# Patient Record
Sex: FEMALE | Race: BLACK OR AFRICAN AMERICAN | Employment: UNEMPLOYED | URBAN - METROPOLITAN AREA
[De-identification: names, ages, dates, MRNs, and addresses within clinical notes are randomized per-mention and may not be internally consistent; named-entity substitution may affect disease eponyms.]

---

## 2017-08-08 PROBLEM — I51.7 CARDIOMEGALY: Status: ACTIVE | Noted: 2017-08-08

## 2017-08-08 PROBLEM — R09.02 HYPOXEMIA: Status: ACTIVE | Noted: 2017-08-08

## 2017-08-09 PROBLEM — R91.1 NODULE OF RIGHT LUNG: Status: ACTIVE | Noted: 2017-08-09

## 2018-04-08 ENCOUNTER — APPOINTMENT (OUTPATIENT)
Dept: GENERAL RADIOLOGY | Age: 63
DRG: 720 | End: 2018-04-08
Payer: COMMERCIAL

## 2018-04-08 ENCOUNTER — APPOINTMENT (OUTPATIENT)
Dept: CT IMAGING | Age: 63
DRG: 720 | End: 2018-04-08
Payer: COMMERCIAL

## 2018-04-08 ENCOUNTER — HOSPITAL ENCOUNTER (INPATIENT)
Age: 63
LOS: 5 days | Discharge: HOME HEALTH | DRG: 720 | End: 2018-04-14
Attending: EMERGENCY MEDICINE | Admitting: HOSPITALIST
Payer: COMMERCIAL

## 2018-04-08 DIAGNOSIS — A41.9 SEVERE SEPSIS (HCC): ICD-10-CM

## 2018-04-08 DIAGNOSIS — N17.0 ACUTE RENAL FAILURE WITH TUBULAR NECROSIS (HCC): ICD-10-CM

## 2018-04-08 DIAGNOSIS — N19 UREMIA: ICD-10-CM

## 2018-04-08 DIAGNOSIS — F14.10 COCAINE ABUSE (HCC): ICD-10-CM

## 2018-04-08 DIAGNOSIS — R65.20 SEVERE SEPSIS (HCC): ICD-10-CM

## 2018-04-08 DIAGNOSIS — E87.29 HIGH ANION GAP METABOLIC ACIDOSIS: ICD-10-CM

## 2018-04-08 DIAGNOSIS — G93.49 UREMIC ENCEPHALOPATHY: Primary | ICD-10-CM

## 2018-04-08 DIAGNOSIS — R74.8 ELEVATED LIPASE: ICD-10-CM

## 2018-04-08 DIAGNOSIS — N19 UREMIC ENCEPHALOPATHY: Primary | ICD-10-CM

## 2018-04-08 DIAGNOSIS — N17.9 ACUTE RENAL FAILURE, UNSPECIFIED ACUTE RENAL FAILURE TYPE (HCC): ICD-10-CM

## 2018-04-08 DIAGNOSIS — K92.2 GASTROINTESTINAL HEMORRHAGE, UNSPECIFIED GASTROINTESTINAL HEMORRHAGE TYPE: ICD-10-CM

## 2018-04-08 DIAGNOSIS — L08.9 LOCAL INFECTION OF SKIN AND SUBCUTANEOUS TISSUE: ICD-10-CM

## 2018-04-08 LAB
ACETAMINOPHEN LEVEL: <15 MCG/ML (ref 10–30)
ALBUMIN SERPL-MCNC: 3.6 G/DL (ref 3.5–5.2)
ALP BLD-CCNC: 129 U/L (ref 35–104)
ALT SERPL-CCNC: 12 U/L (ref 0–32)
AMMONIA: 27 UMOL/L (ref 11–51)
AMPHETAMINE SCREEN, URINE: NOT DETECTED
ANION GAP SERPL CALCULATED.3IONS-SCNC: 39 MMOL/L (ref 7–16)
AST SERPL-CCNC: 16 U/L (ref 0–31)
B.E.: -12.7 MMOL/L (ref -3–3)
BACTERIA: ABNORMAL /HPF
BARBITURATE SCREEN URINE: NOT DETECTED
BASOPHILS ABSOLUTE: 0.05 E9/L (ref 0–0.2)
BASOPHILS RELATIVE PERCENT: 0.2 % (ref 0–2)
BENZODIAZEPINE SCREEN, URINE: NOT DETECTED
BILIRUB SERPL-MCNC: 0.5 MG/DL (ref 0–1.2)
BILIRUBIN URINE: ABNORMAL
BLOOD, URINE: ABNORMAL
BUN BLDV-MCNC: 274 MG/DL (ref 8–23)
CALCIUM SERPL-MCNC: 9.7 MG/DL (ref 8.6–10.2)
CANNABINOID SCREEN URINE: NOT DETECTED
CARDIOPULMONARY BYPASS: NO
CHLORIDE BLD-SCNC: 91 MMOL/L (ref 98–107)
CLARITY: ABNORMAL
CO2: 8 MMOL/L (ref 22–29)
COCAINE METABOLITE SCREEN URINE: POSITIVE
COLOR: YELLOW
CREAT SERPL-MCNC: 15.3 MG/DL (ref 0.5–1)
DELIVERY SYSTEMS: ABNORMAL
DEVICE: ABNORMAL
EKG ATRIAL RATE: 102 BPM
EKG P AXIS: 61 DEGREES
EKG P-R INTERVAL: 160 MS
EKG Q-T INTERVAL: 342 MS
EKG QRS DURATION: 108 MS
EKG QTC CALCULATION (BAZETT): 445 MS
EKG T AXIS: 91 DEGREES
EKG VENTRICULAR RATE: 102 BPM
EOSINOPHILS ABSOLUTE: 0 E9/L (ref 0.05–0.5)
EOSINOPHILS RELATIVE PERCENT: 0 % (ref 0–6)
EPITHELIAL CELLS, UA: ABNORMAL /HPF
ETHANOL: <10 MG/DL (ref 0–0.08)
FIO2 ARTERIAL: 2
GFR AFRICAN AMERICAN: 3
GFR NON-AFRICAN AMERICAN: 3 ML/MIN/1.73
GLUCOSE BLD-MCNC: 157 MG/DL (ref 74–109)
GLUCOSE URINE: NEGATIVE MG/DL
HCO3 ARTERIAL: 12.9 MMOL/L (ref 22–26)
HCT VFR BLD CALC: 35 % (ref 34–48)
HEMOGLOBIN: 12.2 G/DL (ref 11.5–15.5)
IMMATURE GRANULOCYTES #: 0.13 E9/L
IMMATURE GRANULOCYTES %: 0.6 % (ref 0–5)
INR BLD: ABNORMAL
KETONES, URINE: ABNORMAL MG/DL
LACTIC ACID: 0.7 MMOL/L (ref 0.5–2.2)
LACTIC ACID: 1.5 MMOL/L (ref 0.5–2.2)
LEUKOCYTE ESTERASE, URINE: ABNORMAL
LIPASE: 211 U/L (ref 13–60)
LYMPHOCYTES ABSOLUTE: 1.14 E9/L (ref 1.5–4)
LYMPHOCYTES RELATIVE PERCENT: 5.4 % (ref 20–42)
MCH RBC QN AUTO: 29 PG (ref 26–35)
MCHC RBC AUTO-ENTMCNC: 34.9 % (ref 32–34.5)
MCV RBC AUTO: 83.3 FL (ref 80–99.9)
METHADONE SCREEN, URINE: NOT DETECTED
MONOCYTES ABSOLUTE: 0.96 E9/L (ref 0.1–0.95)
MONOCYTES RELATIVE PERCENT: 4.5 % (ref 2–12)
NEUTROPHILS ABSOLUTE: 18.92 E9/L (ref 1.8–7.3)
NEUTROPHILS RELATIVE PERCENT: 89.3 % (ref 43–80)
NITRITE, URINE: NEGATIVE
O2 SATURATION: 96.3 % (ref 92–98.5)
OPERATOR ID: 2124
OPIATE SCREEN URINE: NOT DETECTED
PCO2 ARTERIAL: 28.3 MMHG (ref 35–45)
PDW BLD-RTO: 14.7 FL (ref 11.5–15)
PH BLOOD GAS: 7.27 (ref 7.35–7.45)
PH UA: 5 (ref 5–9)
PHENCYCLIDINE SCREEN URINE: NOT DETECTED
PLATELET # BLD: 303 E9/L (ref 130–450)
PMV BLD AUTO: 12.2 FL (ref 7–12)
PO2 ARTERIAL: 94.3 MMHG (ref 60–80)
POTASSIUM SERPL-SCNC: 3.9 MMOL/L (ref 3.5–5)
PROPOXYPHENE SCREEN: NOT DETECTED
PROTEIN UA: ABNORMAL MG/DL
PROTHROMBIN TIME: ABNORMAL SEC (ref 9.3–12.4)
RBC # BLD: 4.2 E12/L (ref 3.5–5.5)
RBC UA: ABNORMAL /HPF (ref 0–2)
SALICYLATE, SERUM: <0.3 MG/DL (ref 0–30)
SODIUM BLD-SCNC: 138 MMOL/L (ref 132–146)
SOURCE, BLOOD GAS: ABNORMAL
SPECIFIC GRAVITY UA: >=1.03 (ref 1–1.03)
TOTAL CK: 292 U/L (ref 20–180)
TOTAL PROTEIN: 8.3 G/DL (ref 6.4–8.3)
TRICYCLIC ANTIDEPRESSANTS SCREEN SERUM: NEGATIVE NG/ML
TROPONIN: 0.01 NG/ML (ref 0–0.03)
UROBILINOGEN, URINE: 0.2 E.U./DL
WBC # BLD: 21.2 E9/L (ref 4.5–11.5)
WBC UA: ABNORMAL /HPF (ref 0–5)

## 2018-04-08 PROCEDURE — 70450 CT HEAD/BRAIN W/O DYE: CPT

## 2018-04-08 PROCEDURE — G0480 DRUG TEST DEF 1-7 CLASSES: HCPCS

## 2018-04-08 PROCEDURE — 2500000003 HC RX 250 WO HCPCS: Performed by: EMERGENCY MEDICINE

## 2018-04-08 PROCEDURE — 71045 X-RAY EXAM CHEST 1 VIEW: CPT

## 2018-04-08 PROCEDURE — 2000000000 HC ICU R&B

## 2018-04-08 PROCEDURE — 83605 ASSAY OF LACTIC ACID: CPT

## 2018-04-08 PROCEDURE — 36600 WITHDRAWAL OF ARTERIAL BLOOD: CPT

## 2018-04-08 PROCEDURE — 80307 DRUG TEST PRSMV CHEM ANLYZR: CPT

## 2018-04-08 PROCEDURE — 87186 SC STD MICRODIL/AGAR DIL: CPT

## 2018-04-08 PROCEDURE — 87070 CULTURE OTHR SPECIMN AEROBIC: CPT

## 2018-04-08 PROCEDURE — 36415 COLL VENOUS BLD VENIPUNCTURE: CPT

## 2018-04-08 PROCEDURE — 99285 EMERGENCY DEPT VISIT HI MDM: CPT

## 2018-04-08 PROCEDURE — 85025 COMPLETE CBC W/AUTO DIFF WBC: CPT

## 2018-04-08 PROCEDURE — 82803 BLOOD GASES ANY COMBINATION: CPT

## 2018-04-08 PROCEDURE — 6360000002 HC RX W HCPCS: Performed by: HOSPITALIST

## 2018-04-08 PROCEDURE — 85610 PROTHROMBIN TIME: CPT

## 2018-04-08 PROCEDURE — 82550 ASSAY OF CK (CPK): CPT

## 2018-04-08 PROCEDURE — 87077 CULTURE AEROBIC IDENTIFY: CPT

## 2018-04-08 PROCEDURE — 93005 ELECTROCARDIOGRAM TRACING: CPT | Performed by: EMERGENCY MEDICINE

## 2018-04-08 PROCEDURE — 6360000002 HC RX W HCPCS: Performed by: EMERGENCY MEDICINE

## 2018-04-08 PROCEDURE — 84484 ASSAY OF TROPONIN QUANT: CPT

## 2018-04-08 PROCEDURE — 82140 ASSAY OF AMMONIA: CPT

## 2018-04-08 PROCEDURE — 81001 URINALYSIS AUTO W/SCOPE: CPT

## 2018-04-08 PROCEDURE — C9113 INJ PANTOPRAZOLE SODIUM, VIA: HCPCS | Performed by: EMERGENCY MEDICINE

## 2018-04-08 PROCEDURE — 83690 ASSAY OF LIPASE: CPT

## 2018-04-08 PROCEDURE — 2580000003 HC RX 258: Performed by: HOSPITALIST

## 2018-04-08 PROCEDURE — 2580000003 HC RX 258: Performed by: EMERGENCY MEDICINE

## 2018-04-08 PROCEDURE — 87040 BLOOD CULTURE FOR BACTERIA: CPT

## 2018-04-08 PROCEDURE — 6360000002 HC RX W HCPCS: Performed by: PHYSICIAN ASSISTANT

## 2018-04-08 PROCEDURE — 02H633Z INSERTION OF INFUSION DEVICE INTO RIGHT ATRIUM, PERCUTANEOUS APPROACH: ICD-10-PCS | Performed by: HOSPITALIST

## 2018-04-08 PROCEDURE — 80053 COMPREHEN METABOLIC PANEL: CPT

## 2018-04-08 RX ORDER — 0.9 % SODIUM CHLORIDE 0.9 %
1000 INTRAVENOUS SOLUTION INTRAVENOUS ONCE
Status: COMPLETED | OUTPATIENT
Start: 2018-04-08 | End: 2018-04-08

## 2018-04-08 RX ORDER — DESMOPRESSIN ACETATE 4 UG/ML
1 INJECTION, SOLUTION INTRAVENOUS; SUBCUTANEOUS ONCE
Status: DISCONTINUED | OUTPATIENT
Start: 2018-04-08 | End: 2018-04-08

## 2018-04-08 RX ORDER — LORAZEPAM 2 MG/ML
1 INJECTION INTRAMUSCULAR ONCE
Status: COMPLETED | OUTPATIENT
Start: 2018-04-08 | End: 2018-04-08

## 2018-04-08 RX ORDER — 0.9 % SODIUM CHLORIDE 0.9 %
500 INTRAVENOUS SOLUTION INTRAVENOUS ONCE
Status: COMPLETED | OUTPATIENT
Start: 2018-04-08 | End: 2018-04-08

## 2018-04-08 RX ORDER — PANTOPRAZOLE SODIUM 40 MG/10ML
40 INJECTION, POWDER, LYOPHILIZED, FOR SOLUTION INTRAVENOUS ONCE
Status: COMPLETED | OUTPATIENT
Start: 2018-04-08 | End: 2018-04-08

## 2018-04-08 RX ORDER — FLUCONAZOLE, SODIUM CHLORIDE 2 MG/ML
100 INJECTION INTRAVENOUS ONCE
Status: COMPLETED | OUTPATIENT
Start: 2018-04-08 | End: 2018-04-09

## 2018-04-08 RX ADMIN — LORAZEPAM 1 MG: 2 INJECTION INTRAMUSCULAR; INTRAVENOUS at 20:52

## 2018-04-08 RX ADMIN — PANTOPRAZOLE SODIUM 40 MG: 40 INJECTION, POWDER, FOR SOLUTION INTRAVENOUS at 19:51

## 2018-04-08 RX ADMIN — PIPERACILLIN SODIUM AND TAZOBACTAM SODIUM 3.38 G: 3; .375 INJECTION, POWDER, LYOPHILIZED, FOR SOLUTION INTRAVENOUS at 19:51

## 2018-04-08 RX ADMIN — SODIUM CHLORIDE 500 ML: 9 INJECTION, SOLUTION INTRAVENOUS at 17:44

## 2018-04-08 RX ADMIN — SODIUM CHLORIDE 1000 ML: 9 INJECTION, SOLUTION INTRAVENOUS at 19:57

## 2018-04-08 RX ADMIN — FLUCONAZOLE 100 MG: 2 INJECTION INTRAVENOUS at 23:59

## 2018-04-08 RX ADMIN — DAPTOMYCIN 385 MG: 500 INJECTION, POWDER, LYOPHILIZED, FOR SOLUTION INTRAVENOUS at 21:59

## 2018-04-08 RX ADMIN — SODIUM CHLORIDE 1000 ML: 9 INJECTION, SOLUTION INTRAVENOUS at 17:44

## 2018-04-09 ENCOUNTER — APPOINTMENT (OUTPATIENT)
Dept: INTERVENTIONAL RADIOLOGY/VASCULAR | Age: 63
DRG: 720 | End: 2018-04-09
Payer: COMMERCIAL

## 2018-04-09 ENCOUNTER — APPOINTMENT (OUTPATIENT)
Dept: ULTRASOUND IMAGING | Age: 63
DRG: 720 | End: 2018-04-09
Payer: COMMERCIAL

## 2018-04-09 PROBLEM — A41.9 SEPSIS AFFECTING SKIN: Status: ACTIVE | Noted: 2018-04-09

## 2018-04-09 PROBLEM — N17.0 ACUTE RENAL FAILURE WITH TUBULAR NECROSIS (HCC): Status: ACTIVE | Noted: 2018-04-08

## 2018-04-09 PROBLEM — E87.20 METABOLIC ACIDOSIS: Status: ACTIVE | Noted: 2018-04-09

## 2018-04-09 PROBLEM — K62.5 RECTAL BLEEDING: Status: ACTIVE | Noted: 2018-04-09

## 2018-04-09 PROBLEM — F14.10 COCAINE ABUSE (HCC): Status: ACTIVE | Noted: 2018-04-09

## 2018-04-09 LAB
ALBUMIN SERPL-MCNC: 2.7 G/DL (ref 3.5–5.2)
ALP BLD-CCNC: 100 U/L (ref 35–104)
ALT SERPL-CCNC: 10 U/L (ref 0–32)
AMORPHOUS: ABNORMAL
ANION GAP SERPL CALCULATED.3IONS-SCNC: 31 MMOL/L (ref 7–16)
APTT: 24.9 SEC (ref 24.5–35.1)
APTT: 27.9 SEC (ref 24.5–35.1)
AST SERPL-CCNC: 12 U/L (ref 0–31)
BACTERIA: ABNORMAL /HPF
BILIRUB SERPL-MCNC: 0.4 MG/DL (ref 0–1.2)
BILIRUBIN URINE: NEGATIVE
BLOOD, URINE: ABNORMAL
BUN BLDV-MCNC: 254 MG/DL (ref 8–23)
C3 COMPLEMENT: 176 MG/DL (ref 90–180)
C4 COMPLEMENT: 62 MG/DL (ref 10–40)
CALCIUM SERPL-MCNC: 8.9 MG/DL (ref 8.6–10.2)
CHLORIDE BLD-SCNC: 98 MMOL/L (ref 98–107)
CHLORIDE URINE RANDOM: <20 MMOL/L
CLARITY: ABNORMAL
CO2: 14 MMOL/L (ref 22–29)
COLOR: ABNORMAL
CREAT SERPL-MCNC: 12.7 MG/DL (ref 0.5–1)
CREATININE URINE: 169 MG/DL (ref 29–226)
CRYSTALS, UA: ABNORMAL
EOSINOPHIL, URINE: 0 % (ref 0–1)
EPITHELIAL CELLS, UA: ABNORMAL /HPF
GFR AFRICAN AMERICAN: 4
GFR NON-AFRICAN AMERICAN: 4 ML/MIN/1.73
GLUCOSE BLD-MCNC: 181 MG/DL (ref 74–109)
GLUCOSE URINE: NEGATIVE MG/DL
HCT VFR BLD CALC: 26.9 % (ref 34–48)
HEMOGLOBIN: 9.8 G/DL (ref 11.5–15.5)
INR BLD: 1.1
INR BLD: 1.1
KETONES, URINE: NEGATIVE MG/DL
LACTIC ACID: 0.5 MMOL/L (ref 0.5–2.2)
LEUKOCYTE ESTERASE, URINE: ABNORMAL
MAGNESIUM: 1.8 MG/DL (ref 1.6–2.6)
MCH RBC QN AUTO: 29.8 PG (ref 26–35)
MCHC RBC AUTO-ENTMCNC: 36.4 % (ref 32–34.5)
MCV RBC AUTO: 81.8 FL (ref 80–99.9)
NITRITE, URINE: NEGATIVE
OSMOLALITY URINE: 411 MOSM/KG (ref 300–900)
OSMOLALITY: 389 MOSM/KG (ref 285–310)
PDW BLD-RTO: 14.5 FL (ref 11.5–15)
PH UA: 5 (ref 5–9)
PHOSPHORUS: 11.2 MG/DL (ref 2.5–4.5)
PLATELET # BLD: 255 E9/L (ref 130–450)
PMV BLD AUTO: 12.3 FL (ref 7–12)
POTASSIUM SERPL-SCNC: 3.4 MMOL/L (ref 3.5–5)
POTASSIUM, UR: 18.6 MMOL/L
PROTEIN PROTEIN: 52 MG/DL (ref 0–12)
PROTEIN UA: 30 MG/DL
PROTEIN/CREAT RATIO: 0.3
PROTEIN/CREAT RATIO: 0.3 (ref 0–0.2)
PROTHROMBIN TIME: 12.5 SEC (ref 9.3–12.4)
PROTHROMBIN TIME: 12.6 SEC (ref 9.3–12.4)
RBC # BLD: 3.29 E12/L (ref 3.5–5.5)
RBC UA: ABNORMAL /HPF (ref 0–2)
REPORT: NORMAL
RHEUMATOID FACTOR: 12 IU/ML (ref 0–13)
SODIUM BLD-SCNC: 143 MMOL/L (ref 132–146)
SODIUM URINE: <20 MMOL/L
SPECIFIC GRAVITY UA: 1.02 (ref 1–1.03)
TOTAL CK: 207 U/L (ref 20–180)
TOTAL PROTEIN: 7.1 G/DL (ref 6.4–8.3)
UREA NITROGEN, UR: 775 MG/DL (ref 800–1666)
UROBILINOGEN, URINE: 0.2 E.U./DL
WBC # BLD: 14.7 E9/L (ref 4.5–11.5)
WBC UA: ABNORMAL /HPF (ref 0–5)

## 2018-04-09 PROCEDURE — 2580000003 HC RX 258: Performed by: PHYSICIAN ASSISTANT

## 2018-04-09 PROCEDURE — 83930 ASSAY OF BLOOD OSMOLALITY: CPT

## 2018-04-09 PROCEDURE — 80053 COMPREHEN METABOLIC PANEL: CPT

## 2018-04-09 PROCEDURE — 87340 HEPATITIS B SURFACE AG IA: CPT

## 2018-04-09 PROCEDURE — 2000000000 HC ICU R&B

## 2018-04-09 PROCEDURE — 86038 ANTINUCLEAR ANTIBODIES: CPT

## 2018-04-09 PROCEDURE — 36415 COLL VENOUS BLD VENIPUNCTURE: CPT

## 2018-04-09 PROCEDURE — 2500000003 HC RX 250 WO HCPCS: Performed by: EMERGENCY MEDICINE

## 2018-04-09 PROCEDURE — 85730 THROMBOPLASTIN TIME PARTIAL: CPT

## 2018-04-09 PROCEDURE — 02HV33Z INSERTION OF INFUSION DEVICE INTO SUPERIOR VENA CAVA, PERCUTANEOUS APPROACH: ICD-10-PCS | Performed by: HOSPITALIST

## 2018-04-09 PROCEDURE — 94640 AIRWAY INHALATION TREATMENT: CPT

## 2018-04-09 PROCEDURE — 84100 ASSAY OF PHOSPHORUS: CPT

## 2018-04-09 PROCEDURE — 84540 ASSAY OF URINE/UREA-N: CPT

## 2018-04-09 PROCEDURE — 86235 NUCLEAR ANTIGEN ANTIBODY: CPT

## 2018-04-09 PROCEDURE — 83935 ASSAY OF URINE OSMOLALITY: CPT

## 2018-04-09 PROCEDURE — 76937 US GUIDE VASCULAR ACCESS: CPT | Performed by: RADIOLOGY

## 2018-04-09 PROCEDURE — 36556 INSERT NON-TUNNEL CV CATH: CPT | Performed by: RADIOLOGY

## 2018-04-09 PROCEDURE — 82550 ASSAY OF CK (CPK): CPT

## 2018-04-09 PROCEDURE — 86803 HEPATITIS C AB TEST: CPT

## 2018-04-09 PROCEDURE — 87081 CULTURE SCREEN ONLY: CPT

## 2018-04-09 PROCEDURE — 86431 RHEUMATOID FACTOR QUANT: CPT

## 2018-04-09 PROCEDURE — 84166 PROTEIN E-PHORESIS/URINE/CSF: CPT

## 2018-04-09 PROCEDURE — 86160 COMPLEMENT ANTIGEN: CPT

## 2018-04-09 PROCEDURE — 83605 ASSAY OF LACTIC ACID: CPT

## 2018-04-09 PROCEDURE — 83516 IMMUNOASSAY NONANTIBODY: CPT

## 2018-04-09 PROCEDURE — 2700000000 HC OXYGEN THERAPY PER DAY

## 2018-04-09 PROCEDURE — 5A1D70Z PERFORMANCE OF URINARY FILTRATION, INTERMITTENT, LESS THAN 6 HOURS PER DAY: ICD-10-PCS

## 2018-04-09 PROCEDURE — 84165 PROTEIN E-PHORESIS SERUM: CPT

## 2018-04-09 PROCEDURE — 2580000003 HC RX 258: Performed by: EMERGENCY MEDICINE

## 2018-04-09 PROCEDURE — 86703 HIV-1/HIV-2 1 RESULT ANTBDY: CPT

## 2018-04-09 PROCEDURE — C1751 CATH, INF, PER/CENT/MIDLINE: HCPCS

## 2018-04-09 PROCEDURE — 84133 ASSAY OF URINE POTASSIUM: CPT

## 2018-04-09 PROCEDURE — 86255 FLUORESCENT ANTIBODY SCREEN: CPT

## 2018-04-09 PROCEDURE — 82693 ASSAY OF ETHYLENE GLYCOL: CPT

## 2018-04-09 PROCEDURE — 85610 PROTHROMBIN TIME: CPT

## 2018-04-09 PROCEDURE — 84300 ASSAY OF URINE SODIUM: CPT

## 2018-04-09 PROCEDURE — 77001 FLUOROGUIDE FOR VEIN DEVICE: CPT | Performed by: RADIOLOGY

## 2018-04-09 PROCEDURE — 76775 US EXAM ABDO BACK WALL LIM: CPT

## 2018-04-09 PROCEDURE — 81001 URINALYSIS AUTO W/SCOPE: CPT

## 2018-04-09 PROCEDURE — 84156 ASSAY OF PROTEIN URINE: CPT

## 2018-04-09 PROCEDURE — 6360000002 HC RX W HCPCS: Performed by: PHYSICIAN ASSISTANT

## 2018-04-09 PROCEDURE — 82570 ASSAY OF URINE CREATININE: CPT

## 2018-04-09 PROCEDURE — 87186 SC STD MICRODIL/AGAR DIL: CPT

## 2018-04-09 PROCEDURE — 76536 US EXAM OF HEAD AND NECK: CPT

## 2018-04-09 PROCEDURE — 90935 HEMODIALYSIS ONE EVALUATION: CPT

## 2018-04-09 PROCEDURE — 80074 ACUTE HEPATITIS PANEL: CPT

## 2018-04-09 PROCEDURE — 83735 ASSAY OF MAGNESIUM: CPT

## 2018-04-09 PROCEDURE — 6370000000 HC RX 637 (ALT 250 FOR IP): Performed by: PHYSICIAN ASSISTANT

## 2018-04-09 PROCEDURE — 85027 COMPLETE CBC AUTOMATED: CPT

## 2018-04-09 PROCEDURE — 82436 ASSAY OF URINE CHLORIDE: CPT

## 2018-04-09 PROCEDURE — 87070 CULTURE OTHR SPECIMN AEROBIC: CPT

## 2018-04-09 PROCEDURE — 87205 SMEAR GRAM STAIN: CPT

## 2018-04-09 PROCEDURE — 2500000003 HC RX 250 WO HCPCS: Performed by: NURSE PRACTITIONER

## 2018-04-09 RX ORDER — ASPIRIN 81 MG/1
81 TABLET, CHEWABLE ORAL DAILY
Status: DISCONTINUED | OUTPATIENT
Start: 2018-04-09 | End: 2018-04-14 | Stop reason: HOSPADM

## 2018-04-09 RX ORDER — ONDANSETRON 2 MG/ML
4 INJECTION INTRAMUSCULAR; INTRAVENOUS EVERY 6 HOURS PRN
Status: DISCONTINUED | OUTPATIENT
Start: 2018-04-09 | End: 2018-04-14 | Stop reason: HOSPADM

## 2018-04-09 RX ORDER — ATORVASTATIN CALCIUM 10 MG/1
10 TABLET, FILM COATED ORAL DAILY
Status: DISCONTINUED | OUTPATIENT
Start: 2018-04-09 | End: 2018-04-14 | Stop reason: HOSPADM

## 2018-04-09 RX ORDER — SODIUM CHLORIDE 9 MG/ML
INJECTION, SOLUTION INTRAVENOUS CONTINUOUS
Status: DISCONTINUED | OUTPATIENT
Start: 2018-04-09 | End: 2018-04-09

## 2018-04-09 RX ORDER — METOPROLOL TARTRATE 5 MG/5ML
5 INJECTION INTRAVENOUS ONCE
Status: COMPLETED | OUTPATIENT
Start: 2018-04-09 | End: 2018-04-09

## 2018-04-09 RX ORDER — HEPARIN SODIUM 5000 [USP'U]/ML
5000 INJECTION, SOLUTION INTRAVENOUS; SUBCUTANEOUS EVERY 8 HOURS SCHEDULED
Status: DISCONTINUED | OUTPATIENT
Start: 2018-04-09 | End: 2018-04-09

## 2018-04-09 RX ORDER — METOPROLOL TARTRATE 50 MG/1
50 TABLET, FILM COATED ORAL 2 TIMES DAILY
Status: DISCONTINUED | OUTPATIENT
Start: 2018-04-09 | End: 2018-04-09

## 2018-04-09 RX ORDER — ACETAMINOPHEN 500 MG
500 TABLET ORAL EVERY 4 HOURS PRN
Status: DISCONTINUED | OUTPATIENT
Start: 2018-04-09 | End: 2018-04-14 | Stop reason: HOSPADM

## 2018-04-09 RX ORDER — SEVELAMER CARBONATE 800 MG/1
800 TABLET, FILM COATED ORAL
Status: DISCONTINUED | OUTPATIENT
Start: 2018-04-09 | End: 2018-04-14

## 2018-04-09 RX ORDER — SODIUM CHLORIDE 0.9 % (FLUSH) 0.9 %
10 SYRINGE (ML) INJECTION PRN
Status: DISCONTINUED | OUTPATIENT
Start: 2018-04-09 | End: 2018-04-14 | Stop reason: HOSPADM

## 2018-04-09 RX ORDER — DOCUSATE SODIUM 100 MG/1
100 CAPSULE, LIQUID FILLED ORAL 2 TIMES DAILY
Status: DISCONTINUED | OUTPATIENT
Start: 2018-04-09 | End: 2018-04-14 | Stop reason: HOSPADM

## 2018-04-09 RX ORDER — SODIUM CHLORIDE 0.9 % (FLUSH) 0.9 %
10 SYRINGE (ML) INJECTION EVERY 12 HOURS SCHEDULED
Status: DISCONTINUED | OUTPATIENT
Start: 2018-04-09 | End: 2018-04-14 | Stop reason: HOSPADM

## 2018-04-09 RX ADMIN — METOROPROLOL TARTRATE 5 MG: 5 INJECTION, SOLUTION INTRAVENOUS at 21:15

## 2018-04-09 RX ADMIN — Medication 10 ML: at 09:00

## 2018-04-09 RX ADMIN — DESMOPRESSIN ACETATE 29 MCG: 4 SOLUTION INTRAVENOUS at 02:26

## 2018-04-09 RX ADMIN — Medication: at 10:11

## 2018-04-09 RX ADMIN — Medication: at 18:26

## 2018-04-09 RX ADMIN — PIPERACILLIN SODIUM AND TAZOBACTAM SODIUM 3.38 G: 3; .375 INJECTION, POWDER, LYOPHILIZED, FOR SOLUTION INTRAVENOUS at 21:20

## 2018-04-09 RX ADMIN — Medication 10 ML: at 21:15

## 2018-04-09 RX ADMIN — PIPERACILLIN SODIUM AND TAZOBACTAM SODIUM 3.38 G: 3; .375 INJECTION, POWDER, LYOPHILIZED, FOR SOLUTION INTRAVENOUS at 08:33

## 2018-04-09 ASSESSMENT — PAIN SCALES - GENERAL
PAINLEVEL_OUTOF10: 0

## 2018-04-09 ASSESSMENT — ENCOUNTER SYMPTOMS
SHORTNESS OF BREATH: 0
ABDOMINAL PAIN: 0
NAUSEA: 0
VOMITING: 0

## 2018-04-10 ENCOUNTER — APPOINTMENT (OUTPATIENT)
Dept: GENERAL RADIOLOGY | Age: 63
DRG: 720 | End: 2018-04-10
Payer: COMMERCIAL

## 2018-04-10 LAB
ANCA IFA: NORMAL
ANION GAP SERPL CALCULATED.3IONS-SCNC: 19 MMOL/L (ref 7–16)
B.E.: 5 MMOL/L (ref -3–3)
BASOPHILS ABSOLUTE: 0.03 E9/L (ref 0–0.2)
BASOPHILS RELATIVE PERCENT: 0.3 % (ref 0–2)
BUN BLDV-MCNC: 146 MG/DL (ref 8–23)
CALCIUM SERPL-MCNC: 8.5 MG/DL (ref 8.6–10.2)
CHLORIDE BLD-SCNC: 98 MMOL/L (ref 98–107)
CO2: 30 MMOL/L (ref 22–29)
COHB: 1.8 % (ref 0–1.5)
CREAT SERPL-MCNC: 6.9 MG/DL (ref 0.5–1)
CRITICAL: ABNORMAL
DATE ANALYZED: ABNORMAL
DATE OF COLLECTION: ABNORMAL
EOSINOPHILS ABSOLUTE: 0.01 E9/L (ref 0.05–0.5)
EOSINOPHILS RELATIVE PERCENT: 0.1 % (ref 0–6)
GFR AFRICAN AMERICAN: 7
GFR NON-AFRICAN AMERICAN: 7 ML/MIN/1.73
GLUCOSE BLD-MCNC: 159 MG/DL (ref 74–109)
HAV IGM SER IA-ACNC: NORMAL
HCO3: 28.1 MMOL/L (ref 22–26)
HCT VFR BLD CALC: 23.9 % (ref 34–48)
HEMOGLOBIN: 8.5 G/DL (ref 11.5–15.5)
HEPATITIS B CORE IGM ANTIBODY: NORMAL
HEPATITIS B SURFACE ANTIGEN INTERPRETATION: NORMAL
HEPATITIS C ANTIBODY INTERPRETATION: NORMAL
HEPATITIS C ANTIBODY INTERPRETATION: NORMAL
HHB: 3.4 % (ref 0–5)
HIV-1 AND HIV-2 ANTIBODIES: NORMAL
IMMATURE GRANULOCYTES #: 0.07 E9/L
IMMATURE GRANULOCYTES %: 0.8 % (ref 0–5)
INFLUENZA A BY PCR: NOT DETECTED
INFLUENZA B BY PCR: NOT DETECTED
LAB: ABNORMAL
LYMPHOCYTES ABSOLUTE: 0.73 E9/L (ref 1.5–4)
LYMPHOCYTES RELATIVE PERCENT: 8.1 % (ref 20–42)
Lab: 925
MAGNESIUM: 1.5 MG/DL (ref 1.6–2.6)
MCH RBC QN AUTO: 29.3 PG (ref 26–35)
MCHC RBC AUTO-ENTMCNC: 35.6 % (ref 32–34.5)
MCV RBC AUTO: 82.4 FL (ref 80–99.9)
METHB: 0.1 % (ref 0–1.5)
MODE: ABNORMAL
MONOCYTES ABSOLUTE: 0.68 E9/L (ref 0.1–0.95)
MONOCYTES RELATIVE PERCENT: 7.5 % (ref 2–12)
MRSA CULTURE ONLY: NORMAL
NEUTROPHILS ABSOLUTE: 7.53 E9/L (ref 1.8–7.3)
NEUTROPHILS RELATIVE PERCENT: 83.2 % (ref 43–80)
O2 CONTENT: 11.9 ML/DL
O2 SATURATION: 96.5 % (ref 92–98.5)
O2HB: 94.7 % (ref 94–97)
OPERATOR ID: 557
PATIENT TEMP: 37 C
PCO2: 35.1 MMHG (ref 35–45)
PDW BLD-RTO: 13.7 FL (ref 11.5–15)
PH BLOOD GAS: 7.52 (ref 7.35–7.45)
PHOSPHORUS: 5.5 MG/DL (ref 2.5–4.5)
PLATELET # BLD: 225 E9/L (ref 130–450)
PMV BLD AUTO: 11.9 FL (ref 7–12)
PO2: 87.6 MMHG (ref 60–100)
POTASSIUM SERPL-SCNC: 3 MMOL/L (ref 3.5–5)
PRO-BNP: 1705 PG/ML (ref 0–125)
RBC # BLD: 2.9 E12/L (ref 3.5–5.5)
SODIUM BLD-SCNC: 147 MMOL/L (ref 132–146)
SOURCE, BLOOD GAS: ABNORMAL
THB: 8.8 G/DL (ref 11.5–16.5)
TIME ANALYZED: 925
WBC # BLD: 9.1 E9/L (ref 4.5–11.5)

## 2018-04-10 PROCEDURE — 94762 N-INVAS EAR/PLS OXIMTRY CONT: CPT

## 2018-04-10 PROCEDURE — 2580000003 HC RX 258: Performed by: SPECIALIST

## 2018-04-10 PROCEDURE — 36415 COLL VENOUS BLD VENIPUNCTURE: CPT

## 2018-04-10 PROCEDURE — 84100 ASSAY OF PHOSPHORUS: CPT

## 2018-04-10 PROCEDURE — 83880 ASSAY OF NATRIURETIC PEPTIDE: CPT

## 2018-04-10 PROCEDURE — 2500000003 HC RX 250 WO HCPCS: Performed by: NURSE PRACTITIONER

## 2018-04-10 PROCEDURE — 83735 ASSAY OF MAGNESIUM: CPT

## 2018-04-10 PROCEDURE — 2580000003 HC RX 258: Performed by: PHYSICIAN ASSISTANT

## 2018-04-10 PROCEDURE — 73100 X-RAY EXAM OF WRIST: CPT

## 2018-04-10 PROCEDURE — C9113 INJ PANTOPRAZOLE SODIUM, VIA: HCPCS | Performed by: HOSPITALIST

## 2018-04-10 PROCEDURE — 90935 HEMODIALYSIS ONE EVALUATION: CPT

## 2018-04-10 PROCEDURE — 80048 BASIC METABOLIC PNL TOTAL CA: CPT

## 2018-04-10 PROCEDURE — 36600 WITHDRAWAL OF ARTERIAL BLOOD: CPT

## 2018-04-10 PROCEDURE — 87502 INFLUENZA DNA AMP PROBE: CPT

## 2018-04-10 PROCEDURE — 2580000003 HC RX 258: Performed by: EMERGENCY MEDICINE

## 2018-04-10 PROCEDURE — 82805 BLOOD GASES W/O2 SATURATION: CPT

## 2018-04-10 PROCEDURE — 6360000002 HC RX W HCPCS: Performed by: PHYSICIAN ASSISTANT

## 2018-04-10 PROCEDURE — 2000000000 HC ICU R&B

## 2018-04-10 PROCEDURE — 2500000003 HC RX 250 WO HCPCS: Performed by: EMERGENCY MEDICINE

## 2018-04-10 PROCEDURE — 2580000003 HC RX 258: Performed by: HOSPITALIST

## 2018-04-10 PROCEDURE — 6360000002 HC RX W HCPCS: Performed by: SPECIALIST

## 2018-04-10 PROCEDURE — 6370000000 HC RX 637 (ALT 250 FOR IP): Performed by: PHYSICIAN ASSISTANT

## 2018-04-10 PROCEDURE — 6360000002 HC RX W HCPCS: Performed by: HOSPITALIST

## 2018-04-10 PROCEDURE — 94761 N-INVAS EAR/PLS OXIMETRY MLT: CPT

## 2018-04-10 PROCEDURE — 2500000003 HC RX 250 WO HCPCS: Performed by: HOSPITALIST

## 2018-04-10 PROCEDURE — 85025 COMPLETE CBC W/AUTO DIFF WBC: CPT

## 2018-04-10 RX ORDER — PANTOPRAZOLE SODIUM 40 MG/10ML
40 INJECTION, POWDER, LYOPHILIZED, FOR SOLUTION INTRAVENOUS DAILY
Status: DISCONTINUED | OUTPATIENT
Start: 2018-04-10 | End: 2018-04-14 | Stop reason: HOSPADM

## 2018-04-10 RX ORDER — MAGNESIUM SULFATE IN WATER 40 MG/ML
2 INJECTION, SOLUTION INTRAVENOUS ONCE
Status: COMPLETED | OUTPATIENT
Start: 2018-04-10 | End: 2018-04-10

## 2018-04-10 RX ORDER — METOPROLOL TARTRATE 5 MG/5ML
5 INJECTION INTRAVENOUS EVERY 6 HOURS
Status: DISCONTINUED | OUTPATIENT
Start: 2018-04-10 | End: 2018-04-11

## 2018-04-10 RX ORDER — POTASSIUM CHLORIDE 7.45 MG/ML
10 INJECTION INTRAVENOUS
Status: DISCONTINUED | OUTPATIENT
Start: 2018-04-10 | End: 2018-04-10 | Stop reason: CLARIF

## 2018-04-10 RX ORDER — POTASSIUM CHLORIDE 20 MEQ/1
40 TABLET, EXTENDED RELEASE ORAL ONCE
Status: DISCONTINUED | OUTPATIENT
Start: 2018-04-10 | End: 2018-04-10

## 2018-04-10 RX ORDER — ACETAMINOPHEN 650 MG/1
650 SUPPOSITORY RECTAL EVERY 4 HOURS PRN
Status: DISCONTINUED | OUTPATIENT
Start: 2018-04-10 | End: 2018-04-14 | Stop reason: HOSPADM

## 2018-04-10 RX ORDER — METOPROLOL TARTRATE 5 MG/5ML
5 INJECTION INTRAVENOUS EVERY 8 HOURS
Status: DISCONTINUED | OUTPATIENT
Start: 2018-04-10 | End: 2018-04-10

## 2018-04-10 RX ORDER — 0.9 % SODIUM CHLORIDE 0.9 %
10 VIAL (ML) INJECTION DAILY
Status: DISCONTINUED | OUTPATIENT
Start: 2018-04-10 | End: 2018-04-14 | Stop reason: HOSPADM

## 2018-04-10 RX ADMIN — METOPROLOL TARTRATE 5 MG: 5 INJECTION INTRAVENOUS at 16:08

## 2018-04-10 RX ADMIN — METOROPROLOL TARTRATE 5 MG: 5 INJECTION, SOLUTION INTRAVENOUS at 06:20

## 2018-04-10 RX ADMIN — Medication 10 ML: at 18:03

## 2018-04-10 RX ADMIN — MAGNESIUM SULFATE HEPTAHYDRATE 2 G: 40 INJECTION, SOLUTION INTRAVENOUS at 16:08

## 2018-04-10 RX ADMIN — Medication 10 ML: at 19:22

## 2018-04-10 RX ADMIN — PIPERACILLIN SODIUM AND TAZOBACTAM SODIUM 3.38 G: 3; .375 INJECTION, POWDER, LYOPHILIZED, FOR SOLUTION INTRAVENOUS at 08:51

## 2018-04-10 RX ADMIN — POTASSIUM CHLORIDE: 2 INJECTION, SOLUTION, CONCENTRATE INTRAVENOUS at 11:46

## 2018-04-10 RX ADMIN — Medication 10 ML: at 16:12

## 2018-04-10 RX ADMIN — Medication 10 ML: at 09:00

## 2018-04-10 RX ADMIN — MICONAZOLE NITRATE: 1.42 POWDER TOPICAL at 21:01

## 2018-04-10 RX ADMIN — Medication 10 ML: at 18:04

## 2018-04-10 RX ADMIN — PANTOPRAZOLE SODIUM 40 MG: 40 INJECTION, POWDER, FOR SOLUTION INTRAVENOUS at 19:22

## 2018-04-10 RX ADMIN — Medication 10 ML: at 22:11

## 2018-04-10 RX ADMIN — VANCOMYCIN HYDROCHLORIDE 1000 MG: 1 INJECTION, POWDER, LYOPHILIZED, FOR SOLUTION INTRAVENOUS at 16:53

## 2018-04-10 RX ADMIN — Medication 10 ML: at 16:09

## 2018-04-10 RX ADMIN — PIPERACILLIN SODIUM AND TAZOBACTAM SODIUM 3.38 G: 3; .375 INJECTION, POWDER, LYOPHILIZED, FOR SOLUTION INTRAVENOUS at 20:58

## 2018-04-10 RX ADMIN — METOPROLOL TARTRATE 5 MG: 5 INJECTION INTRAVENOUS at 22:11

## 2018-04-10 RX ADMIN — Medication: at 03:21

## 2018-04-10 ASSESSMENT — PAIN SCALES - GENERAL
PAINLEVEL_OUTOF10: 0

## 2018-04-11 LAB
ADDENDUM ELECTROPHORESIS URINE RANDOM: NORMAL
ALBUMIN SERPL-MCNC: 2.8 G/DL (ref 3.5–5.2)
ALP BLD-CCNC: 106 U/L (ref 35–104)
ALT SERPL-CCNC: 11 U/L (ref 0–32)
ANION GAP SERPL CALCULATED.3IONS-SCNC: 16 MMOL/L (ref 7–16)
ANTI-NUCLEAR ANTIBODY (ANA): NEGATIVE
AST SERPL-CCNC: 17 U/L (ref 0–31)
BASOPHILS ABSOLUTE: 0.02 E9/L (ref 0–0.2)
BASOPHILS RELATIVE PERCENT: 0.2 % (ref 0–2)
BETA GLOBULIN: 0 AU/ML (ref 0–19)
BILIRUB SERPL-MCNC: 0.5 MG/DL (ref 0–1.2)
BUN BLDV-MCNC: 86 MG/DL (ref 8–23)
CALCIUM SERPL-MCNC: 9.3 MG/DL (ref 8.6–10.2)
CHLORIDE BLD-SCNC: 101 MMOL/L (ref 98–107)
CO2: 28 MMOL/L (ref 22–29)
CREAT SERPL-MCNC: 3.3 MG/DL (ref 0.5–1)
ENA TO SSA (RO) ANTIBODY: NEGATIVE
ENA TO SSB (LA) ANTIBODY: POSITIVE
EOSINOPHILS ABSOLUTE: 0.01 E9/L (ref 0.05–0.5)
EOSINOPHILS RELATIVE PERCENT: 0.1 % (ref 0–6)
GFR AFRICAN AMERICAN: 17
GFR NON-AFRICAN AMERICAN: 17 ML/MIN/1.73
GLUCOSE BLD-MCNC: 132 MG/DL (ref 74–109)
HCT VFR BLD CALC: 25.1 % (ref 34–48)
HEMOGLOBIN: 8.6 G/DL (ref 11.5–15.5)
IMMATURE GRANULOCYTES #: 0.04 E9/L
IMMATURE GRANULOCYTES %: 0.5 % (ref 0–5)
LYMPHOCYTES ABSOLUTE: 1.09 E9/L (ref 1.5–4)
LYMPHOCYTES RELATIVE PERCENT: 13 % (ref 20–42)
MAGNESIUM: 2 MG/DL (ref 1.6–2.6)
MCH RBC QN AUTO: 29.4 PG (ref 26–35)
MCHC RBC AUTO-ENTMCNC: 34.3 % (ref 32–34.5)
MCV RBC AUTO: 85.7 FL (ref 80–99.9)
MONOCYTES ABSOLUTE: 0.72 E9/L (ref 0.1–0.95)
MONOCYTES RELATIVE PERCENT: 8.6 % (ref 2–12)
NEUTROPHILS ABSOLUTE: 6.52 E9/L (ref 1.8–7.3)
NEUTROPHILS RELATIVE PERCENT: 77.6 % (ref 43–80)
ORGANISM: ABNORMAL
PDW BLD-RTO: 14.1 FL (ref 11.5–15)
PHOSPHORUS: 4.2 MG/DL (ref 2.5–4.5)
PLATELET # BLD: 205 E9/L (ref 130–450)
PMV BLD AUTO: 11.5 FL (ref 7–12)
POTASSIUM SERPL-SCNC: 3.4 MMOL/L (ref 3.5–5)
RBC # BLD: 2.93 E12/L (ref 3.5–5.5)
SODIUM BLD-SCNC: 145 MMOL/L (ref 132–146)
TOTAL CK: 93 U/L (ref 20–180)
TOTAL PROTEIN: 6.8 G/DL (ref 6.4–8.3)
VANCOMYCIN RANDOM: 9.7 MCG/ML (ref 5–40)
WBC # BLD: 8.4 E9/L (ref 4.5–11.5)
WOUND/ABSCESS: ABNORMAL

## 2018-04-11 PROCEDURE — 82550 ASSAY OF CK (CPK): CPT

## 2018-04-11 PROCEDURE — C9113 INJ PANTOPRAZOLE SODIUM, VIA: HCPCS | Performed by: HOSPITALIST

## 2018-04-11 PROCEDURE — 6370000000 HC RX 637 (ALT 250 FOR IP): Performed by: PHYSICIAN ASSISTANT

## 2018-04-11 PROCEDURE — 36415 COLL VENOUS BLD VENIPUNCTURE: CPT

## 2018-04-11 PROCEDURE — 2580000003 HC RX 258: Performed by: PHYSICIAN ASSISTANT

## 2018-04-11 PROCEDURE — 6370000000 HC RX 637 (ALT 250 FOR IP): Performed by: INTERNAL MEDICINE

## 2018-04-11 PROCEDURE — 84100 ASSAY OF PHOSPHORUS: CPT

## 2018-04-11 PROCEDURE — 2060000000 HC ICU INTERMEDIATE R&B

## 2018-04-11 PROCEDURE — 6370000000 HC RX 637 (ALT 250 FOR IP): Performed by: HOSPITALIST

## 2018-04-11 PROCEDURE — 6360000002 HC RX W HCPCS: Performed by: SPECIALIST

## 2018-04-11 PROCEDURE — 94640 AIRWAY INHALATION TREATMENT: CPT

## 2018-04-11 PROCEDURE — 83735 ASSAY OF MAGNESIUM: CPT

## 2018-04-11 PROCEDURE — 2580000003 HC RX 258: Performed by: HOSPITALIST

## 2018-04-11 PROCEDURE — 6360000002 HC RX W HCPCS: Performed by: HOSPITALIST

## 2018-04-11 PROCEDURE — 2500000003 HC RX 250 WO HCPCS: Performed by: HOSPITALIST

## 2018-04-11 PROCEDURE — 80202 ASSAY OF VANCOMYCIN: CPT

## 2018-04-11 PROCEDURE — 2580000003 HC RX 258: Performed by: SPECIALIST

## 2018-04-11 PROCEDURE — 85025 COMPLETE CBC W/AUTO DIFF WBC: CPT

## 2018-04-11 PROCEDURE — 6360000002 HC RX W HCPCS: Performed by: PHYSICIAN ASSISTANT

## 2018-04-11 PROCEDURE — 80053 COMPREHEN METABOLIC PANEL: CPT

## 2018-04-11 RX ORDER — METOPROLOL TARTRATE 50 MG/1
50 TABLET, FILM COATED ORAL 2 TIMES DAILY
Status: DISCONTINUED | OUTPATIENT
Start: 2018-04-11 | End: 2018-04-12

## 2018-04-11 RX ADMIN — Medication 10 ML: at 08:11

## 2018-04-11 RX ADMIN — TIOTROPIUM BROMIDE 18 MCG: 18 CAPSULE ORAL; RESPIRATORY (INHALATION) at 09:52

## 2018-04-11 RX ADMIN — SEVELAMER CARBONATE 800 MG: 800 TABLET, FILM COATED ORAL at 17:54

## 2018-04-11 RX ADMIN — Medication 10 ML: at 05:22

## 2018-04-11 RX ADMIN — ACETAMINOPHEN 650 MG: 650 SUPPOSITORY RECTAL at 04:23

## 2018-04-11 RX ADMIN — Medication 10 ML: at 08:08

## 2018-04-11 RX ADMIN — ACETAMINOPHEN 500 MG: 500 TABLET ORAL at 23:37

## 2018-04-11 RX ADMIN — MICONAZOLE NITRATE: 1.42 POWDER TOPICAL at 08:08

## 2018-04-11 RX ADMIN — PIPERACILLIN SODIUM AND TAZOBACTAM SODIUM 3.38 G: 3; .375 INJECTION, POWDER, LYOPHILIZED, FOR SOLUTION INTRAVENOUS at 10:22

## 2018-04-11 RX ADMIN — MICONAZOLE NITRATE: 1.42 POWDER TOPICAL at 21:17

## 2018-04-11 RX ADMIN — PANTOPRAZOLE SODIUM 40 MG: 40 INJECTION, POWDER, FOR SOLUTION INTRAVENOUS at 08:08

## 2018-04-11 RX ADMIN — METOPROLOL TARTRATE 5 MG: 5 INJECTION INTRAVENOUS at 05:00

## 2018-04-11 RX ADMIN — ACETAMINOPHEN 500 MG: 500 TABLET ORAL at 19:38

## 2018-04-11 RX ADMIN — METOPROLOL TARTRATE 5 MG: 5 INJECTION INTRAVENOUS at 10:38

## 2018-04-11 RX ADMIN — VANCOMYCIN HYDROCHLORIDE 1250 MG: 1 INJECTION, POWDER, LYOPHILIZED, FOR SOLUTION INTRAVENOUS at 11:43

## 2018-04-11 RX ADMIN — METOPROLOL TARTRATE 50 MG: 50 TABLET, FILM COATED ORAL at 17:55

## 2018-04-11 RX ADMIN — PIPERACILLIN SODIUM AND TAZOBACTAM SODIUM 3.38 G: 3; .375 INJECTION, POWDER, LYOPHILIZED, FOR SOLUTION INTRAVENOUS at 21:14

## 2018-04-11 RX ADMIN — Medication 10 ML: at 21:17

## 2018-04-11 RX ADMIN — DOCUSATE SODIUM 100 MG: 100 CAPSULE, LIQUID FILLED ORAL at 21:14

## 2018-04-11 ASSESSMENT — PAIN SCALES - GENERAL
PAINLEVEL_OUTOF10: 0

## 2018-04-12 LAB
ALBUMIN SERPL-MCNC: 2.5 G/DL (ref 3.5–4.7)
ALPHA-1-GLOBULIN: 0.5 G/DL (ref 0.2–0.4)
ALPHA-2-GLOBULIN: 1.1 G/FL (ref 0.5–1)
ANION GAP SERPL CALCULATED.3IONS-SCNC: 13 MMOL/L (ref 7–16)
BASOPHILS ABSOLUTE: 0.04 E9/L (ref 0–0.2)
BASOPHILS RELATIVE PERCENT: 0.5 % (ref 0–2)
BETA GLOBULIN: 1.1 G/DL (ref 0.8–1.3)
BUN BLDV-MCNC: 76 MG/DL (ref 8–23)
CALCIUM SERPL-MCNC: 9.6 MG/DL (ref 8.6–10.2)
CHLORIDE BLD-SCNC: 104 MMOL/L (ref 98–107)
CO2: 31 MMOL/L (ref 22–29)
CREAT SERPL-MCNC: 2.3 MG/DL (ref 0.5–1)
ELECTROPHORESIS: ABNORMAL
EOSINOPHILS ABSOLUTE: 0.03 E9/L (ref 0.05–0.5)
EOSINOPHILS RELATIVE PERCENT: 0.4 % (ref 0–6)
GAMMA GLOBULIN: 1.4 G/DL (ref 0.7–1.6)
GFR AFRICAN AMERICAN: 26
GFR NON-AFRICAN AMERICAN: 26 ML/MIN/1.73
GLUCOSE BLD-MCNC: 126 MG/DL (ref 74–109)
HCT VFR BLD CALC: 26 % (ref 34–48)
HEMOGLOBIN: 8.7 G/DL (ref 11.5–15.5)
IMMATURE GRANULOCYTES #: 0.05 E9/L
IMMATURE GRANULOCYTES %: 0.6 % (ref 0–5)
LYMPHOCYTES ABSOLUTE: 1.5 E9/L (ref 1.5–4)
LYMPHOCYTES RELATIVE PERCENT: 18.8 % (ref 20–42)
MCH RBC QN AUTO: 29.4 PG (ref 26–35)
MCHC RBC AUTO-ENTMCNC: 33.5 % (ref 32–34.5)
MCV RBC AUTO: 87.8 FL (ref 80–99.9)
MONOCYTES ABSOLUTE: 0.72 E9/L (ref 0.1–0.95)
MONOCYTES RELATIVE PERCENT: 9 % (ref 2–12)
NEUTROPHILS ABSOLUTE: 5.63 E9/L (ref 1.8–7.3)
NEUTROPHILS RELATIVE PERCENT: 70.7 % (ref 43–80)
ORGANISM: ABNORMAL
PDW BLD-RTO: 14.3 FL (ref 11.5–15)
PHOSPHORUS: 2.7 MG/DL (ref 2.5–4.5)
PLATELET # BLD: 177 E9/L (ref 130–450)
PMV BLD AUTO: 11.2 FL (ref 7–12)
POTASSIUM SERPL-SCNC: 3.5 MMOL/L (ref 3.5–5)
RBC # BLD: 2.96 E12/L (ref 3.5–5.5)
SODIUM BLD-SCNC: 148 MMOL/L (ref 132–146)
TOTAL PROTEIN: 6.6 G/DL (ref 6.4–8.3)
VANCOMYCIN RANDOM: 13.5 MCG/ML (ref 5–40)
WBC # BLD: 8 E9/L (ref 4.5–11.5)
WOUND/ABSCESS: ABNORMAL
WOUND/ABSCESS: ABNORMAL

## 2018-04-12 PROCEDURE — 97535 SELF CARE MNGMENT TRAINING: CPT

## 2018-04-12 PROCEDURE — C9113 INJ PANTOPRAZOLE SODIUM, VIA: HCPCS | Performed by: HOSPITALIST

## 2018-04-12 PROCEDURE — 2580000003 HC RX 258: Performed by: HOSPITALIST

## 2018-04-12 PROCEDURE — 6370000000 HC RX 637 (ALT 250 FOR IP): Performed by: HOSPITALIST

## 2018-04-12 PROCEDURE — 2060000000 HC ICU INTERMEDIATE R&B

## 2018-04-12 PROCEDURE — 80202 ASSAY OF VANCOMYCIN: CPT

## 2018-04-12 PROCEDURE — 2580000003 HC RX 258: Performed by: INTERNAL MEDICINE

## 2018-04-12 PROCEDURE — 94640 AIRWAY INHALATION TREATMENT: CPT

## 2018-04-12 PROCEDURE — 6360000002 HC RX W HCPCS: Performed by: SPECIALIST

## 2018-04-12 PROCEDURE — 36415 COLL VENOUS BLD VENIPUNCTURE: CPT | Performed by: NURSE PRACTITIONER

## 2018-04-12 PROCEDURE — G8987 SELF CARE CURRENT STATUS: HCPCS

## 2018-04-12 PROCEDURE — 84100 ASSAY OF PHOSPHORUS: CPT

## 2018-04-12 PROCEDURE — G8988 SELF CARE GOAL STATUS: HCPCS

## 2018-04-12 PROCEDURE — 2580000003 HC RX 258: Performed by: SPECIALIST

## 2018-04-12 PROCEDURE — 6360000002 HC RX W HCPCS: Performed by: HOSPITALIST

## 2018-04-12 PROCEDURE — 36592 COLLECT BLOOD FROM PICC: CPT

## 2018-04-12 PROCEDURE — G8979 MOBILITY GOAL STATUS: HCPCS | Performed by: PHYSICAL THERAPIST

## 2018-04-12 PROCEDURE — 97530 THERAPEUTIC ACTIVITIES: CPT

## 2018-04-12 PROCEDURE — 85025 COMPLETE CBC W/AUTO DIFF WBC: CPT

## 2018-04-12 PROCEDURE — 80048 BASIC METABOLIC PNL TOTAL CA: CPT

## 2018-04-12 PROCEDURE — 97165 OT EVAL LOW COMPLEX 30 MIN: CPT

## 2018-04-12 PROCEDURE — 97530 THERAPEUTIC ACTIVITIES: CPT | Performed by: PHYSICAL THERAPIST

## 2018-04-12 PROCEDURE — 6370000000 HC RX 637 (ALT 250 FOR IP): Performed by: SPECIALIST

## 2018-04-12 PROCEDURE — 97161 PT EVAL LOW COMPLEX 20 MIN: CPT | Performed by: PHYSICAL THERAPIST

## 2018-04-12 PROCEDURE — 6370000000 HC RX 637 (ALT 250 FOR IP): Performed by: INTERNAL MEDICINE

## 2018-04-12 PROCEDURE — 6370000000 HC RX 637 (ALT 250 FOR IP): Performed by: PHYSICIAN ASSISTANT

## 2018-04-12 PROCEDURE — G8978 MOBILITY CURRENT STATUS: HCPCS | Performed by: PHYSICAL THERAPIST

## 2018-04-12 PROCEDURE — 2580000003 HC RX 258: Performed by: PHYSICIAN ASSISTANT

## 2018-04-12 PROCEDURE — 36415 COLL VENOUS BLD VENIPUNCTURE: CPT

## 2018-04-12 RX ORDER — CIPROFLOXACIN 500 MG/1
500 TABLET, FILM COATED ORAL
Status: DISCONTINUED | OUTPATIENT
Start: 2018-04-12 | End: 2018-04-14

## 2018-04-12 RX ORDER — SODIUM CHLORIDE 450 MG/100ML
INJECTION, SOLUTION INTRAVENOUS CONTINUOUS
Status: DISCONTINUED | OUTPATIENT
Start: 2018-04-12 | End: 2018-04-13

## 2018-04-12 RX ADMIN — SEVELAMER CARBONATE 800 MG: 800 TABLET, FILM COATED ORAL at 09:00

## 2018-04-12 RX ADMIN — DOCUSATE SODIUM 100 MG: 100 CAPSULE, LIQUID FILLED ORAL at 20:23

## 2018-04-12 RX ADMIN — ASPIRIN 81 MG 81 MG: 81 TABLET ORAL at 12:16

## 2018-04-12 RX ADMIN — Medication 10 ML: at 10:00

## 2018-04-12 RX ADMIN — VANCOMYCIN HYDROCHLORIDE 1000 MG: 1 INJECTION, POWDER, LYOPHILIZED, FOR SOLUTION INTRAVENOUS at 23:50

## 2018-04-12 RX ADMIN — PANTOPRAZOLE SODIUM 40 MG: 40 INJECTION, POWDER, FOR SOLUTION INTRAVENOUS at 10:00

## 2018-04-12 RX ADMIN — DOCUSATE SODIUM 100 MG: 100 CAPSULE, LIQUID FILLED ORAL at 10:00

## 2018-04-12 RX ADMIN — METOPROLOL TARTRATE 50 MG: 50 TABLET, FILM COATED ORAL at 10:00

## 2018-04-12 RX ADMIN — CIPROFLOXACIN HYDROCHLORIDE 500 MG: 500 TABLET, FILM COATED ORAL at 12:16

## 2018-04-12 RX ADMIN — TIOTROPIUM BROMIDE 18 MCG: 18 CAPSULE ORAL; RESPIRATORY (INHALATION) at 07:13

## 2018-04-12 RX ADMIN — ACETAMINOPHEN 500 MG: 500 TABLET ORAL at 20:26

## 2018-04-12 RX ADMIN — ATORVASTATIN CALCIUM 10 MG: 10 TABLET, FILM COATED ORAL at 09:09

## 2018-04-12 RX ADMIN — SEVELAMER CARBONATE 800 MG: 800 TABLET, FILM COATED ORAL at 17:36

## 2018-04-12 RX ADMIN — SODIUM CHLORIDE: 4.5 INJECTION, SOLUTION INTRAVENOUS at 17:35

## 2018-04-12 RX ADMIN — METOPROLOL TARTRATE 75 MG: 25 TABLET ORAL at 20:23

## 2018-04-12 ASSESSMENT — PAIN SCALES - GENERAL
PAINLEVEL_OUTOF10: 0
PAINLEVEL_OUTOF10: 10

## 2018-04-12 ASSESSMENT — PAIN DESCRIPTION - ORIENTATION: ORIENTATION: RIGHT;LEFT

## 2018-04-12 ASSESSMENT — PAIN DESCRIPTION - PAIN TYPE: TYPE: ACUTE PAIN

## 2018-04-12 ASSESSMENT — PAIN DESCRIPTION - PROGRESSION: CLINICAL_PROGRESSION: GRADUALLY IMPROVING

## 2018-04-12 ASSESSMENT — PAIN DESCRIPTION - LOCATION: LOCATION: LEG

## 2018-04-13 LAB
ALBUMIN SERPL-MCNC: 2.9 G/DL (ref 3.5–5.2)
ALP BLD-CCNC: 103 U/L (ref 35–104)
ALT SERPL-CCNC: 13 U/L (ref 0–32)
ANION GAP SERPL CALCULATED.3IONS-SCNC: 13 MMOL/L (ref 7–16)
AST SERPL-CCNC: 21 U/L (ref 0–31)
BASOPHILS ABSOLUTE: 0.03 E9/L (ref 0–0.2)
BASOPHILS RELATIVE PERCENT: 0.4 % (ref 0–2)
BILIRUB SERPL-MCNC: 0.6 MG/DL (ref 0–1.2)
BLOOD CULTURE, ROUTINE: NORMAL
BUN BLDV-MCNC: 53 MG/DL (ref 8–23)
CALCIUM SERPL-MCNC: 9.6 MG/DL (ref 8.6–10.2)
CHLORIDE BLD-SCNC: 104 MMOL/L (ref 98–107)
CO2: 31 MMOL/L (ref 22–29)
CREAT SERPL-MCNC: 1.6 MG/DL (ref 0.5–1)
CULTURE, BLOOD 2: NORMAL
EOSINOPHILS ABSOLUTE: 0.04 E9/L (ref 0.05–0.5)
EOSINOPHILS RELATIVE PERCENT: 0.6 % (ref 0–6)
GFR AFRICAN AMERICAN: 39
GFR NON-AFRICAN AMERICAN: 39 ML/MIN/1.73
GLUCOSE BLD-MCNC: 119 MG/DL (ref 74–109)
HCT VFR BLD CALC: 27.6 % (ref 34–48)
HEMOGLOBIN: 8.9 G/DL (ref 11.5–15.5)
IMMATURE GRANULOCYTES #: 0.03 E9/L
IMMATURE GRANULOCYTES %: 0.4 % (ref 0–5)
LYMPHOCYTES ABSOLUTE: 1.44 E9/L (ref 1.5–4)
LYMPHOCYTES RELATIVE PERCENT: 20.7 % (ref 20–42)
MCH RBC QN AUTO: 28.9 PG (ref 26–35)
MCHC RBC AUTO-ENTMCNC: 32.2 % (ref 32–34.5)
MCV RBC AUTO: 89.6 FL (ref 80–99.9)
MONOCYTES ABSOLUTE: 0.81 E9/L (ref 0.1–0.95)
MONOCYTES RELATIVE PERCENT: 11.6 % (ref 2–12)
NEUTROPHILS ABSOLUTE: 4.62 E9/L (ref 1.8–7.3)
NEUTROPHILS RELATIVE PERCENT: 66.3 % (ref 43–80)
PDW BLD-RTO: 14.2 FL (ref 11.5–15)
PHOSPHORUS: 2.2 MG/DL (ref 2.5–4.5)
PLATELET # BLD: 167 E9/L (ref 130–450)
PMV BLD AUTO: 12.1 FL (ref 7–12)
POTASSIUM SERPL-SCNC: 3.5 MMOL/L (ref 3.5–5)
RBC # BLD: 3.08 E12/L (ref 3.5–5.5)
SODIUM BLD-SCNC: 148 MMOL/L (ref 132–146)
TOTAL PROTEIN: 7.1 G/DL (ref 6.4–8.3)
WBC # BLD: 7 E9/L (ref 4.5–11.5)

## 2018-04-13 PROCEDURE — 85025 COMPLETE CBC W/AUTO DIFF WBC: CPT

## 2018-04-13 PROCEDURE — 36415 COLL VENOUS BLD VENIPUNCTURE: CPT

## 2018-04-13 PROCEDURE — 6370000000 HC RX 637 (ALT 250 FOR IP): Performed by: HOSPITALIST

## 2018-04-13 PROCEDURE — 97530 THERAPEUTIC ACTIVITIES: CPT

## 2018-04-13 PROCEDURE — 2580000003 HC RX 258: Performed by: HOSPITALIST

## 2018-04-13 PROCEDURE — 2060000000 HC ICU INTERMEDIATE R&B

## 2018-04-13 PROCEDURE — 6370000000 HC RX 637 (ALT 250 FOR IP): Performed by: SPECIALIST

## 2018-04-13 PROCEDURE — 6370000000 HC RX 637 (ALT 250 FOR IP): Performed by: PHYSICIAN ASSISTANT

## 2018-04-13 PROCEDURE — C9113 INJ PANTOPRAZOLE SODIUM, VIA: HCPCS | Performed by: HOSPITALIST

## 2018-04-13 PROCEDURE — 2580000003 HC RX 258: Performed by: PHYSICIAN ASSISTANT

## 2018-04-13 PROCEDURE — 97116 GAIT TRAINING THERAPY: CPT

## 2018-04-13 PROCEDURE — 84100 ASSAY OF PHOSPHORUS: CPT

## 2018-04-13 PROCEDURE — 6360000002 HC RX W HCPCS: Performed by: HOSPITALIST

## 2018-04-13 PROCEDURE — 80053 COMPREHEN METABOLIC PANEL: CPT

## 2018-04-13 PROCEDURE — 2500000003 HC RX 250 WO HCPCS: Performed by: HOSPITALIST

## 2018-04-13 PROCEDURE — 6370000000 HC RX 637 (ALT 250 FOR IP): Performed by: INTERNAL MEDICINE

## 2018-04-13 RX ORDER — FLUCONAZOLE 100 MG/1
100 TABLET ORAL DAILY
Status: DISCONTINUED | OUTPATIENT
Start: 2018-04-14 | End: 2018-04-14 | Stop reason: HOSPADM

## 2018-04-13 RX ORDER — FLUCONAZOLE 100 MG/1
200 TABLET ORAL ONCE
Status: COMPLETED | OUTPATIENT
Start: 2018-04-13 | End: 2018-04-13

## 2018-04-13 RX ORDER — DEXTROSE MONOHYDRATE 50 MG/ML
INJECTION, SOLUTION INTRAVENOUS CONTINUOUS
Status: DISCONTINUED | OUTPATIENT
Start: 2018-04-13 | End: 2018-04-14

## 2018-04-13 RX ADMIN — PANTOPRAZOLE SODIUM 40 MG: 40 INJECTION, POWDER, FOR SOLUTION INTRAVENOUS at 09:13

## 2018-04-13 RX ADMIN — DOCUSATE SODIUM 100 MG: 100 CAPSULE, LIQUID FILLED ORAL at 09:15

## 2018-04-13 RX ADMIN — CIPROFLOXACIN HYDROCHLORIDE 500 MG: 500 TABLET, FILM COATED ORAL at 10:30

## 2018-04-13 RX ADMIN — Medication 10 ML: at 21:28

## 2018-04-13 RX ADMIN — SEVELAMER CARBONATE 800 MG: 800 TABLET, FILM COATED ORAL at 09:13

## 2018-04-13 RX ADMIN — SODIUM PHOSPHATE, MONOBASIC, MONOHYDRATE 30 MMOL: 276; 142 INJECTION, SOLUTION INTRAVENOUS at 13:21

## 2018-04-13 RX ADMIN — DOCUSATE SODIUM 100 MG: 100 CAPSULE, LIQUID FILLED ORAL at 21:28

## 2018-04-13 RX ADMIN — METOPROLOL TARTRATE 75 MG: 25 TABLET ORAL at 09:13

## 2018-04-13 RX ADMIN — MICONAZOLE NITRATE: 1.42 POWDER TOPICAL at 09:00

## 2018-04-13 RX ADMIN — DEXTROSE MONOHYDRATE: 50 INJECTION, SOLUTION INTRAVENOUS at 09:20

## 2018-04-13 RX ADMIN — Medication 10 ML: at 09:15

## 2018-04-13 RX ADMIN — METOPROLOL TARTRATE 75 MG: 25 TABLET ORAL at 21:28

## 2018-04-13 RX ADMIN — ASPIRIN 81 MG 81 MG: 81 TABLET ORAL at 09:20

## 2018-04-13 RX ADMIN — SEVELAMER CARBONATE 800 MG: 800 TABLET, FILM COATED ORAL at 17:19

## 2018-04-13 RX ADMIN — FLUCONAZOLE 200 MG: 100 TABLET ORAL at 17:19

## 2018-04-13 RX ADMIN — ATORVASTATIN CALCIUM 10 MG: 10 TABLET, FILM COATED ORAL at 09:13

## 2018-04-13 RX ADMIN — DEXTROSE MONOHYDRATE: 50 INJECTION, SOLUTION INTRAVENOUS at 22:03

## 2018-04-13 RX ADMIN — Medication 10 ML: at 09:13

## 2018-04-13 ASSESSMENT — PAIN SCALES - GENERAL
PAINLEVEL_OUTOF10: 0

## 2018-04-14 VITALS
HEART RATE: 88 BPM | OXYGEN SATURATION: 93 % | SYSTOLIC BLOOD PRESSURE: 116 MMHG | BODY MASS INDEX: 51.91 KG/M2 | HEIGHT: 63 IN | RESPIRATION RATE: 16 BRPM | DIASTOLIC BLOOD PRESSURE: 68 MMHG | TEMPERATURE: 99.4 F | WEIGHT: 293 LBS

## 2018-04-14 LAB
ANION GAP SERPL CALCULATED.3IONS-SCNC: 11 MMOL/L (ref 7–16)
BASOPHILS ABSOLUTE: 0.03 E9/L (ref 0–0.2)
BASOPHILS RELATIVE PERCENT: 0.4 % (ref 0–2)
BUN BLDV-MCNC: 28 MG/DL (ref 8–23)
CALCIUM SERPL-MCNC: 8.8 MG/DL (ref 8.6–10.2)
CHLORIDE BLD-SCNC: 101 MMOL/L (ref 98–107)
CO2: 32 MMOL/L (ref 22–29)
COCAINE, CONFIRM, URINE: >1000 NG/ML
CREAT SERPL-MCNC: 1.2 MG/DL (ref 0.5–1)
EOSINOPHILS ABSOLUTE: 0.06 E9/L (ref 0.05–0.5)
EOSINOPHILS RELATIVE PERCENT: 0.8 % (ref 0–6)
GFR AFRICAN AMERICAN: 55
GFR NON-AFRICAN AMERICAN: 55 ML/MIN/1.73
GLUCOSE BLD-MCNC: 126 MG/DL (ref 74–109)
HCT VFR BLD CALC: 26.1 % (ref 34–48)
HEMOGLOBIN: 8.4 G/DL (ref 11.5–15.5)
IMMATURE GRANULOCYTES #: 0.03 E9/L
IMMATURE GRANULOCYTES %: 0.4 % (ref 0–5)
LYMPHOCYTES ABSOLUTE: 1.73 E9/L (ref 1.5–4)
LYMPHOCYTES RELATIVE PERCENT: 24.2 % (ref 20–42)
MCH RBC QN AUTO: 29 PG (ref 26–35)
MCHC RBC AUTO-ENTMCNC: 32.2 % (ref 32–34.5)
MCV RBC AUTO: 90 FL (ref 80–99.9)
MONOCYTES ABSOLUTE: 0.8 E9/L (ref 0.1–0.95)
MONOCYTES RELATIVE PERCENT: 11.2 % (ref 2–12)
NEUTROPHILS ABSOLUTE: 4.49 E9/L (ref 1.8–7.3)
NEUTROPHILS RELATIVE PERCENT: 63 % (ref 43–80)
PDW BLD-RTO: 14 FL (ref 11.5–15)
PHOSPHORUS: 2.6 MG/DL (ref 2.5–4.5)
PLATELET # BLD: 151 E9/L (ref 130–450)
PMV BLD AUTO: 12 FL (ref 7–12)
POTASSIUM SERPL-SCNC: 3.2 MMOL/L (ref 3.5–5)
RBC # BLD: 2.9 E12/L (ref 3.5–5.5)
SODIUM BLD-SCNC: 144 MMOL/L (ref 132–146)
VANCOMYCIN RANDOM: 7 MCG/ML (ref 5–40)
WBC # BLD: 7.1 E9/L (ref 4.5–11.5)

## 2018-04-14 PROCEDURE — 2580000003 HC RX 258: Performed by: HOSPITALIST

## 2018-04-14 PROCEDURE — 80202 ASSAY OF VANCOMYCIN: CPT

## 2018-04-14 PROCEDURE — 84100 ASSAY OF PHOSPHORUS: CPT

## 2018-04-14 PROCEDURE — 6360000002 HC RX W HCPCS: Performed by: SPECIALIST

## 2018-04-14 PROCEDURE — 36415 COLL VENOUS BLD VENIPUNCTURE: CPT

## 2018-04-14 PROCEDURE — 6370000000 HC RX 637 (ALT 250 FOR IP): Performed by: SPECIALIST

## 2018-04-14 PROCEDURE — 2580000003 HC RX 258: Performed by: PHYSICIAN ASSISTANT

## 2018-04-14 PROCEDURE — 6370000000 HC RX 637 (ALT 250 FOR IP): Performed by: INTERNAL MEDICINE

## 2018-04-14 PROCEDURE — 85025 COMPLETE CBC W/AUTO DIFF WBC: CPT

## 2018-04-14 PROCEDURE — C9113 INJ PANTOPRAZOLE SODIUM, VIA: HCPCS | Performed by: HOSPITALIST

## 2018-04-14 PROCEDURE — 6370000000 HC RX 637 (ALT 250 FOR IP): Performed by: PHYSICIAN ASSISTANT

## 2018-04-14 PROCEDURE — 6360000002 HC RX W HCPCS: Performed by: HOSPITALIST

## 2018-04-14 PROCEDURE — 2580000003 HC RX 258: Performed by: SPECIALIST

## 2018-04-14 PROCEDURE — 6370000000 HC RX 637 (ALT 250 FOR IP): Performed by: HOSPITALIST

## 2018-04-14 PROCEDURE — 80048 BASIC METABOLIC PNL TOTAL CA: CPT

## 2018-04-14 RX ORDER — CEPHALEXIN 500 MG/1
500 CAPSULE ORAL EVERY 8 HOURS SCHEDULED
Status: DISCONTINUED | OUTPATIENT
Start: 2018-04-14 | End: 2018-04-14 | Stop reason: HOSPADM

## 2018-04-14 RX ORDER — METOPROLOL TARTRATE 50 MG/1
75 TABLET, FILM COATED ORAL 2 TIMES DAILY
Qty: 60 TABLET | Refills: 3 | Status: SHIPPED | OUTPATIENT
Start: 2018-04-14 | End: 2020-12-21 | Stop reason: ALTCHOICE

## 2018-04-14 RX ORDER — CIPROFLOXACIN 500 MG/1
500 TABLET, FILM COATED ORAL EVERY 12 HOURS SCHEDULED
Status: DISCONTINUED | OUTPATIENT
Start: 2018-04-14 | End: 2018-04-14 | Stop reason: HOSPADM

## 2018-04-14 RX ORDER — FLUCONAZOLE 100 MG/1
100 TABLET ORAL DAILY
Qty: 12 TABLET | Refills: 0 | Status: SHIPPED | OUTPATIENT
Start: 2018-04-15 | End: 2018-04-27

## 2018-04-14 RX ORDER — CIPROFLOXACIN 500 MG/1
500 TABLET, FILM COATED ORAL EVERY 12 HOURS SCHEDULED
Qty: 28 TABLET | Refills: 0 | Status: SHIPPED | OUTPATIENT
Start: 2018-04-14 | End: 2018-04-28

## 2018-04-14 RX ORDER — POTASSIUM CHLORIDE 20 MEQ/1
40 TABLET, EXTENDED RELEASE ORAL ONCE
Status: COMPLETED | OUTPATIENT
Start: 2018-04-14 | End: 2018-04-14

## 2018-04-14 RX ORDER — CEPHALEXIN 500 MG/1
500 CAPSULE ORAL EVERY 8 HOURS SCHEDULED
Qty: 42 CAPSULE | Refills: 0 | Status: SHIPPED | OUTPATIENT
Start: 2018-04-14 | End: 2018-04-28

## 2018-04-14 RX ADMIN — ATORVASTATIN CALCIUM 10 MG: 10 TABLET, FILM COATED ORAL at 09:05

## 2018-04-14 RX ADMIN — POTASSIUM CHLORIDE 40 MEQ: 20 TABLET, EXTENDED RELEASE ORAL at 09:04

## 2018-04-14 RX ADMIN — ASPIRIN 81 MG 81 MG: 81 TABLET ORAL at 09:03

## 2018-04-14 RX ADMIN — DOCUSATE SODIUM 100 MG: 100 CAPSULE, LIQUID FILLED ORAL at 09:03

## 2018-04-14 RX ADMIN — ACETAMINOPHEN 500 MG: 500 TABLET ORAL at 09:07

## 2018-04-14 RX ADMIN — FLUCONAZOLE 100 MG: 100 TABLET ORAL at 09:03

## 2018-04-14 RX ADMIN — PANTOPRAZOLE SODIUM 40 MG: 40 INJECTION, POWDER, FOR SOLUTION INTRAVENOUS at 09:03

## 2018-04-14 RX ADMIN — Medication 10 ML: at 09:03

## 2018-04-14 RX ADMIN — VANCOMYCIN HYDROCHLORIDE 1000 MG: 1 INJECTION, POWDER, LYOPHILIZED, FOR SOLUTION INTRAVENOUS at 09:02

## 2018-04-14 RX ADMIN — DEXTROSE MONOHYDRATE: 50 INJECTION, SOLUTION INTRAVENOUS at 09:13

## 2018-04-14 RX ADMIN — Medication 10 ML: at 09:06

## 2018-04-14 RX ADMIN — METOPROLOL TARTRATE 75 MG: 25 TABLET ORAL at 09:03

## 2018-04-14 ASSESSMENT — PAIN SCALES - GENERAL
PAINLEVEL_OUTOF10: 10
PAINLEVEL_OUTOF10: 0
PAINLEVEL_OUTOF10: 2

## 2018-04-14 ASSESSMENT — PAIN DESCRIPTION - PAIN TYPE: TYPE: ACUTE PAIN

## 2018-04-14 ASSESSMENT — PAIN DESCRIPTION - LOCATION: LOCATION: HEAD

## 2018-04-14 ASSESSMENT — PAIN DESCRIPTION - DESCRIPTORS: DESCRIPTORS: ACHING;DISCOMFORT

## 2018-04-16 ENCOUNTER — TELEPHONE (OUTPATIENT)
Dept: CARDIOLOGY CLINIC | Age: 63
End: 2018-04-16

## 2020-12-16 ENCOUNTER — APPOINTMENT (OUTPATIENT)
Dept: GENERAL RADIOLOGY | Age: 65
End: 2020-12-16
Payer: COMMERCIAL

## 2020-12-16 ENCOUNTER — HOSPITAL ENCOUNTER (EMERGENCY)
Age: 65
Discharge: HOME | End: 2020-12-16
Payer: COMMERCIAL

## 2020-12-16 VITALS
SYSTOLIC BLOOD PRESSURE: 138 MMHG | RESPIRATION RATE: 18 BRPM | TEMPERATURE: 98 F | DIASTOLIC BLOOD PRESSURE: 84 MMHG | OXYGEN SATURATION: 99 % | HEART RATE: 74 BPM

## 2020-12-16 PROCEDURE — 73070 X-RAY EXAM OF ELBOW: CPT

## 2020-12-16 PROCEDURE — 99284 EMERGENCY DEPT VISIT MOD MDM: CPT

## 2020-12-16 NOTE — ED PROVIDER NOTES
Independent MLP    HPI:  12/16/20,   Time: 12:37 AM SADIA Quintero is a 76 y.o. female presenting to the ED for fall, beginning 1 hour pta ago. The complaint has been constant, mild in severity, and worsened by nothing. Patient states she lost her balance while transferring from the chair to the bed fell onto the right elbow. She was unable to get herself off the floor and had to call for EMS assistance. They proceeded to bring her to the ER for further evaluation. She denies any head or neck pain. She denies any loss of conscious. He is on no anticoagulation. She has no obvious deformity of the upper or lower extremities. She is complaining of minor pain to the right lateral elbow. She has no visible deformity. She is home O2 dependent. ROS:   Pertinent positives and negatives are stated within HPI, all other systems reviewed and are negative.  --------------------------------------------- PAST HISTORY ---------------------------------------------  Past Medical History:  has a past medical history of Cardiomegaly, Hyperlipidemia, Hypertension, Hypoxemia, Nodule of right lung, and Renal failure. Past Surgical History:  has no past surgical history on file. Social History:  reports that she has been smoking cigarettes. She has been smoking about 0.20 packs per day. She has never used smokeless tobacco. She reports current alcohol use. She reports current drug use. Drug: Cocaine. Family History: family history includes Colon Cancer in her mother; No Known Problems in her father. The patients home medications have been reviewed. Allergies: Patient has no known allergies. -------------------------------------------------- RESULTS -------------------------------------------------  All laboratory and radiology results have been personally reviewed by myself   LABS:  No results found for this visit on 12/16/20.     RADIOLOGY:  Interpreted by Radiologist.  XR ELBOW RIGHT (2 prognosis. Questions are answered at this time and they are agreeable with the plan.      --------------------------------- IMPRESSION AND DISPOSITION ---------------------------------    IMPRESSION  1. Fall, initial encounter    2. Contusion of right elbow, initial encounter        DISPOSITION  Disposition: Discharge to home  Patient condition is good                  Austen TAM Nickerson CNP  12/16/20 0130      ATTENDING PROVIDER ATTESTATION:     Supervising Physician, on-site, available for consultation, non-participatory in the evaluation or care of this patient.          Romi Miranda,   12/16/20 9204

## 2020-12-16 NOTE — ED NOTES
Bed: 14  Expected date:   Expected time:   Means of arrival:   Comments:  EMT Fall w/ no complaints.       Angelic Velez, RN  12/16/20 0020

## 2020-12-16 NOTE — ED NOTES
Discharge instructions reviewed, patient verbalized understanding     Chinedu Childers RN  12/16/20 0190

## 2020-12-21 ENCOUNTER — APPOINTMENT (OUTPATIENT)
Dept: GENERAL RADIOLOGY | Age: 65
DRG: 194 | End: 2020-12-21
Payer: COMMERCIAL

## 2020-12-21 ENCOUNTER — HOSPITAL ENCOUNTER (INPATIENT)
Age: 65
LOS: 3 days | Discharge: NURSING, REHAB OR LONG-TERM CARE | DRG: 194 | End: 2020-12-24
Attending: EMERGENCY MEDICINE | Admitting: INTERNAL MEDICINE
Payer: COMMERCIAL

## 2020-12-21 PROBLEM — R53.81 PHYSICAL DECONDITIONING: Status: ACTIVE | Noted: 2020-12-21

## 2020-12-21 PROBLEM — R23.8 SKIN BREAKDOWN: Status: ACTIVE | Noted: 2020-12-21

## 2020-12-21 PROBLEM — E66.01 MORBID OBESITY WITH BMI OF 60.0-69.9, ADULT (HCC): Status: ACTIVE | Noted: 2020-12-21

## 2020-12-21 PROBLEM — I50.31 ACUTE CONGESTIVE HEART FAILURE WITH LEFT VENTRICULAR DIASTOLIC DYSFUNCTION (HCC): Status: ACTIVE | Noted: 2020-12-21

## 2020-12-21 PROBLEM — E55.9 VITAMIN D DEFICIENCY: Status: ACTIVE | Noted: 2020-12-21

## 2020-12-21 LAB
ANION GAP SERPL CALCULATED.3IONS-SCNC: 5 MMOL/L (ref 7–16)
ANISOCYTOSIS: ABNORMAL
BACTERIA: ABNORMAL /HPF
BASOPHILS ABSOLUTE: 0.07 E9/L (ref 0–0.2)
BASOPHILS RELATIVE PERCENT: 1 % (ref 0–2)
BILIRUBIN URINE: ABNORMAL
BLOOD, URINE: NEGATIVE
BUN BLDV-MCNC: 15 MG/DL (ref 8–23)
CALCIUM SERPL-MCNC: 10.1 MG/DL (ref 8.6–10.2)
CHLORIDE BLD-SCNC: 95 MMOL/L (ref 98–107)
CLARITY: CLEAR
CO2: 36 MMOL/L (ref 22–29)
COLOR: YELLOW
CREAT SERPL-MCNC: 0.9 MG/DL (ref 0.5–1)
EOSINOPHILS ABSOLUTE: 0 E9/L (ref 0.05–0.5)
EOSINOPHILS RELATIVE PERCENT: 0 % (ref 0–6)
EPITHELIAL CELLS, UA: ABNORMAL /HPF
GFR AFRICAN AMERICAN: >60
GFR NON-AFRICAN AMERICAN: >60 ML/MIN/1.73
GLUCOSE BLD-MCNC: 98 MG/DL (ref 74–99)
GLUCOSE URINE: NEGATIVE MG/DL
HCT VFR BLD CALC: 47.6 % (ref 34–48)
HEMOGLOBIN: 14.1 G/DL (ref 11.5–15.5)
KETONES, URINE: NEGATIVE MG/DL
LEUKOCYTE ESTERASE, URINE: ABNORMAL
LYMPHOCYTES ABSOLUTE: 1.09 E9/L (ref 1.5–4)
LYMPHOCYTES RELATIVE PERCENT: 16 % (ref 20–42)
MCH RBC QN AUTO: 26.3 PG (ref 26–35)
MCHC RBC AUTO-ENTMCNC: 29.6 % (ref 32–34.5)
MCV RBC AUTO: 88.6 FL (ref 80–99.9)
METER GLUCOSE: 104 MG/DL (ref 74–99)
METER GLUCOSE: 80 MG/DL (ref 74–99)
MONOCYTES ABSOLUTE: 0.75 E9/L (ref 0.1–0.95)
MONOCYTES RELATIVE PERCENT: 11 % (ref 2–12)
MYELOCYTE PERCENT: 1 % (ref 0–0)
NEUTROPHILS ABSOLUTE: 4.9 E9/L (ref 1.8–7.3)
NEUTROPHILS RELATIVE PERCENT: 71 % (ref 43–80)
NITRITE, URINE: NEGATIVE
PDW BLD-RTO: 21.1 FL (ref 11.5–15)
PH UA: 5.5 (ref 5–9)
PLATELET # BLD: 157 E9/L (ref 130–450)
PMV BLD AUTO: 11.9 FL (ref 7–12)
POTASSIUM REFLEX MAGNESIUM: 6 MMOL/L (ref 3.5–5)
PRO-BNP: 7705 PG/ML (ref 0–125)
PROTEIN UA: NEGATIVE MG/DL
RBC # BLD: 5.37 E12/L (ref 3.5–5.5)
RBC UA: ABNORMAL /HPF (ref 0–2)
REASON FOR REJECTION: NORMAL
REASON FOR REJECTION: NORMAL
REJECTED TEST: NORMAL
REJECTED TEST: NORMAL
SODIUM BLD-SCNC: 136 MMOL/L (ref 132–146)
SPECIFIC GRAVITY UA: 1.01 (ref 1–1.03)
TROPONIN: <0.01 NG/ML (ref 0–0.03)
UROBILINOGEN, URINE: 1 E.U./DL
WBC # BLD: 6.8 E9/L (ref 4.5–11.5)
WBC UA: ABNORMAL /HPF (ref 0–5)

## 2020-12-21 PROCEDURE — 6370000000 HC RX 637 (ALT 250 FOR IP): Performed by: INTERNAL MEDICINE

## 2020-12-21 PROCEDURE — 71045 X-RAY EXAM CHEST 1 VIEW: CPT

## 2020-12-21 PROCEDURE — 85025 COMPLETE CBC W/AUTO DIFF WBC: CPT

## 2020-12-21 PROCEDURE — 80048 BASIC METABOLIC PNL TOTAL CA: CPT

## 2020-12-21 PROCEDURE — 83880 ASSAY OF NATRIURETIC PEPTIDE: CPT

## 2020-12-21 PROCEDURE — 1200000000 HC SEMI PRIVATE

## 2020-12-21 PROCEDURE — 6360000002 HC RX W HCPCS: Performed by: EMERGENCY MEDICINE

## 2020-12-21 PROCEDURE — 6360000002 HC RX W HCPCS: Performed by: INTERNAL MEDICINE

## 2020-12-21 PROCEDURE — 82962 GLUCOSE BLOOD TEST: CPT

## 2020-12-21 PROCEDURE — 99222 1ST HOSP IP/OBS MODERATE 55: CPT | Performed by: INTERNAL MEDICINE

## 2020-12-21 PROCEDURE — 93005 ELECTROCARDIOGRAM TRACING: CPT | Performed by: EMERGENCY MEDICINE

## 2020-12-21 PROCEDURE — 84484 ASSAY OF TROPONIN QUANT: CPT

## 2020-12-21 PROCEDURE — 2580000003 HC RX 258: Performed by: INTERNAL MEDICINE

## 2020-12-21 PROCEDURE — 99285 EMERGENCY DEPT VISIT HI MDM: CPT

## 2020-12-21 PROCEDURE — 81001 URINALYSIS AUTO W/SCOPE: CPT

## 2020-12-21 RX ORDER — ATORVASTATIN CALCIUM 40 MG/1
40 TABLET, FILM COATED ORAL NIGHTLY
COMMUNITY

## 2020-12-21 RX ORDER — FUROSEMIDE 40 MG/1
40 TABLET ORAL DAILY
COMMUNITY

## 2020-12-21 RX ORDER — DEXTROSE MONOHYDRATE 50 MG/ML
100 INJECTION, SOLUTION INTRAVENOUS PRN
Status: DISCONTINUED | OUTPATIENT
Start: 2020-12-21 | End: 2020-12-25 | Stop reason: HOSPADM

## 2020-12-21 RX ORDER — ACETAMINOPHEN 650 MG/1
650 SUPPOSITORY RECTAL EVERY 6 HOURS PRN
Status: DISCONTINUED | OUTPATIENT
Start: 2020-12-21 | End: 2020-12-25 | Stop reason: HOSPADM

## 2020-12-21 RX ORDER — PANTOPRAZOLE SODIUM 40 MG/1
40 TABLET, DELAYED RELEASE ORAL
Status: DISCONTINUED | OUTPATIENT
Start: 2020-12-22 | End: 2020-12-25 | Stop reason: HOSPADM

## 2020-12-21 RX ORDER — POLYETHYLENE GLYCOL 3350 17 G/17G
17 POWDER, FOR SOLUTION ORAL DAILY PRN
Status: DISCONTINUED | OUTPATIENT
Start: 2020-12-21 | End: 2020-12-25 | Stop reason: HOSPADM

## 2020-12-21 RX ORDER — ACETAMINOPHEN 325 MG/1
650 TABLET ORAL EVERY 6 HOURS PRN
Status: DISCONTINUED | OUTPATIENT
Start: 2020-12-21 | End: 2020-12-25 | Stop reason: HOSPADM

## 2020-12-21 RX ORDER — LISINOPRIL AND HYDROCHLOROTHIAZIDE 25; 20 MG/1; MG/1
1 TABLET ORAL DAILY
COMMUNITY

## 2020-12-21 RX ORDER — FUROSEMIDE 10 MG/ML
40 INJECTION INTRAMUSCULAR; INTRAVENOUS DAILY
Status: DISCONTINUED | OUTPATIENT
Start: 2020-12-22 | End: 2020-12-22

## 2020-12-21 RX ORDER — CYCLOBENZAPRINE HCL 10 MG
10 TABLET ORAL 2 TIMES DAILY
COMMUNITY

## 2020-12-21 RX ORDER — METOPROLOL SUCCINATE 100 MG/1
100 TABLET, EXTENDED RELEASE ORAL DAILY
COMMUNITY

## 2020-12-21 RX ORDER — AMMONIUM LACTATE 12 G/100G
1 LOTION TOPICAL 3 TIMES DAILY
COMMUNITY

## 2020-12-21 RX ORDER — CYCLOBENZAPRINE HCL 10 MG
10 TABLET ORAL 2 TIMES DAILY PRN
Status: DISCONTINUED | OUTPATIENT
Start: 2020-12-21 | End: 2020-12-25 | Stop reason: HOSPADM

## 2020-12-21 RX ORDER — FUROSEMIDE 10 MG/ML
40 INJECTION INTRAMUSCULAR; INTRAVENOUS ONCE
Status: COMPLETED | OUTPATIENT
Start: 2020-12-21 | End: 2020-12-21

## 2020-12-21 RX ORDER — NICOTINE POLACRILEX 4 MG
15 LOZENGE BUCCAL PRN
Status: DISCONTINUED | OUTPATIENT
Start: 2020-12-21 | End: 2020-12-25 | Stop reason: HOSPADM

## 2020-12-21 RX ORDER — ATORVASTATIN CALCIUM 40 MG/1
40 TABLET, FILM COATED ORAL NIGHTLY
Status: DISCONTINUED | OUTPATIENT
Start: 2020-12-21 | End: 2020-12-25 | Stop reason: HOSPADM

## 2020-12-21 RX ORDER — METOPROLOL SUCCINATE 100 MG/1
100 TABLET, EXTENDED RELEASE ORAL DAILY
Status: DISCONTINUED | OUTPATIENT
Start: 2020-12-21 | End: 2020-12-25 | Stop reason: HOSPADM

## 2020-12-21 RX ORDER — SODIUM CHLORIDE 0.9 % (FLUSH) 0.9 %
10 SYRINGE (ML) INJECTION EVERY 12 HOURS SCHEDULED
Status: DISCONTINUED | OUTPATIENT
Start: 2020-12-21 | End: 2020-12-25 | Stop reason: HOSPADM

## 2020-12-21 RX ORDER — DEXTROSE MONOHYDRATE 25 G/50ML
12.5 INJECTION, SOLUTION INTRAVENOUS PRN
Status: DISCONTINUED | OUTPATIENT
Start: 2020-12-21 | End: 2020-12-25 | Stop reason: HOSPADM

## 2020-12-21 RX ORDER — SENNOSIDES 8.6 MG
650 CAPSULE ORAL EVERY 8 HOURS PRN
COMMUNITY

## 2020-12-21 RX ORDER — SODIUM CHLORIDE 0.9 % (FLUSH) 0.9 %
10 SYRINGE (ML) INJECTION PRN
Status: DISCONTINUED | OUTPATIENT
Start: 2020-12-21 | End: 2020-12-25 | Stop reason: HOSPADM

## 2020-12-21 RX ORDER — VITAMIN B COMPLEX
1000 TABLET ORAL DAILY
Status: DISCONTINUED | OUTPATIENT
Start: 2020-12-21 | End: 2020-12-25 | Stop reason: HOSPADM

## 2020-12-21 RX ADMIN — METOPROLOL SUCCINATE 100 MG: 100 TABLET, EXTENDED RELEASE ORAL at 17:06

## 2020-12-21 RX ADMIN — ENOXAPARIN SODIUM 40 MG: 40 INJECTION SUBCUTANEOUS at 17:06

## 2020-12-21 RX ADMIN — DESMOPRESSIN ACETATE 40 MG: 0.2 TABLET ORAL at 21:43

## 2020-12-21 RX ADMIN — Medication 1000 UNITS: at 17:06

## 2020-12-21 RX ADMIN — Medication 10 ML: at 21:06

## 2020-12-21 RX ADMIN — FUROSEMIDE 40 MG: 10 INJECTION, SOLUTION INTRAMUSCULAR; INTRAVENOUS at 14:40

## 2020-12-21 ASSESSMENT — ENCOUNTER SYMPTOMS
BACK PAIN: 1
EYE PAIN: 0
NAUSEA: 0
SHORTNESS OF BREATH: 0
ABDOMINAL PAIN: 0
SORE THROAT: 0
EYE REDNESS: 0
COUGH: 0
VOMITING: 0
EYE DISCHARGE: 0
DIARRHEA: 0

## 2020-12-21 NOTE — ED PROVIDER NOTES
Patient is a 67-year-old female with a past medical history of obesity, hypertension, chronic respiratory failure presents emergency department with fall. Symptoms moderate to severe in severity and constant since onset. Patient states she was seen here and evaluated on 12/16 for fall. She states when she arrived home she sat back on the floor where she was and has not been able really to get up since. She states she is too weak to stand up. Her  and son had to help lift her up off the ground. She states since the 16th she has stood up twice but then has been back on the floor. She denies hitting her head or losing consciousness. She states she does not able to take care of herself. She feels like a burden on her family. It took 5 people from EMS to lift patient up to bring her to the hospital.  She denies any fevers, chills, chest pain, shortness of breath, abdominal pain, diarrhea or constipation. She does note that she has had swelling in her bilateral legs but this is not tender. She wears 3 liters of oxygen at baseline. Attempting to move and ambulate make symptoms worse, nothing makes symptoms better. Review of Systems   Constitutional: Positive for fever. Negative for chills. HENT: Negative for congestion and sore throat. Eyes: Negative for pain, discharge and redness. Respiratory: Negative for cough and shortness of breath. Cardiovascular: Positive for leg swelling. Negative for chest pain. Gastrointestinal: Negative for abdominal pain, diarrhea, nausea and vomiting. Genitourinary: Negative for dysuria and frequency. Musculoskeletal: Positive for back pain. Negative for arthralgias. Skin: Negative for rash and wound. Neurological: Positive for weakness. Negative for headaches. All other systems reviewed and are negative. Physical Exam  Vitals signs and nursing note reviewed. Constitutional:       General: She is not in acute distress. Appearance: She is well-developed. She is obese. She is not ill-appearing. HENT:      Head: Normocephalic and atraumatic. Eyes:      Extraocular Movements: Extraocular movements intact. Neck:      Musculoskeletal: Normal range of motion and neck supple. Cardiovascular:      Rate and Rhythm: Normal rate and regular rhythm. Pulses: Normal pulses. Pulmonary:      Effort: Pulmonary effort is normal. No respiratory distress. Breath sounds: Normal breath sounds. No wheezing or rales. Abdominal:      Palpations: Abdomen is soft. Tenderness: There is no abdominal tenderness. There is no guarding or rebound. Comments: Edema extending up into the abdomen   Musculoskeletal:      Right lower leg: Edema present. Left lower leg: Edema present. Skin:     General: Skin is warm and dry. Neurological:      General: No focal deficit present. Mental Status: She is alert and oriented to person, place, and time. Motor: Weakness (Weakness in the bilateral lower extremities, most likely secondary to body habitus) present. Procedures     MDM  Number of Diagnoses or Management Options   Patient presented to ED for fall. Clinically non toxic and deconditioned and obvious fluid overload. Labs demonstrating hyperkalemia but hemolyzed so repeat performed. BNP >7,700 and fluid on CXR and clinically so lasix given. No bony tenderness on exam. Did not hit head, no LOC and neurovasc in tact. Patient unable to ambulate or care for herself at home. Would benefit from IV diuresis and pt/ot for possible placement. Consult to on call hospitalist who accepted for admission. ED Course as of Dec 21 2159   Mon Dec 21, 2020   1424 Spoke with Dr. Melvin Joyce, will admit.     [KP]      ED Course User Index  [KP] Antonia Franco, DO        ----------------------------------------------- PAST HISTORY --------------------------------------------  Past Medical History:  has a past medical history of Acute congestive heart failure with left ventricular diastolic dysfunction ([address] Cardiomegaly, Hyperlipidemia, Hypertension, Hypoxemia, Nodule of right lung, and Renal failure. Past Surgical History:  has no past surgical history on file. Social History:  reports that she has been smoking cigarettes. She has been smoking about 0.20 packs per day. She has never used smokeless tobacco. She reports current alcohol use. She reports current drug use. Drug: Cocaine. Family History: family history includes Colon Cancer in her mother; No Known Problems in her father. The patients home medications have been reviewed. Allergies: Patient has no known allergies.     ------------------------------------------------ RESULTS ---------------------------------------------------    LABS:  Results for orders placed or performed during the hospital encounter of 12/21/20   CBC Auto Differential   Result Value Ref Range    WBC 6.8 4.5 - 11.5 E9/L    RBC 5.37 3.50 - 5.50 E12/L    Hemoglobin 14.1 11.5 - 15.5 g/dL    Hematocrit 47.6 34.0 - 48.0 %    MCV 88.6 80.0 - 99.9 fL    MCH 26.3 26.0 - 35.0 pg    MCHC 29.6 (L) 32.0 - 34.5 %    RDW 21.1 (H) 11.5 - 15.0 fL    Platelets 477 301 - 629 E9/L    MPV 11.9 7.0 - 12.0 fL    Neutrophils % 71.0 43.0 - 80.0 %    Lymphocytes % 16.0 (L) 20.0 - 42.0 %    Monocytes % 11.0 2.0 - 12.0 %    Eosinophils % 0.0 0.0 - 6.0 %    Basophils % 1.0 0.0 - 2.0 %    Neutrophils Absolute 4.90 1.80 - 7.30 E9/L    Lymphocytes Absolute 1.09 (L) 1.50 - 4.00 E9/L    Monocytes Absolute 0.75 0.10 - 0.95 E9/L    Eosinophils Absolute 0.00 (L) 0.05 - 0.50 E9/L    Basophils Absolute 0.07 0.00 - 0.20 E9/L    Myelocyte Percent 1.0 0 - 0 %    Anisocytosis 2+    Urinalysis, reflex to microscopic   Result Value Ref Range    Color, UA Yellow Straw/Yellow    Clarity, UA Clear Clear    Glucose, Ur Negative Negative mg/dL    Bilirubin Urine SMALL (A) Negative    Ketones, Urine Negative Negative mg/dL    Specific [place], UA 1.015 1.005 - 1.030    Blood, Urine Negative Negative    pH, UA 5.5 5.0 - 9.0    Protein, UA Negative Negative mg/dL    Urobilinogen, Urine 1.0 <2.0 E.U./dL    Nitrite, Urine Negative Negative    Leukocyte Esterase, Urine SMALL (A) Negative   Basic Metabolic Panel w/ Reflex to MG   Result Value Ref Range    Sodium 136 132 - 146 mmol/L    Potassium reflex Magnesium 6.0 (H) 3.5 - 5.0 mmol/L    Chloride 95 (L) 98 - 107 mmol/L    CO2 36 (H) 22 - 29 mmol/L    Anion Gap 5 (L) 7 - 16 mmol/L    Glucose 98 74 - 99 mg/dL    BUN 15 8 - 23 mg/dL    CREATININE 0.9 0.5 - 1.0 mg/dL    GFR Non-African American >60 >=60 mL/min/1.73    GFR African American >60     Calcium 10.1 8.6 - 10.2 mg/dL   Brain Natriuretic Peptide   Result Value Ref Range    Pro-BNP 7,705 (H) 0 - 125 pg/mL   Troponin   Result Value Ref Range    Troponin <0.01 0.00 - 0.03 ng/mL   SPECIMEN REJECTION   Result Value Ref Range    Rejected Test BMPX, BNP,TROP     Reason for Rejection see below    Microscopic Urinalysis   Result Value Ref Range    WBC, UA 5-10 (A) 0 - 5 /HPF    RBC, UA 1-3 0 - 2 /HPF    Epithelial Cells, UA FEW /HPF    Bacteria, UA RARE (A) None Seen /HPF   SPECIMEN REJECTION   Result Value Ref Range    Rejected Test K     Reason for Rejection see below    POCT Glucose   Result Value Ref Range    Meter Glucose 80 74 - 99 mg/dL   POCT Glucose   Result Value Ref Range    Meter Glucose 104 (H) 74 - 99 mg/dL   EKG 12 Lead   Result Value Ref Range    Ventricular Rate 89 BPM    Atrial Rate 89 BPM    P-R Interval 158 ms    QRS Duration 88 ms    Q-T Interval 372 ms    QTc Calculation (Bazett) 452 ms    P Axis 63 degrees    R Axis -28 degrees    T Axis 30 degrees       RADIOLOGY:    All Radiology results interpreted by Radiologist unless otherwise noted. XR CHEST PORTABLE   Final Result   Cardiomegaly and borderline pulmonary vascular congestion. EKG: This EKG is signed and interpreted by ED Physician.   Time: 1132    Rate: 89  Rhythm: Sinus.  Interpretation: no acute changes. qtc 452. Normal axis deviation. No acute ST segment changes. Comparison: changes compared to previous EKG.    ---------------------------- NURSING NOTES AND VITALS REVIEWED -------------------------   The nursing notes within the ED encounter and vital signs as below have been reviewed.    /84   Pulse 78   Temp 97.8 °F (36.6 °C) (Oral)   Resp 16   Ht 5' 3\" (1.6 m)   Wt (!) 389 lb (176.4 kg)   LMP 07/18/2017   SpO2 96%   BMI 68.91 kg/m²   Oxygen Saturation Interpretation: Normal      ------------------------------------------PROGRESS NOTES -------------------------------------------    ED COURSE MEDICATIONS:                Medications   atorvastatin (LIPITOR) tablet 40 mg (40 mg Oral Given 12/21/20 2143)   cyclobenzaprine (FLEXERIL) tablet 10 mg (has no administration in time range)   furosemide (LASIX) injection 40 mg (has no administration in time range)   insulin lispro (HUMALOG) injection vial 0-6 Units (0 Units Subcutaneous Not Given 12/21/20 1709)   insulin lispro (HUMALOG) injection vial 0-3 Units (0 Units Subcutaneous Not Given 12/21/20 2144)   glucose (GLUTOSE) 40 % oral gel 15 g (has no administration in time range)   dextrose 50 % IV solution (has no administration in time range)   glucagon (rDNA) injection 1 mg (has no administration in time range)   dextrose 5 % solution (has no administration in time range)   metoprolol succinate (TOPROL XL) extended release tablet 100 mg (100 mg Oral Given 12/21/20 1706)   pantoprazole (PROTONIX) tablet 40 mg (has no administration in time range)   Vitamin D (CHOLECALCIFEROL) tablet 1,000 Units (1,000 Units Oral Given 12/21/20 1706)   sodium chloride flush 0.9 % injection 10 mL (10 mLs Intravenous Given 12/21/20 2106)   sodium chloride flush 0.9 % injection 10 mL (has no administration in time range)   enoxaparin (LOVENOX) injection 40 mg (40 mg Subcutaneous Given 12/21/20 1706)   polyethylene glycol (GLYCOLAX) packet 17 g (has no administration in time range)   acetaminophen (TYLENOL) tablet 650 mg (has no administration in time range)     Or   acetaminophen (TYLENOL) suppository 650 mg (has no administration in time range)   perflutren lipid microspheres (DEFINITY) injection 1.65 mg (has no administration in time range)   furosemide (LASIX) injection 40 mg (40 mg Intravenous Given 12/21/20 1440)       CONSULTATIONS:            Consultation:  I Spoke with Dr. Eloina Reyes (Medicine). Discussed case. They will admit this patient. Faisal Cali PROCEDURES:            none. COUNSELING:   I have spoken with the patient and discussed todays results, in addition to providing specific details for the plan of care and counseling regarding the diagnosis and prognosis.     --------------------------------------- IMPRESSION & DISPOSITION --------------------------------     IMPRESSION(s):  1. Acute congestive heart failure, unspecified heart failure type ([address]    2. Inability to walk        This patient's ED course included: a personal history and physicial examination, re-evaluation prior to disposition, cardiac monitoring and continuous pulse oximetry    This patient has remained hemodynamically stable and been closely monitored during their ED course. DISPOSITION:  Disposition: Admit to med/surg floor. Patient condition is stable. END OF PROVIDER NOTE. ATTENDING PROVIDER ATTESTATION:     Rebecca Sherwood presented to the emergency department for evaluation of Fall (dc'd 12/16, has been laying on floor till today. )    I have reviewed and discussed the case, including pertinent history (medical, surgical, family and social) and exam findings with the Resident and the Nurse assigned to Rebecca Sherwood. I have personally performed and/or participated in the history, exam, medical decision making, and procedures and agree with all pertinent clinical information.        I have reviewed my findings and recommendations with Emily Lawerence Scriver and members of family present at the time of disposition. MDM: Supportive care, will obtain appropriate labs and imaging to assess patient's Fall (dc'd 12/16, has been laying on floor till today. )       My findings/plan: The primary encounter diagnosis was Acute congestive heart failure, unspecified heart failure type ([facility] [address] A diagnosis of Inability to walk was also pertinent to this visit.   Current Discharge Medication List        DO Antonia Mosqueda,   Resident  12/21/20 6451       Saman Cummings DO  12/22/20 2020

## 2020-12-21 NOTE — CARE COORDINATION
Emergency Department Social Work Assessment:    Pt presents to the ED, due to a fall. Per report and chart review, pt was evaluated in the ED on 12/16 for the same complaint, discharged home- fell again and has been laying on the floor ever since. SW attempted to meet with patient to discuss transition of care and discharge needs, she was sleeping and unwilling to wake up and speak to SW. Per RN, pt reported that she lives with her , he fed her and washed her up while she was laying on the floor for 5 days. Pt would benefit from PT/OT evaluations to assist with discharge planning. If pt discharges home, Adult Protective Services ([phone]) will need contacted regarding her unsafe living situation. Assigned SW/CM to follow.     NATE Cummins, [address] [facility]

## 2020-12-21 NOTE — ED NOTES
PT states she was here 12/16 and dc'd home. At home her  was helping her get into bed and pt \"slid\" onto floor. PT stayed on floor until today when EMS was called. PT states her  and son put trash bags on the couch cushions for her to sit on and brought her meals and washed her. She states it was easier for her family if she stayed on the floor. When asked if they tried to stand her up she states they tried twice to stand her up and were unable to get a chair once she was up. Pt states she didn't want to sit on the floor but did for her family.    WARREN Cordero, RN  12/21/20 Ul[address] Dhiraj Ledezma RN  12/21/20 1213

## 2020-12-21 NOTE — ED NOTES
Bed: 06  Expected date:   Expected time:   Means of arrival:   Comments:  Via Chel Jewell RN  12/21/20 9155

## 2020-12-21 NOTE — PROGRESS NOTES
Patient assisted into bed at this time, TAPS system placed and bariatric low air loss bed ordered. Redness and excoriation noted under bilateral breasts, abdominal pannus, erika area and inner thighs. Patient stated she was lying on floor for 3 or more days and that her  tried his best to care for her and that he really is a nice man. Patient states \"I know that was wrong and I'll do whatever I have to do to get back to where I was physically and be able to get around again. \" This nurse talked to patient in depth about rehab placement for therapy and strengthening, patient agreeable at this time. Dr. Pura Hidalgo aware of multiple areas of skin breakdown and wound care consulted.

## 2020-12-22 LAB
ANION GAP SERPL CALCULATED.3IONS-SCNC: 5 MMOL/L (ref 7–16)
ANISOCYTOSIS: ABNORMAL
BASOPHILS ABSOLUTE: 0.05 E9/L (ref 0–0.2)
BASOPHILS RELATIVE PERCENT: 0.9 % (ref 0–2)
BUN BLDV-MCNC: 15 MG/DL (ref 8–23)
CALCIUM SERPL-MCNC: 10 MG/DL (ref 8.6–10.2)
CHLORIDE BLD-SCNC: 97 MMOL/L (ref 98–107)
CO2: 39 MMOL/L (ref 22–29)
CREAT SERPL-MCNC: 1 MG/DL (ref 0.5–1)
EKG ATRIAL RATE: 89 BPM
EKG P AXIS: 63 DEGREES
EKG P-R INTERVAL: 158 MS
EKG Q-T INTERVAL: 372 MS
EKG QRS DURATION: 88 MS
EKG QTC CALCULATION (BAZETT): 452 MS
EKG R AXIS: -28 DEGREES
EKG T AXIS: 30 DEGREES
EKG VENTRICULAR RATE: 89 BPM
EOSINOPHILS ABSOLUTE: 0.16 E9/L (ref 0.05–0.5)
EOSINOPHILS RELATIVE PERCENT: 2.6 % (ref 0–6)
GFR AFRICAN AMERICAN: >60
GFR NON-AFRICAN AMERICAN: >60 ML/MIN/1.73
GLUCOSE BLD-MCNC: 97 MG/DL (ref 74–99)
HCT VFR BLD CALC: 43 % (ref 34–48)
HEMOGLOBIN: 12.8 G/DL (ref 11.5–15.5)
LV EF: 55 %
LVEF MODALITY: NORMAL
LYMPHOCYTES ABSOLUTE: 0.9 E9/L (ref 1.5–4)
LYMPHOCYTES RELATIVE PERCENT: 14.9 % (ref 20–42)
MAGNESIUM: 1.9 MG/DL (ref 1.6–2.6)
MCH RBC QN AUTO: 27.1 PG (ref 26–35)
MCHC RBC AUTO-ENTMCNC: 29.8 % (ref 32–34.5)
MCV RBC AUTO: 91.1 FL (ref 80–99.9)
METAMYELOCYTES RELATIVE PERCENT: 0.9 % (ref 0–1)
METER GLUCOSE: 113 MG/DL (ref 74–99)
METER GLUCOSE: 124 MG/DL (ref 74–99)
METER GLUCOSE: 141 MG/DL (ref 74–99)
METER GLUCOSE: 154 MG/DL (ref 74–99)
MONOCYTES ABSOLUTE: 0.6 E9/L (ref 0.1–0.95)
MONOCYTES RELATIVE PERCENT: 9.6 % (ref 2–12)
NEUTROPHILS ABSOLUTE: 4.32 E9/L (ref 1.8–7.3)
NEUTROPHILS RELATIVE PERCENT: 71.1 % (ref 43–80)
OVALOCYTES: ABNORMAL
PDW BLD-RTO: 20.4 FL (ref 11.5–15)
PLATELET # BLD: 152 E9/L (ref 130–450)
PMV BLD AUTO: 11.7 FL (ref 7–12)
POIKILOCYTES: ABNORMAL
POTASSIUM SERPL-SCNC: 4.2 MMOL/L (ref 3.5–5)
RBC # BLD: 4.72 E12/L (ref 3.5–5.5)
SARS-COV-2, NAAT: NOT DETECTED
SODIUM BLD-SCNC: 141 MMOL/L (ref 132–146)
TARGET CELLS: ABNORMAL
WBC # BLD: 6 E9/L (ref 4.5–11.5)

## 2020-12-22 PROCEDURE — 85025 COMPLETE CBC W/AUTO DIFF WBC: CPT

## 2020-12-22 PROCEDURE — 97166 OT EVAL MOD COMPLEX 45 MIN: CPT

## 2020-12-22 PROCEDURE — 97162 PT EVAL MOD COMPLEX 30 MIN: CPT | Performed by: PHYSICAL THERAPIST

## 2020-12-22 PROCEDURE — 6370000000 HC RX 637 (ALT 250 FOR IP): Performed by: INTERNAL MEDICINE

## 2020-12-22 PROCEDURE — U0002 COVID-19 LAB TEST NON-CDC: HCPCS

## 2020-12-22 PROCEDURE — 99232 SBSQ HOSP IP/OBS MODERATE 35: CPT | Performed by: INTERNAL MEDICINE

## 2020-12-22 PROCEDURE — 93306 TTE W/DOPPLER COMPLETE: CPT

## 2020-12-22 PROCEDURE — 2580000003 HC RX 258: Performed by: INTERNAL MEDICINE

## 2020-12-22 PROCEDURE — 97110 THERAPEUTIC EXERCISES: CPT | Performed by: PHYSICAL THERAPIST

## 2020-12-22 PROCEDURE — 82962 GLUCOSE BLOOD TEST: CPT

## 2020-12-22 PROCEDURE — 97535 SELF CARE MNGMENT TRAINING: CPT

## 2020-12-22 PROCEDURE — 2500000003 HC RX 250 WO HCPCS: Performed by: INTERNAL MEDICINE

## 2020-12-22 PROCEDURE — 1200000000 HC SEMI PRIVATE

## 2020-12-22 PROCEDURE — 80048 BASIC METABOLIC PNL TOTAL CA: CPT

## 2020-12-22 PROCEDURE — 6360000002 HC RX W HCPCS: Performed by: INTERNAL MEDICINE

## 2020-12-22 PROCEDURE — 36415 COLL VENOUS BLD VENIPUNCTURE: CPT

## 2020-12-22 PROCEDURE — 83735 ASSAY OF MAGNESIUM: CPT

## 2020-12-22 RX ORDER — FUROSEMIDE 10 MG/ML
40 INJECTION INTRAMUSCULAR; INTRAVENOUS ONCE
Status: COMPLETED | OUTPATIENT
Start: 2020-12-23 | End: 2020-12-23

## 2020-12-22 RX ORDER — FUROSEMIDE 40 MG/1
40 TABLET ORAL DAILY
Status: DISCONTINUED | OUTPATIENT
Start: 2020-12-24 | End: 2020-12-25 | Stop reason: HOSPADM

## 2020-12-22 RX ADMIN — FUROSEMIDE 40 MG: 10 INJECTION, SOLUTION INTRAMUSCULAR; INTRAVENOUS at 08:38

## 2020-12-22 RX ADMIN — Medication 1000 UNITS: at 08:38

## 2020-12-22 RX ADMIN — DESMOPRESSIN ACETATE 40 MG: 0.2 TABLET ORAL at 21:19

## 2020-12-22 RX ADMIN — Medication 10 ML: at 21:18

## 2020-12-22 RX ADMIN — PANTOPRAZOLE SODIUM 40 MG: 40 TABLET, DELAYED RELEASE ORAL at 05:49

## 2020-12-22 RX ADMIN — ENOXAPARIN SODIUM 40 MG: 40 INJECTION SUBCUTANEOUS at 16:41

## 2020-12-22 RX ADMIN — INSULIN LISPRO 1 UNITS: 100 INJECTION, SOLUTION INTRAVENOUS; SUBCUTANEOUS at 21:23

## 2020-12-22 RX ADMIN — POLYETHYLENE GLYCOL 3350 17 G: 17 POWDER, FOR SOLUTION ORAL at 16:42

## 2020-12-22 RX ADMIN — ANTI-FUNGAL POWDER MICONAZOLE NITRATE TALC FREE: 1.42 POWDER TOPICAL at 21:19

## 2020-12-22 RX ADMIN — METOPROLOL SUCCINATE 100 MG: 100 TABLET, EXTENDED RELEASE ORAL at 08:38

## 2020-12-22 NOTE — CARE COORDINATION
SS NOTE: COVID TESTING DONE - AWAITING RESULT. SW contacted pt today to discuss PT/OT recommendations for SNF placement. SW went over the SNF providers for pt's insurance. Pt chose [place] only and would not make any other choices. SW contacted [place] and they are closed to admissions. SW attempted contact with pt several more times to obtain other SNF choices however she did not answer- SW will continue to attempt contact for more choices. For a SNF placement, pt will need PRECERT, a signed AUDI, current PT/OT note and SW will need to complete the HENs. Namita Santiago. 12/22/2020.4:51 PM.

## 2020-12-22 NOTE — PROGRESS NOTES
Physical Therapy    Physical Therapy Initial Evaluation    Room #:  7420/3628-54  Patient Name: Keeley Gray  YOB: 1955  MRN: [id number]    Referring Provider:   Rc Valencia DO      Date of Service: 12/22/2020    Evaluating Physical Therapist: Caryle Bevel, PT  #[id number]       Diagnosis:   Acute congestive heart failure with left ventricular diastolic dysfunction ([address] [I50.31]   Pt fell d/t weakness and has been non ambulatory since 12/16    Patient Active Problem List   Diagnosis    Hypoxemia    Cardiomegaly    Nodule of right lung    Acute renal failure with tubular necrosis (HCC)    Rectal bleeding    Sepsis affecting skin    Cocaine abuse ([address]    Metabolic acidosis    Acute congestive heart failure with left ventricular diastolic dysfunction ([address]    Physical deconditioning    Morbid obesity with BMI of 60.0-69.9, adult ([address]    Skin breakdown    Vitamin D deficiency        Tentative placement recommendation: Subacute rehab    Equipment recommendation: To be determined      Prior Level of Function: Patient   prior to fall; scooter and wheeled chair; in past month  Rehab Potential: good  for baseline    Past medical history:   Past Medical History:   Diagnosis Date    Acute congestive heart failure with left ventricular diastolic dysfunction ([address] 12/21/2020    Cardiomegaly 8/8/2017    Hyperlipidemia     Hypertension     Hypoxemia 8/8/2017    Nodule of right lung 8/9/2017    Renal failure 4/8/2018     History reviewed. No pertinent surgical history. Precautions:  Up with assistance, falls, alarm and O2 , 4 Liters of o2 via nasal cannula     SUBJECTIVE:    Social history: Patient lives with spouse   in a ranch home  with No steps  to enter      Equipment owned: Scooter,   2 Liters of o2 via nasal cannula     [id number] [facility] Mobility Inpatient   How much difficulty turning over in bed?: A Lot  How much difficulty sitting down on / standing up from a chair with arms?: A Lot  How much difficulty moving from lying on back to sitting on side of bed?: A Lot  How much help from another person moving to and from a bed to a chair?: Total  How much help from another person needed to walk in hospital room?: Total  How much help from another person for climbing 3-5 steps with a railing?: Total  AM-PAC Inpatient Mobility Raw Score : 9  AM-[facility] Inpatient T-Scale Score : 30.55  Mobility Inpatient CMS 0-100% Score: 81.38  Mobility Inpatient CMS G-Code Modifier :     Nursing cleared patient for PT evaluation. The admitting diagnosis and active problem list as listed above have been reviewed prior to the initiation of this evaluation. OBJECTIVE;   Initial Evaluation  Date: 12/22/20 Treatment Date:     Short Term/ Long Term   Goals   Was pt agreeable to Eval/treatment? Yes    To be met in 3 days   Pain level   5/10  Skin on posterior thighs     Bed Mobility    Rolling: Maximal assist of  2    Supine to sit: Maximal assist of  2    Sit to supine: Maximal assist of  2    Scooting: Moderate assist of  2    Rolling: Moderate assist of 1    Supine to sit: Moderate assist of 1    Sit to supine: Moderate assist of 1    Scooting: Moderate assist of 1     Transfers Sit to stand: Not assessed     Sit to stand: Moderate assist of  2     Ambulation    not assessed     5 feet using  standard walker with Moderate assist of  2    ROM . impaired d/t body habitus    Increase range of motion 10% of affected joints    Strength BUE:  refer to OT eval  RLE:  2/5  LLE:  2/5   Increase strength in affected mm groups by 1/3 grade   Balance Sitting EOB:  fair however d/t short stature great care needed to avoid sliding off edge  Dynamic Standing:  not assessed    Sitting EOB:  fair  plus  Dynamic Standing: fair with walker and 2 person assist for safety     Patient is Alert & Oriented x person, place, time and situation and follows directions    Sensation:  Patient  denies numbness and tingling Edema:  yes bilateral lower extremities    Endurance: poor         Patient education  Patient educated on role of Physical Therapy, risks of immobility, safety and plan of care, energy conservation,  importance of mobility while in hospital  and importance and purpose of adaptive device and adjusted to proper height for the patient. Patient response to education:   Pt verbalized understanding Pt demonstrated skill Pt requires further education in this area   Yes Partial Yes      Treatment:  Patient practiced and was instructed/facilitated in the following treatment: Patient   Sat edge of bed 20 minutes with Minimal assist of 1 to increase dynamic sitting balance and activity tolerance. initially and progressed to supervision for bilateral upper extremities activities with o.t. Therapeutic Exercises:  ankle pumps trunk wt shifting anterior//posterior x 10 reps. At end of session, patient in bed with  call light and phone within reach,    all lines and tubes intact, nursing notified. Patient would benefit from continued skilled Physical Therapy to improve functional independence and quality of life. Patient's/ family goals   rehab        ASSESSMENT: Patient exhibits decreased strength, balance, coordination impairing functional mobility. Poor trunk control and speed of recruitment to avoid fall impacts patients abilitly to care for self; initially required 2 skilled therapists to progress seated balance with challenges.        Plan of Care:     -Bed Mobility: Lower extremity exercises , Upper extremity exercises  and Trunk control activities   -Sitting Balance: Incorporate reaching activities to activate trunk muscles  and Facilitate postural control in all planes   -Standing Balance: Perform strengthening exercises in standing to promote motor control with or without upper extremity support  and Instruct patient on adequate base of support to maintain balance  -Transfers: Provide

## 2020-12-22 NOTE — PROGRESS NOTES
Occupational Therapy  OCCUPATIONAL THERAPY INITIAL EVALUATION      Date:2020  Patient Name: Alyson Diez  MRN: [id number]  : 1955  Room: [address] [place] [address]    Referring Provider: Mati Bañuelos DO    Evaluating OT: Whitney Corley OTR/L #[id number]    AM-PAC Daily Activity Raw Score: 15    Recommended Placement: Subacute rehab  Recommended Adaptive Equipment: TBD     Diagnosis:   1. Acute congestive heart failure, unspecified heart failure type ([address]    2. Inability to walk        Surgery: N/A      Pertinent Medical History:    Past Medical History:   Diagnosis Date    Acute congestive heart failure with left ventricular diastolic dysfunction ([facility] 2020    Cardiomegaly 2017    Hyperlipidemia     Hypertension     Hypoxemia 2017    Nodule of right lung 2017    Renal failure 2018      Precautions:  Falls, alarm, bariatric bed, O2     Home Living: Pt lives with  and son in a 1 story home with 0 DELLA with 0 rail(s). Bathroom setup: did not specify, pt has been getting sponge bathed by family recently. Equipment owned: scooter, O2    Prior Level of Function: Assist with ADLs , Assist with IADLs; ambulated without AD  Driving: Not reported    Pain Level: Pt c/o burning sensation on thighs. Therapist facilitated repositioning to address pain. Cognition: A&O: 4/4; Follows 2 step directions   Memory:  good   Sequencing:  fair   Problem solving:  fair   Judgement/safety:  fair     Functional Assessment:   Initial Eval Status  Date: 20 Treatment Status  Date: STGs = LTGs  Time frame: 5-7 days   Feeding Set-up   Pt able to  cup and drink from it. R UE limited due to wound under breast.  Independent   Grooming Moderate Assist  Assist for opening lotion, balance when rubbing hands together. Minimal Assist    UB Dressing Moderate Assist   Don gown 2x, once at bed level and once while sitting EOB. Pt able to unsnap sleeves, assist for snapping them.   Minimal Assist    LB Dressing Maximal Assist   Don socks at bed level, assist for lifting heels off of bed. Moderate Assist    Bathing Maximal Assist    Moderate Assist    Toileting Maximal Assist     Moderate Assist    Bed Mobility  Supine to sit: Maximal Assist of 2   Sit to supine: Maximal Assist of 2  Scooting: Moderate Assist of 2   Cuing on sequencing, PLB, and safety. Supine to sit: Moderate Assist   Sit to supine: Moderate Assist    Functional Transfers NT  Due to safety and bariatric bed height      Moderate Assist    Functional Mobility NT     Moderate Assist    Balance Sitting:     Static:  fair    Dynamic:fair  Standing: NT  Sitting:     Static:  Fair+    Dynamic:fair+  Standing: fair   Activity Tolerance Fair-  Due to generalized weakness  fair   Visual/  Perceptual Glasses: Yes   WFL       Hand Dominance Right     Strength ROM Additional Info:    RUE  3+/5  Limited due to wound under breast. Painful movement. good  and wfl FMC/dexterity noted during ADL tasks       LUE 3+/5  WFL good  and wfl FMC/dexterity noted during ADL tasks       Hearing: WFL   Sensation:  No c/o numbness or tingling   Tone: WFL   Edema: GLobal    Comments:   Nursing approved therapy session. Upon arrival, patient supine in bed and agreeable to OT session. Therapist educated pt on role of OT. At end of session, patient supine in bed with call light and phone within reach, all lines and tubes intact; nursing aware. Pt required rest breaks throughout session. Pt demonstrated fair+ understanding of education/techniques and decreased independence and safety during completion of ADL/functional transfer/mobility tasks. Pt would benefit from continued skilled OT to increase safety and independence with completion of ADL/IADL tasks for functional independence and quality of life.     Treatment:   Skilled occupational therapy services provided include instruction/training on safety and adapted techniques for completion of therapeutic activities, ADLs/IADLs. Skilled monitoring of O2 sats, HR, and pt response throughout treatment.  Therapist facilitated therapeutic activities: bed mobility, graded functional activities (static/dynamic sitting, functional reaching) - providing min cuing (verbal, visual, tactile) on hand placement, body mechanics, posture, breathing techniques, energy conservation, compensatory strategies, and safety.  Therapist facilitated self-care retraining: UB dressing, LB dressing, grooming feeding tasks - providing min cuing (verbal, visual, tactile) on body mechanics, posture, breathing techniques, energy conservation, compensatory strategies, and safety. Therapist educated pt on compensatory strategies/energy conservation techniques to safely complete ADLs/IADLs.      Eval Complexity: Low    Assessment of current deficits   Functional mobility [x]  ADLs [x] Strength [x]  Cognition []  Functional transfers  [x] IADLs [x] Safety Awareness [x]  Endurance [x]  Fine Motor Coordination [x] Balance [x] Vision/perception [] Sensation []   Gross Motor Coordination [x] ROM [x] Delirium []                  Motor Control []    Plan of Care: 1-3 days/week for 1-2 weeks PRN   Instruction/training on adapted ADL techniques and AE recommendations to increase functional independence within precautions  Training on energy conservation strategies/techniques to improve independence/tolerance for self-care routine  Functional transfer/mobility training/DME recommendations for increased independence, safety, and fall prevention  Patient/Family education to increase follow through with safety techniques and functional independence  Recommendation of environmental modifications for increased safety with functional transfers/mobility and ADLs  Therapeutic exercise to improve motor endurance, ROM, and functional strength for ADLs/functional transfers  Therapeutic activities to facilitate/challenge dynamic balance, stand tolerance, fine motor dexterity/in-hand manipulation for increased independence with ADLs  Neuro-muscular re-education: facilitation of righting/equilibrium reactions, midline orientation, scapular stability/mobility, normalization of muscle tone, and facilitation of volitional active controled movement    Rehab Potential: Good for established goals     Patient / Family Goal: Get stronger      Patient and/or family were instructed on functional diagnosis, prognosis/goals and OT plan of care. Demonstrated fair+ understanding. Eval Complexity: Low      Time In: 1006  Time Out: 1036  Total Treatment Time: 10    Min Units   OT Eval Low 97165  X  1   OT Eval Medium 24767      OT Eval High 56829       OT Re-Eval [id number]       Therapeutic Ex 84810       Therapeutic Activities 46389  2     ADL/Self Care 25582  8 1    Orthotic Management 03035       Neuro Re-Ed 22916       Non-Billable Time          Evaluation Time includes thorough review of current medical information, gathering information on past medical history/social history and prior level of function, completion of standardized testing/informal observation of tasks, assessment of data and education on plan of care and goals.     Guille Hemphill, OTR/L #[id number]

## 2020-12-22 NOTE — PROGRESS NOTES
Physician Progress Note      PATIENT:               Alberto Guadarrama  CSN #:                  [id number]  :                       1955  ADMIT DATE:       2020 10:44 AM  DISCH DATE:  RESPONDING  PROVIDER #:        Fawn Mcdaniel DO          QUERY TEXT:    Dear Attending Provider,    Pt admitted with CHF. Pt noted to have home oxygen use at 3L. If possible,   please document in the progress notes and discharge summary if you are   evaluating and/or treating any of the following: The medical record reflects the following:  Risk Factors: Obesity, HTN, Hyperlipidemia  Clinical Indicators: Spo2 3L: 100-91%; per ED: \"She wears 3 liters of oxygen   at baseline. ..chronic respiratory failure\"  Treatment: telemetry monitoring, oxygen use at 3L    Thank You,  Domo Edge RN, BSN, CDS  Clinical Documentation Improvement Specialist  [phone]  Options provided:  -- Chronic respiratory failure with hypoxia  -- Chronic respiratory failure with hypercapnia  -- Acute on chronic respiratory failure with hypoxia  -- Acute on chronic respiratory failure with hypercapnia  -- Other - I will add my own diagnosis  -- Disagree - Not applicable / Not valid  -- Disagree - Clinically unable to determine / Unknown  -- Refer to Clinical Documentation Reviewer    PROVIDER RESPONSE TEXT:    This patient has chronic respiratory failure with hypoxia.     Query created by: Shree Mann on 2020 7:48 AM      Electronically signed by:  Fawn Mcdaniel DO 2020 8:13 AM

## 2020-12-22 NOTE — FLOWSHEET NOTE
Inpatient Wound Care    Admit Date: 12/21/2020 10:44 AM    Reason for consult:  Skin care    Significant history:    Past Medical History:   Diagnosis Date    Acute congestive heart failure with left ventricular diastolic dysfunction ([address] 12/21/2020    Cardiomegaly 8/8/2017    Hyperlipidemia     Hypertension     Hypoxemia 8/8/2017    Nodule of right lung 8/9/2017    Renal failure 4/8/2018       Wound history:      Findings:       12/22/20 1138   Skin Integrity   Skin Integrity (WDL) X   Skin Integrity Excoriation; Redness   Location erika area   Skin Integrity Site 2   Skin Integrity Location 2 Excoriation; Redness   Skin Integrity Site 3   Skin Integrity Location 3 Redness    Location 3 heels   Skin Integrity Site 4   Skin Integrity Location 4 Excoriation; Redness   Location 4 inner thighs       Impression:  Redness all folds and under breasts    Interventions in place:    Using interdry under breasts      Plan:  Plan antifungal powder to folds      Alessandro Rivera 12/22/2020 11:43 AM

## 2020-12-22 NOTE — PROGRESS NOTES
[address] [place], [place] [address]ist   Progress Note    Admitting Date and Time: 12/21/2020 10:44 AM  Admit Dx: Acute congestive heart failure with left ventricular diastolic dysfunction (HCC) [I50.31]    Subjective/interval history:    Pt feels a. She still has shortness of breath that comes and goes, but overall improved. So far she has diuresed net -1890 mL since admission. She complains of skin itching that started last night, but improved with miconazole powder ordered today per wound care recommendation. ROS: denies fever, chills, cp, n/v, HA unless stated above.      miconazole   Topical BID    [START ON 12/23/2020] furosemide  40 mg Intravenous Once    [START ON 12/24/2020] furosemide  40 mg Oral Daily    atorvastatin  40 mg Oral Nightly    insulin lispro  0-6 Units Subcutaneous TID WC    insulin lispro  0-3 Units Subcutaneous Nightly    metoprolol succinate  100 mg Oral Daily    pantoprazole  40 mg Oral QAM AC    Vitamin D  1,000 Units Oral Daily    sodium chloride flush  10 mL Intravenous 2 times per day    enoxaparin  40 mg Subcutaneous Daily         cyclobenzaprine, 10 mg, BID PRN      glucose, 15 g, PRN      dextrose, 12.5 g, PRN      glucagon (rDNA), 1 mg, PRN      dextrose, 100 mL/hr, PRN      sodium chloride flush, 10 mL, PRN      polyethylene glycol, 17 g, Daily PRN      acetaminophen, 650 mg, Q6H PRN    Or      acetaminophen, 650 mg, Q6H PRN      perflutren lipid microspheres, 1.5 mL, ONCE PRN         Objective:    /67   Pulse 85   Temp 98 °F (36.7 °C) (Oral)   Resp 16   Ht 5' 3\" (1.6 m)   Wt (!) 312 lb (141.5 kg) Comment: in new bariatric air loss bed  LMP 07/18/2017   SpO2 92%   BMI 55.27 kg/m²   General Appearance: Obese female, alert and oriented to person, place and time and in no acute distress  Skin: Multiple areas of skin breakdown in the abdominal folds  Head: normocephalic and atraumatic  Eyes: pupils equal, round, and reactive to light, extraocular eye movements intact, conjunctivae normal  Neck: neck supple and non tender without mass   Pulmonary/Chest: Nonlabored. Clear to auscultation bilaterally. Cardiovascular: Distant, normal rate, normal S1 and S2 with grade 2/6 systolic murmur and no carotid bruits  Abdomen: Obese, soft, non-tender, non-distended, normal bowel sounds, no masses or organomegaly  Extremities trace bilateral lower extremity edema, improved from prior exam.  No cyanosis or clubbing. Neurologic: no cranial nerve deficit and speech normal      Recent Labs     12/21/20  1324 12/22/20  0920    141   K 6.0* 4.2   CL 95* 97*   CO2 36* 39*   BUN 15 15   CREATININE 0.9 1.0   GLUCOSE 98 97   CALCIUM 10.1 10.0       No results for input(s): ALKPHOS, PROT, LABALBU, BILITOT, AST, ALT in the last 72 hours. Recent Labs     12/21/20  1146 12/22/20  0920   WBC 6.8 6.0   RBC 5.37 4.72   HGB 14.1 12.8   HCT 47.6 43.0   MCV 88.6 91.1   MCH 26.3 27.1   MCHC 29.6* 29.8*   RDW 21.1* 20.4*    152   MPV 11.9 11.7       CBC:   Lab Results   Component Value Date    WBC 6.0 12/22/2020    RBC 4.72 12/22/2020    HGB 12.8 12/22/2020    HCT 43.0 12/22/2020    MCV 91.1 12/22/2020    MCH 27.1 12/22/2020    MCHC 29.8 12/22/2020    RDW 20.4 12/22/2020     12/22/2020    MPV 11.7 12/22/2020     BMP:    Lab Results   Component Value Date     12/22/2020    K 4.2 12/22/2020    K 6.0 12/21/2020    CL 97 12/22/2020    CO2 39 12/22/2020    BUN 15 12/22/2020    LABALBU 2.9 04/13/2018    CREATININE 1.0 12/22/2020    CALCIUM 10.0 12/22/2020    GFRAA >60 12/22/2020    LABGLOM >60 12/22/2020    GLUCOSE 97 12/22/2020        Radiology:   XR CHEST PORTABLE   Final Result   Cardiomegaly and borderline pulmonary vascular congestion.           Assessment/Plan:  Principal Problem:    Acute congestive heart failure with left ventricular diastolic dysfunction (HCC)  Active Problems:    Physical deconditioning    Morbid obesity with BMI of 60.0-69.9, adult [facility])    Skin breakdown    Vitamin D deficiency  Resolved Problems:    * No resolved hospital problems. *    1. Clinical suspicion of acute congestive heart failure  -She has diuresed about 1.9 L since admission. Creatinine stable. Symptomatically significantly improved, and objective findings have improved. Continue IV Lasix through tomorrow, then transition to oral  -Closely monitor creatinine  -Patient has been on beta-blocker at home, so we will continue this  -Cardiogram completed, pending result     2. Fall at home/physical deconditioning  -PT/OT  -social work and case management following for placement     3. Multiple areas of skin breakdown  -Wound care following  -Topical miconazole powder in skin folds    4. Chronic respiratory failure with hypoxia  -Continue supplemental nasal cannula oxygen     5. Morbid obesity with BMI of 68.91  -Counseled on lifestyle modifications     6. Vitamin D deficiency  -continue home oral supplementation      Code Status: Full code  DVT prophylaxis: subcutaneous enoxaparin      NOTE: This report was transcribed using voice recognition software. Every effort was made to ensure accuracy; however, inadvertent computerized transcription errors may be present.      Electronically signed by Lanre Jeter DO on 12/22/2020 at 2:40 PM

## 2020-12-23 PROBLEM — E87.70 VOLUME OVERLOAD: Status: ACTIVE | Noted: 2020-12-21

## 2020-12-23 LAB
ANION GAP SERPL CALCULATED.3IONS-SCNC: 4 MMOL/L (ref 7–16)
BUN BLDV-MCNC: 15 MG/DL (ref 8–23)
CALCIUM SERPL-MCNC: 10 MG/DL (ref 8.6–10.2)
CHLORIDE BLD-SCNC: 97 MMOL/L (ref 98–107)
CO2: 39 MMOL/L (ref 22–29)
CREAT SERPL-MCNC: 1 MG/DL (ref 0.5–1)
GFR AFRICAN AMERICAN: >60
GFR NON-AFRICAN AMERICAN: >60 ML/MIN/1.73
GLUCOSE BLD-MCNC: 117 MG/DL (ref 74–99)
HCT VFR BLD CALC: 42.7 % (ref 34–48)
HEMOGLOBIN: 12.4 G/DL (ref 11.5–15.5)
MAGNESIUM: 1.8 MG/DL (ref 1.6–2.6)
MCH RBC QN AUTO: 26.4 PG (ref 26–35)
MCHC RBC AUTO-ENTMCNC: 29 % (ref 32–34.5)
MCV RBC AUTO: 91 FL (ref 80–99.9)
METER GLUCOSE: 111 MG/DL (ref 74–99)
METER GLUCOSE: 145 MG/DL (ref 74–99)
METER GLUCOSE: 147 MG/DL (ref 74–99)
METER GLUCOSE: 98 MG/DL (ref 74–99)
PDW BLD-RTO: 20.5 FL (ref 11.5–15)
PLATELET # BLD: 152 E9/L (ref 130–450)
PMV BLD AUTO: 11.5 FL (ref 7–12)
POTASSIUM SERPL-SCNC: 4.4 MMOL/L (ref 3.5–5)
RBC # BLD: 4.69 E12/L (ref 3.5–5.5)
SODIUM BLD-SCNC: 140 MMOL/L (ref 132–146)
WBC # BLD: 6.5 E9/L (ref 4.5–11.5)

## 2020-12-23 PROCEDURE — 83735 ASSAY OF MAGNESIUM: CPT

## 2020-12-23 PROCEDURE — 80048 BASIC METABOLIC PNL TOTAL CA: CPT

## 2020-12-23 PROCEDURE — 99232 SBSQ HOSP IP/OBS MODERATE 35: CPT | Performed by: INTERNAL MEDICINE

## 2020-12-23 PROCEDURE — 2580000003 HC RX 258: Performed by: INTERNAL MEDICINE

## 2020-12-23 PROCEDURE — 05HB33Z INSERTION OF INFUSION DEVICE INTO RIGHT BASILIC VEIN, PERCUTANEOUS APPROACH: ICD-10-PCS | Performed by: INTERNAL MEDICINE

## 2020-12-23 PROCEDURE — 2700000000 HC OXYGEN THERAPY PER DAY

## 2020-12-23 PROCEDURE — 97535 SELF CARE MNGMENT TRAINING: CPT

## 2020-12-23 PROCEDURE — 76937 US GUIDE VASCULAR ACCESS: CPT

## 2020-12-23 PROCEDURE — 1200000000 HC SEMI PRIVATE

## 2020-12-23 PROCEDURE — 82962 GLUCOSE BLOOD TEST: CPT

## 2020-12-23 PROCEDURE — 6370000000 HC RX 637 (ALT 250 FOR IP): Performed by: INTERNAL MEDICINE

## 2020-12-23 PROCEDURE — 36410 VNPNXR 3YR/> PHY/QHP DX/THER: CPT

## 2020-12-23 PROCEDURE — 85027 COMPLETE CBC AUTOMATED: CPT

## 2020-12-23 PROCEDURE — 97530 THERAPEUTIC ACTIVITIES: CPT

## 2020-12-23 PROCEDURE — C1751 CATH, INF, PER/CENT/MIDLINE: HCPCS

## 2020-12-23 PROCEDURE — 36415 COLL VENOUS BLD VENIPUNCTURE: CPT

## 2020-12-23 PROCEDURE — 6360000002 HC RX W HCPCS: Performed by: INTERNAL MEDICINE

## 2020-12-23 RX ORDER — SODIUM CHLORIDE 0.9 % (FLUSH) 0.9 %
10 SYRINGE (ML) INJECTION PRN
Status: DISCONTINUED | OUTPATIENT
Start: 2020-12-23 | End: 2020-12-25 | Stop reason: HOSPADM

## 2020-12-23 RX ORDER — HEPARIN SODIUM (PORCINE) LOCK FLUSH IV SOLN 100 UNIT/ML 100 UNIT/ML
1 SOLUTION INTRAVENOUS EVERY 12 HOURS SCHEDULED
Status: DISCONTINUED | OUTPATIENT
Start: 2020-12-23 | End: 2020-12-25 | Stop reason: HOSPADM

## 2020-12-23 RX ORDER — HEPARIN SODIUM (PORCINE) LOCK FLUSH IV SOLN 100 UNIT/ML 100 UNIT/ML
1 SOLUTION INTRAVENOUS PRN
Status: DISCONTINUED | OUTPATIENT
Start: 2020-12-23 | End: 2020-12-25 | Stop reason: HOSPADM

## 2020-12-23 RX ADMIN — PANTOPRAZOLE SODIUM 40 MG: 40 TABLET, DELAYED RELEASE ORAL at 05:36

## 2020-12-23 RX ADMIN — ENOXAPARIN SODIUM 40 MG: 40 INJECTION SUBCUTANEOUS at 16:55

## 2020-12-23 RX ADMIN — INSULIN LISPRO 1 UNITS: 100 INJECTION, SOLUTION INTRAVENOUS; SUBCUTANEOUS at 16:55

## 2020-12-23 RX ADMIN — Medication 10 ML: at 09:02

## 2020-12-23 RX ADMIN — FUROSEMIDE 40 MG: 10 INJECTION, SOLUTION INTRAMUSCULAR; INTRAVENOUS at 09:02

## 2020-12-23 RX ADMIN — DESMOPRESSIN ACETATE 40 MG: 0.2 TABLET ORAL at 21:01

## 2020-12-23 RX ADMIN — INSULIN LISPRO 1 UNITS: 100 INJECTION, SOLUTION INTRAVENOUS; SUBCUTANEOUS at 21:05

## 2020-12-23 RX ADMIN — ANTI-FUNGAL POWDER MICONAZOLE NITRATE TALC FREE: 1.42 POWDER TOPICAL at 09:01

## 2020-12-23 RX ADMIN — Medication 10 ML: at 21:04

## 2020-12-23 RX ADMIN — METOPROLOL SUCCINATE 100 MG: 100 TABLET, EXTENDED RELEASE ORAL at 09:02

## 2020-12-23 RX ADMIN — Medication 1000 UNITS: at 09:02

## 2020-12-23 RX ADMIN — ANTI-FUNGAL POWDER MICONAZOLE NITRATE TALC FREE: 1.42 POWDER TOPICAL at 21:03

## 2020-12-23 RX ADMIN — Medication 100 UNITS: at 21:03

## 2020-12-23 NOTE — PROGRESS NOTES
Physical Therapy    Physical Therapy Treatment Note    Room #:  6463/4514-06  Patient Name: Zuri Lozada  YOB: 1955  MRN: [id number]    Referring Provider:   Flaco Mata DO      Date of Service: 12/23/2020    Evaluating Physical Therapist: Chery Soto, PT  #[id number]       Diagnosis:   Acute congestive heart failure with left ventricular diastolic dysfunction ([address] [I50.31]   Pt fell d/t weakness and has been non ambulatory since 12/16    Patient Active Problem List   Diagnosis    Hypoxemia    Cardiomegaly    Nodule of right lung    Acute renal failure with tubular necrosis (HCC)    Rectal bleeding    Sepsis affecting skin    Cocaine abuse ([address]    Metabolic acidosis    Acute congestive heart failure with left ventricular diastolic dysfunction ([address]    Physical deconditioning    Morbid obesity with BMI of 60.0-69.9, adult ([address]    Skin breakdown    Vitamin D deficiency        Tentative placement recommendation: Subacute rehab    Equipment recommendation: To be determined      Prior Level of Function: Patient   prior to fall; scooter and wheeled chair; in past month  Rehab Potential: good  for baseline    Past medical history:   Past Medical History:   Diagnosis Date    Acute congestive heart failure with left ventricular diastolic dysfunction ([address] 12/21/2020    Cardiomegaly 8/8/2017    Hyperlipidemia     Hypertension     Hypoxemia 8/8/2017    Nodule of right lung 8/9/2017    Renal failure 4/8/2018     History reviewed. No pertinent surgical history. Precautions:  Up with assistance, falls, alarm and O2 , 4 Liters of o2 via nasal cannula     SUBJECTIVE:    Social history: Patient lives with spouse   in a ranch home  with No steps  to enter      Equipment owned: Scooter,   2 Liters of o2 via nasal cannula     [address] [facility] Mobility Inpatient   How much difficulty turning over in bed?: A Lot  How much difficulty sitting down on / standing up from a chair with arms?: A Lot  How much difficulty moving from lying on back to sitting on side of bed?: A Lot  How much help from another person moving to and from a bed to a chair?: Total  How much help from another person needed to walk in hospital room?: Total  How much help from another person for climbing 3-5 steps with a railing?: Total  AM-PAC Inpatient Mobility Raw Score : 9  AM-[facility] Inpatient T-Scale Score : 30.55  Mobility Inpatient CMS 0-100% Score: 81.38  Mobility Inpatient CMS G-Code Modifier : CM    Nursing cleared patient for PT treatment. pt agreed to sit at side of bed      OBJECTIVE;   Initial Evaluation  Date: 12/22/20 Treatment Date:   12/23/2020    Short Term/ Long Term   Goals   Was pt agreeable to Eval/treatment? Yes   yes To be met in 3 days   Pain level   5/10  Skin on posterior thighs Not rated     Bed Mobility    Rolling: Maximal assist of  2    Supine to sit: Maximal assist of  2    Sit to supine: Maximal assist of  2    Scooting: Moderate assist of  2   Rolling: Maximal assist of 1  Pt rolled for positioning and changing pads and adjusting taps   Supine to sit: Maximal assist of  2    Sit to supine: Maximal assist of  2    Scooting: Maximal assist of  2    Pt dependent for scooting to head of bed with bed in trendelenburg Rolling: Moderate assist of 1    Supine to sit: Moderate assist of 1    Sit to supine: Moderate assist of 1    Scooting: Moderate assist of 1     Transfers Sit to stand: Not assessed    Sit to stand: Not assessed        Sit to stand: Moderate assist of  2     Ambulation    not assessed   not assessed      5 feet using  standard walker with Moderate assist of  2    ROM . impaired d/t body habitus    Increase range of motion 10% of affected joints    Strength BUE:  refer to OT eval  RLE:  2/5  LLE:  2/5   Increase strength in affected mm groups by 1/3 grade   Balance Sitting EOB:  fair however d/t short stature great care needed to avoid sliding off edge  Dynamic Standing:  not assessed   Sitting EOB: fair    Dynamic Standing: not assessed       Sitting EOB:  fair  plus  Dynamic Standing: fair with walker and 2 person assist for safety     Patient is Alert & Oriented x person, place, time and situation and follows directions    Sensation:  Patient  denies numbness and tingling     Edema:  yes bilateral lower extremities    Endurance: poor         Patient education  Patient educated on role of Physical Therapy, risks of immobility, safety and plan of care, energy conservation,  importance of mobility while in hospital  and importance and purpose of adaptive device and adjusted to proper height for the patient. Patient was educated and facilitated on techniques to increase safety and independence with bed mobility, balance, functional transfers, and functional mobility. Patient was explained the the benefits of mobility and risks of immobility. Patient response to education:   Pt verbalized understanding Pt demonstrated skill Pt requires further education in this area   Yes Partial Yes      Treatment:  Patient practiced and was instructed/facilitated in the following treatment: Patient   Sat edge of bed 18 minutes with Minimal assist of 1 to increase dynamic sitting balance and activity tolerance. pt's knees blocked and therapist stood anteriorly due to bariatric bed and to  avoid pt slipping forward when seated at side of bed . Therapeutic Exercises:  ankle pumps x 20   At end of session, patient in bed with  call light and phone within reach,    all lines and tubes intact, nursing notified. Patient would benefit from continued skilled Physical Therapy to improve functional independence and quality of life. Patient's/ family goals   rehab        ASSESSMENT: Patient exhibits decreased strength, balance, coordination impairing functional mobility and limit function.    Poor trunk control and speed of recruitment continues  to  impact patients ability  to care for self;

## 2020-12-23 NOTE — PROGRESS NOTES
[address] [place], [place] [address]ist   Progress Note    Admitting Date and Time: 12/21/2020 10:44 AM  Admit Dx: Acute congestive heart failure with left ventricular diastolic dysfunction ([address] [I50.31]    Subjective/interval history:    Patient feels better today. Shortness of breath is at her baseline. Last dose of IV Lasix today, transition to oral tomorrow. Echocardiogram shows normal ejection fraction and diastolic function. Patient states that she has been drinking a lot of water and cranberry juice at home to try to avoid urinary tract infections. She is complaining of pain since her De Jesus catheter was inserted. ROS: denies fever, chills, cp, n/v, HA unless stated above.      lidocaine  5 mL Intradermal Once    heparin flush  1 mL Intravenous 2 times per day    miconazole   Topical BID    [START ON 12/24/2020] furosemide  40 mg Oral Daily    atorvastatin  40 mg Oral Nightly    insulin lispro  0-6 Units Subcutaneous TID WC    insulin lispro  0-3 Units Subcutaneous Nightly    metoprolol succinate  100 mg Oral Daily    pantoprazole  40 mg Oral QAM AC    Vitamin D  1,000 Units Oral Daily    sodium chloride flush  10 mL Intravenous 2 times per day    enoxaparin  40 mg Subcutaneous Daily         sodium chloride flush, 10 mL, PRN      heparin flush, 1 mL, PRN      cyclobenzaprine, 10 mg, BID PRN      glucose, 15 g, PRN      dextrose, 12.5 g, PRN      glucagon (rDNA), 1 mg, PRN      dextrose, 100 mL/hr, PRN      sodium chloride flush, 10 mL, PRN      polyethylene glycol, 17 g, Daily PRN      acetaminophen, 650 mg, Q6H PRN    Or      acetaminophen, 650 mg, Q6H PRN      perflutren lipid microspheres, 1.5 mL, ONCE PRN         Objective:    /71   Pulse 80   Temp 97.1 °F (36.2 °C) (Oral)   Resp 18   Ht 5' 3\" (1.6 m)   Wt (!) 311 lb 3.2 oz (141.2 kg)   LMP 07/18/2017   SpO2 93%   BMI 55.13 kg/m²   General Appearance: Obese female, alert and oriented to person, place and time and in no acute distress  Skin: Multiple areas of skin breakdown in the abdominal folds  Head: normocephalic and atraumatic  Eyes: pupils equal, round, and reactive to light, extraocular eye movements intact, conjunctivae normal  Neck: neck supple and non tender without mass   Pulmonary/Chest: Nonlabored. Clear to auscultation bilaterally. Cardiovascular: Distant, normal rate, normal S1 and S2 with grade 2/6 systolic murmur and no carotid bruits  Abdomen: Obese, soft, non-tender, non-distended, normal bowel sounds, no masses or organomegaly  Extremities trace bilateral lower extremity edema, improved from prior exam.  No cyanosis or clubbing. Neurologic: no cranial nerve deficit and speech normal      Recent Labs     12/21/20  1324 12/22/20  0920 12/23/20  0724    141 140   K 6.0* 4.2 4.4   CL 95* 97* 97*   CO2 36* 39* 39*   BUN 15 15 15   CREATININE 0.9 1.0 1.0   GLUCOSE 98 97 117*   CALCIUM 10.1 10.0 10.0       No results for input(s): ALKPHOS, PROT, LABALBU, BILITOT, AST, ALT in the last 72 hours.     Recent Labs     12/21/20  1146 12/22/20  0920 12/23/20  0724   WBC 6.8 6.0 6.5   RBC 5.37 4.72 4.69   HGB 14.1 12.8 12.4   HCT 47.6 43.0 42.7   MCV 88.6 91.1 91.0   MCH 26.3 27.1 26.4   MCHC 29.6* 29.8* 29.0*   RDW 21.1* 20.4* 20.5*    152 152   MPV 11.9 11.7 11.5       CBC:   Lab Results   Component Value Date    WBC 6.5 12/23/2020    RBC 4.69 12/23/2020    HGB 12.4 12/23/2020    HCT 42.7 12/23/2020    MCV 91.0 12/23/2020    MCH 26.4 12/23/2020    MCHC 29.0 12/23/2020    RDW 20.5 12/23/2020     12/23/2020    MPV 11.5 12/23/2020     BMP:    Lab Results   Component Value Date     12/23/2020    K 4.4 12/23/2020    K 6.0 12/21/2020    CL 97 12/23/2020    CO2 39 12/23/2020    BUN 15 12/23/2020    LABALBU 2.9 04/13/2018    CREATININE 1.0 12/23/2020    CALCIUM 10.0 12/23/2020    GFRAA >60 12/23/2020    LABGLOM >60 12/23/2020    GLUCOSE 117 12/23/2020        Radiology:   XR CHEST PORTABLE Final Result   Cardiomegaly and borderline pulmonary vascular congestion. Assessment/Plan:  Principal Problem:    Acute congestive heart failure with left ventricular diastolic dysfunction (HCC)  Active Problems:    Physical deconditioning    Morbid obesity with BMI of 60.0-69.9, adult (HCC)    Skin breakdown    Vitamin D deficiency  Resolved Problems:    * No resolved hospital problems. *    1. Volume overload  -Echocardiogram shows normal ejection fraction and diastolic function for age  -Switch to oral Lasix to prevent further volume overload     2. Fall at home/physical deconditioning  -PT/OT  -social work and case management following for placement     3. Multiple areas of skin breakdown  -Wound care following  -Topical miconazole powder in skin folds    4. Chronic respiratory failure with hypoxia  -Continue supplemental nasal cannula oxygen     5. Morbid obesity with BMI of 68.91  -Counseled on lifestyle modifications     6. Vitamin D deficiency  -continue home oral supplementation      Code Status: Full code  DVT prophylaxis: subcutaneous enoxaparin      NOTE: This report was transcribed using voice recognition software. Every effort was made to ensure accuracy; however, inadvertent computerized transcription errors may be present.      Electronically signed by Juan Daniel Palm DO on 12/23/2020 at 6:35 PM

## 2020-12-23 NOTE — PROCEDURES
Power midline  Placement 12/23/2020    Product number: [id number]    Lot Number: [id number]      Ultrasound: YES   RIGHT BRACHIAL VEIN              Upper Arm Circumference: 41CM    Size: 4.5FR    Exposed Length: 0CM    Internal Length: 15CM   Cut: 0CM   Vein Measurement: 0.56CM    INSERTED POWER MIDLINE WITHOUT DIFFICULTY, BRISK BLOOD RETURN NOTED.      Becerril Opal  12/23/2020  2:39 PM

## 2020-12-23 NOTE — PROGRESS NOTES
OT BEDSIDE TREATMENT NOTE      Date:2020  Patient Name: Qamar Trevizo  MRN: [id number]  : 1955  Room: [address] [place] [address]     Referring Provider: Patrice Cárdenas DO     Evaluating OT: Ely Ybarra OTR/L #[id number]     AM-PAC Daily Activity Raw Score:      Recommended Placement: Subacute rehab  Recommended Adaptive Equipment: TBD      Diagnosis:   1. Acute congestive heart failure, unspecified heart failure type ([facility]    2. Inability to walk        Surgery: N/A       Pertinent Medical History:    Past Medical History        Past Medical History:   Diagnosis Date    Acute congestive heart failure with left ventricular diastolic dysfunction ([facility] 2020    Cardiomegaly 2017    Hyperlipidemia      Hypertension      Hypoxemia 2017    Nodule of right lung 2017    Renal failure 2018         Precautions:  Falls, alarm, bariatric bed, O2     Home Living: Pt lives with  and son in a 1 story home with 0 DELLA with 0 rail(s). Bathroom setup: did not specify, pt has been getting sponge bathed by family recently. Equipment owned: scooter, O2     Prior Level of Function: Assist with ADLs , Assist with IADLs; ambulated without AD  Driving: Not reported     Pain Level: no c/o pain at this time  Cognition: A&O: 4/4; Follows 2 step directions              Memory:  good              Sequencing:  fair              Problem solving:  fair              Judgement/safety:  fair                Functional Assessment:    Initial Eval Status  Date: 20 Treatment Status  Date:2020 STGs = LTGs  Time frame: 5-7 days   Feeding Set-up   Pt able to  cup and drink from it. R UE limited due to wound under breast.  N/T Independent   Grooming Moderate Assist  Assist for opening lotion, balance when rubbing hands together.    Min A; cuing for follow through to wash face; pt able to manage containers to complete oral hygiene Minimal Assist    UB Dressing Moderate Assist   Don gown 2x, once at demonstrated decreased independence and safety during completion of ADL/functional transfers/mobility tasks. Pt would benefit from continued skilled OT to increase safety and independence with completion of ADL/IADL tasks for functional independence and quality of life. Co-treat with PT d/t level of physical assistance needed and pt's decreased endurance level to tolerate separate sessions. Pt required cues and education as noted above for safe facilitation and completion of tasks. Therapist provided skilled monitoring of patient's response during treatment session. Prior to and at the end of session, environmental modifications/O2 line management completed for patients safety and efficiency of treatment session. Overall, patient demonstrates moderate difficulties with completion of BADLs and IADLs. Factors contributing to these difficulties include incontinence of urine around catheter, decreased endurance, and generalized weakness. As noted above, patient likely to benefit from further OT intervention to increase independence, safety, and overall quality of life. Treatment:     ? Bed mobility: Facilitated bed mobility with cues for proper body mechanics and sequencing to prepare for ADL completion. ? ADL completion: Self-care retraining for the above-mentioned ADLs; training on proper hand placement, safety technique, sequencing, and energy conservation techniques. ? Postural Balance: Sitting balance retraining to improve righting reactions with postural changes during ADLs.     · Pt has made fair/fair progress towards set goals:  ·  OT 1-3x/week for 5-7 days during hospitalization       Treatment Time In: 10:08 AM    Treatment Time Out: 10:47 AM           Treatment Charges: Mins Units   ADL/Home Mgt     [id number]     Neuro Re-ed         [id number]     Orthotic manage/training                               [address] Non Billable Time 14    Total Timed Treatment 39 -14= 25 2        JUD Luna #[id number]

## 2020-12-24 VITALS
SYSTOLIC BLOOD PRESSURE: 130 MMHG | OXYGEN SATURATION: 95 % | TEMPERATURE: 98.1 F | DIASTOLIC BLOOD PRESSURE: 68 MMHG | RESPIRATION RATE: 22 BRPM | HEIGHT: 63 IN | HEART RATE: 96 BPM | WEIGHT: 293 LBS | BODY MASS INDEX: 51.91 KG/M2

## 2020-12-24 LAB
ANION GAP SERPL CALCULATED.3IONS-SCNC: 4 MMOL/L (ref 7–16)
BUN BLDV-MCNC: 16 MG/DL (ref 8–23)
CALCIUM SERPL-MCNC: 10.1 MG/DL (ref 8.6–10.2)
CHLORIDE BLD-SCNC: 96 MMOL/L (ref 98–107)
CO2: 41 MMOL/L (ref 22–29)
CREAT SERPL-MCNC: 0.9 MG/DL (ref 0.5–1)
GFR AFRICAN AMERICAN: >60
GFR NON-AFRICAN AMERICAN: >60 ML/MIN/1.73
GLUCOSE BLD-MCNC: 117 MG/DL (ref 74–99)
METER GLUCOSE: 108 MG/DL (ref 74–99)
METER GLUCOSE: 114 MG/DL (ref 74–99)
METER GLUCOSE: 121 MG/DL (ref 74–99)
METER GLUCOSE: 214 MG/DL (ref 74–99)
POTASSIUM SERPL-SCNC: 5 MMOL/L (ref 3.5–5)
SODIUM BLD-SCNC: 141 MMOL/L (ref 132–146)

## 2020-12-24 PROCEDURE — 6370000000 HC RX 637 (ALT 250 FOR IP): Performed by: INTERNAL MEDICINE

## 2020-12-24 PROCEDURE — 97530 THERAPEUTIC ACTIVITIES: CPT

## 2020-12-24 PROCEDURE — 36415 COLL VENOUS BLD VENIPUNCTURE: CPT

## 2020-12-24 PROCEDURE — 99239 HOSP IP/OBS DSCHRG MGMT >30: CPT | Performed by: INTERNAL MEDICINE

## 2020-12-24 PROCEDURE — 80048 BASIC METABOLIC PNL TOTAL CA: CPT

## 2020-12-24 PROCEDURE — 82962 GLUCOSE BLOOD TEST: CPT

## 2020-12-24 PROCEDURE — 2700000000 HC OXYGEN THERAPY PER DAY

## 2020-12-24 RX ADMIN — DESMOPRESSIN ACETATE 40 MG: 0.2 TABLET ORAL at 20:17

## 2020-12-24 RX ADMIN — PANTOPRAZOLE SODIUM 40 MG: 40 TABLET, DELAYED RELEASE ORAL at 07:09

## 2020-12-24 RX ADMIN — METOPROLOL SUCCINATE 100 MG: 100 TABLET, EXTENDED RELEASE ORAL at 08:35

## 2020-12-24 RX ADMIN — ANTI-FUNGAL POWDER MICONAZOLE NITRATE TALC FREE: 1.42 POWDER TOPICAL at 08:36

## 2020-12-24 RX ADMIN — Medication 1000 UNITS: at 08:35

## 2020-12-24 RX ADMIN — ANTI-FUNGAL POWDER MICONAZOLE NITRATE TALC FREE: 1.42 POWDER TOPICAL at 20:17

## 2020-12-24 RX ADMIN — FUROSEMIDE 40 MG: 40 TABLET ORAL at 08:35

## 2020-12-24 RX ADMIN — INSULIN LISPRO 2 UNITS: 100 INJECTION, SOLUTION INTRAVENOUS; SUBCUTANEOUS at 12:16

## 2020-12-24 ASSESSMENT — PAIN SCALES - GENERAL
PAINLEVEL_OUTOF10: 0

## 2020-12-24 NOTE — CARE COORDINATION
Ss note:12/24/2020.11:09 AM Covid negative on 12-. Met with pt again to inform no local snfs have any beds available. Discussed [facility], pt is receptive and prefers [facility]. Per Liaison [facility] pt has been accepted and bed is available, [facility]to Thacker is waived. HENS completed, will need signed AUDI. Spouse will need notified when discharge is written.  VANNA Russo

## 2020-12-24 NOTE — PROGRESS NOTES
Physical Therapy    Physical Therapy Treatment Note    Room #:  3691/4487-95  Patient Name: Archana Hernandez  YOB: 1955  MRN: [id number]    Referring Provider:   Larisa Bonilla DO      Date of Service: 12/24/2020    Evaluating Physical Therapist: Angel Luis Bishop, PT  #[id number]       Diagnosis:   Acute congestive heart failure with left ventricular diastolic dysfunction ([facility] [I50.31]   Pt fell d/t weakness and has been non ambulatory since 12/16    Patient Active Problem List   Diagnosis    Hypoxemia    Cardiomegaly    Nodule of right lung    Acute renal failure with tubular necrosis (HCC)    Rectal bleeding    Sepsis affecting skin    Cocaine abuse ([facility]    Metabolic acidosis    Volume overload    Physical deconditioning    Morbid obesity with BMI of 60.0-69.9, adult ([facility]    Skin breakdown    Vitamin D deficiency        Tentative placement recommendation: Subacute rehab    Equipment recommendation: To be determined      Prior Level of Function: Patient   prior to fall; scooter and wheeled chair; in past month  Rehab Potential: good  for baseline    Past medical history:   Past Medical History:   Diagnosis Date    Acute congestive heart failure with left ventricular diastolic dysfunction ([facility] 12/21/2020    Cardiomegaly 8/8/2017    Hyperlipidemia     Hypertension     Hypoxemia 8/8/2017    Nodule of right lung 8/9/2017    Renal failure 4/8/2018     History reviewed. No pertinent surgical history. Precautions:  Up with assistance, falls, alarm and O2 , 4 Liters of o2 via nasal cannula     SUBJECTIVE:    Social history: Patient lives with spouse   in a ranch home  with No steps  to enter      Equipment owned: Scooter,   2 Liters of o2 via nasal cannula     [address] [facility] Mobility Inpatient   How much difficulty turning over in bed?: A Lot  How much difficulty sitting down on / standing up from a chair with arms?: Unable  How much difficulty moving from lying on assessed       Sitting EOB:  fair  plus  Dynamic Standing: fair with walker and 2 person assist for safety     Patient is Alert & Oriented x person, place, time and situation and follows directions    Sensation:  Patient  denies numbness and tingling     Edema:  yes bilateral lower extremities    Endurance: poor         Patient education  Patient educated on role of Physical Therapy, risks of immobility, safety and plan of care, energy conservation,  importance of mobility while in hospital  and importance and purpose of adaptive device and adjusted to proper height for the patient. Patient was educated and facilitated on techniques to increase safety and independence with bed mobility, balance, functional transfers, and functional mobility. Patient was explained the the benefits of mobility and risks of immobility. Patient response to education:   Pt verbalized understanding Pt demonstrated skill Pt requires further education in this area   Yes Partial Yes      Treatment:  Patient practiced and was instructed/facilitated in the following treatment: Patient   Sat edge of bed 10 minutes with Minimal assist of 1 to increase dynamic sitting balance and activity tolerance. pt's knees blocked and therapist stood anteriorly due to bariatric bed and to  avoid pt slipping forward when seated at side of bed . Also assisted with rolling pt multiple times to change and apply new pad .    not performed   At end of session, patient in bed with  call light and phone within reach,    all lines and tubes intact, nursing notified. Patient would benefit from continued skilled Physical Therapy to improve functional independence and quality of life. Patient's/ family goals   rehab        ASSESSMENT: Patient exhibits decreased strength, balance, coordination impairing functional mobility and limit function.    Poor trunk control and speed of recruitment continues  to  impact patients ability  to care for self; Plan of Care:     -Bed Mobility: Lower extremity exercises , Upper extremity exercises  and Trunk control activities   -Sitting Balance: Incorporate reaching activities to activate trunk muscles  and Facilitate postural control in all planes   -Standing Balance: Perform strengthening exercises in standing to promote motor control with or without upper extremity support  and Instruct patient on adequate base of support to maintain balance  -Transfers: Provide instruction on proper hand and foot position for adequate transfer of weight onto lower extremities and use of gait device, Cues for hand placement, technique and safety, Facilitate weight shift forward on to lower extremities and provide necessary stabilization of bilateral lower extremities , Support transfer of weight on to lower extremities, Assist with extension of knees trunk and hip to accept weight transfer  and Provide stabilization to prevent fall   -Gait: Gait training and Standing activities to improve: base of support, weight shift, weight bearing    -Endurance: Utilize Supervised activities to increase level of endurance to allow for safe functional mobility including transfers and gait     Patient and or family understand(s) diagnosis, prognosis, and plan of care. Frequency of treatments: Patient will be seen    daily.        Time in 1145 am  Time out  1214 pm    Total Treatment Time  24 minutes    CPT codes:    Therapeutic activities (15849)   24 minutes  2 unit(s)    Francisca Haque PTA  LIC# [id number]

## 2020-12-24 NOTE — PROGRESS NOTES
Spoke with Sreekanth Bo and let know about discharged at 7 pm. Gave address and phone number or facility. ,

## 2020-12-24 NOTE — DISCHARGE INSTR - COC
Continuity of Care Form    Patient Name: Blanca Claros   :  1955  MRN:  [id number]    Admit date:  2020  Discharge date:  2020    Code Status Order: Full Code   Advance Directives:     Admitting Physician:  Patsy Hmaeed DO  PCP: Sera Head    Discharging Nurse: Electronically signed by Abiel Espino RN on 2020 at 3:42 PM    [address] Unit/Room#: 0007/3572-18  Discharging Unit Phone Number: [phone]    Emergency Contact:   Extended Emergency Contact Information  Primary Emergency Contact: Gautam  Address: [address] [place] [address] Phone: [phone]  Relation: Domestic Partner   needed? No  Secondary Emergency Contact: Paula [facility] of [address] [place] [address] Phone: [phone]  Relation: Other   needed? No    Past Surgical History:  History reviewed. No pertinent surgical history. Immunization History: There is no immunization history on file for this patient.     Active Problems:  Patient Active Problem List   Diagnosis Code    Hypoxemia R09.02    Cardiomegaly I51.7    Nodule of right lung R91.1    Acute renal failure with tubular necrosis (HCC) N17.0    Rectal bleeding K62.5    Sepsis affecting skin A41.9    Cocaine abuse ([facility] J93.21    Metabolic acidosis A84.1    Volume overload E87.70    Physical deconditioning R53.81    Morbid obesity with BMI of 60.0-69.9, adult ([facility] E66.01, Z68.44    Skin breakdown R23.8    Vitamin D deficiency E55.9       Isolation/Infection:   Isolation          No Isolation        Patient Infection Status     Infection Onset Added Last Indicated Last Indicated By Review Planned Expiration Resolved Resolved By    None active    Resolved    COVID-19 Rule Out 20 COVID-19 (Ordered)   20 Rule-Out Test Resulted          Nurse Assessment:  Last Vital Signs: /76   Pulse 74   Temp 98.5 °F (36.9 °C) (Oral)   Resp 18   Ht 5' 3\" (1.6 m)   Wt (!) 310 lb 1.6 oz (140.7 kg)   LMP 07/18/2017   SpO2 94%   BMI 54.93 kg/m²     Last documented pain score (0-10 scale): Pain Level: 0  Last Weight:   Wt Readings from Last 1 Encounters:   12/24/20 (!) 310 lb 1.6 oz (140.7 kg)     Mental Status:  oriented and alert    IV Access:  - None    Nursing Mobility/ADLs:  Walking   Dependent  Transfer  Dependent  Bathing  Dependent  Dressing  Dependent  Toileting  Dependent  Feeding  Dependent  Med Admin  Dependent  Med Delivery   whole    Wound Care Documentation and Therapy:  Wound 04/08/18 Abdomen Lower;Left;Right white purulent drainage (Active)   Number of days: 990       Wound 04/09/18 Buttocks Left;Right;Mid; Inner excoriation/redness (Active)   Number of days: 990       Wound 04/09/18 Thigh Inner excoriated areas with bloody drainage (Active)   Number of days: 990        Elimination:  Continence:   · Bowel: Yes  · Bladder: Yes  Urinary Catheter: None   Colostomy/Ileostomy/Ileal Conduit: No       Date of Last BM: ***    Intake/Output Summary (Last 24 hours) at 12/24/2020 1426  Last data filed at 12/24/2020 0824  Gross per 24 hour   Intake 598 ml   Output 500 ml   Net 98 ml     I/O last 3 completed shifts: In: 5 [P.O.:720]  Out: 500 [Urine:500]    Safety Concerns: At Risk for Falls    Impairments/Disabilities:      [address] [facility] Impairments/Disabilities:[id number]}    Nutrition Therapy:  Current Nutrition Therapy:   - Oral Diet:  General and Carb Control 5 carbs/meal (2000kcals/day)    Routes of Feeding: Oral  Liquids: Thin Liquids  Daily Fluid Restriction: no  Last Modified Barium Swallow with Video (Video Swallowing Test): not done    Treatments at the Time of Hospital Discharge:   Respiratory Treatments: ***  Oxygen Therapy:  is not on home oxygen therapy.   Ventilator:    - No ventilator support    Rehab Therapies: Physical Therapy and Occupational Therapy  Weight Bearing Status/Restrictions: No weight bearing

## 2020-12-24 NOTE — DISCHARGE SUMMARY
12/24/2020. Patient also had multiple areas of skin breakdown due to obesity and urinary incontinence. Wound care followed the patient, recommended miconazole powder to the skin folds. Discharge Exam:  Vitals:    12/24/20 0436 12/24/20 0500 12/24/20 0618 12/24/20 0830   BP: 115/64   130/76   Pulse: 84 88  74   Resp: 21   18   Temp: 98.8 °F (37.1 °C)   98.5 °F (36.9 °C)   TempSrc: Oral   Oral   SpO2: 92%   94%   Weight:   (!) 310 lb 1.6 oz (140.7 kg)    Height:           General Appearance: Obese female, alert and oriented to person, place and time and in no acute distress  Skin: Multiple areas of skin breakdown in the abdominal folds, miconazole powder in place  Head: normocephalic and atraumatic  Eyes: pupils equal, round, and reactive to light, extraocular eye movements intact, conjunctivae normal  Neck: neck supple and non tender without mass   Pulmonary/Chest: Nonlabored. Clear to auscultation bilaterally. Cardiovascular: Distant, normal rate, normal S1 and H1 QGNK grade 2/6 systolic murmur and no carotid bruits  Abdomen: Obese, soft, non-tender, non-distended, normal bowel sounds, no masses or organomegaly  Extremities trace bilateral lower extremity edema, improved from prior exam.  No cyanosis or clubbing. Neurologic: no cranial nerve deficit and speech normal  I/O last 3 completed shifts: In: 720 [P.O.:720]  Out: 500 [Urine:500]  I/O this shift:  In: 118 [P.O.:118]  Out: -       LABS:  Recent Labs     12/22/20  0920 12/23/20  0724 12/24/20  0614    140 141   K 4.2 4.4 5.0   CL 97* 97* 96*   CO2 39* 39* 41*   BUN 15 15 16   CREATININE 1.0 1.0 0.9   GLUCOSE 97 117* 117*   CALCIUM 10.0 10.0 10.1       Recent Labs     12/22/20  0920 12/23/20  0724   WBC 6.0 6.5   RBC 4.72 4.69   HGB 12.8 12.4   HCT 43.0 42.7   MCV 91.1 91.0   MCH 27.1 26.4   MCHC 29.8* 29.0*   RDW 20.4* 20.5*    152   MPV 11.7 11.5       No results for input(s): POCGLU in the last 72 hours.     CBC:   Lab Results Component Value Date    WBC 6.5 12/23/2020    RBC 4.69 12/23/2020    HGB 12.4 12/23/2020    HCT 42.7 12/23/2020    MCV 91.0 12/23/2020    MCH 26.4 12/23/2020    MCHC 29.0 12/23/2020    RDW 20.5 12/23/2020     12/23/2020    MPV 11.5 12/23/2020     BMP:    Lab Results   Component Value Date     12/24/2020    K 5.0 12/24/2020    K 6.0 12/21/2020    CL 96 12/24/2020    CO2 41 12/24/2020    BUN 16 12/24/2020    LABALBU 2.9 04/13/2018    CREATININE 0.9 12/24/2020    CALCIUM 10.1 12/24/2020    GFRAA >60 12/24/2020    LABGLOM >60 12/24/2020    GLUCOSE 117 12/24/2020       Imaging:  Echo Complete    Result Date: 12/22/2020  Transthoracic Echocardiography Report (TTE)  Demographics   Patient Name    Mike Alves Gender            Female                  J   Medical Record  [id number]     Room Number       0934  Number   Account #       [de-identified]    Procedure Date    12/22/2020   Corporate ID                 Ordering                               Physician   Accession       [id number]   Referring  Number                       Physician   Date of Birth   1955   [address] [facility]   Age             76 year(s)   Interpreting      Loni 63                               Physician         Physician Cardiology                                                 Osei Cardenas MD                                Any Other  Procedure Type of Study   TTE procedure  Procedure Date Date: 12/22/2020 Start: 01:43 PM Study Location: Portable Technical Quality: Limited visualization due to body habitus. Indications:Congestive heart failure. Patient Status: Routine Height: 63 inches Weight: 389 pounds BSA: 2.57 m^2 BMI: 68.91 kg/m^2 HR: 89 bpm BP: 126/67 mmHg  Findings   Left Ventricle  Left ventricle size is normal.  Moderate concentric left ventricular hypertrophy. Ejection fraction is visually estimated at 55%.   wall motion couldn't be evaluated  Normal left ventricular diastolic filling pattern for age. No evidence of left ventricular mass or thrombus noted. Right Ventricle  Normal right ventricular size and function. Left Atrium  The left atrium is moderately dilated. Interatrial septum appears intact. Right Atrium  Mildly enlarged right atrium size. Mitral Valve  Structurally normal mitral valve. No mitral valve stenosis present. No evidence of mitral regurgitaton. Tricuspid Valve  The tricuspid valve appears structurally normal.  Mild to moderate tricuspid regurgitation. RVSP is 50 mmHg. Pulmonary hypertension is moderate . Aortic Valve  Aortic valve opens well. No evidence of aortic valve regurgitation. No  hemodynamically significant aortic stenosis is present. Pulmonic Valve  The pulmonic valve was not well visualized. No evidence of any pulmonic regurgitation. Pericardial Effusion  No evidence of pericardial effusion. Aorta  Aortic root within normal limits. Conclusions   Summary  Compared to prior echo, changes noted. Technically limited study. Left ventricle size is normal.  Moderate concentric left ventricular hypertrophy. Ejection fraction is visually estimated at 55%. wall motion couldn't be evaluated  Normal left ventricular diastolic filling pattern for age. Mild to moderate tricuspid regurgitation. RVSP is 50 mmHg. Pulmonary hypertension is moderate .    Signature   ----------------------------------------------------------------  Electronically signed by Giancarlo Azul MD(Interpreting  physician) on 12/22/2020 09:29 PM  ----------------------------------------------------------------  M-Mode/2D Measurements & Calculations   LV Diastolic    LV Systolic Dimension: 3 cm  AV Cusp Separation: 2.2 cmLA  Dimension: 4.9  LV Volume Diastolic: 790.1   Dimension: 5.1 cmAO Root  cm              ml                           Dimension: 3.4 cm  LV FS:38.8 %    LV Volume Systolic: 39.8 ml  LV PW           LV EDV/LV EDV Index: 759.6  Diastolic: 1.3  BP/92 RH/F^3RU ESV/LV ESV cm              Index: 36.3 ml/14ml/ m^2     RV Diastolic Dimension: 2.9  Septum          EF Calculated: 67.1 %        cm  Diastolic: 1.4  LV Mass Index: 104 l/min*m^2  cm  CO: 4.5 l/min                                LA volume/Index: 69.7 ml  CI: 1.75        LVOT: 2 cm  l/m*m^2  LV Mass: 267.94  g  Doppler Measurements & Calculations   MV Peak E-Wave: 1.05 AV Peak Velocity:     LVOT Peak Velocity: 0.78 m/s  m/s                  1.25 m/s              LVOT Mean Velocity: 0.55 m/s  MV Peak A-Wave: 0.63 AV Peak Gradient:     LVOT Peak Gradient: 2.4  m/s                  6.25 mmHg             mmHgLVOT Mean Gradient: 1.3  MV E/A Ratio: 1.67   AV Mean Velocity:     mmHg  MV Peak Gradient: 4  0.91 m/s  mmHg                 AV Mean Gradient: 3.9  MV Mean Gradient:    mmHg  1.8 mmHg             AV VTI: 23.1 cm       TR Velocity:3.56 m/s  MV Mean Velocity:    AV Area               TR Gradient:50.72 mmHg  0.64 m/s             (Continuity):2.19     PV Peak Velocity: 0.81 m/s  MV Deceleration      cm^2                  PV Peak Gradient: 2.63 mmHg  Time: 182.4 msec                           PV Mean Velocity: 0.64 m/s  MV P1/2t: 60.4 msec  LVOT VTI: 16.1 cm     PV Mean Gradient: 1.7 mmHg  MVA by PHT:3.64 cm^2  MV Area  (continuity): 2.4  cm^2  [email or web address] [id number]    Xr Chest Portable    Result Date: 12/21/2020  EXAMINATION: ONE XRAY VIEW OF THE CHEST 12/21/2020 10:40 am COMPARISON: April 8, 2018 HISTORY: ORDERING SYSTEM PROVIDED HISTORY: fall TECHNOLOGIST PROVIDED HISTORY: Reason for exam:->fall FINDINGS: Heart is enlarged. Pulmonary vascularity is borderline congested. Lungs are clear. Neither costophrenic angle is clearly blunted. Cardiomegaly and borderline pulmonary vascular congestion.         Patient Instructions:   Current Discharge Medication List      CONTINUE these medications which have NOT

## 2020-12-25 NOTE — PROGRESS NOTES
This nurse attempted to call report to [address] [facility] in [facility] at [phone] 2 times & the previous RN on the shift before me attempted to call once as well. Unable to leave voice message as phone will just continuously ring.  Electronically signed by Brittanie Lopez RN on 12/24/2020 at 9:35 PM

## 2023-07-03 NOTE — H&P
----- Message from Shai Jenkins MD sent at 7/3/2023  1:30 PM CDT -----  The 1 mg overnight dexamethasone suppression test is normal.  She does not have Cushing syndrome.   [address] [place], [place] [address]ist Group   History and Physical      CHIEF COMPLAINT: Fall at home and shortness of breath    History of Present Illness:  76 y.o. female with a history of hypertension, hyperlipidemia, morbid obesity with BMI 68 presents with a fall at home. This occurred about 6 days ago, and since then she has been afraid to get up and ambulate. Per her family, patient has not been able to move very much since this fall occurred. She also complains of shortness of breath, however this has been progressive over several years. She does use oxygen at home. She denies any chest pain, palpitations, fever, chills, or sick contacts. Per ED provider, patient's family reports that she was covered in urine prior to admission. In the ED significant for BMP with a potassium of 6 but moderately hemolyzed, proBNP of 7705, negative troponin, and chest x-ray suggestive of pulmonary vascular congestion. She was given Lasix 40 mg IV in the ED. Informant(s) for H&P: Patient, chart review, ED physician    REVIEW OF SYSTEMS:  no fevers, chills, cp, n/v, ha, vision/hearing changes, wt changes, hot/cold flashes, other open skin lesions, diarrhea, constipation, dysuria/hematuria unless noted in HPI. Complete ROS performed with the patient and is otherwise negative. PMH:  Past Medical History:   Diagnosis Date    Acute congestive heart failure with left ventricular diastolic dysfunction ([address] 12/21/2020    Cardiomegaly 8/8/2017    Hyperlipidemia     Hypertension     Hypoxemia 8/8/2017    Nodule of right lung 8/9/2017    Renal failure 4/8/2018       Surgical History:  History reviewed. No pertinent surgical history. Medications Prior to Admission:    Prior to Admission medications    Medication Sig Start Date End Date Taking?  Authorizing Provider   atorvastatin (LIPITOR) 40 MG tablet Take 40 mg by mouth nightly   Yes Historical Provider, MD   metoprolol succinate (TOPROL XL) 100 MG extended release tablet Take 100 mg by mouth daily   Yes Historical Provider, MD   miconazole (MICOTIN) 2 % powder Apply 1 each topically 3 times daily   Yes Historical Provider, MD   metFORMIN (GLUCOPHAGE) 500 MG tablet Take 500 mg by mouth daily (with breakfast)   Yes Historical Provider, MD   acetaminophen (TYLENOL 8 HOUR ARTHRITIS PAIN) 650 MG extended release tablet Take 650 mg by mouth every 8 hours as needed for Pain   Yes Historical Provider, MD   ammonium lactate (LAC-HYDRIN) 12 % lotion Apply 1 g topically 3 times daily   Yes Historical Provider, MD   vitamin D 25 MCG (1000 UT) CAPS Take 1,000 Units by mouth daily   Yes Historical Provider, MD   cyclobenzaprine (FLEXERIL) 10 MG tablet Take 10 mg by mouth 2 times daily   Yes Historical Provider, MD   furosemide (LASIX) 40 MG tablet Take 40 mg by mouth daily   Yes Historical Provider, MD   lisinopril-hydroCHLOROthiazide (PRINZIDE;ZESTORETIC) 20-25 MG per tablet Take 1 tablet by mouth daily   Yes Historical Provider, MD   OXYGEN Inhale 2 L into the lungs continuous   Yes Historical Provider, MD   influenza virus trivalent vaccine (FLUZONE) injection Inject 0.5 mLs into the muscle once Given 10/2020   Yes Historical Provider, MD   omeprazole (PRILOSEC) 20 MG delayed release capsule Take 20 mg by mouth daily   Yes Historical Provider, MD       Allergies:    Patient has no known allergies. Social History:    reports that she has been smoking cigarettes. She has been smoking about 0.20 packs per day. She has never used smokeless tobacco. She reports current alcohol use. She reports current drug use. Drug: Cocaine. Family History:   family history includes Colon Cancer in her mother; No Known Problems in her father.      PHYSICAL EXAM:  Vitals:  /81   Pulse 90   Temp 98.3 °F (36.8 °C) (Oral)   Resp 16   Ht 5' 3\" (1.6 m)   Wt (!) 389 lb (176.4 kg)   LMP 07/18/2017   SpO2 91%   BMI 68.91 kg/m²   General Appearance: Obese female, alert and oriented to person, place and time and in no acute distress  Skin: Multiple areas of skin breakdown in the abdominal folds  Head: normocephalic and atraumatic  Eyes: pupils equal, round, and reactive to light, extraocular eye movements intact, conjunctivae normal  Neck: neck supple and non tender without mass   Pulmonary/Chest: Nonlabored. Diminished breath sounds at the bases bilaterally, otherwise clear to auscultation  Cardiovascular: Distant, normal rate, normal S1 and S2 with grade 2/6 systolic murmur and no carotid bruits  Abdomen: Obese, soft, non-tender, non-distended, normal bowel sounds, no masses or organomegaly  Extremities: 1+ bilateral lower extremity edema. No cyanosis or clubbing. Neurologic: no cranial nerve deficit and speech normal    LABS:  Recent Labs     12/21/20  1324      K 6.0*   CL 95*   CO2 36*   BUN 15   CREATININE 0.9   GLUCOSE 98   CALCIUM 10.1       Recent Labs     12/21/20  1146   WBC 6.8   RBC 5.37   HGB 14.1   HCT 47.6   MCV 88.6   MCH 26.3   MCHC 29.6*   RDW 21.1*      MPV 11.9       No results for input(s): POCGLU in the last 72 hours.     CBC:   Lab Results   Component Value Date    WBC 6.8 12/21/2020    RBC 5.37 12/21/2020    HGB 14.1 12/21/2020    HCT 47.6 12/21/2020    MCV 88.6 12/21/2020    MCH 26.3 12/21/2020    MCHC 29.6 12/21/2020    RDW 21.1 12/21/2020     12/21/2020    MPV 11.9 12/21/2020     BMP:    Lab Results   Component Value Date     12/21/2020    K 6.0 12/21/2020    CL 95 12/21/2020    CO2 36 12/21/2020    BUN 15 12/21/2020    LABALBU 2.9 04/13/2018    CREATININE 0.9 12/21/2020    CALCIUM 10.1 12/21/2020    GFRAA >60 12/21/2020    LABGLOM >60 12/21/2020    GLUCOSE 98 12/21/2020       Radiology: Xr Chest Portable    Result Date: 12/21/2020  EXAMINATION: ONE XRAY VIEW OF THE CHEST 12/21/2020 10:40 am COMPARISON: April 8, 2018 HISTORY: ORDERING SYSTEM PROVIDED HISTORY: fall TECHNOLOGIST PROVIDED HISTORY: Reason for exam:->fall FINDINGS: Heart is enlarged. Pulmonary vascularity is borderline congested. Lungs are clear. Neither costophrenic angle is clearly blunted. Cardiomegaly and borderline pulmonary vascular congestion. EKG: Sinus rhythm, low voltage    ASSESSMENT/PLAN:      Principal Problem:    Acute congestive heart failure with left ventricular diastolic dysfunction (HCC)  Active Problems:    Physical deconditioning    Morbid obesity with BMI of 60.0-69.9, adult (HCC)    Skin breakdown    Vitamin D deficiency  Resolved Problems:    * No resolved hospital problems. *      1. Clinical suspicion of acute congestive heart failure  -Diuresis with IV Lasix  -Closely monitor creatinine  -Patient has been on beta-blocker at home, so we will continue this  -Obtain echocardiogram    2. Fall at home/physical deconditioning  -PT/OT  -social work consult for placement    3. Multiple areas of skin breakdown  -Wound care consult    4. Morbid obesity with BMI of 68.91  -Counseled on lifestyle modifications    5. Vitamin D deficiency  -continue home oral supplementation     Code Status: Full code  DVT prophylaxis: subcutaneous enoxaparin    NOTE: This report was transcribed using voice recognition software.  Every effort was made to ensure accuracy; however, inadvertent computerized transcription errors may be present.     Electronically signed by Mati Bañuelos DO on 12/21/2020 at 7:21 PM

## 2024-01-19 ENCOUNTER — HOSPITAL ENCOUNTER (INPATIENT)
Age: 69
LOS: 23 days | Discharge: NURSING, REHAB OR LONG-TERM CARE | DRG: 133 | End: 2024-02-11
Attending: STUDENT IN AN ORGANIZED HEALTH CARE EDUCATION/TRAINING PROGRAM | Admitting: INTERNAL MEDICINE
Payer: COMMERCIAL

## 2024-01-19 ENCOUNTER — APPOINTMENT (OUTPATIENT)
Dept: GENERAL RADIOLOGY | Age: 69
DRG: 133 | End: 2024-01-19
Payer: COMMERCIAL

## 2024-01-19 ENCOUNTER — APPOINTMENT (OUTPATIENT)
Age: 69
DRG: 133 | End: 2024-01-19
Attending: INTERNAL MEDICINE
Payer: COMMERCIAL

## 2024-01-19 DIAGNOSIS — J96.21 ACUTE ON CHRONIC RESPIRATORY FAILURE WITH HYPOXIA (HCC): Primary | ICD-10-CM

## 2024-01-19 DIAGNOSIS — I50.9 ACUTE ON CHRONIC CONGESTIVE HEART FAILURE, UNSPECIFIED HEART FAILURE TYPE (HCC): ICD-10-CM

## 2024-01-19 DIAGNOSIS — R62.7 FAILURE TO THRIVE IN ADULT: ICD-10-CM

## 2024-01-19 DIAGNOSIS — I48.91 ATRIAL FIBRILLATION, UNSPECIFIED TYPE (HCC): ICD-10-CM

## 2024-01-19 DIAGNOSIS — J18.9 COMMUNITY ACQUIRED PNEUMONIA OF RIGHT LUNG, UNSPECIFIED PART OF LUNG: ICD-10-CM

## 2024-01-19 PROBLEM — J96.22 ACUTE ON CHRONIC RESPIRATORY FAILURE WITH HYPOXIA AND HYPERCAPNIA (HCC): Status: ACTIVE | Noted: 2024-01-19

## 2024-01-19 PROBLEM — E11.65 TYPE 2 DIABETES MELLITUS WITH HYPERGLYCEMIA, WITHOUT LONG-TERM CURRENT USE OF INSULIN (HCC): Status: ACTIVE | Noted: 2024-01-19

## 2024-01-19 PROBLEM — J44.9 COPD (CHRONIC OBSTRUCTIVE PULMONARY DISEASE) (HCC): Status: ACTIVE | Noted: 2024-01-19

## 2024-01-19 PROBLEM — I50.33 ACUTE ON CHRONIC HEART FAILURE WITH PRESERVED EJECTION FRACTION (HFPEF) (HCC): Status: ACTIVE | Noted: 2024-01-19

## 2024-01-19 PROBLEM — I10 PRIMARY HYPERTENSION: Status: ACTIVE | Noted: 2024-01-19

## 2024-01-19 LAB
AADO2: 107.4 MMHG
ALBUMIN SERPL-MCNC: 4 G/DL (ref 3.5–5.2)
ALP SERPL-CCNC: 100 U/L (ref 35–104)
ALT SERPL-CCNC: 6 U/L (ref 0–32)
AMPHET UR QL SCN: NEGATIVE
ANION GAP SERPL CALCULATED.3IONS-SCNC: 5 MMOL/L (ref 7–16)
APAP SERPL-MCNC: <5 UG/ML (ref 10–30)
AST SERPL-CCNC: 13 U/L (ref 0–31)
B.E.: 22.1 MMOL/L (ref -3–3)
B.E.: 24.1 MMOL/L (ref -3–3)
BARBITURATES UR QL SCN: NEGATIVE
BASOPHILS # BLD: 0.02 K/UL (ref 0–0.2)
BASOPHILS NFR BLD: 0 % (ref 0–2)
BENZODIAZ UR QL: NEGATIVE
BILIRUB SERPL-MCNC: 0.5 MG/DL (ref 0–1.2)
BILIRUB UR QL STRIP: NEGATIVE
BNP SERPL-MCNC: 4289 PG/ML (ref 0–450)
BUN SERPL-MCNC: 15 MG/DL (ref 6–23)
BUPRENORPHINE UR QL: NEGATIVE
CALCIUM SERPL-MCNC: 10.7 MG/DL (ref 8.6–10.2)
CANNABINOIDS UR QL SCN: NEGATIVE
CHLORIDE SERPL-SCNC: 95 MMOL/L (ref 98–107)
CLARITY UR: CLEAR
CO2 SERPL-SCNC: 45 MMOL/L (ref 22–29)
COCAINE UR QL SCN: POSITIVE
COHB: 1 % (ref 0–1.5)
COHB: 1.6 % (ref 0–1.5)
COLOR UR: YELLOW
COMMENT: ABNORMAL
CREAT SERPL-MCNC: 1 MG/DL (ref 0.5–1)
CRITICAL ACTION: NORMAL
CRITICAL NOTIFICATION DATE/TIME: NORMAL
CRITICAL NOTIFICATION: NORMAL
CRITICAL VALUE READ BACK: YES
CRITICAL: ABNORMAL
CRITICAL: ABNORMAL
DATE ANALYZED: ABNORMAL
DATE ANALYZED: ABNORMAL
DATE OF COLLECTION: ABNORMAL
DATE OF COLLECTION: ABNORMAL
ECHO AV AREA PEAK VELOCITY: 2.6 CM2
ECHO AV AREA PEAK VELOCITY: 2.7 CM2
ECHO AV AREA PEAK VELOCITY: 2.7 CM2
ECHO AV AREA PEAK VELOCITY: 2.8 CM2
ECHO AV AREA VTI: 3.1 CM2
ECHO AV AREA/BSA VTI: 1.3 CM2/M2
ECHO AV CUSP MM: 2.1 CM
ECHO AV MEAN GRADIENT: 5 MMHG
ECHO AV MEAN VELOCITY: 1.1 M/S
ECHO AV PEAK GRADIENT: 10 MMHG
ECHO AV PEAK GRADIENT: 11 MMHG
ECHO AV PEAK VELOCITY: 1.6 M/S
ECHO AV PEAK VELOCITY: 1.6 M/S
ECHO AV VTI: 25.5 CM
ECHO BSA: 2.5 M2
ECHO EST RA PRESSURE: 3 MMHG
ECHO LA DIAMETER INDEX: 1.59 CM/M2
ECHO LA DIAMETER: 3.9 CM
ECHO LA VOL A-L A4C: 105 ML (ref 22–52)
ECHO LA VOL MOD A2C: 72 ML (ref 22–52)
ECHO LA VOL MOD A4C: 95 ML (ref 22–52)
ECHO LA VOLUME AREA LENGTH: 96 ML
ECHO LA VOLUME INDEX A-L A2C: 31 ML/M2 (ref 16–34)
ECHO LA VOLUME INDEX A-L A4C: 43 ML/M2 (ref 16–34)
ECHO LA VOLUME INDEX AREA LENGTH: 39 ML/M2 (ref 16–34)
ECHO LA VOLUME INDEX MOD A2C: 29 ML/M2 (ref 16–34)
ECHO LA VOLUME INDEX MOD A4C: 39 ML/M2 (ref 16–34)
ECHO LV FRACTIONAL SHORTENING: 35 % (ref 28–44)
ECHO LV INTERNAL DIMENSION DIASTOLE INDEX: 1.96 CM/M2
ECHO LV INTERNAL DIMENSION DIASTOLIC: 4.8 CM (ref 3.9–5.3)
ECHO LV INTERNAL DIMENSION SYSTOLIC INDEX: 1.27 CM/M2
ECHO LV INTERNAL DIMENSION SYSTOLIC: 3.1 CM
ECHO LV IVSD: 1.5 CM (ref 0.6–0.9)
ECHO LV IVSS: 2 CM
ECHO LV MASS 2D: 303.4 G (ref 67–162)
ECHO LV MASS INDEX 2D: 123.8 G/M2 (ref 43–95)
ECHO LV POSTERIOR WALL DIASTOLIC: 1.5 CM (ref 0.6–0.9)
ECHO LV POSTERIOR WALL SYSTOLIC: 2 CM
ECHO LV RELATIVE WALL THICKNESS RATIO: 0.63
ECHO LVOT AREA: 4.9 CM2
ECHO LVOT AV VTI INDEX: 0.6
ECHO LVOT DIAM: 2.5 CM
ECHO LVOT MEAN GRADIENT: 1 MMHG
ECHO LVOT PEAK GRADIENT: 3 MMHG
ECHO LVOT PEAK GRADIENT: 3 MMHG
ECHO LVOT PEAK VELOCITY: 0.8 M/S
ECHO LVOT PEAK VELOCITY: 0.9 M/S
ECHO LVOT STROKE VOLUME INDEX: 30.8 ML/M2
ECHO LVOT SV: 75.6 ML
ECHO LVOT VTI: 15.4 CM
ECHO MV A VELOCITY: 0.01 M/S
ECHO MV AREA PHT: 3 CM2
ECHO MV AREA VTI: 2.5 CM2
ECHO MV E DECELERATION TIME (DT): 189.3 MS
ECHO MV E VELOCITY: 1.49 M/S
ECHO MV E/A RATIO: 149
ECHO MV LVOT VTI INDEX: 1.99
ECHO MV MAX VELOCITY: 1.5 M/S
ECHO MV MEAN GRADIENT: 3 MMHG
ECHO MV MEAN VELOCITY: 0.8 M/S
ECHO MV PEAK GRADIENT: 9 MMHG
ECHO MV PRESSURE HALF TIME (PHT): 73.8 MS
ECHO MV VTI: 30.6 CM
ECHO PV MAX VELOCITY: 0.8 M/S
ECHO PV MEAN GRADIENT: 2 MMHG
ECHO PV MEAN VELOCITY: 0.6 M/S
ECHO PV PEAK GRADIENT: 3 MMHG
ECHO PV VTI: 17.3 CM
ECHO PVEIN A DURATION: 382.9 MS
ECHO PVEIN A VELOCITY: 0.3 M/S
ECHO PVEIN PEAK D VELOCITY: 0.4 M/S
ECHO PVEIN PEAK S VELOCITY: 0.3 M/S
ECHO PVEIN S/D RATIO: 0.8
ECHO RIGHT VENTRICULAR SYSTOLIC PRESSURE (RVSP): 63 MMHG
ECHO RV INTERNAL DIMENSION: 4.4 CM
ECHO RV TAPSE: 0.9 CM (ref 1.7–?)
ECHO RVOT PEAK GRADIENT: 1 MMHG
ECHO RVOT PEAK VELOCITY: 0.6 M/S
ECHO TV REGURGITANT MAX VELOCITY: 3.88 M/S
ECHO TV REGURGITANT PEAK GRADIENT: 60 MMHG
EOSINOPHIL # BLD: 0.18 K/UL (ref 0.05–0.5)
EOSINOPHILS RELATIVE PERCENT: 2 % (ref 0–6)
EPI CELLS #/AREA URNS HPF: ABNORMAL /HPF
ERYTHROCYTE [DISTWIDTH] IN BLOOD BY AUTOMATED COUNT: 15.6 % (ref 11.5–15)
ETHANOLAMINE SERPL-MCNC: <10 MG/DL
FENTANYL UR QL: NEGATIVE
FIO2: 50 %
FLOW RATE: 15
GFR SERPL CREATININE-BSD FRML MDRD: 58 ML/MIN/1.73M2
GLUCOSE BLD-MCNC: 130 MG/DL (ref 74–99)
GLUCOSE BLD-MCNC: 132 MG/DL (ref 74–99)
GLUCOSE BLD-MCNC: 141 MG/DL (ref 74–99)
GLUCOSE SERPL-MCNC: 112 MG/DL (ref 74–99)
GLUCOSE UR STRIP-MCNC: NEGATIVE MG/DL
HBA1C MFR BLD: 6.1 % (ref 4–5.6)
HCO3: 54.1 MMOL/L (ref 22–26)
HCO3: 55.9 MMOL/L (ref 22–26)
HCT VFR BLD AUTO: 41.4 % (ref 34–48)
HGB BLD-MCNC: 11.7 G/DL (ref 11.5–15.5)
HGB UR QL STRIP.AUTO: NEGATIVE
HHB: 3.1 % (ref 0–5)
HHB: 6.2 % (ref 0–5)
INFLUENZA A BY PCR: NOT DETECTED
INFLUENZA B BY PCR: NOT DETECTED
KETONES UR STRIP-MCNC: NEGATIVE MG/DL
LAB: ABNORMAL
LAB: ABNORMAL
LACTATE BLDV-SCNC: 1 MMOL/L (ref 0.5–2.2)
LEUKOCYTE ESTERASE UR QL STRIP: ABNORMAL
LYMPHOCYTES NFR BLD: 0.96 K/UL (ref 1.5–4)
LYMPHOCYTES RELATIVE PERCENT: 13 % (ref 20–42)
Lab: 1314
Lab: 2330
MAGNESIUM SERPL-MCNC: 2.2 MG/DL (ref 1.6–2.6)
MCH RBC QN AUTO: 28.3 PG (ref 26–35)
MCHC RBC AUTO-ENTMCNC: 28.3 G/DL (ref 32–34.5)
MCV RBC AUTO: 100.2 FL (ref 80–99.9)
METHADONE UR QL: NEGATIVE
METHB: 0.1 % (ref 0–1.5)
METHB: 0.1 % (ref 0–1.5)
MODE: ABNORMAL
MODE: ABNORMAL
MONOCYTES NFR BLD: 0.69 K/UL (ref 0.1–0.95)
MONOCYTES NFR BLD: 9 % (ref 2–12)
NEUTROPHILS NFR BLD: 75 % (ref 43–80)
NEUTS SEG NFR BLD: 5.6 K/UL (ref 1.8–7.3)
NITRITE UR QL STRIP: NEGATIVE
O2 CONTENT: 14.8 ML/DL
O2 CONTENT: 15.9 ML/DL
O2 DELIVERY DEVICE: ABNORMAL
O2 SATURATION: 93.7 % (ref 92–98.5)
O2 SATURATION: 96.9 % (ref 92–98.5)
O2HB: 92.1 % (ref 94–97)
O2HB: 95.8 % (ref 94–97)
OPERATOR ID: 1821
OPERATOR ID: 30
OPIATES UR QL SCN: NEGATIVE
OXYCODONE UR QL SCN: NEGATIVE
PATIENT TEMP: 37 C
PATIENT TEMP: 37 C
PCO2: 113.5 MMHG (ref 35–45)
PCO2: 121.2 MMHG (ref 35–45)
PCP UR QL SCN: NEGATIVE
PFO2: 2.01 MMHG/%
PH BLOOD GAS: 7.28 (ref 7.35–7.45)
PH BLOOD GAS: 7.3 (ref 7.35–7.45)
PH UR STRIP: 5 [PH] (ref 5–9)
PLATELET # BLD AUTO: 173 K/UL (ref 130–450)
PMV BLD AUTO: 12.9 FL (ref 7–12)
PO2: 100.3 MMHG (ref 75–100)
PO2: 76.3 MMHG (ref 75–100)
POC HCO3: 48.5 MMOL/L (ref 22–26)
POC O2 SATURATION: 97.4 % (ref 92–98.5)
POC PCO2: 95.1 MM HG (ref 35–45)
POC PH: 7.32 (ref 7.35–7.45)
POC PO2: 111.8 MM HG (ref 60–80)
POSITIVE BASE EXCESS, ART: 16.9 MMOL/L (ref 0–3)
POTASSIUM SERPL-SCNC: 4 MMOL/L (ref 3.5–5)
PROCALCITONIN SERPL-MCNC: 0.03 NG/ML (ref 0–0.08)
PROT SERPL-MCNC: 7.7 G/DL (ref 6.4–8.3)
PROT UR STRIP-MCNC: NEGATIVE MG/DL
RBC # BLD AUTO: 4.13 M/UL (ref 3.5–5.5)
RBC # BLD: ABNORMAL 10*6/UL
RBC #/AREA URNS HPF: ABNORMAL /HPF
RI(T): 1.07
RR MECHANICAL: 20 B/MIN
SALICYLATES SERPL-MCNC: <0.3 MG/DL (ref 0–30)
SARS-COV-2 RDRP RESP QL NAA+PROBE: NOT DETECTED
SODIUM SERPL-SCNC: 145 MMOL/L (ref 132–146)
SOURCE, BLOOD GAS: ABNORMAL
SOURCE, BLOOD GAS: ABNORMAL
SP GR UR STRIP: 1.01 (ref 1–1.03)
SPECIMEN DESCRIPTION: NORMAL
TEST INFORMATION: ABNORMAL
THB: 10.9 G/DL (ref 11.5–16.5)
THB: 12.2 G/DL (ref 11.5–16.5)
TIME ANALYZED: 1317
TIME ANALYZED: 2333
TOXIC TRICYCLIC SC,BLOOD: NEGATIVE
TRICHOMONAS #/AREA URNS HPF: PRESENT /[HPF]
TROPONIN I SERPL HS-MCNC: 28 NG/L (ref 0–9)
TROPONIN I SERPL HS-MCNC: 31 NG/L (ref 0–9)
UROBILINOGEN UR STRIP-ACNC: 0.2 EU/DL (ref 0–1)
VT MECHANICAL: 500 ML
WBC #/AREA URNS HPF: ABNORMAL /HPF
WBC OTHER # BLD: 7.5 K/UL (ref 4.5–11.5)

## 2024-01-19 PROCEDURE — 84145 PROCALCITONIN (PCT): CPT

## 2024-01-19 PROCEDURE — 87502 INFLUENZA DNA AMP PROBE: CPT

## 2024-01-19 PROCEDURE — 94640 AIRWAY INHALATION TREATMENT: CPT

## 2024-01-19 PROCEDURE — 36600 WITHDRAWAL OF ARTERIAL BLOOD: CPT

## 2024-01-19 PROCEDURE — 6360000002 HC RX W HCPCS: Performed by: INTERNAL MEDICINE

## 2024-01-19 PROCEDURE — 83735 ASSAY OF MAGNESIUM: CPT

## 2024-01-19 PROCEDURE — 80307 DRUG TEST PRSMV CHEM ANLYZR: CPT

## 2024-01-19 PROCEDURE — 83880 ASSAY OF NATRIURETIC PEPTIDE: CPT

## 2024-01-19 PROCEDURE — 80143 DRUG ASSAY ACETAMINOPHEN: CPT

## 2024-01-19 PROCEDURE — 85025 COMPLETE CBC W/AUTO DIFF WBC: CPT

## 2024-01-19 PROCEDURE — 6370000000 HC RX 637 (ALT 250 FOR IP): Performed by: INTERNAL MEDICINE

## 2024-01-19 PROCEDURE — 87635 SARS-COV-2 COVID-19 AMP PRB: CPT

## 2024-01-19 PROCEDURE — 99291 CRITICAL CARE FIRST HOUR: CPT | Performed by: INTERNAL MEDICINE

## 2024-01-19 PROCEDURE — C8929 TTE W OR WO FOL WCON,DOPPLER: HCPCS

## 2024-01-19 PROCEDURE — 83605 ASSAY OF LACTIC ACID: CPT

## 2024-01-19 PROCEDURE — 99285 EMERGENCY DEPT VISIT HI MDM: CPT

## 2024-01-19 PROCEDURE — 6360000002 HC RX W HCPCS

## 2024-01-19 PROCEDURE — 87081 CULTURE SCREEN ONLY: CPT

## 2024-01-19 PROCEDURE — 81001 URINALYSIS AUTO W/SCOPE: CPT

## 2024-01-19 PROCEDURE — 93005 ELECTROCARDIOGRAM TRACING: CPT | Performed by: STUDENT IN AN ORGANIZED HEALTH CARE EDUCATION/TRAINING PROGRAM

## 2024-01-19 PROCEDURE — 2580000003 HC RX 258: Performed by: INTERNAL MEDICINE

## 2024-01-19 PROCEDURE — 84100 ASSAY OF PHOSPHORUS: CPT

## 2024-01-19 PROCEDURE — 71045 X-RAY EXAM CHEST 1 VIEW: CPT

## 2024-01-19 PROCEDURE — 2700000000 HC OXYGEN THERAPY PER DAY

## 2024-01-19 PROCEDURE — 94660 CPAP INITIATION&MGMT: CPT

## 2024-01-19 PROCEDURE — 6360000002 HC RX W HCPCS: Performed by: EMERGENCY MEDICINE

## 2024-01-19 PROCEDURE — 82962 GLUCOSE BLOOD TEST: CPT

## 2024-01-19 PROCEDURE — 2500000003 HC RX 250 WO HCPCS: Performed by: INTERNAL MEDICINE

## 2024-01-19 PROCEDURE — 80179 DRUG ASSAY SALICYLATE: CPT

## 2024-01-19 PROCEDURE — 82805 BLOOD GASES W/O2 SATURATION: CPT

## 2024-01-19 PROCEDURE — 6370000000 HC RX 637 (ALT 250 FOR IP): Performed by: STUDENT IN AN ORGANIZED HEALTH CARE EDUCATION/TRAINING PROGRAM

## 2024-01-19 PROCEDURE — 6360000004 HC RX CONTRAST MEDICATION: Performed by: INTERNAL MEDICINE

## 2024-01-19 PROCEDURE — 93306 TTE W/DOPPLER COMPLETE: CPT | Performed by: INTERNAL MEDICINE

## 2024-01-19 PROCEDURE — 83036 HEMOGLOBIN GLYCOSYLATED A1C: CPT

## 2024-01-19 PROCEDURE — G0480 DRUG TEST DEF 1-7 CLASSES: HCPCS

## 2024-01-19 PROCEDURE — 5A09557 ASSISTANCE WITH RESPIRATORY VENTILATION, GREATER THAN 96 CONSECUTIVE HOURS, CONTINUOUS POSITIVE AIRWAY PRESSURE: ICD-10-PCS | Performed by: INTERNAL MEDICINE

## 2024-01-19 PROCEDURE — 6370000000 HC RX 637 (ALT 250 FOR IP): Performed by: EMERGENCY MEDICINE

## 2024-01-19 PROCEDURE — 2580000003 HC RX 258

## 2024-01-19 PROCEDURE — 2580000003 HC RX 258: Performed by: EMERGENCY MEDICINE

## 2024-01-19 PROCEDURE — 99223 1ST HOSP IP/OBS HIGH 75: CPT | Performed by: INTERNAL MEDICINE

## 2024-01-19 PROCEDURE — 80053 COMPREHEN METABOLIC PANEL: CPT

## 2024-01-19 PROCEDURE — 82803 BLOOD GASES ANY COMBINATION: CPT

## 2024-01-19 PROCEDURE — 84484 ASSAY OF TROPONIN QUANT: CPT

## 2024-01-19 PROCEDURE — 2000000000 HC ICU R&B

## 2024-01-19 RX ORDER — ATORVASTATIN CALCIUM 40 MG/1
40 TABLET, FILM COATED ORAL NIGHTLY
Status: DISCONTINUED | OUTPATIENT
Start: 2024-01-19 | End: 2024-02-11 | Stop reason: HOSPADM

## 2024-01-19 RX ORDER — PANTOPRAZOLE SODIUM 40 MG/1
40 TABLET, DELAYED RELEASE ORAL
Status: DISCONTINUED | OUTPATIENT
Start: 2024-01-19 | End: 2024-01-21

## 2024-01-19 RX ORDER — IBUPROFEN 600 MG/1
1 TABLET ORAL PRN
Status: DISCONTINUED | OUTPATIENT
Start: 2024-01-19 | End: 2024-02-11 | Stop reason: HOSPADM

## 2024-01-19 RX ORDER — ACETAMINOPHEN 650 MG/1
650 SUPPOSITORY RECTAL EVERY 6 HOURS PRN
Status: DISCONTINUED | OUTPATIENT
Start: 2024-01-19 | End: 2024-02-11 | Stop reason: HOSPADM

## 2024-01-19 RX ORDER — ZIPRASIDONE MESYLATE 20 MG/ML
10 INJECTION, POWDER, LYOPHILIZED, FOR SOLUTION INTRAMUSCULAR ONCE
Status: COMPLETED | OUTPATIENT
Start: 2024-01-19 | End: 2024-01-19

## 2024-01-19 RX ORDER — SODIUM CHLORIDE 0.9 % (FLUSH) 0.9 %
5-40 SYRINGE (ML) INJECTION PRN
Status: DISCONTINUED | OUTPATIENT
Start: 2024-01-19 | End: 2024-02-11 | Stop reason: HOSPADM

## 2024-01-19 RX ORDER — FUROSEMIDE 10 MG/ML
40 INJECTION INTRAMUSCULAR; INTRAVENOUS 2 TIMES DAILY
Status: DISCONTINUED | OUTPATIENT
Start: 2024-01-19 | End: 2024-01-22

## 2024-01-19 RX ORDER — ONDANSETRON 4 MG/1
4 TABLET, ORALLY DISINTEGRATING ORAL EVERY 8 HOURS PRN
Status: DISCONTINUED | OUTPATIENT
Start: 2024-01-19 | End: 2024-02-11 | Stop reason: HOSPADM

## 2024-01-19 RX ORDER — MIDAZOLAM HYDROCHLORIDE 1 MG/ML
INJECTION INTRAMUSCULAR; INTRAVENOUS
Status: COMPLETED
Start: 2024-01-19 | End: 2024-01-19

## 2024-01-19 RX ORDER — METRONIDAZOLE 500 MG/100ML
500 INJECTION, SOLUTION INTRAVENOUS EVERY 12 HOURS
Status: DISCONTINUED | OUTPATIENT
Start: 2024-01-19 | End: 2024-01-20

## 2024-01-19 RX ORDER — WATER 10 ML/10ML
INJECTION INTRAMUSCULAR; INTRAVENOUS; SUBCUTANEOUS
Status: DISPENSED
Start: 2024-01-19 | End: 2024-01-20

## 2024-01-19 RX ORDER — LISINOPRIL 20 MG/1
20 TABLET ORAL DAILY
Status: DISCONTINUED | OUTPATIENT
Start: 2024-01-19 | End: 2024-02-11 | Stop reason: HOSPADM

## 2024-01-19 RX ORDER — DOXYCYCLINE HYCLATE 100 MG/1
100 CAPSULE ORAL ONCE
Status: COMPLETED | OUTPATIENT
Start: 2024-01-19 | End: 2024-01-19

## 2024-01-19 RX ORDER — VITAMIN B COMPLEX
1000 TABLET ORAL DAILY
Status: DISCONTINUED | OUTPATIENT
Start: 2024-01-19 | End: 2024-02-11 | Stop reason: HOSPADM

## 2024-01-19 RX ORDER — AMMONIUM LACTATE 12 G/100G
LOTION TOPICAL 3 TIMES DAILY
Status: DISCONTINUED | OUTPATIENT
Start: 2024-01-19 | End: 2024-02-11 | Stop reason: HOSPADM

## 2024-01-19 RX ORDER — DEXTROSE MONOHYDRATE 100 MG/ML
INJECTION, SOLUTION INTRAVENOUS CONTINUOUS PRN
Status: DISCONTINUED | OUTPATIENT
Start: 2024-01-19 | End: 2024-02-11 | Stop reason: HOSPADM

## 2024-01-19 RX ORDER — IPRATROPIUM BROMIDE AND ALBUTEROL SULFATE 2.5; .5 MG/3ML; MG/3ML
1 SOLUTION RESPIRATORY (INHALATION)
Status: DISCONTINUED | OUTPATIENT
Start: 2024-01-19 | End: 2024-02-08

## 2024-01-19 RX ORDER — DEXMEDETOMIDINE HYDROCHLORIDE 4 UG/ML
.1-1.5 INJECTION, SOLUTION INTRAVENOUS CONTINUOUS
Status: DISCONTINUED | OUTPATIENT
Start: 2024-01-19 | End: 2024-01-24

## 2024-01-19 RX ORDER — SODIUM CHLORIDE 9 MG/ML
INJECTION, SOLUTION INTRAVENOUS PRN
Status: DISCONTINUED | OUTPATIENT
Start: 2024-01-19 | End: 2024-02-11 | Stop reason: HOSPADM

## 2024-01-19 RX ORDER — HYDROCHLOROTHIAZIDE 25 MG/1
25 TABLET ORAL DAILY
Status: DISCONTINUED | OUTPATIENT
Start: 2024-01-19 | End: 2024-01-21

## 2024-01-19 RX ORDER — ENOXAPARIN SODIUM 100 MG/ML
30 INJECTION SUBCUTANEOUS 2 TIMES DAILY
Status: DISCONTINUED | OUTPATIENT
Start: 2024-01-19 | End: 2024-01-19

## 2024-01-19 RX ORDER — FUROSEMIDE 40 MG/1
40 TABLET ORAL DAILY
Status: DISCONTINUED | OUTPATIENT
Start: 2024-01-19 | End: 2024-01-21

## 2024-01-19 RX ORDER — IPRATROPIUM BROMIDE AND ALBUTEROL SULFATE 2.5; .5 MG/3ML; MG/3ML
1 SOLUTION RESPIRATORY (INHALATION) ONCE
Status: COMPLETED | OUTPATIENT
Start: 2024-01-19 | End: 2024-01-19

## 2024-01-19 RX ORDER — LISINOPRIL AND HYDROCHLOROTHIAZIDE 25; 20 MG/1; MG/1
1 TABLET ORAL DAILY
Status: DISCONTINUED | OUTPATIENT
Start: 2024-01-19 | End: 2024-01-19 | Stop reason: CLARIF

## 2024-01-19 RX ORDER — INSULIN LISPRO 100 [IU]/ML
0-4 INJECTION, SOLUTION INTRAVENOUS; SUBCUTANEOUS NIGHTLY
Status: DISCONTINUED | OUTPATIENT
Start: 2024-01-19 | End: 2024-01-22

## 2024-01-19 RX ORDER — ONDANSETRON 2 MG/ML
4 INJECTION INTRAMUSCULAR; INTRAVENOUS EVERY 6 HOURS PRN
Status: DISCONTINUED | OUTPATIENT
Start: 2024-01-19 | End: 2024-02-11 | Stop reason: HOSPADM

## 2024-01-19 RX ORDER — SODIUM CHLORIDE 0.9 % (FLUSH) 0.9 %
5-40 SYRINGE (ML) INJECTION EVERY 12 HOURS SCHEDULED
Status: DISCONTINUED | OUTPATIENT
Start: 2024-01-19 | End: 2024-02-11 | Stop reason: HOSPADM

## 2024-01-19 RX ORDER — MIDAZOLAM HYDROCHLORIDE 1 MG/ML
2 INJECTION INTRAMUSCULAR; INTRAVENOUS ONCE
Status: COMPLETED | OUTPATIENT
Start: 2024-01-20 | End: 2024-01-19

## 2024-01-19 RX ORDER — INSULIN LISPRO 100 [IU]/ML
0-4 INJECTION, SOLUTION INTRAVENOUS; SUBCUTANEOUS
Status: DISCONTINUED | OUTPATIENT
Start: 2024-01-19 | End: 2024-01-22

## 2024-01-19 RX ORDER — BUDESONIDE 0.5 MG/2ML
0.5 INHALANT ORAL
Status: DISCONTINUED | OUTPATIENT
Start: 2024-01-19 | End: 2024-02-11 | Stop reason: HOSPADM

## 2024-01-19 RX ORDER — ACETAMINOPHEN 325 MG/1
650 TABLET ORAL EVERY 6 HOURS PRN
Status: DISCONTINUED | OUTPATIENT
Start: 2024-01-19 | End: 2024-02-11 | Stop reason: HOSPADM

## 2024-01-19 RX ORDER — POLYETHYLENE GLYCOL 3350 17 G/17G
17 POWDER, FOR SOLUTION ORAL DAILY PRN
Status: DISCONTINUED | OUTPATIENT
Start: 2024-01-19 | End: 2024-02-11 | Stop reason: HOSPADM

## 2024-01-19 RX ORDER — ATROPINE SULFATE 0.1 MG/ML
INJECTION INTRAVENOUS
Status: DISPENSED
Start: 2024-01-19 | End: 2024-01-20

## 2024-01-19 RX ORDER — METOPROLOL SUCCINATE 100 MG/1
100 TABLET, EXTENDED RELEASE ORAL DAILY
Status: DISCONTINUED | OUTPATIENT
Start: 2024-01-19 | End: 2024-01-21

## 2024-01-19 RX ADMIN — Medication 1.5 MCG/KG/HR: at 21:57

## 2024-01-19 RX ADMIN — Medication 1.5 MCG/KG/HR: at 19:59

## 2024-01-19 RX ADMIN — METRONIDAZOLE 500 MG: 500 INJECTION, SOLUTION INTRAVENOUS at 15:50

## 2024-01-19 RX ADMIN — ENOXAPARIN SODIUM 30 MG: 100 INJECTION SUBCUTANEOUS at 09:59

## 2024-01-19 RX ADMIN — METOPROLOL SUCCINATE 100 MG: 100 TABLET, EXTENDED RELEASE ORAL at 09:46

## 2024-01-19 RX ADMIN — MIDAZOLAM HYDROCHLORIDE 2 MG: 1 INJECTION INTRAMUSCULAR; INTRAVENOUS at 23:59

## 2024-01-19 RX ADMIN — ZIPRASIDONE MESYLATE 10 MG: 20 INJECTION, POWDER, LYOPHILIZED, FOR SOLUTION INTRAMUSCULAR at 18:02

## 2024-01-19 RX ADMIN — Medication: at 22:12

## 2024-01-19 RX ADMIN — WATER 1000 MG: 1 INJECTION INTRAMUSCULAR; INTRAVENOUS; SUBCUTANEOUS at 09:47

## 2024-01-19 RX ADMIN — BUDESONIDE INHALATION 500 MCG: 0.5 SUSPENSION RESPIRATORY (INHALATION) at 16:22

## 2024-01-19 RX ADMIN — Medication 1.5 MCG/KG/HR: at 17:56

## 2024-01-19 RX ADMIN — DOXYCYCLINE HYCLATE 100 MG: 100 CAPSULE ORAL at 09:47

## 2024-01-19 RX ADMIN — FUROSEMIDE 40 MG: 10 INJECTION, SOLUTION INTRAMUSCULAR; INTRAVENOUS at 09:47

## 2024-01-19 RX ADMIN — PHENYLEPHRINE HYDROCHLORIDE 30 MCG/MIN: 10 INJECTION INTRAVENOUS at 23:21

## 2024-01-19 RX ADMIN — IPRATROPIUM BROMIDE AND ALBUTEROL SULFATE 1 DOSE: .5; 2.5 SOLUTION RESPIRATORY (INHALATION) at 13:27

## 2024-01-19 RX ADMIN — SODIUM CHLORIDE, PRESERVATIVE FREE 10 ML: 5 INJECTION INTRAVENOUS at 16:52

## 2024-01-19 RX ADMIN — IPRATROPIUM BROMIDE AND ALBUTEROL SULFATE 1 DOSE: .5; 2.5 SOLUTION RESPIRATORY (INHALATION) at 16:21

## 2024-01-19 RX ADMIN — PERFLUTREN 1.5 ML: 6.52 INJECTION, SUSPENSION INTRAVENOUS at 10:28

## 2024-01-19 RX ADMIN — LISINOPRIL 20 MG: 20 TABLET ORAL at 09:47

## 2024-01-19 RX ADMIN — Medication 10 ML: at 14:38

## 2024-01-19 RX ADMIN — MIDAZOLAM 2 MG: 1 INJECTION INTRAMUSCULAR; INTRAVENOUS at 23:59

## 2024-01-19 RX ADMIN — Medication 1000 UNITS: at 09:46

## 2024-01-19 RX ADMIN — IPRATROPIUM BROMIDE AND ALBUTEROL SULFATE 1 DOSE: .5; 3 SOLUTION RESPIRATORY (INHALATION) at 04:30

## 2024-01-19 RX ADMIN — Medication 10 ML: at 22:12

## 2024-01-19 NOTE — ED NOTES
Pt is A&Ox4, refusing to sit back in bed, encouraged pt to sit back to ensure pt safety. Informed charge nurse sitter should be initiated.

## 2024-01-19 NOTE — CONSULTS
PULMONARY CRITICAL CARE CONSULTATION NOTE.    1/19/2024    CONSULTING  PHYSICIAN:Dr. Pineda    REASON FOR REFERRAL:  CO2 narcosis     History of Present Illness    Ms. Emily Weiss  is a 68 y.o. female HFP EF, COPD, hypertension, hyperlipidemia who initially presented to the ED with increasing shortness of breath and increasing lower extremity edema.  In the emergency room she was found to have pCO2 level of over 100 and the blood gas and hence admitted for acute on chronic hypercapnic respiratory failure requiring noninvasive positive pressure ventilation.  Transiently she was also found to be hypoxic requiring up to 7 to 10 L of high flow oxygen via nasal cannula.  She was admitted to the MedSur floor.  Subsequently rapid response team was called for altered mental status and frequent apneas and not pulling and volumes on NIPPV.  Patient was then transferred to the ICU  Upon my evaluation, patient is awake, oriented x 3 and follows commands.  Does not look in acute distress and is tolerating NIPPV.    Presenting symptoms-    Gradually worsening MORROW and lower extremity swelling    Baseline Exercise tolerance/MMRC score( Bold )     MMRC Dyspnea Scale  Grade Description of Breathlessness   0 I only get breathless with strenuous exercise.   1 I get short of breath when hurrying on level ground or walking up a slight hill.   2 On level ground, I walk slower than people of the same age because of breathlessness, or have to stop for breath when walking at my own pace.   3 I stop for breath after walking about 100 yards or after a few minutes on level ground.   4 I am too breathless to leave the house or I am breathless when dressing.     Smoking history-current everyday smoker with over 25-pack-year history of smoking    Occupational exposure/ Pet/bird -  No history of exposure to any occupational Pneumotoxins.     Age appropriate Cancer screening-   Patient is up to date on age appropriate cancer

## 2024-01-19 NOTE — H&P
[facility]   History and Physical      CHIEF COMPLAINT: Shortness of breath    History of Present Illness:  68 y.o. female with a history of chronic heart failure with preserved ejection fraction, COPD, hypertension, hyperlipidemia presenting with above complaints.  At this time patient is awake, alert, oriented x 3, very tangential and speech is mostly nonsensical.  On review of systems she does endorse shortness of breath and worsening lower extremity edema.  No chest pain, syncope, or presyncope.  No fevers, chills, or known sick contacts.  In the ED, found to be hypoxic and placed on cannula oxygen, given DuoNeb treatment.  ABG consistent with acute on chronic hypercapnic respiratory failure.  Per RN, when patient was brought up from the ED oxygen saturation was in the 70s on nasal cannula and she was subsequently placed on high flow nasal cannula.    Informant(s) for H&P: Patient and chart review    REVIEW OF SYSTEMS:  no fevers, chills, cp, sob, n/v, ha, vision/hearing changes, wt changes, hot/cold flashes, other open skin lesions, diarrhea, constipation, dysuria/hematuria unless noted in HPI. Complete ROS performed with the patient and is otherwise negative.      PMH:  Past Medical History:   Diagnosis Date    Acute congestive heart failure with left ventricular diastolic dysfunction ([facility]) 12/21/2020    Atrial fibrillation ([facility]) 1/19/2024    Cardiomegaly 8/8/2017    COPD (chronic obstructive pulmonary disease) ([facility]) 1/19/2024    Hyperlipidemia     Hypertension     Hypoxemia 8/8/2017    Nodule of right lung 8/9/2017    Renal failure 4/8/2018    Type 2 diabetes mellitus with hyperglycemia, without long-term current use of insulin ([facility]) 1/19/2024       Surgical History:  No past surgical history on file.    Medications Prior to Admission:    Prior to Admission medications    Medication Sig Start Date End Date Taking? Authorizing Provider   atorvastatin (LIPITOR) 40 MG

## 2024-01-19 NOTE — ED PROVIDER NOTES
[facility]  EMERGENCY DEPARTMENT ENCOUNTER        Pt Name: Emily Weiss  MRN: [id number]  Birthdate 1955  Date of evaluation: 1/19/2024  Provider: Jennifer Gee MD  PCP: Sabas Vargas MD  Note Started: 4:02 AM EST 1/19/24    HPI  68 y.o. female presenting for fatigue.  Patient is a poor historian.  Patient complains of feeling tired. She states she has been working around the house lately.  She states she has felt like this for couple weeks.  She does endorse a little cough.  She admits to drinking alcohol,, states she put her tongue in the bottle of vodka today to see what kind it was.  She endorses leg swelling has been present for a while.  She denies other complaints at this time.      --------------------------------------------- PAST HISTORY ---------------------------------------------  Past Medical History:  has a past medical history of Acute congestive heart failure with left ventricular diastolic dysfunction (HCC), Cardiomegaly, COPD (chronic obstructive pulmonary disease) (HCC), Hyperlipidemia, Hypertension, Hypoxemia, Nodule of right lung, and Renal failure.    Past Surgical History:  has no past surgical history on file.    Social History:  reports that she has been smoking cigarettes. She has never used smokeless tobacco. She reports current alcohol use. She reports current drug use. Drug: Cocaine.    Family History: family history includes Colon Cancer in her mother; No Known Problems in her father.     The patient’s home medications have been reviewed.    Allergies: Patient has no known allergies.      Review of Systems   Constitutional:  Positive for fatigue. Negative for chills and fever.   HENT:  Negative for congestion and sore throat.    Eyes:  Negative for photophobia and visual disturbance.   Respiratory:  Negative for cough and shortness of breath.    Cardiovascular:  Positive for leg swelling. Negative for chest pain.   Gastrointestinal:  Negative for abdominal pain, 
and management of their symptoms and will be admitted to the hospital for further evaluation and treatment.    --------------------------------- ADDITIONAL PROVIDER NOTES ---------------------------------  Consultations:  Time: 0830. Spoke with Dr. Elliott.  Discussed case.  He will admit the patient.  This patient's ED course included: a personal history and physicial examination, re-evaluation prior to disposition, multiple bedside re-evaluations, IV medications, cardiac monitoring, continuous pulse oximetry, and complex medical decision making and emergency management    This patient has remained hemodynamically stable during their ED course.    Diagnosis:  1. Acute on chronic respiratory failure with hypoxia (HCC)    2. Acute on chronic congestive heart failure, unspecified heart failure type (HCC)    3. Community acquired pneumonia of right lung, unspecified part of lung    4. Failure to thrive in adult    5. Atrial fibrillation, unspecified type (HCC)        Disposition:  Patient's disposition: Admit to telemetry  Patient's condition is fair.             Neftali Solo DO  01/19/24 0837       Neftali Solo DO  01/19/24 0911

## 2024-01-20 ENCOUNTER — APPOINTMENT (OUTPATIENT)
Dept: CT IMAGING | Age: 69
DRG: 133 | End: 2024-01-20
Payer: COMMERCIAL

## 2024-01-20 ENCOUNTER — APPOINTMENT (OUTPATIENT)
Dept: GENERAL RADIOLOGY | Age: 69
DRG: 133 | End: 2024-01-20
Payer: COMMERCIAL

## 2024-01-20 PROBLEM — I50.811 ACUTE RIGHT HEART FAILURE (HCC): Status: ACTIVE | Noted: 2024-01-20

## 2024-01-20 LAB
AADO2: 379.5 MMHG
AADO2: 580.4 MMHG
AADO2: 582.2 MMHG
ALBUMIN SERPL-MCNC: 3.1 G/DL (ref 3.5–5.2)
ALBUMIN SERPL-MCNC: 3.2 G/DL (ref 3.5–5.2)
ALP SERPL-CCNC: 72 U/L (ref 35–104)
ALP SERPL-CCNC: 78 U/L (ref 35–104)
ALT SERPL-CCNC: <5 U/L (ref 0–32)
ALT SERPL-CCNC: <5 U/L (ref 0–32)
ANION GAP SERPL CALCULATED.3IONS-SCNC: 4 MMOL/L (ref 7–16)
ANION GAP SERPL CALCULATED.3IONS-SCNC: 9 MMOL/L (ref 7–16)
AST SERPL-CCNC: 10 U/L (ref 0–31)
AST SERPL-CCNC: 12 U/L (ref 0–31)
B PARAP IS1001 DNA NPH QL NAA+NON-PROBE: NOT DETECTED
B PERT DNA SPEC QL NAA+PROBE: NOT DETECTED
B.E.: 20.5 MMOL/L (ref -3–3)
B.E.: 20.8 MMOL/L (ref -3–3)
B.E.: 23.3 MMOL/L (ref -3–3)
BASOPHILS # BLD: 0.01 K/UL (ref 0–0.2)
BASOPHILS NFR BLD: 0 % (ref 0–2)
BILIRUB SERPL-MCNC: 0.3 MG/DL (ref 0–1.2)
BILIRUB SERPL-MCNC: 0.6 MG/DL (ref 0–1.2)
BUN SERPL-MCNC: 15 MG/DL (ref 6–23)
BUN SERPL-MCNC: 15 MG/DL (ref 6–23)
C PNEUM DNA NPH QL NAA+NON-PROBE: NOT DETECTED
CALCIUM SERPL-MCNC: 10 MG/DL (ref 8.6–10.2)
CALCIUM SERPL-MCNC: 9.8 MG/DL (ref 8.6–10.2)
CHLORIDE SERPL-SCNC: 92 MMOL/L (ref 98–107)
CHLORIDE SERPL-SCNC: 93 MMOL/L (ref 98–107)
CO2 SERPL-SCNC: 43 MMOL/L (ref 22–29)
CO2 SERPL-SCNC: 48 MMOL/L (ref 22–29)
COHB: 0.8 % (ref 0–1.5)
COHB: 0.9 % (ref 0–1.5)
COHB: 1.2 % (ref 0–1.5)
COMMENT: ABNORMAL
COMMENT: ABNORMAL
CREAT SERPL-MCNC: 1 MG/DL (ref 0.5–1)
CREAT SERPL-MCNC: 1.1 MG/DL (ref 0.5–1)
CRITICAL: ABNORMAL
DATE ANALYZED: ABNORMAL
DATE OF COLLECTION: ABNORMAL
EKG ATRIAL RATE: 197 BPM
EKG Q-T INTERVAL: 364 MS
EKG QRS DURATION: 100 MS
EKG QTC CALCULATION (BAZETT): 445 MS
EKG R AXIS: -40 DEGREES
EKG T AXIS: 84 DEGREES
EKG VENTRICULAR RATE: 90 BPM
EOSINOPHIL # BLD: 0.09 K/UL (ref 0.05–0.5)
EOSINOPHILS RELATIVE PERCENT: 1 % (ref 0–6)
ERYTHROCYTE [DISTWIDTH] IN BLOOD BY AUTOMATED COUNT: 15.3 % (ref 11.5–15)
FIO2: 100 %
FIO2: 100 %
FIO2: 70 %
FLUAV RNA NPH QL NAA+NON-PROBE: NOT DETECTED
FLUBV RNA NPH QL NAA+NON-PROBE: NOT DETECTED
GFR SERPL CREATININE-BSD FRML MDRD: 55 ML/MIN/1.73M2
GFR SERPL CREATININE-BSD FRML MDRD: 60 ML/MIN/1.73M2
GLUCOSE BLD-MCNC: 105 MG/DL (ref 74–99)
GLUCOSE BLD-MCNC: 105 MG/DL (ref 74–99)
GLUCOSE BLD-MCNC: 84 MG/DL (ref 74–99)
GLUCOSE BLD-MCNC: 89 MG/DL (ref 74–99)
GLUCOSE SERPL-MCNC: 110 MG/DL (ref 74–99)
GLUCOSE SERPL-MCNC: 126 MG/DL (ref 74–99)
HADV DNA NPH QL NAA+NON-PROBE: NOT DETECTED
HCO3: 43.6 MMOL/L (ref 22–26)
HCO3: 44.3 MMOL/L (ref 22–26)
HCO3: 46.9 MMOL/L (ref 22–26)
HCOV 229E RNA NPH QL NAA+NON-PROBE: NOT DETECTED
HCOV HKU1 RNA NPH QL NAA+NON-PROBE: NOT DETECTED
HCOV NL63 RNA NPH QL NAA+NON-PROBE: NOT DETECTED
HCOV OC43 RNA NPH QL NAA+NON-PROBE: NOT DETECTED
HCT VFR BLD AUTO: 37.7 % (ref 34–48)
HGB BLD-MCNC: 11 G/DL (ref 11.5–15.5)
HHB: 3.7 % (ref 0–5)
HHB: 3.9 % (ref 0–5)
HHB: 8.2 % (ref 0–5)
HMPV RNA NPH QL NAA+NON-PROBE: NOT DETECTED
HPIV1 RNA NPH QL NAA+NON-PROBE: NOT DETECTED
HPIV2 RNA NPH QL NAA+NON-PROBE: NOT DETECTED
HPIV3 RNA NPH QL NAA+NON-PROBE: NOT DETECTED
HPIV4 RNA NPH QL NAA+NON-PROBE: NOT DETECTED
IMM GRANULOCYTES # BLD AUTO: <0.03 K/UL (ref 0–0.58)
IMM GRANULOCYTES NFR BLD: 0 % (ref 0–5)
LAB: ABNORMAL
LYMPHOCYTES NFR BLD: 1.1 K/UL (ref 1.5–4)
LYMPHOCYTES RELATIVE PERCENT: 18 % (ref 20–42)
Lab: 1013
Lab: 219
Lab: 530
M PNEUMO DNA NPH QL NAA+NON-PROBE: NOT DETECTED
MAGNESIUM SERPL-MCNC: 1.8 MG/DL (ref 1.6–2.6)
MAGNESIUM SERPL-MCNC: 2 MG/DL (ref 1.6–2.6)
MCH RBC QN AUTO: 27.2 PG (ref 26–35)
MCHC RBC AUTO-ENTMCNC: 29.2 G/DL (ref 32–34.5)
MCV RBC AUTO: 93.3 FL (ref 80–99.9)
METHB: 0.3 % (ref 0–1.5)
MODE: AC
MONOCYTES NFR BLD: 0.56 K/UL (ref 0.1–0.95)
MONOCYTES NFR BLD: 9 % (ref 2–12)
NEUTROPHILS NFR BLD: 72 % (ref 43–80)
NEUTS SEG NFR BLD: 4.48 K/UL (ref 1.8–7.3)
O2 CONTENT: 15.7 ML/DL
O2 CONTENT: 16.2 ML/DL
O2 CONTENT: 16.9 ML/DL
O2 SATURATION: 91.7 % (ref 92–98.5)
O2 SATURATION: 96 % (ref 92–98.5)
O2 SATURATION: 96.3 % (ref 92–98.5)
O2HB: 90.6 % (ref 94–97)
O2HB: 94.6 % (ref 94–97)
O2HB: 95.2 % (ref 94–97)
OPERATOR ID: 1821
OPERATOR ID: 1821
OPERATOR ID: 30
PATIENT TEMP: 37 C
PCO2: 40.7 MMHG (ref 35–45)
PCO2: 44.8 MMHG (ref 35–45)
PCO2: 45.2 MMHG (ref 35–45)
PEEP/CPAP: 5 CMH2O
PEEP/CPAP: 5 CMH2O
PEEP/CPAP: 8 CMH2O
PFO2: 0.63 MMHG/%
PFO2: 0.65 MMHG/%
PFO2: 0.76 MMHG/%
PH BLOOD GAS: 7.61 (ref 7.35–7.45)
PH BLOOD GAS: 7.64 (ref 7.35–7.45)
PH BLOOD GAS: 7.65 (ref 7.35–7.45)
PHOSPHATE SERPL-MCNC: 2.3 MG/DL (ref 2.5–4.5)
PHOSPHATE SERPL-MCNC: 4.3 MG/DL (ref 2.5–4.5)
PLATELET # BLD AUTO: 147 K/UL (ref 130–450)
PMV BLD AUTO: 13 FL (ref 7–12)
PO2: 53.5 MMHG (ref 75–100)
PO2: 62.8 MMHG (ref 75–100)
PO2: 65.1 MMHG (ref 75–100)
POTASSIUM SERPL-SCNC: 3.8 MMOL/L (ref 3.5–5)
POTASSIUM SERPL-SCNC: 4.3 MMOL/L (ref 3.5–5)
PROT SERPL-MCNC: 5.9 G/DL (ref 6.4–8.3)
PROT SERPL-MCNC: 6.3 G/DL (ref 6.4–8.3)
PTH-INTACT SERPL-MCNC: 45.2 PG/ML (ref 15–65)
RBC # BLD AUTO: 4.04 M/UL (ref 3.5–5.5)
RI(T): 7.09
RI(T): 8.94
RI(T): 9.24
RR MECHANICAL: 15 B/MIN
RR MECHANICAL: 16 B/MIN
RR MECHANICAL: 20 B/MIN
RSV RNA NPH QL NAA+NON-PROBE: NOT DETECTED
RV+EV RNA NPH QL NAA+NON-PROBE: NOT DETECTED
SARS-COV-2 RNA NPH QL NAA+NON-PROBE: NOT DETECTED
SODIUM SERPL-SCNC: 144 MMOL/L (ref 132–146)
SODIUM SERPL-SCNC: 145 MMOL/L (ref 132–146)
SOURCE, BLOOD GAS: ABNORMAL
SPECIMEN DESCRIPTION: NORMAL
THB: 12.1 G/DL (ref 11.5–16.5)
THB: 12.3 G/DL (ref 11.5–16.5)
THB: 12.7 G/DL (ref 11.5–16.5)
TIME ANALYZED: 1015
TIME ANALYZED: 222
TIME ANALYZED: 537
VT MECHANICAL: 350 ML
VT MECHANICAL: 450 ML
VT MECHANICAL: 500 ML
WBC OTHER # BLD: 6.3 K/UL (ref 4.5–11.5)

## 2024-01-20 PROCEDURE — 2580000003 HC RX 258

## 2024-01-20 PROCEDURE — 6370000000 HC RX 637 (ALT 250 FOR IP): Performed by: INTERNAL MEDICINE

## 2024-01-20 PROCEDURE — 84100 ASSAY OF PHOSPHORUS: CPT

## 2024-01-20 PROCEDURE — 74018 RADEX ABDOMEN 1 VIEW: CPT

## 2024-01-20 PROCEDURE — 6360000002 HC RX W HCPCS: Performed by: INTERNAL MEDICINE

## 2024-01-20 PROCEDURE — 82962 GLUCOSE BLOOD TEST: CPT

## 2024-01-20 PROCEDURE — 2580000003 HC RX 258: Performed by: INTERNAL MEDICINE

## 2024-01-20 PROCEDURE — 85025 COMPLETE CBC W/AUTO DIFF WBC: CPT

## 2024-01-20 PROCEDURE — 99291 CRITICAL CARE FIRST HOUR: CPT | Performed by: INTERNAL MEDICINE

## 2024-01-20 PROCEDURE — 6360000002 HC RX W HCPCS

## 2024-01-20 PROCEDURE — 0BH17EZ INSERTION OF ENDOTRACHEAL AIRWAY INTO TRACHEA, VIA NATURAL OR ARTIFICIAL OPENING: ICD-10-PCS | Performed by: INTERNAL MEDICINE

## 2024-01-20 PROCEDURE — 82805 BLOOD GASES W/O2 SATURATION: CPT

## 2024-01-20 PROCEDURE — 94003 VENT MGMT INPAT SUBQ DAY: CPT

## 2024-01-20 PROCEDURE — 94640 AIRWAY INHALATION TREATMENT: CPT

## 2024-01-20 PROCEDURE — 51702 INSERT TEMP BLADDER CATH: CPT

## 2024-01-20 PROCEDURE — 83970 ASSAY OF PARATHORMONE: CPT

## 2024-01-20 PROCEDURE — 93010 ELECTROCARDIOGRAM REPORT: CPT | Performed by: INTERNAL MEDICINE

## 2024-01-20 PROCEDURE — 87070 CULTURE OTHR SPECIMN AEROBIC: CPT

## 2024-01-20 PROCEDURE — 71045 X-RAY EXAM CHEST 1 VIEW: CPT

## 2024-01-20 PROCEDURE — 99233 SBSQ HOSP IP/OBS HIGH 50: CPT | Performed by: INTERNAL MEDICINE

## 2024-01-20 PROCEDURE — 0202U NFCT DS 22 TRGT SARS-COV-2: CPT

## 2024-01-20 PROCEDURE — 83735 ASSAY OF MAGNESIUM: CPT

## 2024-01-20 PROCEDURE — 87205 SMEAR GRAM STAIN: CPT

## 2024-01-20 PROCEDURE — 5A1945Z RESPIRATORY VENTILATION, 24-96 CONSECUTIVE HOURS: ICD-10-PCS | Performed by: INTERNAL MEDICINE

## 2024-01-20 PROCEDURE — 71250 CT THORAX DX C-: CPT

## 2024-01-20 PROCEDURE — 94002 VENT MGMT INPAT INIT DAY: CPT

## 2024-01-20 PROCEDURE — 2000000000 HC ICU R&B

## 2024-01-20 PROCEDURE — 31500 INSERT EMERGENCY AIRWAY: CPT

## 2024-01-20 PROCEDURE — 87040 BLOOD CULTURE FOR BACTERIA: CPT

## 2024-01-20 PROCEDURE — 80053 COMPREHEN METABOLIC PANEL: CPT

## 2024-01-20 RX ORDER — ATROPINE SULFATE 0.1 MG/ML
1 INJECTION INTRAVENOUS PRN
Status: DISCONTINUED | OUTPATIENT
Start: 2024-01-20 | End: 2024-02-11 | Stop reason: HOSPADM

## 2024-01-20 RX ORDER — FENTANYL CITRATE-0.9 % NACL/PF 20 MCG/2ML
50 SYRINGE (ML) INTRAVENOUS EVERY 30 MIN PRN
Status: DISCONTINUED | OUTPATIENT
Start: 2024-01-20 | End: 2024-01-22

## 2024-01-20 RX ORDER — MIDAZOLAM HYDROCHLORIDE 1 MG/ML
1-10 INJECTION, SOLUTION INTRAVENOUS CONTINUOUS
Status: DISCONTINUED | OUTPATIENT
Start: 2024-01-20 | End: 2024-01-21

## 2024-01-20 RX ORDER — METRONIDAZOLE 500 MG/1
500 TABLET ORAL EVERY 12 HOURS SCHEDULED
Status: DISCONTINUED | OUTPATIENT
Start: 2024-01-20 | End: 2024-01-21

## 2024-01-20 RX ORDER — METRONIDAZOLE 500 MG/1
500 TABLET ORAL EVERY 12 HOURS SCHEDULED
Status: DISCONTINUED | OUTPATIENT
Start: 2024-01-20 | End: 2024-01-20

## 2024-01-20 RX ADMIN — BUDESONIDE INHALATION 500 MCG: 0.5 SUSPENSION RESPIRATORY (INHALATION) at 18:20

## 2024-01-20 RX ADMIN — IPRATROPIUM BROMIDE AND ALBUTEROL SULFATE 1 DOSE: .5; 2.5 SOLUTION RESPIRATORY (INHALATION) at 09:07

## 2024-01-20 RX ADMIN — METRONIDAZOLE 500 MG: 500 INJECTION, SOLUTION INTRAVENOUS at 04:28

## 2024-01-20 RX ADMIN — IPRATROPIUM BROMIDE AND ALBUTEROL SULFATE 1 DOSE: .5; 2.5 SOLUTION RESPIRATORY (INHALATION) at 05:24

## 2024-01-20 RX ADMIN — FUROSEMIDE 40 MG: 10 INJECTION, SOLUTION INTRAMUSCULAR; INTRAVENOUS at 17:44

## 2024-01-20 RX ADMIN — Medication 1000 UNITS: at 08:04

## 2024-01-20 RX ADMIN — Medication: at 21:09

## 2024-01-20 RX ADMIN — ACETAZOLAMIDE 500 MG: 500 INJECTION, POWDER, LYOPHILIZED, FOR SOLUTION INTRAVENOUS at 08:08

## 2024-01-20 RX ADMIN — Medication 10 ML: at 08:05

## 2024-01-20 RX ADMIN — IPRATROPIUM BROMIDE AND ALBUTEROL SULFATE 1 DOSE: .5; 2.5 SOLUTION RESPIRATORY (INHALATION) at 18:21

## 2024-01-20 RX ADMIN — Medication: at 15:12

## 2024-01-20 RX ADMIN — MIDAZOLAM 2 MG/HR: 5 INJECTION INTRAMUSCULAR; INTRAVENOUS at 00:25

## 2024-01-20 RX ADMIN — ACETAZOLAMIDE 500 MG: 500 INJECTION, POWDER, LYOPHILIZED, FOR SOLUTION INTRAVENOUS at 13:52

## 2024-01-20 RX ADMIN — FUROSEMIDE 40 MG: 10 INJECTION, SOLUTION INTRAMUSCULAR; INTRAVENOUS at 08:04

## 2024-01-20 RX ADMIN — LISINOPRIL 20 MG: 20 TABLET ORAL at 08:04

## 2024-01-20 RX ADMIN — APIXABAN 5 MG: 5 TABLET, FILM COATED ORAL at 08:04

## 2024-01-20 RX ADMIN — ATORVASTATIN CALCIUM 40 MG: 40 TABLET, FILM COATED ORAL at 21:08

## 2024-01-20 RX ADMIN — FENTANYL CITRATE 50 MCG/HR: 0.05 INJECTION, SOLUTION INTRAMUSCULAR; INTRAVENOUS at 01:24

## 2024-01-20 RX ADMIN — ACETAMINOPHEN 650 MG: 325 TABLET ORAL at 10:56

## 2024-01-20 RX ADMIN — METRONIDAZOLE 500 MG: 500 TABLET ORAL at 21:08

## 2024-01-20 RX ADMIN — APIXABAN 5 MG: 5 TABLET, FILM COATED ORAL at 21:08

## 2024-01-20 RX ADMIN — METOPROLOL SUCCINATE 100 MG: 100 TABLET, EXTENDED RELEASE ORAL at 08:04

## 2024-01-20 RX ADMIN — IPRATROPIUM BROMIDE AND ALBUTEROL SULFATE 1 DOSE: .5; 2.5 SOLUTION RESPIRATORY (INHALATION) at 13:13

## 2024-01-20 RX ADMIN — Medication 10 ML: at 21:09

## 2024-01-20 RX ADMIN — FENTANYL CITRATE 50 MCG/HR: 0.05 INJECTION, SOLUTION INTRAMUSCULAR; INTRAVENOUS at 13:00

## 2024-01-20 RX ADMIN — Medication: at 08:09

## 2024-01-20 RX ADMIN — BUDESONIDE INHALATION 500 MCG: 0.5 SUSPENSION RESPIRATORY (INHALATION) at 05:24

## 2024-01-21 LAB
AADO2: 256.3 MMHG
ALBUMIN SERPL-MCNC: 3 G/DL (ref 3.5–5.2)
ALP SERPL-CCNC: 73 U/L (ref 35–104)
ALT SERPL-CCNC: <5 U/L (ref 0–32)
ANION GAP SERPL CALCULATED.3IONS-SCNC: 6 MMOL/L (ref 7–16)
AST SERPL-CCNC: 10 U/L (ref 0–31)
B.E.: 20.9 MMOL/L (ref -3–3)
BASOPHILS # BLD: 0.04 K/UL (ref 0–0.2)
BASOPHILS NFR BLD: 1 % (ref 0–2)
BILIRUB SERPL-MCNC: 0.7 MG/DL (ref 0–1.2)
BUN SERPL-MCNC: 19 MG/DL (ref 6–23)
CALCIUM SERPL-MCNC: 9.5 MG/DL (ref 8.6–10.2)
CHLORIDE SERPL-SCNC: 93 MMOL/L (ref 98–107)
CO2 SERPL-SCNC: 44 MMOL/L (ref 22–29)
COHB: 1 % (ref 0–1.5)
CREAT SERPL-MCNC: 1.4 MG/DL (ref 0.5–1)
CRITICAL: ABNORMAL
DATE ANALYZED: ABNORMAL
DATE OF COLLECTION: ABNORMAL
EOSINOPHIL # BLD: 0.15 K/UL (ref 0.05–0.5)
EOSINOPHILS RELATIVE PERCENT: 2 % (ref 0–6)
ERYTHROCYTE [DISTWIDTH] IN BLOOD BY AUTOMATED COUNT: 16 % (ref 11.5–15)
FIO2: 55 %
GFR SERPL CREATININE-BSD FRML MDRD: 43 ML/MIN/1.73M2
GLUCOSE BLD-MCNC: 109 MG/DL (ref 74–99)
GLUCOSE BLD-MCNC: 85 MG/DL (ref 74–99)
GLUCOSE BLD-MCNC: 91 MG/DL (ref 74–99)
GLUCOSE BLD-MCNC: 95 MG/DL (ref 74–99)
GLUCOSE SERPL-MCNC: 101 MG/DL (ref 74–99)
HCO3: 46.5 MMOL/L (ref 22–26)
HCT VFR BLD AUTO: 34.8 % (ref 34–48)
HGB BLD-MCNC: 10.6 G/DL (ref 11.5–15.5)
HHB: 7.7 % (ref 0–5)
IMM GRANULOCYTES # BLD AUTO: <0.03 K/UL (ref 0–0.58)
IMM GRANULOCYTES NFR BLD: 0 % (ref 0–5)
LAB: ABNORMAL
LACTATE BLDV-SCNC: 0.8 MMOL/L (ref 0.5–2.2)
LYMPHOCYTES NFR BLD: 1.44 K/UL (ref 1.5–4)
LYMPHOCYTES RELATIVE PERCENT: 23 % (ref 20–42)
Lab: 450
MAGNESIUM SERPL-MCNC: 1.7 MG/DL (ref 1.6–2.6)
MCH RBC QN AUTO: 27.2 PG (ref 26–35)
MCV RBC AUTO: 89.5 FL (ref 80–99.9)
METHB: 0 % (ref 0–1.5)
MICROORGANISM SPEC CULT: NORMAL
MODE: AC
MONOCYTES NFR BLD: 0.71 K/UL (ref 0.1–0.95)
MONOCYTES NFR BLD: 12 % (ref 2–12)
NEUTROPHILS NFR BLD: 62 % (ref 43–80)
NEUTS SEG NFR BLD: 3.8 K/UL (ref 1.8–7.3)
O2 CONTENT: 15.3 ML/DL
O2 SATURATION: 92.2 % (ref 92–98.5)
O2HB: 91.3 % (ref 94–97)
OPERATOR ID: 2323
PATIENT TEMP: 37 C
PCO2: 55.9 MMHG (ref 35–45)
PEEP/CPAP: 8 CMH2O
PFO2: 1.09 MMHG/%
PH BLOOD GAS: 7.54 (ref 7.35–7.45)
PHOSPHATE SERPL-MCNC: 3 MG/DL (ref 2.5–4.5)
PLATELET # BLD AUTO: 153 K/UL (ref 130–450)
PMV BLD AUTO: 12.6 FL (ref 7–12)
PO2: 59.9 MMHG (ref 75–100)
POTASSIUM SERPL-SCNC: 3.1 MMOL/L (ref 3.5–5)
PROCALCITONIN SERPL-MCNC: 0.06 NG/ML (ref 0–0.08)
PROT SERPL-MCNC: 5.7 G/DL (ref 6.4–8.3)
RBC # BLD AUTO: 3.89 M/UL (ref 3.5–5.5)
RI(T): 4.28
RR MECHANICAL: 15 B/MIN
SODIUM SERPL-SCNC: 143 MMOL/L (ref 132–146)
SOURCE, BLOOD GAS: ABNORMAL
SPECIMEN DESCRIPTION: NORMAL
THB: 11.9 G/DL (ref 11.5–16.5)
TIME ANALYZED: 455
VT MECHANICAL: 350 ML
WBC OTHER # BLD: 6.2 K/UL (ref 4.5–11.5)

## 2024-01-21 PROCEDURE — 6370000000 HC RX 637 (ALT 250 FOR IP): Performed by: INTERNAL MEDICINE

## 2024-01-21 PROCEDURE — 2000000000 HC ICU R&B

## 2024-01-21 PROCEDURE — 82805 BLOOD GASES W/O2 SATURATION: CPT

## 2024-01-21 PROCEDURE — 6360000002 HC RX W HCPCS: Performed by: INTERNAL MEDICINE

## 2024-01-21 PROCEDURE — 6360000002 HC RX W HCPCS

## 2024-01-21 PROCEDURE — 94667 MNPJ CHEST WALL 1ST: CPT

## 2024-01-21 PROCEDURE — 84100 ASSAY OF PHOSPHORUS: CPT

## 2024-01-21 PROCEDURE — 2580000003 HC RX 258: Performed by: INTERNAL MEDICINE

## 2024-01-21 PROCEDURE — 85025 COMPLETE CBC W/AUTO DIFF WBC: CPT

## 2024-01-21 PROCEDURE — 6370000000 HC RX 637 (ALT 250 FOR IP)

## 2024-01-21 PROCEDURE — 94640 AIRWAY INHALATION TREATMENT: CPT

## 2024-01-21 PROCEDURE — 84145 PROCALCITONIN (PCT): CPT

## 2024-01-21 PROCEDURE — A4216 STERILE WATER/SALINE, 10 ML: HCPCS | Performed by: INTERNAL MEDICINE

## 2024-01-21 PROCEDURE — 99232 SBSQ HOSP IP/OBS MODERATE 35: CPT | Performed by: INTERNAL MEDICINE

## 2024-01-21 PROCEDURE — C9113 INJ PANTOPRAZOLE SODIUM, VIA: HCPCS | Performed by: INTERNAL MEDICINE

## 2024-01-21 PROCEDURE — 83605 ASSAY OF LACTIC ACID: CPT

## 2024-01-21 PROCEDURE — 99291 CRITICAL CARE FIRST HOUR: CPT | Performed by: INTERNAL MEDICINE

## 2024-01-21 PROCEDURE — 82962 GLUCOSE BLOOD TEST: CPT

## 2024-01-21 PROCEDURE — 2500000003 HC RX 250 WO HCPCS: Performed by: INTERNAL MEDICINE

## 2024-01-21 PROCEDURE — 94669 MECHANICAL CHEST WALL OSCILL: CPT

## 2024-01-21 PROCEDURE — 80053 COMPREHEN METABOLIC PANEL: CPT

## 2024-01-21 PROCEDURE — 2580000003 HC RX 258

## 2024-01-21 PROCEDURE — 83735 ASSAY OF MAGNESIUM: CPT

## 2024-01-21 PROCEDURE — 94003 VENT MGMT INPAT SUBQ DAY: CPT

## 2024-01-21 RX ORDER — ACETYLCYSTEINE 100 MG/ML
4 SOLUTION ORAL; RESPIRATORY (INHALATION) EVERY 4 HOURS
Status: DISCONTINUED | OUTPATIENT
Start: 2024-01-21 | End: 2024-01-22

## 2024-01-21 RX ORDER — METOPROLOL TARTRATE 50 MG/1
50 TABLET, FILM COATED ORAL 2 TIMES DAILY
Status: DISCONTINUED | OUTPATIENT
Start: 2024-01-21 | End: 2024-01-25

## 2024-01-21 RX ORDER — PROPOFOL 10 MG/ML
5-50 INJECTION, EMULSION INTRAVENOUS CONTINUOUS
Status: DISCONTINUED | OUTPATIENT
Start: 2024-01-21 | End: 2024-01-24

## 2024-01-21 RX ADMIN — BUDESONIDE INHALATION 500 MCG: 0.5 SUSPENSION RESPIRATORY (INHALATION) at 04:32

## 2024-01-21 RX ADMIN — IPRATROPIUM BROMIDE AND ALBUTEROL SULFATE 1 DOSE: .5; 2.5 SOLUTION RESPIRATORY (INHALATION) at 17:08

## 2024-01-21 RX ADMIN — PROPOFOL 20 MCG/KG/MIN: 10 INJECTION, EMULSION INTRAVENOUS at 20:47

## 2024-01-21 RX ADMIN — Medication: at 15:25

## 2024-01-21 RX ADMIN — MIDAZOLAM 2 MG/HR: 5 INJECTION INTRAMUSCULAR; INTRAVENOUS at 05:44

## 2024-01-21 RX ADMIN — Medication 10 ML: at 07:53

## 2024-01-21 RX ADMIN — Medication: at 07:54

## 2024-01-21 RX ADMIN — IPRATROPIUM BROMIDE AND ALBUTEROL SULFATE 1 DOSE: .5; 2.5 SOLUTION RESPIRATORY (INHALATION) at 04:32

## 2024-01-21 RX ADMIN — IPRATROPIUM BROMIDE AND ALBUTEROL SULFATE 1 DOSE: .5; 2.5 SOLUTION RESPIRATORY (INHALATION) at 13:51

## 2024-01-21 RX ADMIN — Medication 0.2 MCG/KG/HR: at 23:30

## 2024-01-21 RX ADMIN — ACETAZOLAMIDE 500 MG: 500 INJECTION, POWDER, LYOPHILIZED, FOR SOLUTION INTRAVENOUS at 11:16

## 2024-01-21 RX ADMIN — ACETYLCYSTEINE 400 MG: 100 INHALANT RESPIRATORY (INHALATION) at 13:50

## 2024-01-21 RX ADMIN — Medication: at 20:17

## 2024-01-21 RX ADMIN — IPRATROPIUM BROMIDE AND ALBUTEROL SULFATE 1 DOSE: .5; 2.5 SOLUTION RESPIRATORY (INHALATION) at 09:19

## 2024-01-21 RX ADMIN — METRONIDAZOLE 500 MG: 500 TABLET ORAL at 07:53

## 2024-01-21 RX ADMIN — BUDESONIDE INHALATION 500 MCG: 0.5 SUSPENSION RESPIRATORY (INHALATION) at 17:09

## 2024-01-21 RX ADMIN — PIPERACILLIN AND TAZOBACTAM 4500 MG: 4; .5 INJECTION, POWDER, LYOPHILIZED, FOR SOLUTION INTRAVENOUS at 11:13

## 2024-01-21 RX ADMIN — FENTANYL CITRATE 75 MCG/HR: 0.05 INJECTION, SOLUTION INTRAMUSCULAR; INTRAVENOUS at 08:32

## 2024-01-21 RX ADMIN — ATORVASTATIN CALCIUM 40 MG: 40 TABLET, FILM COATED ORAL at 20:16

## 2024-01-21 RX ADMIN — APIXABAN 5 MG: 5 TABLET, FILM COATED ORAL at 07:53

## 2024-01-21 RX ADMIN — Medication 1000 UNITS: at 07:53

## 2024-01-21 RX ADMIN — Medication 10 ML: at 20:16

## 2024-01-21 RX ADMIN — ACETYLCYSTEINE 400 MG: 100 INHALANT RESPIRATORY (INHALATION) at 17:09

## 2024-01-21 RX ADMIN — POTASSIUM BICARBONATE 50 MEQ: 978 TABLET, EFFERVESCENT ORAL at 07:00

## 2024-01-21 RX ADMIN — ACETAZOLAMIDE 500 MG: 500 INJECTION, POWDER, LYOPHILIZED, FOR SOLUTION INTRAVENOUS at 23:26

## 2024-01-21 RX ADMIN — APIXABAN 5 MG: 5 TABLET, FILM COATED ORAL at 20:16

## 2024-01-21 RX ADMIN — PROPOFOL 20 MCG/KG/MIN: 10 INJECTION, EMULSION INTRAVENOUS at 11:05

## 2024-01-21 RX ADMIN — ACETYLCYSTEINE 400 MG: 100 INHALANT RESPIRATORY (INHALATION) at 21:30

## 2024-01-21 RX ADMIN — PIPERACILLIN AND TAZOBACTAM 4500 MG: 4; .5 INJECTION, POWDER, LYOPHILIZED, FOR SOLUTION INTRAVENOUS at 18:30

## 2024-01-21 RX ADMIN — METOPROLOL TARTRATE 50 MG: 50 TABLET, FILM COATED ORAL at 20:16

## 2024-01-21 RX ADMIN — Medication 0.2 MCG/KG/HR: at 11:09

## 2024-01-21 RX ADMIN — PROPOFOL 20 MCG/KG/MIN: 10 INJECTION, EMULSION INTRAVENOUS at 15:25

## 2024-01-21 RX ADMIN — SODIUM CHLORIDE, PRESERVATIVE FREE 40 MG: 5 INJECTION INTRAVENOUS at 07:57

## 2024-01-21 RX ADMIN — IPRATROPIUM BROMIDE AND ALBUTEROL SULFATE 1 DOSE: .5; 2.5 SOLUTION RESPIRATORY (INHALATION) at 21:30

## 2024-01-22 ENCOUNTER — APPOINTMENT (OUTPATIENT)
Dept: GENERAL RADIOLOGY | Age: 69
DRG: 133 | End: 2024-01-22
Payer: COMMERCIAL

## 2024-01-22 LAB
AADO2: 346.2 MMHG
ALBUMIN SERPL-MCNC: 2.8 G/DL (ref 3.5–5.2)
ALP SERPL-CCNC: 63 U/L (ref 35–104)
ALT SERPL-CCNC: <5 U/L (ref 0–32)
ANION GAP SERPL CALCULATED.3IONS-SCNC: 6 MMOL/L (ref 7–16)
AST SERPL-CCNC: 10 U/L (ref 0–31)
B.E.: 14.2 MMOL/L (ref -3–3)
BASOPHILS # BLD: 0.03 K/UL (ref 0–0.2)
BASOPHILS NFR BLD: 1 % (ref 0–2)
BILIRUB SERPL-MCNC: 0.6 MG/DL (ref 0–1.2)
BUN SERPL-MCNC: 19 MG/DL (ref 6–23)
CALCIUM SERPL-MCNC: 9.4 MG/DL (ref 8.6–10.2)
CHLORIDE SERPL-SCNC: 98 MMOL/L (ref 98–107)
CO2 SERPL-SCNC: 41 MMOL/L (ref 22–29)
COHB: 0.8 % (ref 0–1.5)
CREAT SERPL-MCNC: 1.4 MG/DL (ref 0.5–1)
CRITICAL: ABNORMAL
DATE ANALYZED: ABNORMAL
DATE OF COLLECTION: ABNORMAL
EOSINOPHIL # BLD: 0.14 K/UL (ref 0.05–0.5)
EOSINOPHILS RELATIVE PERCENT: 2 % (ref 0–6)
ERYTHROCYTE [DISTWIDTH] IN BLOOD BY AUTOMATED COUNT: 16.1 % (ref 11.5–15)
FIO2: 70 %
GFR SERPL CREATININE-BSD FRML MDRD: 41 ML/MIN/1.73M2
GLUCOSE BLD-MCNC: 102 MG/DL (ref 74–99)
GLUCOSE BLD-MCNC: 104 MG/DL (ref 74–99)
GLUCOSE BLD-MCNC: 104 MG/DL (ref 74–99)
GLUCOSE BLD-MCNC: 141 MG/DL (ref 74–99)
GLUCOSE SERPL-MCNC: 106 MG/DL (ref 74–99)
HCO3: 40.9 MMOL/L (ref 22–26)
HCT VFR BLD AUTO: 35.1 % (ref 34–48)
HGB BLD-MCNC: 10.6 G/DL (ref 11.5–15.5)
HHB: 7.8 % (ref 0–5)
IMM GRANULOCYTES # BLD AUTO: <0.03 K/UL (ref 0–0.58)
IMM GRANULOCYTES NFR BLD: 0 % (ref 0–5)
INR PPP: 1.5
LAB: ABNORMAL
LYMPHOCYTES NFR BLD: 1.03 K/UL (ref 1.5–4)
LYMPHOCYTES RELATIVE PERCENT: 17 % (ref 20–42)
Lab: 548
MAGNESIUM SERPL-MCNC: 1.8 MG/DL (ref 1.6–2.6)
MCH RBC QN AUTO: 27.4 PG (ref 26–35)
MCHC RBC AUTO-ENTMCNC: 30.2 G/DL (ref 32–34.5)
MCV RBC AUTO: 90.7 FL (ref 80–99.9)
METHB: 0 % (ref 0–1.5)
MICROORGANISM SPEC CULT: NORMAL
MICROORGANISM/AGENT SPEC: NORMAL
MODE: AC
MONOCYTES NFR BLD: 0.64 K/UL (ref 0.1–0.95)
MONOCYTES NFR BLD: 11 % (ref 2–12)
NEUTROPHILS NFR BLD: 69 % (ref 43–80)
NEUTS SEG NFR BLD: 4.18 K/UL (ref 1.8–7.3)
O2 CONTENT: 15.2 ML/DL
O2 SATURATION: 92.1 % (ref 92–98.5)
O2HB: 91.4 % (ref 94–97)
OPERATOR ID: ABNORMAL
PARTIAL THROMBOPLASTIN TIME: 32.5 SEC (ref 24.5–35.1)
PATIENT TEMP: 37 C
PCO2: 62 MMHG (ref 35–45)
PEEP/CPAP: 8 CMH2O
PFO2: 0.98 MMHG/%
PH BLOOD GAS: 7.44 (ref 7.35–7.45)
PHOSPHATE SERPL-MCNC: 4.3 MG/DL (ref 2.5–4.5)
PLATELET # BLD AUTO: 125 K/UL (ref 130–450)
PMV BLD AUTO: 12.5 FL (ref 7–12)
PO2: 68.8 MMHG (ref 75–100)
POTASSIUM SERPL-SCNC: 2.8 MMOL/L (ref 3.5–5)
PROT SERPL-MCNC: 5.6 G/DL (ref 6.4–8.3)
PROTHROMBIN TIME: 17 SEC (ref 9.3–12.4)
RBC # BLD AUTO: 3.87 M/UL (ref 3.5–5.5)
RI(T): 5.03
RR MECHANICAL: 15 B/MIN
SODIUM SERPL-SCNC: 145 MMOL/L (ref 132–146)
SOURCE, BLOOD GAS: ABNORMAL
SPECIMEN DESCRIPTION: NORMAL
THB: 11.8 G/DL (ref 11.5–16.5)
TIME ANALYZED: 550
VT MECHANICAL: 350 ML
WBC OTHER # BLD: 6 K/UL (ref 4.5–11.5)

## 2024-01-22 PROCEDURE — 94640 AIRWAY INHALATION TREATMENT: CPT

## 2024-01-22 PROCEDURE — 6370000000 HC RX 637 (ALT 250 FOR IP): Performed by: INTERNAL MEDICINE

## 2024-01-22 PROCEDURE — 6360000002 HC RX W HCPCS

## 2024-01-22 PROCEDURE — 6360000002 HC RX W HCPCS: Performed by: INTERNAL MEDICINE

## 2024-01-22 PROCEDURE — 99232 SBSQ HOSP IP/OBS MODERATE 35: CPT | Performed by: INTERNAL MEDICINE

## 2024-01-22 PROCEDURE — 36415 COLL VENOUS BLD VENIPUNCTURE: CPT

## 2024-01-22 PROCEDURE — 80053 COMPREHEN METABOLIC PANEL: CPT

## 2024-01-22 PROCEDURE — 2580000003 HC RX 258: Performed by: INTERNAL MEDICINE

## 2024-01-22 PROCEDURE — 2500000003 HC RX 250 WO HCPCS: Performed by: INTERNAL MEDICINE

## 2024-01-22 PROCEDURE — 6370000000 HC RX 637 (ALT 250 FOR IP)

## 2024-01-22 PROCEDURE — 36600 WITHDRAWAL OF ARTERIAL BLOOD: CPT

## 2024-01-22 PROCEDURE — 82962 GLUCOSE BLOOD TEST: CPT

## 2024-01-22 PROCEDURE — 2000000000 HC ICU R&B

## 2024-01-22 PROCEDURE — 94003 VENT MGMT INPAT SUBQ DAY: CPT

## 2024-01-22 PROCEDURE — 82805 BLOOD GASES W/O2 SATURATION: CPT

## 2024-01-22 PROCEDURE — 85610 PROTHROMBIN TIME: CPT

## 2024-01-22 PROCEDURE — 71045 X-RAY EXAM CHEST 1 VIEW: CPT

## 2024-01-22 PROCEDURE — 85730 THROMBOPLASTIN TIME PARTIAL: CPT

## 2024-01-22 PROCEDURE — C9113 INJ PANTOPRAZOLE SODIUM, VIA: HCPCS | Performed by: INTERNAL MEDICINE

## 2024-01-22 PROCEDURE — 84100 ASSAY OF PHOSPHORUS: CPT

## 2024-01-22 PROCEDURE — 85025 COMPLETE CBC W/AUTO DIFF WBC: CPT

## 2024-01-22 PROCEDURE — 83735 ASSAY OF MAGNESIUM: CPT

## 2024-01-22 PROCEDURE — 99291 CRITICAL CARE FIRST HOUR: CPT | Performed by: INTERNAL MEDICINE

## 2024-01-22 PROCEDURE — 94669 MECHANICAL CHEST WALL OSCILL: CPT

## 2024-01-22 RX ORDER — POTASSIUM CHLORIDE 7.45 MG/ML
10 INJECTION INTRAVENOUS
Status: COMPLETED | OUTPATIENT
Start: 2024-01-22 | End: 2024-01-22

## 2024-01-22 RX ORDER — METOPROLOL TARTRATE 1 MG/ML
5 INJECTION, SOLUTION INTRAVENOUS ONCE
Status: COMPLETED | OUTPATIENT
Start: 2024-01-22 | End: 2024-01-22

## 2024-01-22 RX ORDER — INSULIN LISPRO 100 [IU]/ML
0-4 INJECTION, SOLUTION INTRAVENOUS; SUBCUTANEOUS EVERY 4 HOURS
Status: DISCONTINUED | OUTPATIENT
Start: 2024-01-22 | End: 2024-02-01

## 2024-01-22 RX ORDER — CHLORHEXIDINE GLUCONATE ORAL RINSE 1.2 MG/ML
15 SOLUTION DENTAL 2 TIMES DAILY
Status: DISCONTINUED | OUTPATIENT
Start: 2024-01-22 | End: 2024-01-27

## 2024-01-22 RX ORDER — INSULIN LISPRO 100 [IU]/ML
0-4 INJECTION, SOLUTION INTRAVENOUS; SUBCUTANEOUS EVERY 4 HOURS
Status: DISCONTINUED | OUTPATIENT
Start: 2024-01-22 | End: 2024-01-22

## 2024-01-22 RX ADMIN — APIXABAN 5 MG: 5 TABLET, FILM COATED ORAL at 08:34

## 2024-01-22 RX ADMIN — IPRATROPIUM BROMIDE AND ALBUTEROL SULFATE 1 DOSE: .5; 2.5 SOLUTION RESPIRATORY (INHALATION) at 12:42

## 2024-01-22 RX ADMIN — SODIUM CHLORIDE, PRESERVATIVE FREE 40 MG: 5 INJECTION INTRAVENOUS at 08:34

## 2024-01-22 RX ADMIN — PROPOFOL 25 MCG/KG/MIN: 10 INJECTION, EMULSION INTRAVENOUS at 20:56

## 2024-01-22 RX ADMIN — BUDESONIDE INHALATION 500 MCG: 0.5 SUSPENSION RESPIRATORY (INHALATION) at 16:17

## 2024-01-22 RX ADMIN — IPRATROPIUM BROMIDE AND ALBUTEROL SULFATE 1 DOSE: .5; 2.5 SOLUTION RESPIRATORY (INHALATION) at 04:50

## 2024-01-22 RX ADMIN — CHLORHEXIDINE GLUCONATE 15 ML: 1.2 RINSE ORAL at 23:14

## 2024-01-22 RX ADMIN — IPRATROPIUM BROMIDE AND ALBUTEROL SULFATE 1 DOSE: .5; 2.5 SOLUTION RESPIRATORY (INHALATION) at 01:10

## 2024-01-22 RX ADMIN — POTASSIUM CHLORIDE 10 MEQ: 7.46 INJECTION, SOLUTION INTRAVENOUS at 12:04

## 2024-01-22 RX ADMIN — Medication: at 16:08

## 2024-01-22 RX ADMIN — Medication 0.4 MCG/KG/HR: at 05:19

## 2024-01-22 RX ADMIN — PIPERACILLIN AND TAZOBACTAM 4500 MG: 4; .5 INJECTION, POWDER, LYOPHILIZED, FOR SOLUTION INTRAVENOUS at 18:39

## 2024-01-22 RX ADMIN — ACETYLCYSTEINE 400 MG: 100 INHALANT RESPIRATORY (INHALATION) at 04:50

## 2024-01-22 RX ADMIN — APIXABAN 5 MG: 5 TABLET, FILM COATED ORAL at 20:27

## 2024-01-22 RX ADMIN — Medication 1000 UNITS: at 08:34

## 2024-01-22 RX ADMIN — PROPOFOL 15 MCG/KG/MIN: 10 INJECTION, EMULSION INTRAVENOUS at 02:13

## 2024-01-22 RX ADMIN — METOPROLOL TARTRATE 50 MG: 50 TABLET, FILM COATED ORAL at 20:27

## 2024-01-22 RX ADMIN — BUDESONIDE INHALATION 500 MCG: 0.5 SUSPENSION RESPIRATORY (INHALATION) at 04:50

## 2024-01-22 RX ADMIN — SODIUM CHLORIDE 1000 ML: 4.5 INJECTION, SOLUTION INTRAVENOUS at 12:00

## 2024-01-22 RX ADMIN — Medication: at 08:33

## 2024-01-22 RX ADMIN — Medication 10 ML: at 08:35

## 2024-01-22 RX ADMIN — METOPROLOL TARTRATE 5 MG: 5 INJECTION INTRAVENOUS at 16:22

## 2024-01-22 RX ADMIN — POTASSIUM CHLORIDE 10 MEQ: 7.46 INJECTION, SOLUTION INTRAVENOUS at 07:01

## 2024-01-22 RX ADMIN — POTASSIUM CHLORIDE 10 MEQ: 7.46 INJECTION, SOLUTION INTRAVENOUS at 15:20

## 2024-01-22 RX ADMIN — Medication 10 ML: at 20:27

## 2024-01-22 RX ADMIN — ACETYLCYSTEINE 400 MG: 100 INHALANT RESPIRATORY (INHALATION) at 08:51

## 2024-01-22 RX ADMIN — ACETYLCYSTEINE 400 MG: 100 INHALANT RESPIRATORY (INHALATION) at 01:10

## 2024-01-22 RX ADMIN — PROPOFOL 15 MCG/KG/MIN: 10 INJECTION, EMULSION INTRAVENOUS at 09:38

## 2024-01-22 RX ADMIN — POTASSIUM CHLORIDE 10 MEQ: 7.46 INJECTION, SOLUTION INTRAVENOUS at 14:06

## 2024-01-22 RX ADMIN — POTASSIUM CHLORIDE 10 MEQ: 7.46 INJECTION, SOLUTION INTRAVENOUS at 12:54

## 2024-01-22 RX ADMIN — PROPOFOL 15 MCG/KG/MIN: 10 INJECTION, EMULSION INTRAVENOUS at 16:10

## 2024-01-22 RX ADMIN — ATORVASTATIN CALCIUM 40 MG: 40 TABLET, FILM COATED ORAL at 20:27

## 2024-01-22 RX ADMIN — IPRATROPIUM BROMIDE AND ALBUTEROL SULFATE 1 DOSE: .5; 2.5 SOLUTION RESPIRATORY (INHALATION) at 16:17

## 2024-01-22 RX ADMIN — IPRATROPIUM BROMIDE AND ALBUTEROL SULFATE 1 DOSE: .5; 2.5 SOLUTION RESPIRATORY (INHALATION) at 08:51

## 2024-01-22 RX ADMIN — Medication: at 20:27

## 2024-01-22 RX ADMIN — PIPERACILLIN AND TAZOBACTAM 4500 MG: 4; .5 INJECTION, POWDER, LYOPHILIZED, FOR SOLUTION INTRAVENOUS at 03:58

## 2024-01-22 RX ADMIN — METHYLPREDNISOLONE SODIUM SUCCINATE 40 MG: 40 INJECTION, POWDER, LYOPHILIZED, FOR SOLUTION INTRAMUSCULAR; INTRAVENOUS at 12:12

## 2024-01-22 RX ADMIN — PIPERACILLIN AND TAZOBACTAM 4500 MG: 4; .5 INJECTION, POWDER, LYOPHILIZED, FOR SOLUTION INTRAVENOUS at 11:16

## 2024-01-22 RX ADMIN — POTASSIUM CHLORIDE 10 MEQ: 7.46 INJECTION, SOLUTION INTRAVENOUS at 08:32

## 2024-01-22 NOTE — ACP (ADVANCE CARE PLANNING)
Advance Care Planning   Healthcare Decision Maker:    Primary Decision Maker: raina noe - Brother/Sister - [phone]      Today we documented Decision Maker(s) consistent with ACP documents on file.        Electronically signed by Radha Hargrove RN-BC on 1/22/2024 at 2:10 PM

## 2024-01-22 NOTE — CONSULTS
Comprehensive Nutrition Assessment    Type and Reason for Visit:  Initial, Consult (Tube Feedings Order and Managment)    Nutrition Recommendations/Plan:   Continue NPO status   Provide tube feeding recommendations/orders per MD Consult.      Recommend continuous Vital AF 1.2 (peptide based) @ 48ml/h with 2 protein modulars daily, 30ml flush q4h (additional fluids per critical care team). Regimen to provide 1302ml TV, 1963kcal (including 373kcal/day from propofol), 138.4g protein and 1115ml water per day which meets 100% of estimated protein & energy needs at goal rate.         Malnutrition Assessment:  Malnutrition Status:  At risk for malnutrition (Comment) (01/22/24 1326)    Context:  Acute Illness     Findings of the 6 clinical characteristics of malnutrition:  Energy Intake:  Mild decrease in energy intake (Comment) (NPO status)  Weight Loss:  Unable to assess (d/t lack of wt hx per EMR x 1 year)     Body Fat Loss:  No significant body fat loss     Muscle Mass Loss:  No significant muscle mass loss    Fluid Accumulation:  Unable to assess     Strength:  Not Performed    Nutrition Assessment:    Pt admit w/ acute on chronic resp failure requiring intubation, possible asp. PNA, acute on chronic HF, new onset afib. PMHx of HTN/HLD, DM, COPD w/ exac., R lung nodule, renal failure. Pt is currently NPO, will provide TF recs/orders per consult & continue to monitor while inpatient.    Nutrition Related Findings:    abd soft, obese, hypoactive BSx4, NT, ND, +1 edema, I/O's -4.33L since admit, intubated/sedated on propofol @ 14.1ml/h (373kcal/day), MAP 90 Wound Type: None       Current Nutrition Intake & Therapies:    Average Meal Intake: NPO  Average Supplements Intake: NPO  Diet NPO  ADULT TUBE FEEDING; Orogastric; Peptide Based; Continuous; 15; Yes; 10; Q 6 hours; 48; 30; Q 4 hours; Protein; Proteinex 2go - 2 servings daily via OGT    Anthropometric Measures:  Height: 160 cm (5' 2.99\")  Ideal Body Weight (IBW):

## 2024-01-23 LAB
ALBUMIN SERPL-MCNC: 3.1 G/DL (ref 3.5–5.2)
ALP SERPL-CCNC: 77 U/L (ref 35–104)
ALT SERPL-CCNC: <5 U/L (ref 0–32)
ANION GAP SERPL CALCULATED.3IONS-SCNC: 9 MMOL/L (ref 7–16)
AST SERPL-CCNC: 12 U/L (ref 0–31)
BASOPHILS # BLD: 0 K/UL (ref 0–0.2)
BASOPHILS NFR BLD: 0 % (ref 0–2)
BILIRUB SERPL-MCNC: 0.6 MG/DL (ref 0–1.2)
BUN SERPL-MCNC: 20 MG/DL (ref 6–23)
CALCIUM SERPL-MCNC: 9.3 MG/DL (ref 8.6–10.2)
CHLORIDE SERPL-SCNC: 96 MMOL/L (ref 98–107)
CO2 SERPL-SCNC: 36 MMOL/L (ref 22–29)
CREAT SERPL-MCNC: 1.3 MG/DL (ref 0.5–1)
EOSINOPHIL # BLD: 0.06 K/UL (ref 0.05–0.5)
EOSINOPHILS RELATIVE PERCENT: 1 % (ref 0–6)
ERYTHROCYTE [DISTWIDTH] IN BLOOD BY AUTOMATED COUNT: 15.9 % (ref 11.5–15)
GFR SERPL CREATININE-BSD FRML MDRD: 45 ML/MIN/1.73M2
GLUCOSE BLD-MCNC: 155 MG/DL (ref 74–99)
GLUCOSE BLD-MCNC: 166 MG/DL (ref 74–99)
GLUCOSE BLD-MCNC: 167 MG/DL (ref 74–99)
GLUCOSE BLD-MCNC: 175 MG/DL (ref 74–99)
GLUCOSE BLD-MCNC: 188 MG/DL (ref 74–99)
GLUCOSE SERPL-MCNC: 168 MG/DL (ref 74–99)
HCT VFR BLD AUTO: 36.7 % (ref 34–48)
HGB BLD-MCNC: 11.4 G/DL (ref 11.5–15.5)
LYMPHOCYTES NFR BLD: 0.36 K/UL (ref 1.5–4)
LYMPHOCYTES RELATIVE PERCENT: 5 % (ref 20–42)
MAGNESIUM SERPL-MCNC: 1.9 MG/DL (ref 1.6–2.6)
MCH RBC QN AUTO: 27.3 PG (ref 26–35)
MCHC RBC AUTO-ENTMCNC: 31.1 G/DL (ref 32–34.5)
MCV RBC AUTO: 87.8 FL (ref 80–99.9)
MONOCYTES NFR BLD: 0 % (ref 2–12)
MONOCYTES NFR BLD: 0 K/UL (ref 0.1–0.95)
NEUTROPHILS NFR BLD: 94 % (ref 43–80)
NEUTS SEG NFR BLD: 6.48 K/UL (ref 1.8–7.3)
NUCLEATED RED BLOOD CELLS: 2 PER 100 WBC
PHOSPHATE SERPL-MCNC: 4.3 MG/DL (ref 2.5–4.5)
PLATELET, FLUORESCENCE: 157 K/UL (ref 130–450)
PMV BLD AUTO: 13.3 FL (ref 7–12)
POTASSIUM SERPL-SCNC: 3.2 MMOL/L (ref 3.5–5)
PROT SERPL-MCNC: 6.4 G/DL (ref 6.4–8.3)
RBC # BLD AUTO: 4.18 M/UL (ref 3.5–5.5)
RBC # BLD: ABNORMAL 10*6/UL
SODIUM SERPL-SCNC: 141 MMOL/L (ref 132–146)
WBC OTHER # BLD: 6.9 K/UL (ref 4.5–11.5)

## 2024-01-23 PROCEDURE — 6360000002 HC RX W HCPCS: Performed by: INTERNAL MEDICINE

## 2024-01-23 PROCEDURE — 94003 VENT MGMT INPAT SUBQ DAY: CPT

## 2024-01-23 PROCEDURE — 99233 SBSQ HOSP IP/OBS HIGH 50: CPT | Performed by: INTERNAL MEDICINE

## 2024-01-23 PROCEDURE — 82962 GLUCOSE BLOOD TEST: CPT

## 2024-01-23 PROCEDURE — 94668 MNPJ CHEST WALL SBSQ: CPT

## 2024-01-23 PROCEDURE — 6370000000 HC RX 637 (ALT 250 FOR IP)

## 2024-01-23 PROCEDURE — 94669 MECHANICAL CHEST WALL OSCILL: CPT

## 2024-01-23 PROCEDURE — 6370000000 HC RX 637 (ALT 250 FOR IP): Performed by: INTERNAL MEDICINE

## 2024-01-23 PROCEDURE — C9113 INJ PANTOPRAZOLE SODIUM, VIA: HCPCS | Performed by: INTERNAL MEDICINE

## 2024-01-23 PROCEDURE — 92610 EVALUATE SWALLOWING FUNCTION: CPT | Performed by: SPEECH-LANGUAGE PATHOLOGIST

## 2024-01-23 PROCEDURE — 2500000003 HC RX 250 WO HCPCS: Performed by: INTERNAL MEDICINE

## 2024-01-23 PROCEDURE — 6360000002 HC RX W HCPCS

## 2024-01-23 PROCEDURE — 99291 CRITICAL CARE FIRST HOUR: CPT | Performed by: INTERNAL MEDICINE

## 2024-01-23 PROCEDURE — 94640 AIRWAY INHALATION TREATMENT: CPT

## 2024-01-23 PROCEDURE — 2580000003 HC RX 258: Performed by: INTERNAL MEDICINE

## 2024-01-23 PROCEDURE — 80053 COMPREHEN METABOLIC PANEL: CPT

## 2024-01-23 PROCEDURE — 85025 COMPLETE CBC W/AUTO DIFF WBC: CPT

## 2024-01-23 PROCEDURE — 94660 CPAP INITIATION&MGMT: CPT

## 2024-01-23 PROCEDURE — 83735 ASSAY OF MAGNESIUM: CPT

## 2024-01-23 PROCEDURE — 2000000000 HC ICU R&B

## 2024-01-23 PROCEDURE — 84100 ASSAY OF PHOSPHORUS: CPT

## 2024-01-23 PROCEDURE — 36415 COLL VENOUS BLD VENIPUNCTURE: CPT

## 2024-01-23 RX ORDER — DIMETHICONE, OXYBENZONE, AND PADIMATE O 2; 2.5; 6.6 G/100G; G/100G; G/100G
STICK TOPICAL PRN
Status: DISCONTINUED | OUTPATIENT
Start: 2024-01-23 | End: 2024-02-11 | Stop reason: HOSPADM

## 2024-01-23 RX ORDER — POTASSIUM CHLORIDE 7.45 MG/ML
10 INJECTION INTRAVENOUS
Status: COMPLETED | OUTPATIENT
Start: 2024-01-23 | End: 2024-01-23

## 2024-01-23 RX ADMIN — Medication: at 15:30

## 2024-01-23 RX ADMIN — IPRATROPIUM BROMIDE AND ALBUTEROL SULFATE 1 DOSE: .5; 2.5 SOLUTION RESPIRATORY (INHALATION) at 18:10

## 2024-01-23 RX ADMIN — POTASSIUM CHLORIDE 10 MEQ: 7.46 INJECTION, SOLUTION INTRAVENOUS at 09:00

## 2024-01-23 RX ADMIN — Medication 0.9 MCG/KG/HR: at 22:08

## 2024-01-23 RX ADMIN — Medication 1000 UNITS: at 08:52

## 2024-01-23 RX ADMIN — BUDESONIDE INHALATION 500 MCG: 0.5 SUSPENSION RESPIRATORY (INHALATION) at 06:33

## 2024-01-23 RX ADMIN — IPRATROPIUM BROMIDE AND ALBUTEROL SULFATE 1 DOSE: .5; 2.5 SOLUTION RESPIRATORY (INHALATION) at 14:55

## 2024-01-23 RX ADMIN — POTASSIUM CHLORIDE 10 MEQ: 7.46 INJECTION, SOLUTION INTRAVENOUS at 10:04

## 2024-01-23 RX ADMIN — POTASSIUM CHLORIDE 10 MEQ: 7.46 INJECTION, SOLUTION INTRAVENOUS at 06:38

## 2024-01-23 RX ADMIN — PIPERACILLIN AND TAZOBACTAM 4500 MG: 4; .5 INJECTION, POWDER, LYOPHILIZED, FOR SOLUTION INTRAVENOUS at 03:12

## 2024-01-23 RX ADMIN — Medication 0.4 MCG/KG/HR: at 17:13

## 2024-01-23 RX ADMIN — METHYLPREDNISOLONE SODIUM SUCCINATE 40 MG: 40 INJECTION, POWDER, LYOPHILIZED, FOR SOLUTION INTRAMUSCULAR; INTRAVENOUS at 01:01

## 2024-01-23 RX ADMIN — Medication 0.2 MCG/KG/HR: at 10:32

## 2024-01-23 RX ADMIN — SODIUM CHLORIDE, PRESERVATIVE FREE 40 MG: 5 INJECTION INTRAVENOUS at 08:56

## 2024-01-23 RX ADMIN — IPRATROPIUM BROMIDE AND ALBUTEROL SULFATE 1 DOSE: .5; 2.5 SOLUTION RESPIRATORY (INHALATION) at 09:53

## 2024-01-23 RX ADMIN — APIXABAN 5 MG: 5 TABLET, FILM COATED ORAL at 08:52

## 2024-01-23 RX ADMIN — PROPOFOL 25 MCG/KG/MIN: 10 INJECTION, EMULSION INTRAVENOUS at 01:08

## 2024-01-23 RX ADMIN — Medication: at 19:58

## 2024-01-23 RX ADMIN — PROPOFOL 25 MCG/KG/MIN: 10 INJECTION, EMULSION INTRAVENOUS at 04:45

## 2024-01-23 RX ADMIN — CHLORHEXIDINE GLUCONATE 15 ML: 1.2 RINSE ORAL at 08:53

## 2024-01-23 RX ADMIN — POTASSIUM CHLORIDE 10 MEQ: 7.46 INJECTION, SOLUTION INTRAVENOUS at 07:49

## 2024-01-23 RX ADMIN — Medication 10 ML: at 08:53

## 2024-01-23 RX ADMIN — BUDESONIDE INHALATION 500 MCG: 0.5 SUSPENSION RESPIRATORY (INHALATION) at 18:10

## 2024-01-23 RX ADMIN — Medication: at 08:53

## 2024-01-23 RX ADMIN — PIPERACILLIN AND TAZOBACTAM 4500 MG: 4; .5 INJECTION, POWDER, LYOPHILIZED, FOR SOLUTION INTRAVENOUS at 12:21

## 2024-01-23 RX ADMIN — Medication 10 ML: at 20:07

## 2024-01-23 RX ADMIN — IPRATROPIUM BROMIDE AND ALBUTEROL SULFATE 1 DOSE: .5; 2.5 SOLUTION RESPIRATORY (INHALATION) at 06:33

## 2024-01-23 RX ADMIN — METHYLPREDNISOLONE SODIUM SUCCINATE 40 MG: 40 INJECTION, POWDER, LYOPHILIZED, FOR SOLUTION INTRAMUSCULAR; INTRAVENOUS at 11:59

## 2024-01-23 RX ADMIN — LISINOPRIL 20 MG: 20 TABLET ORAL at 08:52

## 2024-01-23 RX ADMIN — PROPOFOL 30 MCG/KG/MIN: 10 INJECTION, EMULSION INTRAVENOUS at 08:59

## 2024-01-23 RX ADMIN — METOPROLOL TARTRATE 50 MG: 50 TABLET, FILM COATED ORAL at 08:55

## 2024-01-23 RX ADMIN — PIPERACILLIN AND TAZOBACTAM 4500 MG: 4; .5 INJECTION, POWDER, LYOPHILIZED, FOR SOLUTION INTRAVENOUS at 18:56

## 2024-01-24 LAB
ALBUMIN SERPL-MCNC: 3.4 G/DL (ref 3.5–5.2)
ALP SERPL-CCNC: 77 U/L (ref 35–104)
ALT SERPL-CCNC: <5 U/L (ref 0–32)
ANION GAP SERPL CALCULATED.3IONS-SCNC: 7 MMOL/L (ref 7–16)
AST SERPL-CCNC: 12 U/L (ref 0–31)
BASOPHILS # BLD: 0 K/UL (ref 0–0.2)
BASOPHILS NFR BLD: 0 % (ref 0–2)
BILIRUB SERPL-MCNC: 0.5 MG/DL (ref 0–1.2)
BUN SERPL-MCNC: 20 MG/DL (ref 6–23)
CALCIUM SERPL-MCNC: 9.7 MG/DL (ref 8.6–10.2)
CHLORIDE SERPL-SCNC: 99 MMOL/L (ref 98–107)
CO2 SERPL-SCNC: 35 MMOL/L (ref 22–29)
CREAT SERPL-MCNC: 1.2 MG/DL (ref 0.5–1)
EOSINOPHIL # BLD: 0 K/UL (ref 0.05–0.5)
EOSINOPHILS RELATIVE PERCENT: 0 % (ref 0–6)
ERYTHROCYTE [DISTWIDTH] IN BLOOD BY AUTOMATED COUNT: 15.5 % (ref 11.5–15)
GFR SERPL CREATININE-BSD FRML MDRD: 49 ML/MIN/1.73M2
GLUCOSE BLD-MCNC: 133 MG/DL (ref 74–99)
GLUCOSE BLD-MCNC: 136 MG/DL (ref 74–99)
GLUCOSE BLD-MCNC: 138 MG/DL (ref 74–99)
GLUCOSE BLD-MCNC: 146 MG/DL (ref 74–99)
GLUCOSE BLD-MCNC: 152 MG/DL (ref 74–99)
GLUCOSE BLD-MCNC: 154 MG/DL (ref 74–99)
GLUCOSE BLD-MCNC: 184 MG/DL (ref 74–99)
GLUCOSE SERPL-MCNC: 164 MG/DL (ref 74–99)
HCT VFR BLD AUTO: 37.4 % (ref 34–48)
HGB BLD-MCNC: 11.7 G/DL (ref 11.5–15.5)
LYMPHOCYTES NFR BLD: 0.37 K/UL (ref 1.5–4)
LYMPHOCYTES RELATIVE PERCENT: 6 % (ref 20–42)
MAGNESIUM SERPL-MCNC: 2.1 MG/DL (ref 1.6–2.6)
MCH RBC QN AUTO: 27.9 PG (ref 26–35)
MCHC RBC AUTO-ENTMCNC: 31.3 G/DL (ref 32–34.5)
MONOCYTES NFR BLD: 0.31 K/UL (ref 0.1–0.95)
MONOCYTES NFR BLD: 5 % (ref 2–12)
NEUTROPHILS NFR BLD: 89 % (ref 43–80)
NEUTS SEG NFR BLD: 5.52 K/UL (ref 1.8–7.3)
PHOSPHATE SERPL-MCNC: 3.5 MG/DL (ref 2.5–4.5)
PLATELET, FLUORESCENCE: 148 K/UL (ref 130–450)
PMV BLD AUTO: 13.5 FL (ref 7–12)
POTASSIUM SERPL-SCNC: 3.6 MMOL/L (ref 3.5–5)
PROT SERPL-MCNC: 7.1 G/DL (ref 6.4–8.3)
RBC # BLD AUTO: 4.19 M/UL (ref 3.5–5.5)
RBC # BLD: ABNORMAL 10*6/UL
SODIUM SERPL-SCNC: 141 MMOL/L (ref 132–146)
WBC OTHER # BLD: 6.2 K/UL (ref 4.5–11.5)

## 2024-01-24 PROCEDURE — 6370000000 HC RX 637 (ALT 250 FOR IP): Performed by: INTERNAL MEDICINE

## 2024-01-24 PROCEDURE — 2580000003 HC RX 258: Performed by: INTERNAL MEDICINE

## 2024-01-24 PROCEDURE — 80053 COMPREHEN METABOLIC PANEL: CPT

## 2024-01-24 PROCEDURE — 99232 SBSQ HOSP IP/OBS MODERATE 35: CPT | Performed by: INTERNAL MEDICINE

## 2024-01-24 PROCEDURE — 92526 ORAL FUNCTION THERAPY: CPT | Performed by: SPEECH-LANGUAGE PATHOLOGIST

## 2024-01-24 PROCEDURE — 6360000002 HC RX W HCPCS: Performed by: INTERNAL MEDICINE

## 2024-01-24 PROCEDURE — 84100 ASSAY OF PHOSPHORUS: CPT

## 2024-01-24 PROCEDURE — 83735 ASSAY OF MAGNESIUM: CPT

## 2024-01-24 PROCEDURE — 6370000000 HC RX 637 (ALT 250 FOR IP)

## 2024-01-24 PROCEDURE — 2700000000 HC OXYGEN THERAPY PER DAY

## 2024-01-24 PROCEDURE — 85025 COMPLETE CBC W/AUTO DIFF WBC: CPT

## 2024-01-24 PROCEDURE — 94660 CPAP INITIATION&MGMT: CPT

## 2024-01-24 PROCEDURE — 94640 AIRWAY INHALATION TREATMENT: CPT

## 2024-01-24 PROCEDURE — C9113 INJ PANTOPRAZOLE SODIUM, VIA: HCPCS | Performed by: INTERNAL MEDICINE

## 2024-01-24 PROCEDURE — 76937 US GUIDE VASCULAR ACCESS: CPT

## 2024-01-24 PROCEDURE — A4216 STERILE WATER/SALINE, 10 ML: HCPCS | Performed by: INTERNAL MEDICINE

## 2024-01-24 PROCEDURE — 99291 CRITICAL CARE FIRST HOUR: CPT | Performed by: INTERNAL MEDICINE

## 2024-01-24 PROCEDURE — 82962 GLUCOSE BLOOD TEST: CPT

## 2024-01-24 PROCEDURE — 2500000003 HC RX 250 WO HCPCS: Performed by: INTERNAL MEDICINE

## 2024-01-24 PROCEDURE — 2060000000 HC ICU INTERMEDIATE R&B

## 2024-01-24 RX ORDER — FUROSEMIDE 10 MG/ML
20 INJECTION INTRAMUSCULAR; INTRAVENOUS 2 TIMES DAILY
Status: COMPLETED | OUTPATIENT
Start: 2024-01-24 | End: 2024-01-24

## 2024-01-24 RX ORDER — METRONIDAZOLE 500 MG/100ML
500 INJECTION, SOLUTION INTRAVENOUS EVERY 8 HOURS
Status: DISCONTINUED | OUTPATIENT
Start: 2024-01-24 | End: 2024-02-04

## 2024-01-24 RX ADMIN — BUDESONIDE INHALATION 500 MCG: 0.5 SUSPENSION RESPIRATORY (INHALATION) at 05:09

## 2024-01-24 RX ADMIN — WATER 1000 MG: 1 INJECTION INTRAMUSCULAR; INTRAVENOUS; SUBCUTANEOUS at 15:31

## 2024-01-24 RX ADMIN — Medication 0.5 MCG/KG/HR: at 07:17

## 2024-01-24 RX ADMIN — APIXABAN 5 MG: 5 TABLET, FILM COATED ORAL at 20:39

## 2024-01-24 RX ADMIN — IPRATROPIUM BROMIDE AND ALBUTEROL SULFATE 1 DOSE: .5; 2.5 SOLUTION RESPIRATORY (INHALATION) at 05:08

## 2024-01-24 RX ADMIN — METRONIDAZOLE 500 MG: 500 INJECTION, SOLUTION INTRAVENOUS at 22:44

## 2024-01-24 RX ADMIN — Medication: at 09:00

## 2024-01-24 RX ADMIN — METHYLPREDNISOLONE SODIUM SUCCINATE 40 MG: 40 INJECTION, POWDER, LYOPHILIZED, FOR SOLUTION INTRAMUSCULAR; INTRAVENOUS at 23:46

## 2024-01-24 RX ADMIN — CHLORHEXIDINE GLUCONATE 15 ML: 1.2 RINSE ORAL at 08:37

## 2024-01-24 RX ADMIN — LISINOPRIL 20 MG: 20 TABLET ORAL at 08:41

## 2024-01-24 RX ADMIN — BUDESONIDE INHALATION 500 MCG: 0.5 SUSPENSION RESPIRATORY (INHALATION) at 17:31

## 2024-01-24 RX ADMIN — SODIUM CHLORIDE, PRESERVATIVE FREE 40 MG: 5 INJECTION INTRAVENOUS at 08:42

## 2024-01-24 RX ADMIN — POTASSIUM BICARBONATE 40 MEQ: 782 TABLET, EFFERVESCENT ORAL at 12:38

## 2024-01-24 RX ADMIN — PIPERACILLIN AND TAZOBACTAM 4500 MG: 4; .5 INJECTION, POWDER, LYOPHILIZED, FOR SOLUTION INTRAVENOUS at 11:35

## 2024-01-24 RX ADMIN — ATORVASTATIN CALCIUM 40 MG: 40 TABLET, FILM COATED ORAL at 20:39

## 2024-01-24 RX ADMIN — METOPROLOL TARTRATE 50 MG: 50 TABLET, FILM COATED ORAL at 08:40

## 2024-01-24 RX ADMIN — IPRATROPIUM BROMIDE AND ALBUTEROL SULFATE 1 DOSE: .5; 2.5 SOLUTION RESPIRATORY (INHALATION) at 14:16

## 2024-01-24 RX ADMIN — Medication 10 ML: at 20:39

## 2024-01-24 RX ADMIN — METHYLPREDNISOLONE SODIUM SUCCINATE 40 MG: 40 INJECTION, POWDER, LYOPHILIZED, FOR SOLUTION INTRAMUSCULAR; INTRAVENOUS at 11:25

## 2024-01-24 RX ADMIN — Medication 1000 UNITS: at 08:41

## 2024-01-24 RX ADMIN — IPRATROPIUM BROMIDE AND ALBUTEROL SULFATE 1 DOSE: .5; 2.5 SOLUTION RESPIRATORY (INHALATION) at 17:31

## 2024-01-24 RX ADMIN — FUROSEMIDE 20 MG: 10 INJECTION, SOLUTION INTRAMUSCULAR; INTRAVENOUS at 12:37

## 2024-01-24 RX ADMIN — METRONIDAZOLE 500 MG: 500 INJECTION, SOLUTION INTRAVENOUS at 15:35

## 2024-01-24 RX ADMIN — FUROSEMIDE 20 MG: 10 INJECTION, SOLUTION INTRAMUSCULAR; INTRAVENOUS at 18:08

## 2024-01-24 RX ADMIN — METHYLPREDNISOLONE SODIUM SUCCINATE 40 MG: 40 INJECTION, POWDER, LYOPHILIZED, FOR SOLUTION INTRAMUSCULAR; INTRAVENOUS at 01:05

## 2024-01-24 RX ADMIN — Medication 0.7 MCG/KG/HR: at 01:47

## 2024-01-24 RX ADMIN — CHLORHEXIDINE GLUCONATE 15 ML: 1.2 RINSE ORAL at 20:39

## 2024-01-24 RX ADMIN — IPRATROPIUM BROMIDE AND ALBUTEROL SULFATE 1 DOSE: .5; 2.5 SOLUTION RESPIRATORY (INHALATION) at 09:36

## 2024-01-24 RX ADMIN — APIXABAN 5 MG: 5 TABLET, FILM COATED ORAL at 08:40

## 2024-01-24 RX ADMIN — PIPERACILLIN AND TAZOBACTAM 4500 MG: 4; .5 INJECTION, POWDER, LYOPHILIZED, FOR SOLUTION INTRAVENOUS at 03:08

## 2024-01-25 PROBLEM — J96.21 ACUTE ON CHRONIC RESPIRATORY FAILURE WITH HYPOXIA (HCC): Status: ACTIVE | Noted: 2024-01-25

## 2024-01-25 PROBLEM — J18.9 COMMUNITY ACQUIRED PNEUMONIA OF RIGHT LUNG: Status: ACTIVE | Noted: 2024-01-25

## 2024-01-25 LAB
GLUCOSE BLD-MCNC: 128 MG/DL (ref 74–99)
GLUCOSE BLD-MCNC: 131 MG/DL (ref 74–99)
GLUCOSE BLD-MCNC: 133 MG/DL (ref 74–99)
GLUCOSE BLD-MCNC: 133 MG/DL (ref 74–99)
GLUCOSE BLD-MCNC: 142 MG/DL (ref 74–99)
MICROORGANISM SPEC CULT: NORMAL
MICROORGANISM SPEC CULT: NORMAL
SERVICE CMNT-IMP: NORMAL
SERVICE CMNT-IMP: NORMAL
SPECIMEN DESCRIPTION: NORMAL
SPECIMEN DESCRIPTION: NORMAL

## 2024-01-25 PROCEDURE — 2580000003 HC RX 258: Performed by: INTERNAL MEDICINE

## 2024-01-25 PROCEDURE — 2700000000 HC OXYGEN THERAPY PER DAY

## 2024-01-25 PROCEDURE — 6360000002 HC RX W HCPCS: Performed by: INTERNAL MEDICINE

## 2024-01-25 PROCEDURE — 94660 CPAP INITIATION&MGMT: CPT

## 2024-01-25 PROCEDURE — 6370000000 HC RX 637 (ALT 250 FOR IP)

## 2024-01-25 PROCEDURE — 97530 THERAPEUTIC ACTIVITIES: CPT | Performed by: PHYSICAL THERAPIST

## 2024-01-25 PROCEDURE — 99233 SBSQ HOSP IP/OBS HIGH 50: CPT | Performed by: INTERNAL MEDICINE

## 2024-01-25 PROCEDURE — 6370000000 HC RX 637 (ALT 250 FOR IP): Performed by: INTERNAL MEDICINE

## 2024-01-25 PROCEDURE — C9113 INJ PANTOPRAZOLE SODIUM, VIA: HCPCS | Performed by: INTERNAL MEDICINE

## 2024-01-25 PROCEDURE — 2060000000 HC ICU INTERMEDIATE R&B

## 2024-01-25 PROCEDURE — 99232 SBSQ HOSP IP/OBS MODERATE 35: CPT | Performed by: INTERNAL MEDICINE

## 2024-01-25 PROCEDURE — A4216 STERILE WATER/SALINE, 10 ML: HCPCS | Performed by: INTERNAL MEDICINE

## 2024-01-25 PROCEDURE — 94640 AIRWAY INHALATION TREATMENT: CPT

## 2024-01-25 PROCEDURE — 97161 PT EVAL LOW COMPLEX 20 MIN: CPT | Performed by: PHYSICAL THERAPIST

## 2024-01-25 PROCEDURE — 82962 GLUCOSE BLOOD TEST: CPT

## 2024-01-25 RX ORDER — METOPROLOL SUCCINATE 25 MG/1
25 TABLET, EXTENDED RELEASE ORAL 2 TIMES DAILY
Status: DISCONTINUED | OUTPATIENT
Start: 2024-01-25 | End: 2024-02-11 | Stop reason: HOSPADM

## 2024-01-25 RX ADMIN — CHLORHEXIDINE GLUCONATE 15 ML: 1.2 RINSE ORAL at 21:20

## 2024-01-25 RX ADMIN — METHYLPREDNISOLONE SODIUM SUCCINATE 40 MG: 40 INJECTION, POWDER, LYOPHILIZED, FOR SOLUTION INTRAMUSCULAR; INTRAVENOUS at 23:42

## 2024-01-25 RX ADMIN — WATER 1000 MG: 1 INJECTION INTRAMUSCULAR; INTRAVENOUS; SUBCUTANEOUS at 15:11

## 2024-01-25 RX ADMIN — Medication 1000 UNITS: at 10:19

## 2024-01-25 RX ADMIN — APIXABAN 5 MG: 5 TABLET, FILM COATED ORAL at 10:19

## 2024-01-25 RX ADMIN — METOPROLOL TARTRATE 50 MG: 50 TABLET, FILM COATED ORAL at 10:20

## 2024-01-25 RX ADMIN — IPRATROPIUM BROMIDE AND ALBUTEROL SULFATE 1 DOSE: .5; 2.5 SOLUTION RESPIRATORY (INHALATION) at 09:32

## 2024-01-25 RX ADMIN — METRONIDAZOLE 500 MG: 500 INJECTION, SOLUTION INTRAVENOUS at 15:18

## 2024-01-25 RX ADMIN — METHYLPREDNISOLONE SODIUM SUCCINATE 40 MG: 40 INJECTION, POWDER, LYOPHILIZED, FOR SOLUTION INTRAMUSCULAR; INTRAVENOUS at 12:00

## 2024-01-25 RX ADMIN — ACETAMINOPHEN 650 MG: 325 TABLET ORAL at 16:55

## 2024-01-25 RX ADMIN — SODIUM CHLORIDE, PRESERVATIVE FREE 40 MG: 5 INJECTION INTRAVENOUS at 10:19

## 2024-01-25 RX ADMIN — IPRATROPIUM BROMIDE AND ALBUTEROL SULFATE 1 DOSE: .5; 2.5 SOLUTION RESPIRATORY (INHALATION) at 06:16

## 2024-01-25 RX ADMIN — BUDESONIDE INHALATION 500 MCG: 0.5 SUSPENSION RESPIRATORY (INHALATION) at 06:16

## 2024-01-25 RX ADMIN — METRONIDAZOLE 500 MG: 500 INJECTION, SOLUTION INTRAVENOUS at 23:45

## 2024-01-25 RX ADMIN — Medication: at 15:16

## 2024-01-25 RX ADMIN — METOPROLOL SUCCINATE 25 MG: 25 TABLET, EXTENDED RELEASE ORAL at 21:20

## 2024-01-25 RX ADMIN — METRONIDAZOLE 500 MG: 500 INJECTION, SOLUTION INTRAVENOUS at 06:34

## 2024-01-25 RX ADMIN — CHLORHEXIDINE GLUCONATE 15 ML: 1.2 RINSE ORAL at 10:20

## 2024-01-25 RX ADMIN — ATORVASTATIN CALCIUM 40 MG: 40 TABLET, FILM COATED ORAL at 21:20

## 2024-01-25 RX ADMIN — BUDESONIDE INHALATION 500 MCG: 0.5 SUSPENSION RESPIRATORY (INHALATION) at 18:38

## 2024-01-25 RX ADMIN — Medication 10 ML: at 21:21

## 2024-01-25 RX ADMIN — IPRATROPIUM BROMIDE AND ALBUTEROL SULFATE 1 DOSE: .5; 2.5 SOLUTION RESPIRATORY (INHALATION) at 14:48

## 2024-01-25 RX ADMIN — IPRATROPIUM BROMIDE AND ALBUTEROL SULFATE 1 DOSE: .5; 2.5 SOLUTION RESPIRATORY (INHALATION) at 18:38

## 2024-01-25 RX ADMIN — Medication 10 ML: at 10:21

## 2024-01-25 RX ADMIN — Medication: at 21:20

## 2024-01-25 RX ADMIN — APIXABAN 5 MG: 5 TABLET, FILM COATED ORAL at 21:20

## 2024-01-25 NOTE — DISCHARGE INSTR - COC
Continuity of Care Form    Patient Name: Emily Weiss   :  1955  MRN:  [id number]    Admit date:  2024  Discharge date:  24    Code Status Order: Full Code   Advance Directives:     Admitting Physician:  Huber Pineda DO  PCP: Sabas Vargas MD    Discharging Nurse: andrea  Discharging Hospital Unit/Room#: 0622/0622-01  Discharging Unit Phone Number: [phone]    Emergency Contact:   Extended Emergency Contact Information  Primary Emergency Contact: Neftali Parikh  Address: [address] [place] [address]  Home Phone: [phone]  Relation: Domestic Partner   needed? No  Secondary Emergency Contact: KatiRupinder   [facility]  Home Phone: [phone]  Relation: Other   needed? No    Past Surgical History:  No past surgical history on file.    Immunization History:     There is no immunization history on file for this patient.    Active Problems:  Patient Active Problem List   Diagnosis Code    Hypoxemia R09.02    Cardiomegaly I51.7    Nodule of right lung R91.1    Acute renal failure with tubular necrosis ([facility]) N17.0    Rectal bleeding K62.5    Sepsis affecting skin A41.9    Cocaine abuse ([facility]) F14.10    Metabolic acidosis E87.20    Volume overload E87.70    Physical deconditioning R53.81    Morbid obesity with BMI of 60.0-69.9, adult ([facility]) E66.01, Z68.44    Skin breakdown R23.8    Vitamin D deficiency E55.9    COPD (chronic obstructive pulmonary disease) ([facility]) J44.9    Acute on chronic respiratory failure with hypoxia and hypercapnia ([facility]) J96.21, J96.22    Acute on chronic heart failure with preserved ejection fraction (HFpEF) ([facility]) I50.33    Atrial fibrillation ([facility]) I48.91    Primary hypertension I10    Type 2 diabetes mellitus with hyperglycemia, without long-term current use of insulin ([facility]) E11.65    Acute right heart failure ([facility]) I50.811    Community acquired pneumonia of right lung J18.9    Acute on chronic

## 2024-01-25 NOTE — FLOWSHEET NOTE
Inpatient Wound Care    Admit Date: 1/19/2024  3:44 AM    Reason for consult:  blister arm    Significant history:    Past Medical History:   Diagnosis Date    Acute congestive heart failure with left ventricular diastolic dysfunction ([facility]) 12/21/2020    Atrial fibrillation ([facility]) 01/19/2024    Cardiomegaly 08/08/2017    COPD (chronic obstructive pulmonary disease) ([facility]) 01/19/2024    Hyperlipidemia     Hypertension     Hypoxemia 08/08/2017    Nodule of right lung 08/09/2017    Obese     Oxygen dependent     2l n/c    Renal failure 04/08/2018    Substance abuse ([facility])     Type 2 diabetes mellitus with hyperglycemia, without long-term current use of insulin ([facility]) 01/19/2024       Wound history:      Findings:     01/25/24 1448   Wound 01/23/24 Brachial Left;Anterior red superficial area   Date First Assessed/Time First Assessed: 01/23/24 2000   Present on Original Admission: No  Location: Brachial  Wound Location Orientation: Left;Anterior  Wound Description (Comments): red superficial area   Wound Image    Dressing Status New dressing applied   Dressing/Treatment Xeroform   Wound Length (cm) 2 cm   Wound Width (cm) 1 cm   Wound Depth (cm) 0.2 cm   Wound Surface Area (cm^2) 2 cm^2   Change in Wound Size % (l*w) 0   Wound Volume (cm^3) 0.4 cm^3   Wound Assessment Ruptured blister   Drainage Amount Scant (moist but unmeasurable)   Odor None   Wound 01/23/24 Brachial Distal;Left;Anterior red superficial area   Date First Assessed/Time First Assessed: 01/23/24 2000   Present on Original Admission: No  Location: Brachial  Wound Location Orientation: Distal;Left;Anterior  Wound Description (Comments): red superficial area   Wound Length (cm) 3 cm   Wound Width (cm) 2 cm   Wound Surface Area (cm^2) 6 cm^2   Change in Wound Size % (l*w) 0   Wound Assessment Ruptured blister   Drainage Amount Scant (moist but unmeasurable)   Drainage Description Serosanguinous   Odor None       **Informed Consent**    The patient has given

## 2024-01-26 LAB
ALBUMIN SERPL-MCNC: 4 G/DL (ref 3.5–5.2)
ALP SERPL-CCNC: 87 U/L (ref 35–104)
ALT SERPL-CCNC: 8 U/L (ref 0–32)
ANION GAP SERPL CALCULATED.3IONS-SCNC: 9 MMOL/L (ref 7–16)
AST SERPL-CCNC: 13 U/L (ref 0–31)
BASOPHILS # BLD: 0.02 K/UL (ref 0–0.2)
BASOPHILS NFR BLD: 0 % (ref 0–2)
BILIRUB SERPL-MCNC: 0.5 MG/DL (ref 0–1.2)
BUN SERPL-MCNC: 29 MG/DL (ref 6–23)
CALCIUM SERPL-MCNC: 10.1 MG/DL (ref 8.6–10.2)
CHLORIDE SERPL-SCNC: 101 MMOL/L (ref 98–107)
CO2 SERPL-SCNC: 37 MMOL/L (ref 22–29)
CREAT SERPL-MCNC: 1.4 MG/DL (ref 0.5–1)
EOSINOPHIL # BLD: 0 K/UL (ref 0.05–0.5)
EOSINOPHILS RELATIVE PERCENT: 0 % (ref 0–6)
ERYTHROCYTE [DISTWIDTH] IN BLOOD BY AUTOMATED COUNT: 16.2 % (ref 11.5–15)
GFR SERPL CREATININE-BSD FRML MDRD: 42 ML/MIN/1.73M2
GLUCOSE BLD-MCNC: 110 MG/DL (ref 74–99)
GLUCOSE BLD-MCNC: 133 MG/DL (ref 74–99)
GLUCOSE BLD-MCNC: 150 MG/DL (ref 74–99)
GLUCOSE BLD-MCNC: 169 MG/DL (ref 74–99)
GLUCOSE SERPL-MCNC: 178 MG/DL (ref 74–99)
HCT VFR BLD AUTO: 34.8 % (ref 34–48)
HGB BLD-MCNC: 10.6 G/DL (ref 11.5–15.5)
IMM GRANULOCYTES # BLD AUTO: 0.07 K/UL (ref 0–0.58)
IMM GRANULOCYTES NFR BLD: 1 % (ref 0–5)
LYMPHOCYTES NFR BLD: 0.82 K/UL (ref 1.5–4)
LYMPHOCYTES RELATIVE PERCENT: 11 % (ref 20–42)
MAGNESIUM SERPL-MCNC: 2.1 MG/DL (ref 1.6–2.6)
MCHC RBC AUTO-ENTMCNC: 30.5 G/DL (ref 32–34.5)
MCV RBC AUTO: 91.1 FL (ref 80–99.9)
MONOCYTES NFR BLD: 0.76 K/UL (ref 0.1–0.95)
MONOCYTES NFR BLD: 10 % (ref 2–12)
NEUTROPHILS NFR BLD: 78 % (ref 43–80)
NEUTS SEG NFR BLD: 5.77 K/UL (ref 1.8–7.3)
PHOSPHATE SERPL-MCNC: 2.2 MG/DL (ref 2.5–4.5)
PLATELET, FLUORESCENCE: 190 K/UL (ref 130–450)
PMV BLD AUTO: 13.4 FL (ref 7–12)
POTASSIUM SERPL-SCNC: 3.2 MMOL/L (ref 3.5–5)
PROT SERPL-MCNC: 7.4 G/DL (ref 6.4–8.3)
RBC # BLD AUTO: 3.82 M/UL (ref 3.5–5.5)
SODIUM SERPL-SCNC: 147 MMOL/L (ref 132–146)
WBC OTHER # BLD: 7.6 K/UL (ref 4.5–11.5)

## 2024-01-26 PROCEDURE — 2580000003 HC RX 258: Performed by: INTERNAL MEDICINE

## 2024-01-26 PROCEDURE — 6370000000 HC RX 637 (ALT 250 FOR IP): Performed by: INTERNAL MEDICINE

## 2024-01-26 PROCEDURE — 85025 COMPLETE CBC W/AUTO DIFF WBC: CPT

## 2024-01-26 PROCEDURE — 99232 SBSQ HOSP IP/OBS MODERATE 35: CPT | Performed by: INTERNAL MEDICINE

## 2024-01-26 PROCEDURE — 2700000000 HC OXYGEN THERAPY PER DAY

## 2024-01-26 PROCEDURE — 6360000002 HC RX W HCPCS: Performed by: INTERNAL MEDICINE

## 2024-01-26 PROCEDURE — 82962 GLUCOSE BLOOD TEST: CPT

## 2024-01-26 PROCEDURE — 2060000000 HC ICU INTERMEDIATE R&B

## 2024-01-26 PROCEDURE — 94660 CPAP INITIATION&MGMT: CPT

## 2024-01-26 PROCEDURE — 6370000000 HC RX 637 (ALT 250 FOR IP)

## 2024-01-26 PROCEDURE — 2500000003 HC RX 250 WO HCPCS: Performed by: INTERNAL MEDICINE

## 2024-01-26 PROCEDURE — 84100 ASSAY OF PHOSPHORUS: CPT

## 2024-01-26 PROCEDURE — 83735 ASSAY OF MAGNESIUM: CPT

## 2024-01-26 PROCEDURE — A4216 STERILE WATER/SALINE, 10 ML: HCPCS | Performed by: INTERNAL MEDICINE

## 2024-01-26 PROCEDURE — 99233 SBSQ HOSP IP/OBS HIGH 50: CPT | Performed by: INTERNAL MEDICINE

## 2024-01-26 PROCEDURE — 36415 COLL VENOUS BLD VENIPUNCTURE: CPT

## 2024-01-26 PROCEDURE — 80053 COMPREHEN METABOLIC PANEL: CPT

## 2024-01-26 PROCEDURE — 97165 OT EVAL LOW COMPLEX 30 MIN: CPT | Performed by: OCCUPATIONAL THERAPIST

## 2024-01-26 PROCEDURE — C9113 INJ PANTOPRAZOLE SODIUM, VIA: HCPCS | Performed by: INTERNAL MEDICINE

## 2024-01-26 PROCEDURE — 92526 ORAL FUNCTION THERAPY: CPT | Performed by: SPEECH-LANGUAGE PATHOLOGIST

## 2024-01-26 PROCEDURE — 94640 AIRWAY INHALATION TREATMENT: CPT

## 2024-01-26 RX ORDER — POTASSIUM CHLORIDE 20 MEQ/1
40 TABLET, EXTENDED RELEASE ORAL ONCE
Status: COMPLETED | OUTPATIENT
Start: 2024-01-26 | End: 2024-01-26

## 2024-01-26 RX ADMIN — METOPROLOL SUCCINATE 25 MG: 25 TABLET, EXTENDED RELEASE ORAL at 08:33

## 2024-01-26 RX ADMIN — IPRATROPIUM BROMIDE AND ALBUTEROL SULFATE 1 DOSE: .5; 2.5 SOLUTION RESPIRATORY (INHALATION) at 09:52

## 2024-01-26 RX ADMIN — Medication: at 21:52

## 2024-01-26 RX ADMIN — APIXABAN 5 MG: 5 TABLET, FILM COATED ORAL at 21:51

## 2024-01-26 RX ADMIN — SODIUM CHLORIDE, PRESERVATIVE FREE 40 MG: 5 INJECTION INTRAVENOUS at 08:33

## 2024-01-26 RX ADMIN — BUDESONIDE INHALATION 500 MCG: 0.5 SUSPENSION RESPIRATORY (INHALATION) at 06:13

## 2024-01-26 RX ADMIN — Medication 10 ML: at 21:52

## 2024-01-26 RX ADMIN — METRONIDAZOLE 500 MG: 500 INJECTION, SOLUTION INTRAVENOUS at 23:34

## 2024-01-26 RX ADMIN — IPRATROPIUM BROMIDE AND ALBUTEROL SULFATE 1 DOSE: .5; 2.5 SOLUTION RESPIRATORY (INHALATION) at 06:13

## 2024-01-26 RX ADMIN — METHYLPREDNISOLONE SODIUM SUCCINATE 40 MG: 40 INJECTION, POWDER, LYOPHILIZED, FOR SOLUTION INTRAMUSCULAR; INTRAVENOUS at 12:16

## 2024-01-26 RX ADMIN — ATORVASTATIN CALCIUM 40 MG: 40 TABLET, FILM COATED ORAL at 21:51

## 2024-01-26 RX ADMIN — LISINOPRIL 20 MG: 20 TABLET ORAL at 08:33

## 2024-01-26 RX ADMIN — METRONIDAZOLE 500 MG: 500 INJECTION, SOLUTION INTRAVENOUS at 17:34

## 2024-01-26 RX ADMIN — WATER 1000 MG: 1 INJECTION INTRAMUSCULAR; INTRAVENOUS; SUBCUTANEOUS at 17:30

## 2024-01-26 RX ADMIN — Medication 1000 UNITS: at 08:33

## 2024-01-26 RX ADMIN — CHLORHEXIDINE GLUCONATE 15 ML: 1.2 RINSE ORAL at 21:51

## 2024-01-26 RX ADMIN — POTASSIUM CHLORIDE 40 MEQ: 1500 TABLET, EXTENDED RELEASE ORAL at 12:16

## 2024-01-26 RX ADMIN — CHLORHEXIDINE GLUCONATE 15 ML: 1.2 RINSE ORAL at 08:34

## 2024-01-26 RX ADMIN — METRONIDAZOLE 500 MG: 500 INJECTION, SOLUTION INTRAVENOUS at 08:41

## 2024-01-26 RX ADMIN — BUDESONIDE INHALATION 500 MCG: 0.5 SUSPENSION RESPIRATORY (INHALATION) at 18:25

## 2024-01-26 RX ADMIN — METHYLPREDNISOLONE SODIUM SUCCINATE 40 MG: 40 INJECTION, POWDER, LYOPHILIZED, FOR SOLUTION INTRAMUSCULAR; INTRAVENOUS at 23:34

## 2024-01-26 RX ADMIN — APIXABAN 5 MG: 5 TABLET, FILM COATED ORAL at 08:33

## 2024-01-26 RX ADMIN — METOPROLOL SUCCINATE 25 MG: 25 TABLET, EXTENDED RELEASE ORAL at 21:51

## 2024-01-26 RX ADMIN — IPRATROPIUM BROMIDE AND ALBUTEROL SULFATE 1 DOSE: .5; 2.5 SOLUTION RESPIRATORY (INHALATION) at 18:25

## 2024-01-26 RX ADMIN — POTASSIUM PHOSPHATE, MONOBASIC POTASSIUM PHOSPHATE, DIBASIC 30 MMOL: 224; 236 INJECTION, SOLUTION, CONCENTRATE INTRAVENOUS at 12:21

## 2024-01-27 LAB
ALBUMIN SERPL-MCNC: 4 G/DL (ref 3.5–5.2)
ALP SERPL-CCNC: 90 U/L (ref 35–104)
ALT SERPL-CCNC: 8 U/L (ref 0–32)
ANION GAP SERPL CALCULATED.3IONS-SCNC: 9 MMOL/L (ref 7–16)
AST SERPL-CCNC: 15 U/L (ref 0–31)
BASOPHILS # BLD: 0 K/UL (ref 0–0.2)
BASOPHILS NFR BLD: 0 % (ref 0–2)
BILIRUB SERPL-MCNC: 0.6 MG/DL (ref 0–1.2)
BUN SERPL-MCNC: 31 MG/DL (ref 6–23)
CALCIUM SERPL-MCNC: 10.2 MG/DL (ref 8.6–10.2)
CHLORIDE SERPL-SCNC: 103 MMOL/L (ref 98–107)
CO2 SERPL-SCNC: 37 MMOL/L (ref 22–29)
CREAT SERPL-MCNC: 1.3 MG/DL (ref 0.5–1)
EOSINOPHIL # BLD: 0 K/UL (ref 0.05–0.5)
EOSINOPHILS RELATIVE PERCENT: 0 % (ref 0–6)
ERYTHROCYTE [DISTWIDTH] IN BLOOD BY AUTOMATED COUNT: 16.3 % (ref 11.5–15)
GFR SERPL CREATININE-BSD FRML MDRD: 46 ML/MIN/1.73M2
GLUCOSE BLD-MCNC: 124 MG/DL (ref 74–99)
GLUCOSE BLD-MCNC: 134 MG/DL (ref 74–99)
GLUCOSE BLD-MCNC: 136 MG/DL (ref 74–99)
GLUCOSE BLD-MCNC: 140 MG/DL (ref 74–99)
GLUCOSE BLD-MCNC: 172 MG/DL (ref 74–99)
GLUCOSE SERPL-MCNC: 119 MG/DL (ref 74–99)
HCT VFR BLD AUTO: 35.8 % (ref 34–48)
HGB BLD-MCNC: 10.4 G/DL (ref 11.5–15.5)
LYMPHOCYTES NFR BLD: 1.05 K/UL (ref 1.5–4)
LYMPHOCYTES RELATIVE PERCENT: 15 % (ref 20–42)
MAGNESIUM SERPL-MCNC: 2.3 MG/DL (ref 1.6–2.6)
MCH RBC QN AUTO: 27.4 PG (ref 26–35)
MCHC RBC AUTO-ENTMCNC: 29.1 G/DL (ref 32–34.5)
MCV RBC AUTO: 94.2 FL (ref 80–99.9)
MONOCYTES NFR BLD: 0.49 K/UL (ref 0.1–0.95)
MONOCYTES NFR BLD: 7 % (ref 2–12)
NEUTROPHILS NFR BLD: 78 % (ref 43–80)
NEUTS SEG NFR BLD: 5.56 K/UL (ref 1.8–7.3)
NUCLEATED RED BLOOD CELLS: 2 PER 100 WBC
PHOSPHATE SERPL-MCNC: 3.3 MG/DL (ref 2.5–4.5)
PLATELET, FLUORESCENCE: 163 K/UL (ref 130–450)
PMV BLD AUTO: 13.3 FL (ref 7–12)
POTASSIUM SERPL-SCNC: 4.1 MMOL/L (ref 3.5–5)
PROT SERPL-MCNC: 7.5 G/DL (ref 6.4–8.3)
RBC # BLD AUTO: 3.8 M/UL (ref 3.5–5.5)
RBC # BLD: ABNORMAL 10*6/UL
SODIUM SERPL-SCNC: 149 MMOL/L (ref 132–146)
WBC OTHER # BLD: 7.1 K/UL (ref 4.5–11.5)

## 2024-01-27 PROCEDURE — 6360000002 HC RX W HCPCS: Performed by: INTERNAL MEDICINE

## 2024-01-27 PROCEDURE — 6370000000 HC RX 637 (ALT 250 FOR IP)

## 2024-01-27 PROCEDURE — 80053 COMPREHEN METABOLIC PANEL: CPT

## 2024-01-27 PROCEDURE — 94640 AIRWAY INHALATION TREATMENT: CPT

## 2024-01-27 PROCEDURE — 94660 CPAP INITIATION&MGMT: CPT

## 2024-01-27 PROCEDURE — 2700000000 HC OXYGEN THERAPY PER DAY

## 2024-01-27 PROCEDURE — 82962 GLUCOSE BLOOD TEST: CPT

## 2024-01-27 PROCEDURE — 36415 COLL VENOUS BLD VENIPUNCTURE: CPT

## 2024-01-27 PROCEDURE — C9113 INJ PANTOPRAZOLE SODIUM, VIA: HCPCS | Performed by: INTERNAL MEDICINE

## 2024-01-27 PROCEDURE — 6370000000 HC RX 637 (ALT 250 FOR IP): Performed by: INTERNAL MEDICINE

## 2024-01-27 PROCEDURE — 2580000003 HC RX 258: Performed by: INTERNAL MEDICINE

## 2024-01-27 PROCEDURE — 99233 SBSQ HOSP IP/OBS HIGH 50: CPT | Performed by: INTERNAL MEDICINE

## 2024-01-27 PROCEDURE — 85025 COMPLETE CBC W/AUTO DIFF WBC: CPT

## 2024-01-27 PROCEDURE — 83735 ASSAY OF MAGNESIUM: CPT

## 2024-01-27 PROCEDURE — A4216 STERILE WATER/SALINE, 10 ML: HCPCS | Performed by: INTERNAL MEDICINE

## 2024-01-27 PROCEDURE — 84100 ASSAY OF PHOSPHORUS: CPT

## 2024-01-27 PROCEDURE — 2060000000 HC ICU INTERMEDIATE R&B

## 2024-01-27 PROCEDURE — 99232 SBSQ HOSP IP/OBS MODERATE 35: CPT | Performed by: INTERNAL MEDICINE

## 2024-01-27 RX ORDER — PREDNISONE 20 MG/1
40 TABLET ORAL DAILY
Status: DISCONTINUED | OUTPATIENT
Start: 2024-01-28 | End: 2024-01-30

## 2024-01-27 RX ORDER — FUROSEMIDE 40 MG/1
40 TABLET ORAL DAILY
Status: DISCONTINUED | OUTPATIENT
Start: 2024-01-27 | End: 2024-02-02

## 2024-01-27 RX ADMIN — Medication: at 09:39

## 2024-01-27 RX ADMIN — Medication 10 ML: at 20:59

## 2024-01-27 RX ADMIN — Medication 10 ML: at 09:37

## 2024-01-27 RX ADMIN — METOPROLOL SUCCINATE 25 MG: 25 TABLET, EXTENDED RELEASE ORAL at 09:37

## 2024-01-27 RX ADMIN — APIXABAN 5 MG: 5 TABLET, FILM COATED ORAL at 20:58

## 2024-01-27 RX ADMIN — APIXABAN 5 MG: 5 TABLET, FILM COATED ORAL at 09:37

## 2024-01-27 RX ADMIN — METRONIDAZOLE 500 MG: 500 INJECTION, SOLUTION INTRAVENOUS at 07:00

## 2024-01-27 RX ADMIN — SODIUM CHLORIDE, PRESERVATIVE FREE 40 MG: 5 INJECTION INTRAVENOUS at 09:31

## 2024-01-27 RX ADMIN — IPRATROPIUM BROMIDE AND ALBUTEROL SULFATE 1 DOSE: .5; 2.5 SOLUTION RESPIRATORY (INHALATION) at 17:21

## 2024-01-27 RX ADMIN — Medication: at 15:16

## 2024-01-27 RX ADMIN — BUDESONIDE INHALATION 500 MCG: 0.5 SUSPENSION RESPIRATORY (INHALATION) at 17:21

## 2024-01-27 RX ADMIN — Medication 1000 UNITS: at 09:37

## 2024-01-27 RX ADMIN — Medication: at 20:59

## 2024-01-27 RX ADMIN — FUROSEMIDE 40 MG: 40 TABLET ORAL at 13:43

## 2024-01-27 RX ADMIN — IPRATROPIUM BROMIDE AND ALBUTEROL SULFATE 1 DOSE: .5; 2.5 SOLUTION RESPIRATORY (INHALATION) at 06:39

## 2024-01-27 RX ADMIN — METOPROLOL SUCCINATE 25 MG: 25 TABLET, EXTENDED RELEASE ORAL at 20:58

## 2024-01-27 RX ADMIN — LISINOPRIL 20 MG: 20 TABLET ORAL at 09:37

## 2024-01-27 RX ADMIN — ATORVASTATIN CALCIUM 40 MG: 40 TABLET, FILM COATED ORAL at 20:58

## 2024-01-27 RX ADMIN — BUDESONIDE INHALATION 500 MCG: 0.5 SUSPENSION RESPIRATORY (INHALATION) at 06:39

## 2024-01-27 RX ADMIN — WATER 1000 MG: 1 INJECTION INTRAMUSCULAR; INTRAVENOUS; SUBCUTANEOUS at 15:06

## 2024-01-27 RX ADMIN — METRONIDAZOLE 500 MG: 500 INJECTION, SOLUTION INTRAVENOUS at 15:09

## 2024-01-27 RX ADMIN — METHYLPREDNISOLONE SODIUM SUCCINATE 40 MG: 40 INJECTION, POWDER, LYOPHILIZED, FOR SOLUTION INTRAMUSCULAR; INTRAVENOUS at 13:43

## 2024-01-27 RX ADMIN — CHLORHEXIDINE GLUCONATE 15 ML: 1.2 RINSE ORAL at 09:37

## 2024-01-28 LAB
BUN SERPL-MCNC: 30 MG/DL (ref 6–23)
CALCIUM SERPL-MCNC: 10.1 MG/DL (ref 8.6–10.2)
CHLORIDE SERPL-SCNC: 101 MMOL/L (ref 98–107)
CO2 SERPL-SCNC: 35 MMOL/L (ref 22–29)
CREAT SERPL-MCNC: 1.1 MG/DL (ref 0.5–1)
GFR SERPL CREATININE-BSD FRML MDRD: 52 ML/MIN/1.73M2
GLUCOSE BLD-MCNC: 153 MG/DL (ref 74–99)
GLUCOSE BLD-MCNC: 157 MG/DL (ref 74–99)
GLUCOSE BLD-MCNC: 158 MG/DL (ref 74–99)
GLUCOSE BLD-MCNC: 161 MG/DL (ref 74–99)
GLUCOSE BLD-MCNC: 170 MG/DL (ref 74–99)
GLUCOSE SERPL-MCNC: 158 MG/DL (ref 74–99)
POTASSIUM SERPL-SCNC: 4 MMOL/L (ref 3.5–5)
SODIUM SERPL-SCNC: 146 MMOL/L (ref 132–146)

## 2024-01-28 PROCEDURE — 99233 SBSQ HOSP IP/OBS HIGH 50: CPT | Performed by: INTERNAL MEDICINE

## 2024-01-28 PROCEDURE — C9113 INJ PANTOPRAZOLE SODIUM, VIA: HCPCS | Performed by: INTERNAL MEDICINE

## 2024-01-28 PROCEDURE — 2060000000 HC ICU INTERMEDIATE R&B

## 2024-01-28 PROCEDURE — 6360000002 HC RX W HCPCS: Performed by: INTERNAL MEDICINE

## 2024-01-28 PROCEDURE — 2580000003 HC RX 258: Performed by: INTERNAL MEDICINE

## 2024-01-28 PROCEDURE — 6370000000 HC RX 637 (ALT 250 FOR IP): Performed by: INTERNAL MEDICINE

## 2024-01-28 PROCEDURE — 80048 BASIC METABOLIC PNL TOTAL CA: CPT

## 2024-01-28 PROCEDURE — 99232 SBSQ HOSP IP/OBS MODERATE 35: CPT | Performed by: INTERNAL MEDICINE

## 2024-01-28 PROCEDURE — 82962 GLUCOSE BLOOD TEST: CPT

## 2024-01-28 PROCEDURE — 36415 COLL VENOUS BLD VENIPUNCTURE: CPT

## 2024-01-28 PROCEDURE — A4216 STERILE WATER/SALINE, 10 ML: HCPCS | Performed by: INTERNAL MEDICINE

## 2024-01-28 PROCEDURE — 94640 AIRWAY INHALATION TREATMENT: CPT

## 2024-01-28 PROCEDURE — 94660 CPAP INITIATION&MGMT: CPT

## 2024-01-28 RX ORDER — AMLODIPINE BESYLATE 5 MG/1
5 TABLET ORAL DAILY
Status: DISCONTINUED | OUTPATIENT
Start: 2024-01-28 | End: 2024-01-31

## 2024-01-28 RX ADMIN — IPRATROPIUM BROMIDE AND ALBUTEROL SULFATE 1 DOSE: .5; 2.5 SOLUTION RESPIRATORY (INHALATION) at 09:34

## 2024-01-28 RX ADMIN — Medication: at 20:41

## 2024-01-28 RX ADMIN — APIXABAN 5 MG: 5 TABLET, FILM COATED ORAL at 20:41

## 2024-01-28 RX ADMIN — IPRATROPIUM BROMIDE AND ALBUTEROL SULFATE 1 DOSE: .5; 2.5 SOLUTION RESPIRATORY (INHALATION) at 06:20

## 2024-01-28 RX ADMIN — METHYLPREDNISOLONE SODIUM SUCCINATE 40 MG: 40 INJECTION, POWDER, LYOPHILIZED, FOR SOLUTION INTRAMUSCULAR; INTRAVENOUS at 00:15

## 2024-01-28 RX ADMIN — METRONIDAZOLE 500 MG: 500 INJECTION, SOLUTION INTRAVENOUS at 09:37

## 2024-01-28 RX ADMIN — ATORVASTATIN CALCIUM 40 MG: 40 TABLET, FILM COATED ORAL at 20:41

## 2024-01-28 RX ADMIN — Medication 1000 UNITS: at 09:28

## 2024-01-28 RX ADMIN — Medication 10 ML: at 20:42

## 2024-01-28 RX ADMIN — Medication 10 ML: at 09:27

## 2024-01-28 RX ADMIN — METOPROLOL SUCCINATE 25 MG: 25 TABLET, EXTENDED RELEASE ORAL at 20:41

## 2024-01-28 RX ADMIN — METOPROLOL SUCCINATE 25 MG: 25 TABLET, EXTENDED RELEASE ORAL at 09:28

## 2024-01-28 RX ADMIN — LISINOPRIL 20 MG: 20 TABLET ORAL at 09:27

## 2024-01-28 RX ADMIN — METRONIDAZOLE 500 MG: 500 INJECTION, SOLUTION INTRAVENOUS at 00:16

## 2024-01-28 RX ADMIN — WATER 1000 MG: 1 INJECTION INTRAMUSCULAR; INTRAVENOUS; SUBCUTANEOUS at 14:48

## 2024-01-28 RX ADMIN — APIXABAN 5 MG: 5 TABLET, FILM COATED ORAL at 09:28

## 2024-01-28 RX ADMIN — Medication: at 15:00

## 2024-01-28 RX ADMIN — PREDNISONE 40 MG: 20 TABLET ORAL at 09:28

## 2024-01-28 RX ADMIN — IPRATROPIUM BROMIDE AND ALBUTEROL SULFATE 1 DOSE: .5; 2.5 SOLUTION RESPIRATORY (INHALATION) at 15:11

## 2024-01-28 RX ADMIN — SODIUM CHLORIDE, PRESERVATIVE FREE 40 MG: 5 INJECTION INTRAVENOUS at 09:28

## 2024-01-28 RX ADMIN — METRONIDAZOLE 500 MG: 500 INJECTION, SOLUTION INTRAVENOUS at 15:00

## 2024-01-28 RX ADMIN — AMLODIPINE BESYLATE 5 MG: 5 TABLET ORAL at 20:41

## 2024-01-28 RX ADMIN — BUDESONIDE INHALATION 500 MCG: 0.5 SUSPENSION RESPIRATORY (INHALATION) at 06:20

## 2024-01-28 RX ADMIN — BUDESONIDE INHALATION 500 MCG: 0.5 SUSPENSION RESPIRATORY (INHALATION) at 19:10

## 2024-01-28 RX ADMIN — IPRATROPIUM BROMIDE AND ALBUTEROL SULFATE 1 DOSE: .5; 2.5 SOLUTION RESPIRATORY (INHALATION) at 19:10

## 2024-01-28 RX ADMIN — Medication: at 09:28

## 2024-01-29 LAB
AADO2: 264.2 MMHG
ALBUMIN SERPL-MCNC: 4 G/DL (ref 3.5–5.2)
ALP SERPL-CCNC: 89 U/L (ref 35–104)
ALT SERPL-CCNC: 7 U/L (ref 0–32)
ANION GAP SERPL CALCULATED.3IONS-SCNC: 10 MMOL/L (ref 7–16)
AST SERPL-CCNC: 12 U/L (ref 0–31)
B.E.: 11.1 MMOL/L (ref -3–3)
BASOPHILS # BLD: 0 K/UL (ref 0–0.2)
BASOPHILS NFR BLD: 0 % (ref 0–2)
BILIRUB SERPL-MCNC: 0.5 MG/DL (ref 0–1.2)
BUN SERPL-MCNC: 29 MG/DL (ref 6–23)
CALCIUM SERPL-MCNC: 10.1 MG/DL (ref 8.6–10.2)
CHLORIDE SERPL-SCNC: 101 MMOL/L (ref 98–107)
CO2 SERPL-SCNC: 36 MMOL/L (ref 22–29)
COHB: 1 % (ref 0–1.5)
COMMENT: ABNORMAL
CREAT SERPL-MCNC: 1.1 MG/DL (ref 0.5–1)
CRITICAL: ABNORMAL
DATE ANALYZED: ABNORMAL
DATE OF COLLECTION: ABNORMAL
EOSINOPHIL # BLD: 0 K/UL (ref 0.05–0.5)
EOSINOPHILS RELATIVE PERCENT: 0 % (ref 0–6)
ERYTHROCYTE [DISTWIDTH] IN BLOOD BY AUTOMATED COUNT: 16.4 % (ref 11.5–15)
FIO2: 60 %
GFR SERPL CREATININE-BSD FRML MDRD: 53 ML/MIN/1.73M2
GLUCOSE BLD-MCNC: 120 MG/DL (ref 74–99)
GLUCOSE BLD-MCNC: 128 MG/DL (ref 74–99)
GLUCOSE BLD-MCNC: 158 MG/DL (ref 74–99)
GLUCOSE SERPL-MCNC: 124 MG/DL (ref 74–99)
HCO3: 40 MMOL/L (ref 22–26)
HCT VFR BLD AUTO: 39.8 % (ref 34–48)
HGB BLD-MCNC: 11.8 G/DL (ref 11.5–15.5)
HHB: 9.9 % (ref 0–5)
LAB: ABNORMAL
LYMPHOCYTES NFR BLD: 0.54 K/UL (ref 1.5–4)
LYMPHOCYTES RELATIVE PERCENT: 5 % (ref 20–42)
Lab: 1048
MAGNESIUM SERPL-MCNC: 2.2 MG/DL (ref 1.6–2.6)
MCH RBC QN AUTO: 27.6 PG (ref 26–35)
MCHC RBC AUTO-ENTMCNC: 29.6 G/DL (ref 32–34.5)
MCV RBC AUTO: 93 FL (ref 80–99.9)
METHB: 0.1 % (ref 0–1.5)
MODE: ABNORMAL
MONOCYTES NFR BLD: 0.86 K/UL (ref 0.1–0.95)
MONOCYTES NFR BLD: 8 % (ref 2–12)
NEUTROPHILS NFR BLD: 87 % (ref 43–80)
NEUTS SEG NFR BLD: 9.4 K/UL (ref 1.8–7.3)
O2 CONTENT: 15.4 ML/DL
O2 SATURATION: 90 % (ref 92–98.5)
O2HB: 89 % (ref 94–97)
OPERATOR ID: ABNORMAL
PATIENT TEMP: 37 C
PCO2: 77.7 MMHG (ref 35–45)
PEEP/CPAP: 5 CMH2O
PFO2: 1.05 MMHG/%
PH BLOOD GAS: 7.33 (ref 7.35–7.45)
PHOSPHATE SERPL-MCNC: 2.9 MG/DL (ref 2.5–4.5)
PLATELET, FLUORESCENCE: 161 K/UL (ref 130–450)
PMV BLD AUTO: 13.6 FL (ref 7–12)
PO2: 63.1 MMHG (ref 75–100)
POTASSIUM SERPL-SCNC: 3.6 MMOL/L (ref 3.5–5)
PROT SERPL-MCNC: 7.2 G/DL (ref 6.4–8.3)
PS: 15 CMH20
RBC # BLD AUTO: 4.28 M/UL (ref 3.5–5.5)
RBC # BLD: ABNORMAL 10*6/UL
RI(T): 4.19
SODIUM SERPL-SCNC: 147 MMOL/L (ref 132–146)
SOURCE, BLOOD GAS: ABNORMAL
THB: 12.3 G/DL (ref 11.5–16.5)
TIME ANALYZED: 1049
WBC OTHER # BLD: 10.8 K/UL (ref 4.5–11.5)

## 2024-01-29 PROCEDURE — 6370000000 HC RX 637 (ALT 250 FOR IP): Performed by: INTERNAL MEDICINE

## 2024-01-29 PROCEDURE — 94668 MNPJ CHEST WALL SBSQ: CPT

## 2024-01-29 PROCEDURE — 94660 CPAP INITIATION&MGMT: CPT

## 2024-01-29 PROCEDURE — 36600 WITHDRAWAL OF ARTERIAL BLOOD: CPT

## 2024-01-29 PROCEDURE — 85025 COMPLETE CBC W/AUTO DIFF WBC: CPT

## 2024-01-29 PROCEDURE — 97535 SELF CARE MNGMENT TRAINING: CPT

## 2024-01-29 PROCEDURE — 94761 N-INVAS EAR/PLS OXIMETRY MLT: CPT

## 2024-01-29 PROCEDURE — 82962 GLUCOSE BLOOD TEST: CPT

## 2024-01-29 PROCEDURE — C9113 INJ PANTOPRAZOLE SODIUM, VIA: HCPCS | Performed by: INTERNAL MEDICINE

## 2024-01-29 PROCEDURE — A4216 STERILE WATER/SALINE, 10 ML: HCPCS | Performed by: INTERNAL MEDICINE

## 2024-01-29 PROCEDURE — 94640 AIRWAY INHALATION TREATMENT: CPT

## 2024-01-29 PROCEDURE — 2060000000 HC ICU INTERMEDIATE R&B

## 2024-01-29 PROCEDURE — 80053 COMPREHEN METABOLIC PANEL: CPT

## 2024-01-29 PROCEDURE — 82805 BLOOD GASES W/O2 SATURATION: CPT

## 2024-01-29 PROCEDURE — 2580000003 HC RX 258: Performed by: INTERNAL MEDICINE

## 2024-01-29 PROCEDURE — 2700000000 HC OXYGEN THERAPY PER DAY

## 2024-01-29 PROCEDURE — 36415 COLL VENOUS BLD VENIPUNCTURE: CPT

## 2024-01-29 PROCEDURE — 84100 ASSAY OF PHOSPHORUS: CPT

## 2024-01-29 PROCEDURE — 6360000002 HC RX W HCPCS: Performed by: INTERNAL MEDICINE

## 2024-01-29 PROCEDURE — 99232 SBSQ HOSP IP/OBS MODERATE 35: CPT | Performed by: INTERNAL MEDICINE

## 2024-01-29 PROCEDURE — 83735 ASSAY OF MAGNESIUM: CPT

## 2024-01-29 RX ADMIN — IPRATROPIUM BROMIDE AND ALBUTEROL SULFATE 1 DOSE: .5; 2.5 SOLUTION RESPIRATORY (INHALATION) at 05:33

## 2024-01-29 RX ADMIN — METOPROLOL SUCCINATE 25 MG: 25 TABLET, EXTENDED RELEASE ORAL at 08:54

## 2024-01-29 RX ADMIN — Medication 1000 UNITS: at 08:54

## 2024-01-29 RX ADMIN — Medication: at 15:16

## 2024-01-29 RX ADMIN — Medication: at 20:36

## 2024-01-29 RX ADMIN — PREDNISONE 40 MG: 20 TABLET ORAL at 08:54

## 2024-01-29 RX ADMIN — METRONIDAZOLE 500 MG: 500 INJECTION, SOLUTION INTRAVENOUS at 00:22

## 2024-01-29 RX ADMIN — LISINOPRIL 20 MG: 20 TABLET ORAL at 08:54

## 2024-01-29 RX ADMIN — APIXABAN 5 MG: 5 TABLET, FILM COATED ORAL at 08:54

## 2024-01-29 RX ADMIN — WATER 1000 MG: 1 INJECTION INTRAMUSCULAR; INTRAVENOUS; SUBCUTANEOUS at 16:09

## 2024-01-29 RX ADMIN — BUDESONIDE INHALATION 500 MCG: 0.5 SUSPENSION RESPIRATORY (INHALATION) at 05:33

## 2024-01-29 RX ADMIN — SODIUM CHLORIDE, PRESERVATIVE FREE 40 MG: 5 INJECTION INTRAVENOUS at 08:54

## 2024-01-29 RX ADMIN — METRONIDAZOLE 500 MG: 500 INJECTION, SOLUTION INTRAVENOUS at 15:15

## 2024-01-29 RX ADMIN — METRONIDAZOLE 500 MG: 500 INJECTION, SOLUTION INTRAVENOUS at 07:52

## 2024-01-29 RX ADMIN — APIXABAN 5 MG: 5 TABLET, FILM COATED ORAL at 20:35

## 2024-01-29 RX ADMIN — IPRATROPIUM BROMIDE AND ALBUTEROL SULFATE 1 DOSE: .5; 2.5 SOLUTION RESPIRATORY (INHALATION) at 09:49

## 2024-01-29 RX ADMIN — Medication 10 ML: at 20:36

## 2024-01-29 RX ADMIN — AMLODIPINE BESYLATE 5 MG: 5 TABLET ORAL at 08:54

## 2024-01-29 RX ADMIN — FUROSEMIDE 40 MG: 40 TABLET ORAL at 08:54

## 2024-01-29 RX ADMIN — IPRATROPIUM BROMIDE AND ALBUTEROL SULFATE 1 DOSE: .5; 2.5 SOLUTION RESPIRATORY (INHALATION) at 18:20

## 2024-01-29 RX ADMIN — IPRATROPIUM BROMIDE AND ALBUTEROL SULFATE 1 DOSE: .5; 2.5 SOLUTION RESPIRATORY (INHALATION) at 13:06

## 2024-01-29 RX ADMIN — ATORVASTATIN CALCIUM 40 MG: 40 TABLET, FILM COATED ORAL at 20:35

## 2024-01-29 RX ADMIN — Medication: at 08:55

## 2024-01-29 RX ADMIN — Medication 10 ML: at 08:55

## 2024-01-29 RX ADMIN — BUDESONIDE INHALATION 500 MCG: 0.5 SUSPENSION RESPIRATORY (INHALATION) at 18:20

## 2024-01-29 RX ADMIN — METOPROLOL SUCCINATE 25 MG: 25 TABLET, EXTENDED RELEASE ORAL at 20:35

## 2024-01-30 ENCOUNTER — APPOINTMENT (OUTPATIENT)
Dept: GENERAL RADIOLOGY | Age: 69
DRG: 133 | End: 2024-01-30
Payer: COMMERCIAL

## 2024-01-30 LAB
ANION GAP SERPL CALCULATED.3IONS-SCNC: 8 MMOL/L (ref 7–16)
BUN SERPL-MCNC: 25 MG/DL (ref 6–23)
CALCIUM SERPL-MCNC: 10.1 MG/DL (ref 8.6–10.2)
CHLORIDE SERPL-SCNC: 101 MMOL/L (ref 98–107)
CO2 SERPL-SCNC: 38 MMOL/L (ref 22–29)
CREAT SERPL-MCNC: 1 MG/DL (ref 0.5–1)
GFR SERPL CREATININE-BSD FRML MDRD: >60 ML/MIN/1.73M2
GLUCOSE BLD-MCNC: 113 MG/DL (ref 74–99)
GLUCOSE BLD-MCNC: 165 MG/DL (ref 74–99)
GLUCOSE BLD-MCNC: 176 MG/DL (ref 74–99)
GLUCOSE BLD-MCNC: 198 MG/DL (ref 74–99)
GLUCOSE SERPL-MCNC: 97 MG/DL (ref 74–99)
MAGNESIUM SERPL-MCNC: 2 MG/DL (ref 1.6–2.6)
PHOSPHATE SERPL-MCNC: 2.9 MG/DL (ref 2.5–4.5)
POTASSIUM SERPL-SCNC: 3.8 MMOL/L (ref 3.5–5)
SODIUM SERPL-SCNC: 147 MMOL/L (ref 132–146)

## 2024-01-30 PROCEDURE — 2060000000 HC ICU INTERMEDIATE R&B

## 2024-01-30 PROCEDURE — 2700000000 HC OXYGEN THERAPY PER DAY

## 2024-01-30 PROCEDURE — 6360000002 HC RX W HCPCS: Performed by: INTERNAL MEDICINE

## 2024-01-30 PROCEDURE — 2580000003 HC RX 258: Performed by: INTERNAL MEDICINE

## 2024-01-30 PROCEDURE — 94660 CPAP INITIATION&MGMT: CPT

## 2024-01-30 PROCEDURE — 83735 ASSAY OF MAGNESIUM: CPT

## 2024-01-30 PROCEDURE — 82962 GLUCOSE BLOOD TEST: CPT

## 2024-01-30 PROCEDURE — A4216 STERILE WATER/SALINE, 10 ML: HCPCS | Performed by: INTERNAL MEDICINE

## 2024-01-30 PROCEDURE — 36415 COLL VENOUS BLD VENIPUNCTURE: CPT

## 2024-01-30 PROCEDURE — 94640 AIRWAY INHALATION TREATMENT: CPT

## 2024-01-30 PROCEDURE — 6370000000 HC RX 637 (ALT 250 FOR IP): Performed by: INTERNAL MEDICINE

## 2024-01-30 PROCEDURE — 84100 ASSAY OF PHOSPHORUS: CPT

## 2024-01-30 PROCEDURE — 80048 BASIC METABOLIC PNL TOTAL CA: CPT

## 2024-01-30 PROCEDURE — 99233 SBSQ HOSP IP/OBS HIGH 50: CPT | Performed by: INTERNAL MEDICINE

## 2024-01-30 PROCEDURE — 71045 X-RAY EXAM CHEST 1 VIEW: CPT

## 2024-01-30 PROCEDURE — 94668 MNPJ CHEST WALL SBSQ: CPT

## 2024-01-30 PROCEDURE — C9113 INJ PANTOPRAZOLE SODIUM, VIA: HCPCS | Performed by: INTERNAL MEDICINE

## 2024-01-30 RX ORDER — FUROSEMIDE 10 MG/ML
40 INJECTION INTRAMUSCULAR; INTRAVENOUS ONCE
Status: COMPLETED | OUTPATIENT
Start: 2024-01-30 | End: 2024-01-30

## 2024-01-30 RX ADMIN — METOPROLOL SUCCINATE 25 MG: 25 TABLET, EXTENDED RELEASE ORAL at 21:25

## 2024-01-30 RX ADMIN — BUDESONIDE INHALATION 500 MCG: 0.5 SUSPENSION RESPIRATORY (INHALATION) at 06:22

## 2024-01-30 RX ADMIN — IPRATROPIUM BROMIDE AND ALBUTEROL SULFATE 1 DOSE: .5; 2.5 SOLUTION RESPIRATORY (INHALATION) at 14:33

## 2024-01-30 RX ADMIN — WATER 1000 MG: 1 INJECTION INTRAMUSCULAR; INTRAVENOUS; SUBCUTANEOUS at 08:42

## 2024-01-30 RX ADMIN — IPRATROPIUM BROMIDE AND ALBUTEROL SULFATE 1 DOSE: .5; 2.5 SOLUTION RESPIRATORY (INHALATION) at 06:22

## 2024-01-30 RX ADMIN — Medication 1000 UNITS: at 08:40

## 2024-01-30 RX ADMIN — IPRATROPIUM BROMIDE AND ALBUTEROL SULFATE 1 DOSE: .5; 2.5 SOLUTION RESPIRATORY (INHALATION) at 09:25

## 2024-01-30 RX ADMIN — BUDESONIDE INHALATION 500 MCG: 0.5 SUSPENSION RESPIRATORY (INHALATION) at 18:46

## 2024-01-30 RX ADMIN — IPRATROPIUM BROMIDE AND ALBUTEROL SULFATE 1 DOSE: .5; 2.5 SOLUTION RESPIRATORY (INHALATION) at 18:45

## 2024-01-30 RX ADMIN — APIXABAN 5 MG: 5 TABLET, FILM COATED ORAL at 08:41

## 2024-01-30 RX ADMIN — FUROSEMIDE 40 MG: 40 TABLET ORAL at 08:40

## 2024-01-30 RX ADMIN — Medication: at 15:51

## 2024-01-30 RX ADMIN — METRONIDAZOLE 500 MG: 500 INJECTION, SOLUTION INTRAVENOUS at 15:50

## 2024-01-30 RX ADMIN — Medication: at 21:25

## 2024-01-30 RX ADMIN — METRONIDAZOLE 500 MG: 500 INJECTION, SOLUTION INTRAVENOUS at 00:40

## 2024-01-30 RX ADMIN — Medication 10 ML: at 08:41

## 2024-01-30 RX ADMIN — SODIUM CHLORIDE, PRESERVATIVE FREE 40 MG: 5 INJECTION INTRAVENOUS at 08:40

## 2024-01-30 RX ADMIN — METOPROLOL SUCCINATE 25 MG: 25 TABLET, EXTENDED RELEASE ORAL at 08:40

## 2024-01-30 RX ADMIN — LISINOPRIL 20 MG: 20 TABLET ORAL at 08:40

## 2024-01-30 RX ADMIN — Medication 10 ML: at 21:25

## 2024-01-30 RX ADMIN — FUROSEMIDE 40 MG: 10 INJECTION, SOLUTION INTRAMUSCULAR; INTRAVENOUS at 12:33

## 2024-01-30 RX ADMIN — METRONIDAZOLE 500 MG: 500 INJECTION, SOLUTION INTRAVENOUS at 06:55

## 2024-01-30 RX ADMIN — Medication: at 08:42

## 2024-01-30 RX ADMIN — ATORVASTATIN CALCIUM 40 MG: 40 TABLET, FILM COATED ORAL at 21:25

## 2024-01-30 RX ADMIN — METRONIDAZOLE 500 MG: 500 INJECTION, SOLUTION INTRAVENOUS at 21:28

## 2024-01-30 RX ADMIN — APIXABAN 5 MG: 5 TABLET, FILM COATED ORAL at 21:25

## 2024-01-30 RX ADMIN — PREDNISONE 40 MG: 20 TABLET ORAL at 08:40

## 2024-01-30 RX ADMIN — AMLODIPINE BESYLATE 5 MG: 5 TABLET ORAL at 05:37

## 2024-01-31 ENCOUNTER — APPOINTMENT (OUTPATIENT)
Dept: GENERAL RADIOLOGY | Age: 69
DRG: 133 | End: 2024-01-31
Payer: COMMERCIAL

## 2024-01-31 LAB
AADO2: 378.7 MMHG
AADO2: 437 MMHG
ANION GAP SERPL CALCULATED.3IONS-SCNC: 7 MMOL/L (ref 7–16)
B PARAP IS1001 DNA NPH QL NAA+NON-PROBE: NOT DETECTED
B PERT DNA SPEC QL NAA+PROBE: NOT DETECTED
B.E.: 15.7 MMOL/L (ref -3–3)
B.E.: 16.2 MMOL/L (ref -3–3)
BUN SERPL-MCNC: 20 MG/DL (ref 6–23)
C PNEUM DNA NPH QL NAA+NON-PROBE: NOT DETECTED
CALCIUM SERPL-MCNC: 10 MG/DL (ref 8.6–10.2)
CHLORIDE SERPL-SCNC: 97 MMOL/L (ref 98–107)
CO2 SERPL-SCNC: 44 MMOL/L (ref 22–29)
COHB: 0.9 % (ref 0–1.5)
COHB: 0.9 % (ref 0–1.5)
COMMENT: ABNORMAL
CREAT SERPL-MCNC: 0.9 MG/DL (ref 0.5–1)
CRITICAL: ABNORMAL
CRITICAL: ABNORMAL
DATE ANALYZED: ABNORMAL
DATE ANALYZED: ABNORMAL
DATE OF COLLECTION: ABNORMAL
DATE OF COLLECTION: ABNORMAL
FIO2: 80 %
FIO2: 90 %
FLUAV RNA NPH QL NAA+NON-PROBE: NOT DETECTED
FLUBV RNA NPH QL NAA+NON-PROBE: NOT DETECTED
GFR SERPL CREATININE-BSD FRML MDRD: >60 ML/MIN/1.73M2
GLUCOSE BLD-MCNC: 128 MG/DL (ref 74–99)
GLUCOSE BLD-MCNC: 134 MG/DL (ref 74–99)
GLUCOSE BLD-MCNC: 135 MG/DL (ref 74–99)
GLUCOSE BLD-MCNC: 159 MG/DL (ref 74–99)
GLUCOSE BLD-MCNC: 164 MG/DL (ref 74–99)
GLUCOSE SERPL-MCNC: 118 MG/DL (ref 74–99)
HADV DNA NPH QL NAA+NON-PROBE: NOT DETECTED
HCO3: 45.8 MMOL/L (ref 22–26)
HCO3: 46.6 MMOL/L (ref 22–26)
HCOV 229E RNA NPH QL NAA+NON-PROBE: NOT DETECTED
HCOV HKU1 RNA NPH QL NAA+NON-PROBE: NOT DETECTED
HCOV NL63 RNA NPH QL NAA+NON-PROBE: NOT DETECTED
HCOV OC43 RNA NPH QL NAA+NON-PROBE: NOT DETECTED
HHB: 4.2 % (ref 0–5)
HHB: 5.2 % (ref 0–5)
HMPV RNA NPH QL NAA+NON-PROBE: NOT DETECTED
HPIV1 RNA NPH QL NAA+NON-PROBE: NOT DETECTED
HPIV2 RNA NPH QL NAA+NON-PROBE: NOT DETECTED
HPIV3 RNA NPH QL NAA+NON-PROBE: NOT DETECTED
HPIV4 RNA NPH QL NAA+NON-PROBE: NOT DETECTED
LAB: ABNORMAL
LAB: ABNORMAL
Lab: 1432
Lab: 940
M PNEUMO DNA NPH QL NAA+NON-PROBE: NOT DETECTED
METHB: 0.1 % (ref 0–1.5)
METHB: 0.1 % (ref 0–1.5)
MODE: ABNORMAL
MODE: ABNORMAL
O2 CONTENT: 16.3 ML/DL
O2 CONTENT: 16.7 ML/DL
O2 SATURATION: 94.7 % (ref 92–98.5)
O2 SATURATION: 95.8 % (ref 92–98.5)
O2HB: 93.8 % (ref 94–97)
O2HB: 94.8 % (ref 94–97)
OPERATOR ID: 2220
OPERATOR ID: ABNORMAL
PATIENT TEMP: 37 C
PATIENT TEMP: 37 C
PCO2: 89.6 MMHG (ref 35–45)
PCO2: 95.4 MMHG (ref 35–45)
PEEP/CPAP: 12 CMH2O
PEEP/CPAP: 8 CMH2O
PFO2: 0.94 MMHG/%
PFO2: 0.97 MMHG/%
PH BLOOD GAS: 7.31 (ref 7.35–7.45)
PH BLOOD GAS: 7.33 (ref 7.35–7.45)
PHOSPHATE SERPL-MCNC: 2.9 MG/DL (ref 2.5–4.5)
PO2: 77.6 MMHG (ref 75–100)
PO2: 84.4 MMHG (ref 75–100)
POTASSIUM SERPL-SCNC: 4 MMOL/L (ref 3.5–5)
PROCALCITONIN SERPL-MCNC: 0.04 NG/ML (ref 0–0.08)
PS: 18 CMH20
RI(T): 4.88
RI(T): 5.18
RR MECHANICAL: 14 B/MIN
RR MECHANICAL: 18 B/MIN
RSV RNA NPH QL NAA+NON-PROBE: NOT DETECTED
RV+EV RNA NPH QL NAA+NON-PROBE: NOT DETECTED
SARS-COV-2 RNA NPH QL NAA+NON-PROBE: NOT DETECTED
SODIUM SERPL-SCNC: 148 MMOL/L (ref 132–146)
SOURCE, BLOOD GAS: ABNORMAL
SOURCE, BLOOD GAS: ABNORMAL
SPECIMEN DESCRIPTION: NORMAL
THB: 12.2 G/DL (ref 11.5–16.5)
THB: 12.6 G/DL (ref 11.5–16.5)
TIME ANALYZED: 1440
TIME ANALYZED: 946
VT MECHANICAL: 450 ML

## 2024-01-31 PROCEDURE — 84145 PROCALCITONIN (PCT): CPT

## 2024-01-31 PROCEDURE — 71045 X-RAY EXAM CHEST 1 VIEW: CPT

## 2024-01-31 PROCEDURE — 94660 CPAP INITIATION&MGMT: CPT

## 2024-01-31 PROCEDURE — 0202U NFCT DS 22 TRGT SARS-COV-2: CPT

## 2024-01-31 PROCEDURE — 02HV33Z INSERTION OF INFUSION DEVICE INTO SUPERIOR VENA CAVA, PERCUTANEOUS APPROACH: ICD-10-PCS | Performed by: INTERNAL MEDICINE

## 2024-01-31 PROCEDURE — C9113 INJ PANTOPRAZOLE SODIUM, VIA: HCPCS | Performed by: INTERNAL MEDICINE

## 2024-01-31 PROCEDURE — 6370000000 HC RX 637 (ALT 250 FOR IP): Performed by: INTERNAL MEDICINE

## 2024-01-31 PROCEDURE — 99233 SBSQ HOSP IP/OBS HIGH 50: CPT | Performed by: INTERNAL MEDICINE

## 2024-01-31 PROCEDURE — 6360000002 HC RX W HCPCS: Performed by: INTERNAL MEDICINE

## 2024-01-31 PROCEDURE — A4216 STERILE WATER/SALINE, 10 ML: HCPCS | Performed by: INTERNAL MEDICINE

## 2024-01-31 PROCEDURE — 2000000000 HC ICU R&B

## 2024-01-31 PROCEDURE — 82962 GLUCOSE BLOOD TEST: CPT

## 2024-01-31 PROCEDURE — 2580000003 HC RX 258: Performed by: INTERNAL MEDICINE

## 2024-01-31 PROCEDURE — 2500000003 HC RX 250 WO HCPCS: Performed by: INTERNAL MEDICINE

## 2024-01-31 PROCEDURE — 2700000000 HC OXYGEN THERAPY PER DAY

## 2024-01-31 PROCEDURE — C1751 CATH, INF, PER/CENT/MIDLINE: HCPCS

## 2024-01-31 PROCEDURE — 2720000010 HC SURG SUPPLY STERILE

## 2024-01-31 PROCEDURE — 76937 US GUIDE VASCULAR ACCESS: CPT

## 2024-01-31 PROCEDURE — 94640 AIRWAY INHALATION TREATMENT: CPT

## 2024-01-31 PROCEDURE — 87070 CULTURE OTHR SPECIMN AEROBIC: CPT

## 2024-01-31 PROCEDURE — 36569 INSJ PICC 5 YR+ W/O IMAGING: CPT

## 2024-01-31 PROCEDURE — 82805 BLOOD GASES W/O2 SATURATION: CPT

## 2024-01-31 PROCEDURE — 84100 ASSAY OF PHOSPHORUS: CPT

## 2024-01-31 PROCEDURE — 36415 COLL VENOUS BLD VENIPUNCTURE: CPT

## 2024-01-31 PROCEDURE — 80048 BASIC METABOLIC PNL TOTAL CA: CPT

## 2024-01-31 RX ORDER — HEPARIN 100 UNIT/ML
3 SYRINGE INTRAVENOUS PRN
Status: DISCONTINUED | OUTPATIENT
Start: 2024-01-31 | End: 2024-02-11 | Stop reason: HOSPADM

## 2024-01-31 RX ORDER — AMLODIPINE BESYLATE 5 MG/1
5 TABLET ORAL ONCE
Status: COMPLETED | OUTPATIENT
Start: 2024-01-31 | End: 2024-01-31

## 2024-01-31 RX ORDER — SODIUM CHLORIDE 0.9 % (FLUSH) 0.9 %
5-40 SYRINGE (ML) INJECTION PRN
Status: DISCONTINUED | OUTPATIENT
Start: 2024-01-31 | End: 2024-02-11 | Stop reason: HOSPADM

## 2024-01-31 RX ORDER — METOPROLOL TARTRATE 1 MG/ML
10 INJECTION, SOLUTION INTRAVENOUS EVERY 6 HOURS
Status: COMPLETED | OUTPATIENT
Start: 2024-01-31 | End: 2024-01-31

## 2024-01-31 RX ORDER — SODIUM CHLORIDE 0.9 % (FLUSH) 0.9 %
5-40 SYRINGE (ML) INJECTION EVERY 12 HOURS SCHEDULED
Status: DISCONTINUED | OUTPATIENT
Start: 2024-01-31 | End: 2024-02-11 | Stop reason: HOSPADM

## 2024-01-31 RX ORDER — LIDOCAINE HYDROCHLORIDE 10 MG/ML
5 INJECTION, SOLUTION EPIDURAL; INFILTRATION; INTRACAUDAL; PERINEURAL ONCE
Status: COMPLETED | OUTPATIENT
Start: 2024-01-31 | End: 2024-01-31

## 2024-01-31 RX ORDER — DEXTROSE AND SODIUM CHLORIDE 5; .45 G/100ML; G/100ML
INJECTION, SOLUTION INTRAVENOUS CONTINUOUS
Status: DISCONTINUED | OUTPATIENT
Start: 2024-01-31 | End: 2024-02-02

## 2024-01-31 RX ORDER — HEPARIN 100 UNIT/ML
3 SYRINGE INTRAVENOUS EVERY 12 HOURS SCHEDULED
Status: DISCONTINUED | OUTPATIENT
Start: 2024-01-31 | End: 2024-02-11 | Stop reason: HOSPADM

## 2024-01-31 RX ORDER — SODIUM CHLORIDE 9 MG/ML
INJECTION, SOLUTION INTRAVENOUS PRN
Status: DISCONTINUED | OUTPATIENT
Start: 2024-01-31 | End: 2024-02-11 | Stop reason: HOSPADM

## 2024-01-31 RX ORDER — AMLODIPINE BESYLATE 5 MG/1
10 TABLET ORAL DAILY
Status: DISCONTINUED | OUTPATIENT
Start: 2024-01-31 | End: 2024-02-11 | Stop reason: HOSPADM

## 2024-01-31 RX ADMIN — METOPROLOL TARTRATE 10 MG: 5 INJECTION INTRAVENOUS at 15:12

## 2024-01-31 RX ADMIN — APIXABAN 5 MG: 5 TABLET, FILM COATED ORAL at 09:51

## 2024-01-31 RX ADMIN — IPRATROPIUM BROMIDE AND ALBUTEROL SULFATE 1 DOSE: .5; 2.5 SOLUTION RESPIRATORY (INHALATION) at 10:21

## 2024-01-31 RX ADMIN — CEFEPIME 2000 MG: 2 INJECTION, POWDER, FOR SOLUTION INTRAVENOUS at 09:47

## 2024-01-31 RX ADMIN — IPRATROPIUM BROMIDE AND ALBUTEROL SULFATE 1 DOSE: .5; 2.5 SOLUTION RESPIRATORY (INHALATION) at 06:11

## 2024-01-31 RX ADMIN — Medication 1000 UNITS: at 09:51

## 2024-01-31 RX ADMIN — METRONIDAZOLE 500 MG: 500 INJECTION, SOLUTION INTRAVENOUS at 22:34

## 2024-01-31 RX ADMIN — DOXYCYCLINE 100 MG: 100 INJECTION, POWDER, LYOPHILIZED, FOR SOLUTION INTRAVENOUS at 13:56

## 2024-01-31 RX ADMIN — IPRATROPIUM BROMIDE AND ALBUTEROL SULFATE 1 DOSE: .5; 2.5 SOLUTION RESPIRATORY (INHALATION) at 15:02

## 2024-01-31 RX ADMIN — LIDOCAINE HYDROCHLORIDE 5 ML: 10 INJECTION, SOLUTION EPIDURAL; INFILTRATION; INTRACAUDAL at 16:16

## 2024-01-31 RX ADMIN — METOPROLOL SUCCINATE 25 MG: 25 TABLET, EXTENDED RELEASE ORAL at 09:51

## 2024-01-31 RX ADMIN — ATORVASTATIN CALCIUM 40 MG: 40 TABLET, FILM COATED ORAL at 20:37

## 2024-01-31 RX ADMIN — BUDESONIDE INHALATION 500 MCG: 0.5 SUSPENSION RESPIRATORY (INHALATION) at 06:11

## 2024-01-31 RX ADMIN — DEXTROSE AND SODIUM CHLORIDE: 5; 450 INJECTION, SOLUTION INTRAVENOUS at 17:19

## 2024-01-31 RX ADMIN — LISINOPRIL 20 MG: 20 TABLET ORAL at 09:51

## 2024-01-31 RX ADMIN — WATER 40 MG: 1 INJECTION INTRAMUSCULAR; INTRAVENOUS; SUBCUTANEOUS at 20:38

## 2024-01-31 RX ADMIN — CEFEPIME 2000 MG: 2 INJECTION, POWDER, FOR SOLUTION INTRAVENOUS at 22:38

## 2024-01-31 RX ADMIN — Medication: at 20:50

## 2024-01-31 RX ADMIN — BUDESONIDE INHALATION 500 MCG: 0.5 SUSPENSION RESPIRATORY (INHALATION) at 18:07

## 2024-01-31 RX ADMIN — APIXABAN 5 MG: 5 TABLET, FILM COATED ORAL at 20:37

## 2024-01-31 RX ADMIN — WATER 40 MG: 1 INJECTION INTRAMUSCULAR; INTRAVENOUS; SUBCUTANEOUS at 09:50

## 2024-01-31 RX ADMIN — HEPARIN 300 UNITS: 100 SYRINGE at 20:49

## 2024-01-31 RX ADMIN — Medication 10 ML: at 09:53

## 2024-01-31 RX ADMIN — AMLODIPINE BESYLATE 10 MG: 10 TABLET ORAL at 09:51

## 2024-01-31 RX ADMIN — METRONIDAZOLE 500 MG: 500 INJECTION, SOLUTION INTRAVENOUS at 16:50

## 2024-01-31 RX ADMIN — Medication 10 ML: at 20:51

## 2024-01-31 RX ADMIN — METRONIDAZOLE 500 MG: 500 INJECTION, SOLUTION INTRAVENOUS at 06:36

## 2024-01-31 RX ADMIN — Medication: at 13:57

## 2024-01-31 RX ADMIN — Medication: at 16:36

## 2024-01-31 RX ADMIN — METOPROLOL SUCCINATE 25 MG: 25 TABLET, EXTENDED RELEASE ORAL at 20:38

## 2024-01-31 RX ADMIN — SODIUM CHLORIDE, PRESERVATIVE FREE 40 MG: 5 INJECTION INTRAVENOUS at 09:50

## 2024-01-31 RX ADMIN — SODIUM CHLORIDE, PRESERVATIVE FREE 10 ML: 5 INJECTION INTRAVENOUS at 20:38

## 2024-01-31 RX ADMIN — SODIUM CHLORIDE, PRESERVATIVE FREE 10 ML: 5 INJECTION INTRAVENOUS at 20:50

## 2024-01-31 RX ADMIN — AMLODIPINE BESYLATE 5 MG: 5 TABLET ORAL at 04:51

## 2024-01-31 RX ADMIN — IPRATROPIUM BROMIDE AND ALBUTEROL SULFATE 1 DOSE: .5; 2.5 SOLUTION RESPIRATORY (INHALATION) at 18:07

## 2024-01-31 NOTE — PROCEDURES
DAMIAN power PICC Placement 1/31/2024        Product number: ask [id number] mhbv   Lot Number: [id number]   Consult: limited access   Ultrasound: yes   Right Brachial vein:                Upper Arm Circumference: (CM) 42    Size:(FR)/GUAGE 5.5    Exposed Length: (CM) 0    Internal Length: (CM) 44   Cut: (CM) 44   Vein Measurement: 0.55cm              Lidocaine Given: yes    Consent obtained  Risks and benefits explained  Time out prior to procedure  Tolerated well  Unable to achieve VPS jesica d/t Afib  CXR obtained      Dedrick Uriarte RN  1/31/2024  4:20 PM        PICC tip @ [facility]  RN notified

## 2024-02-01 ENCOUNTER — APPOINTMENT (OUTPATIENT)
Dept: GENERAL RADIOLOGY | Age: 69
DRG: 133 | End: 2024-02-01
Payer: COMMERCIAL

## 2024-02-01 ENCOUNTER — APPOINTMENT (OUTPATIENT)
Dept: CT IMAGING | Age: 69
DRG: 133 | End: 2024-02-01
Payer: COMMERCIAL

## 2024-02-01 ENCOUNTER — APPOINTMENT (OUTPATIENT)
Dept: INTERVENTIONAL RADIOLOGY/VASCULAR | Age: 69
DRG: 133 | End: 2024-02-01
Payer: COMMERCIAL

## 2024-02-01 LAB
AADO2: 304.2 MMHG
ALBUMIN SERPL-MCNC: 4.1 G/DL (ref 3.5–5.2)
ALP SERPL-CCNC: 87 U/L (ref 35–104)
ALT SERPL-CCNC: 6 U/L (ref 0–32)
ANION GAP SERPL CALCULATED.3IONS-SCNC: 5 MMOL/L (ref 7–16)
APPEARANCE FLD: NORMAL
AST SERPL-CCNC: 9 U/L (ref 0–31)
B.E.: 11.8 MMOL/L (ref -3–3)
BASOPHILS # BLD: 0 K/UL (ref 0–0.2)
BASOPHILS NFR BLD: 0 % (ref 0–2)
BILIRUB SERPL-MCNC: 0.5 MG/DL (ref 0–1.2)
BODY FLD TYPE: NORMAL
BUN SERPL-MCNC: 16 MG/DL (ref 6–23)
CALCIUM SERPL-MCNC: 10.1 MG/DL (ref 8.6–10.2)
CHLORIDE SERPL-SCNC: 96 MMOL/L (ref 98–107)
CLOT CHECK: NORMAL
CO2 SERPL-SCNC: 43 MMOL/L (ref 22–29)
COHB: 1.2 % (ref 0–1.5)
COLOR FLD: YELLOW
CREAT SERPL-MCNC: 0.8 MG/DL (ref 0.5–1)
CRITICAL: ABNORMAL
CRITICAL: NORMAL
DATE ANALYZED: ABNORMAL
DATE ANALYZED: NORMAL
DATE OF COLLECTION: ABNORMAL
DATE OF COLLECTION: NORMAL
EOSINOPHIL # BLD: 0 K/UL (ref 0.05–0.5)
EOSINOPHILS RELATIVE PERCENT: 0 % (ref 0–6)
ERYTHROCYTE [DISTWIDTH] IN BLOOD BY AUTOMATED COUNT: 16.2 % (ref 11.5–15)
FIO2: 70 %
GFR SERPL CREATININE-BSD FRML MDRD: >60 ML/MIN/1.73M2
GLUCOSE BLD-MCNC: 160 MG/DL (ref 74–99)
GLUCOSE BLD-MCNC: 163 MG/DL (ref 74–99)
GLUCOSE BLD-MCNC: 169 MG/DL (ref 74–99)
GLUCOSE BLD-MCNC: 173 MG/DL (ref 74–99)
GLUCOSE FLD-MCNC: 183 MG/DL
GLUCOSE SERPL-MCNC: 181 MG/DL (ref 74–99)
HCO3: 41.6 MMOL/L (ref 22–26)
HCT VFR BLD AUTO: 38.2 % (ref 34–48)
HGB BLD-MCNC: 11.3 G/DL (ref 11.5–15.5)
HHB: 4.4 % (ref 0–5)
LAB: ABNORMAL
LAB: NORMAL
LDH FLD L TO P-CCNC: 48 U/L
LYMPHOCYTES NFR BLD: 0.24 K/UL (ref 1.5–4)
LYMPHOCYTES RELATIVE PERCENT: 4 % (ref 20–42)
Lab: 1419
Lab: 440
MAGNESIUM SERPL-MCNC: 1.8 MG/DL (ref 1.6–2.6)
MCH RBC QN AUTO: 27.8 PG (ref 26–35)
MCHC RBC AUTO-ENTMCNC: 29.6 G/DL (ref 32–34.5)
MCV RBC AUTO: 94.1 FL (ref 80–99.9)
METHB: 0.1 % (ref 0–1.5)
MODE: ABNORMAL
MONOCYTES NFR BLD: 0.12 K/UL (ref 0.1–0.95)
MONOCYTES NFR BLD: 2 % (ref 2–12)
MONOCYTES NFR FLD: 90 %
NEUTROPHILS NFR BLD: 95 % (ref 43–80)
NEUTROPHILS NFR FLD: 10 %
NEUTS SEG NFR BLD: 6.63 K/UL (ref 1.8–7.3)
O2 CONTENT: 16.4 ML/DL
O2 SATURATION: 95.5 % (ref 92–98.5)
O2HB: 94.3 % (ref 94–97)
OPERATOR ID: 5523
OPERATOR ID: NORMAL
PATIENT TEMP: 37 C
PCO2: 87.1 MMHG (ref 35–45)
PEEP/CPAP: 12 CMH2O
PFO2: 1.2 MMHG/%
PH BLOOD GAS: 7.3 (ref 7.35–7.45)
PH FLUID: 7.45
PHOSPHATE SERPL-MCNC: 2.6 MG/DL (ref 2.5–4.5)
PLATELET, FLUORESCENCE: 134 K/UL (ref 130–450)
PMV BLD AUTO: 14.2 FL (ref 7–12)
PO2: 83.8 MMHG (ref 75–100)
POTASSIUM SERPL-SCNC: 4.2 MMOL/L (ref 3.5–5)
PROT FLD-MCNC: 2.1 G/DL
PROT SERPL-MCNC: 7.1 G/DL (ref 6.4–8.3)
RBC # BLD AUTO: 4.06 M/UL (ref 3.5–5.5)
RBC # BLD: ABNORMAL 10*6/UL
RBC # FLD: NORMAL CELLS/UL
RI(T): 3.63
RR MECHANICAL: 14 B/MIN
SODIUM SERPL-SCNC: 144 MMOL/L (ref 132–146)
SOURCE, BLOOD GAS: ABNORMAL
SOURCE, BLOOD GAS: NORMAL
SPECIMEN TYPE: NORMAL
THB: 12.3 G/DL (ref 11.5–16.5)
TIME ANALYZED: 1428
TIME ANALYZED: 446
WBC # BLD: ABNORMAL 10*3/UL
WBC # FLD: 156 CELLS/UL
WBC OTHER # BLD: 7 K/UL (ref 4.5–11.5)

## 2024-02-01 PROCEDURE — 88305 TISSUE EXAM BY PATHOLOGIST: CPT

## 2024-02-01 PROCEDURE — 87102 FUNGUS ISOLATION CULTURE: CPT

## 2024-02-01 PROCEDURE — 82962 GLUCOSE BLOOD TEST: CPT

## 2024-02-01 PROCEDURE — 0W993ZZ DRAINAGE OF RIGHT PLEURAL CAVITY, PERCUTANEOUS APPROACH: ICD-10-PCS | Performed by: INTERNAL MEDICINE

## 2024-02-01 PROCEDURE — 83615 LACTATE (LD) (LDH) ENZYME: CPT

## 2024-02-01 PROCEDURE — 6360000002 HC RX W HCPCS: Performed by: INTERNAL MEDICINE

## 2024-02-01 PROCEDURE — 87070 CULTURE OTHR SPECIMN AEROBIC: CPT

## 2024-02-01 PROCEDURE — A4216 STERILE WATER/SALINE, 10 ML: HCPCS | Performed by: INTERNAL MEDICINE

## 2024-02-01 PROCEDURE — 84100 ASSAY OF PHOSPHORUS: CPT

## 2024-02-01 PROCEDURE — 99291 CRITICAL CARE FIRST HOUR: CPT | Performed by: INTERNAL MEDICINE

## 2024-02-01 PROCEDURE — 2580000003 HC RX 258: Performed by: INTERNAL MEDICINE

## 2024-02-01 PROCEDURE — 83986 ASSAY PH BODY FLUID NOS: CPT

## 2024-02-01 PROCEDURE — 80053 COMPREHEN METABOLIC PANEL: CPT

## 2024-02-01 PROCEDURE — 71045 X-RAY EXAM CHEST 1 VIEW: CPT

## 2024-02-01 PROCEDURE — 83735 ASSAY OF MAGNESIUM: CPT

## 2024-02-01 PROCEDURE — 94640 AIRWAY INHALATION TREATMENT: CPT

## 2024-02-01 PROCEDURE — 32555 ASPIRATE PLEURA W/ IMAGING: CPT

## 2024-02-01 PROCEDURE — 71250 CT THORAX DX C-: CPT

## 2024-02-01 PROCEDURE — 88112 CYTOPATH CELL ENHANCE TECH: CPT

## 2024-02-01 PROCEDURE — 89051 BODY FLUID CELL COUNT: CPT

## 2024-02-01 PROCEDURE — 36592 COLLECT BLOOD FROM PICC: CPT

## 2024-02-01 PROCEDURE — 87205 SMEAR GRAM STAIN: CPT

## 2024-02-01 PROCEDURE — 82945 GLUCOSE OTHER FLUID: CPT

## 2024-02-01 PROCEDURE — C1729 CATH, DRAINAGE: HCPCS

## 2024-02-01 PROCEDURE — 6360000002 HC RX W HCPCS

## 2024-02-01 PROCEDURE — 6370000000 HC RX 637 (ALT 250 FOR IP): Performed by: INTERNAL MEDICINE

## 2024-02-01 PROCEDURE — 94660 CPAP INITIATION&MGMT: CPT

## 2024-02-01 PROCEDURE — 99233 SBSQ HOSP IP/OBS HIGH 50: CPT | Performed by: INTERNAL MEDICINE

## 2024-02-01 PROCEDURE — 2500000003 HC RX 250 WO HCPCS: Performed by: INTERNAL MEDICINE

## 2024-02-01 PROCEDURE — 2700000000 HC OXYGEN THERAPY PER DAY

## 2024-02-01 PROCEDURE — 94669 MECHANICAL CHEST WALL OSCILL: CPT

## 2024-02-01 PROCEDURE — C9113 INJ PANTOPRAZOLE SODIUM, VIA: HCPCS | Performed by: INTERNAL MEDICINE

## 2024-02-01 PROCEDURE — 2000000000 HC ICU R&B

## 2024-02-01 PROCEDURE — 84157 ASSAY OF PROTEIN OTHER: CPT

## 2024-02-01 PROCEDURE — 82805 BLOOD GASES W/O2 SATURATION: CPT

## 2024-02-01 PROCEDURE — 85025 COMPLETE CBC W/AUTO DIFF WBC: CPT

## 2024-02-01 RX ORDER — INSULIN LISPRO 100 [IU]/ML
0-4 INJECTION, SOLUTION INTRAVENOUS; SUBCUTANEOUS
Status: DISCONTINUED | OUTPATIENT
Start: 2024-02-01 | End: 2024-02-02

## 2024-02-01 RX ORDER — MAGNESIUM SULFATE 1 G/100ML
1000 INJECTION INTRAVENOUS ONCE
Status: COMPLETED | OUTPATIENT
Start: 2024-02-01 | End: 2024-02-01

## 2024-02-01 RX ADMIN — SODIUM CHLORIDE, PRESERVATIVE FREE 10 ML: 5 INJECTION INTRAVENOUS at 20:58

## 2024-02-01 RX ADMIN — Medication 1000 UNITS: at 10:07

## 2024-02-01 RX ADMIN — IPRATROPIUM BROMIDE AND ALBUTEROL SULFATE 1 DOSE: .5; 2.5 SOLUTION RESPIRATORY (INHALATION) at 04:02

## 2024-02-01 RX ADMIN — WATER 40 MG: 1 INJECTION INTRAMUSCULAR; INTRAVENOUS; SUBCUTANEOUS at 20:57

## 2024-02-01 RX ADMIN — HEPARIN 300 UNITS: 100 SYRINGE at 20:57

## 2024-02-01 RX ADMIN — METRONIDAZOLE 500 MG: 500 INJECTION, SOLUTION INTRAVENOUS at 06:20

## 2024-02-01 RX ADMIN — IPRATROPIUM BROMIDE AND ALBUTEROL SULFATE 1 DOSE: .5; 2.5 SOLUTION RESPIRATORY (INHALATION) at 14:40

## 2024-02-01 RX ADMIN — DOXYCYCLINE 100 MG: 100 INJECTION, POWDER, LYOPHILIZED, FOR SOLUTION INTRAVENOUS at 00:05

## 2024-02-01 RX ADMIN — AMLODIPINE BESYLATE 10 MG: 10 TABLET ORAL at 10:07

## 2024-02-01 RX ADMIN — METRONIDAZOLE 500 MG: 500 INJECTION, SOLUTION INTRAVENOUS at 16:00

## 2024-02-01 RX ADMIN — Medication: at 20:57

## 2024-02-01 RX ADMIN — WATER 40 MG: 1 INJECTION INTRAMUSCULAR; INTRAVENOUS; SUBCUTANEOUS at 10:10

## 2024-02-01 RX ADMIN — Medication: at 10:04

## 2024-02-01 RX ADMIN — DOXYCYCLINE 100 MG: 100 INJECTION, POWDER, LYOPHILIZED, FOR SOLUTION INTRAVENOUS at 23:48

## 2024-02-01 RX ADMIN — CEFEPIME 2000 MG: 2 INJECTION, POWDER, FOR SOLUTION INTRAVENOUS at 17:49

## 2024-02-01 RX ADMIN — SODIUM CHLORIDE, PRESERVATIVE FREE 10 ML: 5 INJECTION INTRAVENOUS at 20:57

## 2024-02-01 RX ADMIN — METOPROLOL SUCCINATE 25 MG: 25 TABLET, EXTENDED RELEASE ORAL at 20:56

## 2024-02-01 RX ADMIN — APIXABAN 5 MG: 5 TABLET, FILM COATED ORAL at 10:07

## 2024-02-01 RX ADMIN — MAGNESIUM SULFATE IN DEXTROSE 1000 MG: 10 INJECTION, SOLUTION INTRAVENOUS at 06:23

## 2024-02-01 RX ADMIN — Medication 10 ML: at 21:36

## 2024-02-01 RX ADMIN — DEXTROSE AND SODIUM CHLORIDE: 5; 450 INJECTION, SOLUTION INTRAVENOUS at 04:12

## 2024-02-01 RX ADMIN — METRONIDAZOLE 500 MG: 500 INJECTION, SOLUTION INTRAVENOUS at 22:55

## 2024-02-01 RX ADMIN — BUDESONIDE INHALATION 500 MCG: 0.5 SUSPENSION RESPIRATORY (INHALATION) at 04:02

## 2024-02-01 RX ADMIN — IPRATROPIUM BROMIDE AND ALBUTEROL SULFATE 1 DOSE: .5; 2.5 SOLUTION RESPIRATORY (INHALATION) at 17:27

## 2024-02-01 RX ADMIN — Medication: at 16:01

## 2024-02-01 RX ADMIN — BUDESONIDE INHALATION 500 MCG: 0.5 SUSPENSION RESPIRATORY (INHALATION) at 17:27

## 2024-02-01 RX ADMIN — DEXTROSE AND SODIUM CHLORIDE: 5; 450 INJECTION, SOLUTION INTRAVENOUS at 18:52

## 2024-02-01 RX ADMIN — Medication 10 ML: at 10:13

## 2024-02-01 RX ADMIN — METOPROLOL SUCCINATE 25 MG: 25 TABLET, EXTENDED RELEASE ORAL at 10:05

## 2024-02-01 RX ADMIN — SODIUM CHLORIDE, PRESERVATIVE FREE 10 ML: 5 INJECTION INTRAVENOUS at 10:12

## 2024-02-01 RX ADMIN — ATORVASTATIN CALCIUM 40 MG: 40 TABLET, FILM COATED ORAL at 20:56

## 2024-02-01 RX ADMIN — LISINOPRIL 20 MG: 20 TABLET ORAL at 10:07

## 2024-02-01 RX ADMIN — HEPARIN 200 UNITS: 100 SYRINGE at 10:09

## 2024-02-01 RX ADMIN — IPRATROPIUM BROMIDE AND ALBUTEROL SULFATE 1 DOSE: .5; 2.5 SOLUTION RESPIRATORY (INHALATION) at 09:49

## 2024-02-01 RX ADMIN — DOXYCYCLINE 100 MG: 100 INJECTION, POWDER, LYOPHILIZED, FOR SOLUTION INTRAVENOUS at 13:15

## 2024-02-01 RX ADMIN — SODIUM CHLORIDE, PRESERVATIVE FREE 40 MG: 5 INJECTION INTRAVENOUS at 10:06

## 2024-02-02 LAB
ALBUMIN SERPL-MCNC: 4 G/DL (ref 3.5–5.2)
ALP SERPL-CCNC: 89 U/L (ref 35–104)
ALT SERPL-CCNC: 6 U/L (ref 0–32)
ANION GAP SERPL CALCULATED.3IONS-SCNC: 6 MMOL/L (ref 7–16)
AST SERPL-CCNC: 10 U/L (ref 0–31)
BASOPHILS # BLD: 0 K/UL (ref 0–0.2)
BASOPHILS NFR BLD: 0 % (ref 0–2)
BILIRUB SERPL-MCNC: 0.6 MG/DL (ref 0–1.2)
BUN SERPL-MCNC: 16 MG/DL (ref 6–23)
CALCIUM SERPL-MCNC: 10 MG/DL (ref 8.6–10.2)
CHLORIDE SERPL-SCNC: 97 MMOL/L (ref 98–107)
CO2 SERPL-SCNC: 42 MMOL/L (ref 22–29)
CREAT SERPL-MCNC: 0.8 MG/DL (ref 0.5–1)
CRITICAL ACTION: NORMAL
CRITICAL ACTION: NORMAL
CRITICAL NOTIFICATION DATE/TIME: NORMAL
CRITICAL NOTIFICATION DATE/TIME: NORMAL
CRITICAL NOTIFICATION: NORMAL
CRITICAL NOTIFICATION: NORMAL
CRITICAL VALUE READ BACK: YES
CRITICAL VALUE READ BACK: YES
EOSINOPHIL # BLD: 0 K/UL (ref 0.05–0.5)
EOSINOPHILS RELATIVE PERCENT: 0 % (ref 0–6)
ERYTHROCYTE [DISTWIDTH] IN BLOOD BY AUTOMATED COUNT: 16.2 % (ref 11.5–15)
FIO2: 60
FIO2: 60
GFR SERPL CREATININE-BSD FRML MDRD: >60 ML/MIN/1.73M2
GLUCOSE BLD-MCNC: 178 MG/DL (ref 74–99)
GLUCOSE BLD-MCNC: 182 MG/DL (ref 74–99)
GLUCOSE BLD-MCNC: 192 MG/DL (ref 74–99)
GLUCOSE BLD-MCNC: 252 MG/DL (ref 74–99)
GLUCOSE SERPL-MCNC: 187 MG/DL (ref 74–99)
HCT VFR BLD AUTO: 36.1 % (ref 34–48)
HGB BLD-MCNC: 10.9 G/DL (ref 11.5–15.5)
LYMPHOCYTES NFR BLD: 0.3 K/UL (ref 1.5–4)
LYMPHOCYTES RELATIVE PERCENT: 4 % (ref 20–42)
MAGNESIUM SERPL-MCNC: 1.9 MG/DL (ref 1.6–2.6)
MCH RBC QN AUTO: 27.9 PG (ref 26–35)
MCHC RBC AUTO-ENTMCNC: 30.2 G/DL (ref 32–34.5)
MCV RBC AUTO: 92.3 FL (ref 80–99.9)
MICROORGANISM SPEC CULT: NORMAL
MICROORGANISM/AGENT SPEC: NORMAL
MONOCYTES NFR BLD: 0.22 K/UL (ref 0.1–0.95)
MONOCYTES NFR BLD: 3 % (ref 2–12)
NEUTROPHILS NFR BLD: 94 % (ref 43–80)
NEUTS SEG NFR BLD: 7.98 K/UL (ref 1.8–7.3)
O2 DELIVERY DEVICE: ABNORMAL
PHOSPHATE SERPL-MCNC: 1.8 MG/DL (ref 2.5–4.5)
PLATELET, FLUORESCENCE: 128 K/UL (ref 130–450)
PMV BLD AUTO: ABNORMAL FL (ref 7–12)
POC HCO3: 42.7 MMOL/L (ref 22–26)
POC HCO3: 43.9 MMOL/L (ref 22–26)
POC O2 SATURATION: 88.7 % (ref 92–98.5)
POC O2 SATURATION: 92.7 % (ref 92–98.5)
POC PCO2: 72.6 MM HG (ref 35–45)
POC PCO2: 73.6 MM HG (ref 35–45)
POC PH: 7.37 (ref 7.35–7.45)
POC PH: 7.39 (ref 7.35–7.45)
POC PO2: 61 MM HG (ref 60–80)
POC PO2: 70.5 MM HG (ref 60–80)
POSITIVE BASE EXCESS, ART: 13.9 MMOL/L (ref 0–3)
POSITIVE BASE EXCESS, ART: 15.2 MMOL/L (ref 0–3)
POTASSIUM SERPL-SCNC: 4.3 MMOL/L (ref 3.5–5)
PROT SERPL-MCNC: 7 G/DL (ref 6.4–8.3)
RBC # BLD AUTO: 3.91 M/UL (ref 3.5–5.5)
RBC # BLD: ABNORMAL 10*6/UL
SODIUM SERPL-SCNC: 145 MMOL/L (ref 132–146)
SPECIMEN DESCRIPTION: NORMAL
WBC OTHER # BLD: 8.5 K/UL (ref 4.5–11.5)

## 2024-02-02 PROCEDURE — 85025 COMPLETE CBC W/AUTO DIFF WBC: CPT

## 2024-02-02 PROCEDURE — 99232 SBSQ HOSP IP/OBS MODERATE 35: CPT | Performed by: INTERNAL MEDICINE

## 2024-02-02 PROCEDURE — 94660 CPAP INITIATION&MGMT: CPT

## 2024-02-02 PROCEDURE — 6370000000 HC RX 637 (ALT 250 FOR IP): Performed by: INTERNAL MEDICINE

## 2024-02-02 PROCEDURE — 36592 COLLECT BLOOD FROM PICC: CPT

## 2024-02-02 PROCEDURE — 80053 COMPREHEN METABOLIC PANEL: CPT

## 2024-02-02 PROCEDURE — 97530 THERAPEUTIC ACTIVITIES: CPT | Performed by: PHYSICAL THERAPIST

## 2024-02-02 PROCEDURE — 6360000002 HC RX W HCPCS: Performed by: INTERNAL MEDICINE

## 2024-02-02 PROCEDURE — 2700000000 HC OXYGEN THERAPY PER DAY

## 2024-02-02 PROCEDURE — 6360000002 HC RX W HCPCS

## 2024-02-02 PROCEDURE — 97110 THERAPEUTIC EXERCISES: CPT

## 2024-02-02 PROCEDURE — 82962 GLUCOSE BLOOD TEST: CPT

## 2024-02-02 PROCEDURE — C9113 INJ PANTOPRAZOLE SODIUM, VIA: HCPCS | Performed by: INTERNAL MEDICINE

## 2024-02-02 PROCEDURE — 2580000003 HC RX 258: Performed by: INTERNAL MEDICINE

## 2024-02-02 PROCEDURE — 83735 ASSAY OF MAGNESIUM: CPT

## 2024-02-02 PROCEDURE — 36600 WITHDRAWAL OF ARTERIAL BLOOD: CPT

## 2024-02-02 PROCEDURE — 82803 BLOOD GASES ANY COMBINATION: CPT

## 2024-02-02 PROCEDURE — 2000000000 HC ICU R&B

## 2024-02-02 PROCEDURE — A4216 STERILE WATER/SALINE, 10 ML: HCPCS | Performed by: INTERNAL MEDICINE

## 2024-02-02 PROCEDURE — 94640 AIRWAY INHALATION TREATMENT: CPT

## 2024-02-02 PROCEDURE — 97164 PT RE-EVAL EST PLAN CARE: CPT | Performed by: PHYSICAL THERAPIST

## 2024-02-02 PROCEDURE — 84100 ASSAY OF PHOSPHORUS: CPT

## 2024-02-02 PROCEDURE — 2500000003 HC RX 250 WO HCPCS: Performed by: INTERNAL MEDICINE

## 2024-02-02 RX ORDER — INSULIN LISPRO 100 [IU]/ML
0-4 INJECTION, SOLUTION INTRAVENOUS; SUBCUTANEOUS NIGHTLY
Status: DISCONTINUED | OUTPATIENT
Start: 2024-02-02 | End: 2024-02-02

## 2024-02-02 RX ORDER — INSULIN LISPRO 100 [IU]/ML
0-4 INJECTION, SOLUTION INTRAVENOUS; SUBCUTANEOUS
Status: DISCONTINUED | OUTPATIENT
Start: 2024-02-02 | End: 2024-02-11 | Stop reason: HOSPADM

## 2024-02-02 RX ORDER — MAGNESIUM SULFATE 1 G/100ML
1000 INJECTION INTRAVENOUS ONCE
Status: COMPLETED | OUTPATIENT
Start: 2024-02-02 | End: 2024-02-02

## 2024-02-02 RX ORDER — DEXTROSE AND SODIUM CHLORIDE 5; .45 G/100ML; G/100ML
INJECTION, SOLUTION INTRAVENOUS CONTINUOUS
Status: DISCONTINUED | OUTPATIENT
Start: 2024-02-02 | End: 2024-02-02

## 2024-02-02 RX ORDER — FUROSEMIDE 20 MG/1
20 TABLET ORAL DAILY
Status: DISCONTINUED | OUTPATIENT
Start: 2024-02-03 | End: 2024-02-03

## 2024-02-02 RX ORDER — INSULIN LISPRO 100 [IU]/ML
0-4 INJECTION, SOLUTION INTRAVENOUS; SUBCUTANEOUS
Status: DISCONTINUED | OUTPATIENT
Start: 2024-02-02 | End: 2024-02-02

## 2024-02-02 RX ADMIN — LISINOPRIL 20 MG: 20 TABLET ORAL at 09:10

## 2024-02-02 RX ADMIN — IPRATROPIUM BROMIDE AND ALBUTEROL SULFATE 1 DOSE: .5; 2.5 SOLUTION RESPIRATORY (INHALATION) at 16:49

## 2024-02-02 RX ADMIN — Medication 10 ML: at 20:30

## 2024-02-02 RX ADMIN — INSULIN LISPRO 2 UNITS: 100 INJECTION, SOLUTION INTRAVENOUS; SUBCUTANEOUS at 11:40

## 2024-02-02 RX ADMIN — SODIUM CHLORIDE, PRESERVATIVE FREE 10 ML: 5 INJECTION INTRAVENOUS at 09:16

## 2024-02-02 RX ADMIN — METRONIDAZOLE 500 MG: 500 INJECTION, SOLUTION INTRAVENOUS at 15:11

## 2024-02-02 RX ADMIN — CEFEPIME 2000 MG: 2 INJECTION, POWDER, FOR SOLUTION INTRAVENOUS at 01:48

## 2024-02-02 RX ADMIN — WATER 40 MG: 1 INJECTION INTRAMUSCULAR; INTRAVENOUS; SUBCUTANEOUS at 09:15

## 2024-02-02 RX ADMIN — Medication: at 20:40

## 2024-02-02 RX ADMIN — IPRATROPIUM BROMIDE AND ALBUTEROL SULFATE 1 DOSE: .5; 2.5 SOLUTION RESPIRATORY (INHALATION) at 09:54

## 2024-02-02 RX ADMIN — BUDESONIDE INHALATION 500 MCG: 0.5 SUSPENSION RESPIRATORY (INHALATION) at 16:49

## 2024-02-02 RX ADMIN — IPRATROPIUM BROMIDE AND ALBUTEROL SULFATE 1 DOSE: .5; 2.5 SOLUTION RESPIRATORY (INHALATION) at 06:45

## 2024-02-02 RX ADMIN — HEPARIN 200 UNITS: 100 SYRINGE at 09:11

## 2024-02-02 RX ADMIN — ATORVASTATIN CALCIUM 40 MG: 40 TABLET, FILM COATED ORAL at 20:29

## 2024-02-02 RX ADMIN — SODIUM PHOSPHATE, MONOBASIC, MONOHYDRATE AND SODIUM PHOSPHATE, DIBASIC, ANHYDROUS 30 MMOL: 276; 142 INJECTION, SOLUTION INTRAVENOUS at 10:38

## 2024-02-02 RX ADMIN — SODIUM CHLORIDE, PRESERVATIVE FREE 40 MG: 5 INJECTION INTRAVENOUS at 09:08

## 2024-02-02 RX ADMIN — Medication 1000 UNITS: at 09:08

## 2024-02-02 RX ADMIN — DOXYCYCLINE 100 MG: 100 INJECTION, POWDER, LYOPHILIZED, FOR SOLUTION INTRAVENOUS at 11:43

## 2024-02-02 RX ADMIN — METRONIDAZOLE 500 MG: 500 INJECTION, SOLUTION INTRAVENOUS at 05:44

## 2024-02-02 RX ADMIN — AMLODIPINE BESYLATE 10 MG: 10 TABLET ORAL at 09:08

## 2024-02-02 RX ADMIN — METOPROLOL SUCCINATE 25 MG: 25 TABLET, EXTENDED RELEASE ORAL at 20:29

## 2024-02-02 RX ADMIN — HEPARIN 300 UNITS: 100 SYRINGE at 20:29

## 2024-02-02 RX ADMIN — BUDESONIDE INHALATION 500 MCG: 0.5 SUSPENSION RESPIRATORY (INHALATION) at 06:45

## 2024-02-02 RX ADMIN — SODIUM CHLORIDE, PRESERVATIVE FREE 10 ML: 5 INJECTION INTRAVENOUS at 20:30

## 2024-02-02 RX ADMIN — Medication 10 ML: at 09:16

## 2024-02-02 RX ADMIN — WATER 20 MG: 1 INJECTION INTRAMUSCULAR; INTRAVENOUS; SUBCUTANEOUS at 20:29

## 2024-02-02 RX ADMIN — DOXYCYCLINE 100 MG: 100 INJECTION, POWDER, LYOPHILIZED, FOR SOLUTION INTRAVENOUS at 23:32

## 2024-02-02 RX ADMIN — Medication: at 09:07

## 2024-02-02 RX ADMIN — METRONIDAZOLE 500 MG: 500 INJECTION, SOLUTION INTRAVENOUS at 22:35

## 2024-02-02 RX ADMIN — CEFEPIME 2000 MG: 2 INJECTION, POWDER, FOR SOLUTION INTRAVENOUS at 18:23

## 2024-02-02 RX ADMIN — MAGNESIUM SULFATE IN DEXTROSE 1000 MG: 10 INJECTION, SOLUTION INTRAVENOUS at 05:45

## 2024-02-02 RX ADMIN — Medication: at 15:09

## 2024-02-02 RX ADMIN — CEFEPIME 2000 MG: 2 INJECTION, POWDER, FOR SOLUTION INTRAVENOUS at 09:54

## 2024-02-02 RX ADMIN — DEXTROSE AND SODIUM CHLORIDE: 5; 450 INJECTION, SOLUTION INTRAVENOUS at 04:45

## 2024-02-02 RX ADMIN — METOPROLOL SUCCINATE 25 MG: 25 TABLET, EXTENDED RELEASE ORAL at 09:10

## 2024-02-03 ENCOUNTER — APPOINTMENT (OUTPATIENT)
Dept: GENERAL RADIOLOGY | Age: 69
DRG: 133 | End: 2024-02-03
Payer: COMMERCIAL

## 2024-02-03 LAB
ALBUMIN SERPL-MCNC: 4 G/DL (ref 3.5–5.2)
ALP SERPL-CCNC: 100 U/L (ref 35–104)
ALT SERPL-CCNC: 7 U/L (ref 0–32)
ANION GAP SERPL CALCULATED.3IONS-SCNC: 4 MMOL/L (ref 7–16)
ANION GAP SERPL CALCULATED.3IONS-SCNC: 6 MMOL/L (ref 7–16)
AST SERPL-CCNC: 11 U/L (ref 0–31)
BASOPHILS # BLD: 0 K/UL (ref 0–0.2)
BASOPHILS NFR BLD: 0 % (ref 0–2)
BILIRUB SERPL-MCNC: 0.6 MG/DL (ref 0–1.2)
BNP SERPL-MCNC: 3532 PG/ML (ref 0–450)
BUN SERPL-MCNC: 17 MG/DL (ref 6–23)
BUN SERPL-MCNC: 17 MG/DL (ref 6–23)
CALCIUM SERPL-MCNC: 10 MG/DL (ref 8.6–10.2)
CALCIUM SERPL-MCNC: 10.2 MG/DL (ref 8.6–10.2)
CHLORIDE SERPL-SCNC: 96 MMOL/L (ref 98–107)
CHLORIDE SERPL-SCNC: 97 MMOL/L (ref 98–107)
CO2 SERPL-SCNC: 40 MMOL/L (ref 22–29)
CO2 SERPL-SCNC: 41 MMOL/L (ref 22–29)
CREAT SERPL-MCNC: 0.7 MG/DL (ref 0.5–1)
CREAT SERPL-MCNC: 0.7 MG/DL (ref 0.5–1)
EOSINOPHIL # BLD: 0 K/UL (ref 0.05–0.5)
EOSINOPHILS RELATIVE PERCENT: 0 % (ref 0–6)
ERYTHROCYTE [DISTWIDTH] IN BLOOD BY AUTOMATED COUNT: 16.4 % (ref 11.5–15)
GFR SERPL CREATININE-BSD FRML MDRD: >60 ML/MIN/1.73M2
GFR SERPL CREATININE-BSD FRML MDRD: >60 ML/MIN/1.73M2
GLUCOSE BLD-MCNC: 142 MG/DL (ref 74–99)
GLUCOSE BLD-MCNC: 174 MG/DL (ref 74–99)
GLUCOSE BLD-MCNC: 176 MG/DL (ref 74–99)
GLUCOSE BLD-MCNC: 183 MG/DL (ref 74–99)
GLUCOSE SERPL-MCNC: 197 MG/DL (ref 74–99)
GLUCOSE SERPL-MCNC: 210 MG/DL (ref 74–99)
HCT VFR BLD AUTO: 36.2 % (ref 34–48)
HGB BLD-MCNC: 11 G/DL (ref 11.5–15.5)
LYMPHOCYTES NFR BLD: 0.3 K/UL (ref 1.5–4)
LYMPHOCYTES RELATIVE PERCENT: 4 % (ref 20–42)
MAGNESIUM SERPL-MCNC: 1.9 MG/DL (ref 1.6–2.6)
MCH RBC QN AUTO: 27.8 PG (ref 26–35)
MCHC RBC AUTO-ENTMCNC: 30.4 G/DL (ref 32–34.5)
MCV RBC AUTO: 91.6 FL (ref 80–99.9)
MONOCYTES NFR BLD: 0.23 K/UL (ref 0.1–0.95)
MONOCYTES NFR BLD: 3 % (ref 2–12)
NEUTROPHILS NFR BLD: 94 % (ref 43–80)
NEUTS SEG NFR BLD: 7.97 K/UL (ref 1.8–7.3)
PHOSPHATE SERPL-MCNC: 2.1 MG/DL (ref 2.5–4.5)
PLATELET, FLUORESCENCE: 125 K/UL (ref 130–450)
PMV BLD AUTO: ABNORMAL FL (ref 7–12)
POTASSIUM SERPL-SCNC: 4 MMOL/L (ref 3.5–5)
POTASSIUM SERPL-SCNC: 4.3 MMOL/L (ref 3.5–5)
PROT SERPL-MCNC: 6.9 G/DL (ref 6.4–8.3)
RBC # BLD AUTO: 3.95 M/UL (ref 3.5–5.5)
RBC # BLD: ABNORMAL 10*6/UL
REPORT STATUS: NORMAL
SODIUM SERPL-SCNC: 142 MMOL/L (ref 132–146)
SODIUM SERPL-SCNC: 142 MMOL/L (ref 132–146)
WBC OTHER # BLD: 8.5 K/UL (ref 4.5–11.5)

## 2024-02-03 PROCEDURE — 82962 GLUCOSE BLOOD TEST: CPT

## 2024-02-03 PROCEDURE — 99291 CRITICAL CARE FIRST HOUR: CPT | Performed by: INTERNAL MEDICINE

## 2024-02-03 PROCEDURE — 2500000003 HC RX 250 WO HCPCS: Performed by: INTERNAL MEDICINE

## 2024-02-03 PROCEDURE — C9113 INJ PANTOPRAZOLE SODIUM, VIA: HCPCS | Performed by: INTERNAL MEDICINE

## 2024-02-03 PROCEDURE — 83735 ASSAY OF MAGNESIUM: CPT

## 2024-02-03 PROCEDURE — 2700000000 HC OXYGEN THERAPY PER DAY

## 2024-02-03 PROCEDURE — 84100 ASSAY OF PHOSPHORUS: CPT

## 2024-02-03 PROCEDURE — 80053 COMPREHEN METABOLIC PANEL: CPT

## 2024-02-03 PROCEDURE — 94660 CPAP INITIATION&MGMT: CPT

## 2024-02-03 PROCEDURE — 6370000000 HC RX 637 (ALT 250 FOR IP): Performed by: INTERNAL MEDICINE

## 2024-02-03 PROCEDURE — 2580000003 HC RX 258: Performed by: INTERNAL MEDICINE

## 2024-02-03 PROCEDURE — 6360000002 HC RX W HCPCS: Performed by: INTERNAL MEDICINE

## 2024-02-03 PROCEDURE — 36592 COLLECT BLOOD FROM PICC: CPT

## 2024-02-03 PROCEDURE — 73560 X-RAY EXAM OF KNEE 1 OR 2: CPT

## 2024-02-03 PROCEDURE — 83880 ASSAY OF NATRIURETIC PEPTIDE: CPT

## 2024-02-03 PROCEDURE — 71045 X-RAY EXAM CHEST 1 VIEW: CPT

## 2024-02-03 PROCEDURE — 94640 AIRWAY INHALATION TREATMENT: CPT

## 2024-02-03 PROCEDURE — 2580000003 HC RX 258

## 2024-02-03 PROCEDURE — 99232 SBSQ HOSP IP/OBS MODERATE 35: CPT | Performed by: INTERNAL MEDICINE

## 2024-02-03 PROCEDURE — 2500000003 HC RX 250 WO HCPCS

## 2024-02-03 PROCEDURE — 2000000000 HC ICU R&B

## 2024-02-03 PROCEDURE — 80048 BASIC METABOLIC PNL TOTAL CA: CPT

## 2024-02-03 PROCEDURE — 94668 MNPJ CHEST WALL SBSQ: CPT

## 2024-02-03 PROCEDURE — 85025 COMPLETE CBC W/AUTO DIFF WBC: CPT

## 2024-02-03 PROCEDURE — 6360000002 HC RX W HCPCS

## 2024-02-03 RX ORDER — FUROSEMIDE 10 MG/ML
40 INJECTION INTRAMUSCULAR; INTRAVENOUS 2 TIMES DAILY
Status: DISCONTINUED | OUTPATIENT
Start: 2024-02-03 | End: 2024-02-11 | Stop reason: HOSPADM

## 2024-02-03 RX ORDER — MAGNESIUM SULFATE 1 G/100ML
1000 INJECTION INTRAVENOUS ONCE
Status: COMPLETED | OUTPATIENT
Start: 2024-02-03 | End: 2024-02-03

## 2024-02-03 RX ORDER — HYDRALAZINE HYDROCHLORIDE 20 MG/ML
5 INJECTION INTRAMUSCULAR; INTRAVENOUS ONCE
Status: COMPLETED | OUTPATIENT
Start: 2024-02-03 | End: 2024-02-03

## 2024-02-03 RX ADMIN — METRONIDAZOLE 500 MG: 500 INJECTION, SOLUTION INTRAVENOUS at 23:50

## 2024-02-03 RX ADMIN — MAGNESIUM SULFATE IN DEXTROSE 1000 MG: 10 INJECTION, SOLUTION INTRAVENOUS at 06:32

## 2024-02-03 RX ADMIN — HYDRALAZINE HYDROCHLORIDE 5 MG: 20 INJECTION INTRAMUSCULAR; INTRAVENOUS at 01:01

## 2024-02-03 RX ADMIN — SODIUM PHOSPHATE, MONOBASIC, MONOHYDRATE AND SODIUM PHOSPHATE, DIBASIC, ANHYDROUS 15 MMOL: 142; 276 INJECTION, SOLUTION INTRAVENOUS at 08:07

## 2024-02-03 RX ADMIN — METRONIDAZOLE 500 MG: 500 INJECTION, SOLUTION INTRAVENOUS at 06:13

## 2024-02-03 RX ADMIN — HEPARIN 300 UNITS: 100 SYRINGE at 08:07

## 2024-02-03 RX ADMIN — AMLODIPINE BESYLATE 10 MG: 10 TABLET ORAL at 10:40

## 2024-02-03 RX ADMIN — CEFEPIME 2000 MG: 2 INJECTION, POWDER, FOR SOLUTION INTRAVENOUS at 10:43

## 2024-02-03 RX ADMIN — IPRATROPIUM BROMIDE AND ALBUTEROL SULFATE 1 DOSE: .5; 2.5 SOLUTION RESPIRATORY (INHALATION) at 17:26

## 2024-02-03 RX ADMIN — CEFEPIME 2000 MG: 2 INJECTION, POWDER, FOR SOLUTION INTRAVENOUS at 18:47

## 2024-02-03 RX ADMIN — WATER 20 MG: 1 INJECTION INTRAMUSCULAR; INTRAVENOUS; SUBCUTANEOUS at 20:18

## 2024-02-03 RX ADMIN — BUDESONIDE INHALATION 500 MCG: 0.5 SUSPENSION RESPIRATORY (INHALATION) at 05:04

## 2024-02-03 RX ADMIN — APIXABAN 5 MG: 5 TABLET, FILM COATED ORAL at 20:15

## 2024-02-03 RX ADMIN — Medication 1000 UNITS: at 10:39

## 2024-02-03 RX ADMIN — ATORVASTATIN CALCIUM 40 MG: 40 TABLET, FILM COATED ORAL at 20:15

## 2024-02-03 RX ADMIN — WATER 20 MG: 1 INJECTION INTRAMUSCULAR; INTRAVENOUS; SUBCUTANEOUS at 10:39

## 2024-02-03 RX ADMIN — SODIUM CHLORIDE, PRESERVATIVE FREE 10 ML: 5 INJECTION INTRAVENOUS at 08:07

## 2024-02-03 RX ADMIN — BUDESONIDE INHALATION 500 MCG: 0.5 SUSPENSION RESPIRATORY (INHALATION) at 17:26

## 2024-02-03 RX ADMIN — Medication: at 14:23

## 2024-02-03 RX ADMIN — METOPROLOL SUCCINATE 25 MG: 25 TABLET, EXTENDED RELEASE ORAL at 10:40

## 2024-02-03 RX ADMIN — METRONIDAZOLE 500 MG: 500 INJECTION, SOLUTION INTRAVENOUS at 14:24

## 2024-02-03 RX ADMIN — IPRATROPIUM BROMIDE AND ALBUTEROL SULFATE 1 DOSE: .5; 2.5 SOLUTION RESPIRATORY (INHALATION) at 13:25

## 2024-02-03 RX ADMIN — SODIUM CHLORIDE, PRESERVATIVE FREE 40 MG: 5 INJECTION INTRAVENOUS at 10:37

## 2024-02-03 RX ADMIN — LISINOPRIL 20 MG: 20 TABLET ORAL at 10:39

## 2024-02-03 RX ADMIN — METOPROLOL SUCCINATE 25 MG: 25 TABLET, EXTENDED RELEASE ORAL at 20:15

## 2024-02-03 RX ADMIN — Medication 10 ML: at 10:41

## 2024-02-03 RX ADMIN — IPRATROPIUM BROMIDE AND ALBUTEROL SULFATE 1 DOSE: .5; 2.5 SOLUTION RESPIRATORY (INHALATION) at 09:32

## 2024-02-03 RX ADMIN — IPRATROPIUM BROMIDE AND ALBUTEROL SULFATE 1 DOSE: .5; 2.5 SOLUTION RESPIRATORY (INHALATION) at 05:03

## 2024-02-03 RX ADMIN — FUROSEMIDE 40 MG: 10 INJECTION, SOLUTION INTRAMUSCULAR; INTRAVENOUS at 18:46

## 2024-02-03 RX ADMIN — DOXYCYCLINE 100 MG: 100 INJECTION, POWDER, LYOPHILIZED, FOR SOLUTION INTRAVENOUS at 12:34

## 2024-02-03 RX ADMIN — FUROSEMIDE 20 MG: 20 TABLET ORAL at 10:40

## 2024-02-03 RX ADMIN — Medication: at 10:40

## 2024-02-03 RX ADMIN — HEPARIN 300 UNITS: 100 SYRINGE at 10:40

## 2024-02-03 RX ADMIN — CEFEPIME 2000 MG: 2 INJECTION, POWDER, FOR SOLUTION INTRAVENOUS at 02:11

## 2024-02-03 RX ADMIN — Medication: at 20:17

## 2024-02-03 RX ADMIN — DOXYCYCLINE 100 MG: 100 INJECTION, POWDER, LYOPHILIZED, FOR SOLUTION INTRAVENOUS at 23:51

## 2024-02-04 ENCOUNTER — APPOINTMENT (OUTPATIENT)
Dept: GENERAL RADIOLOGY | Age: 69
DRG: 133 | End: 2024-02-04
Payer: COMMERCIAL

## 2024-02-04 LAB
ALBUMIN SERPL-MCNC: 4 G/DL (ref 3.5–5.2)
ALP SERPL-CCNC: 131 U/L (ref 35–104)
ALT SERPL-CCNC: 9 U/L (ref 0–32)
ANION GAP SERPL CALCULATED.3IONS-SCNC: 4 MMOL/L (ref 7–16)
ANION GAP SERPL CALCULATED.3IONS-SCNC: 5 MMOL/L (ref 7–16)
AST SERPL-CCNC: 16 U/L (ref 0–31)
BASOPHILS # BLD: 0 K/UL (ref 0–0.2)
BASOPHILS NFR BLD: 0 % (ref 0–2)
BILIRUB SERPL-MCNC: 0.6 MG/DL (ref 0–1.2)
BUN SERPL-MCNC: 16 MG/DL (ref 6–23)
BUN SERPL-MCNC: 17 MG/DL (ref 6–23)
CALCIUM SERPL-MCNC: 10.1 MG/DL (ref 8.6–10.2)
CALCIUM SERPL-MCNC: 9.9 MG/DL (ref 8.6–10.2)
CHLORIDE SERPL-SCNC: 91 MMOL/L (ref 98–107)
CHLORIDE SERPL-SCNC: 96 MMOL/L (ref 98–107)
CO2 SERPL-SCNC: 42 MMOL/L (ref 22–29)
CO2 SERPL-SCNC: 43 MMOL/L (ref 22–29)
CREAT SERPL-MCNC: 0.7 MG/DL (ref 0.5–1)
CREAT SERPL-MCNC: 0.7 MG/DL (ref 0.5–1)
EOSINOPHIL # BLD: 0 K/UL (ref 0.05–0.5)
EOSINOPHILS RELATIVE PERCENT: 0 % (ref 0–6)
ERYTHROCYTE [DISTWIDTH] IN BLOOD BY AUTOMATED COUNT: 16.5 % (ref 11.5–15)
GFR SERPL CREATININE-BSD FRML MDRD: >60 ML/MIN/1.73M2
GFR SERPL CREATININE-BSD FRML MDRD: >60 ML/MIN/1.73M2
GLUCOSE BLD-MCNC: 137 MG/DL (ref 74–99)
GLUCOSE BLD-MCNC: 170 MG/DL (ref 74–99)
GLUCOSE BLD-MCNC: 203 MG/DL (ref 74–99)
GLUCOSE SERPL-MCNC: 172 MG/DL (ref 74–99)
GLUCOSE SERPL-MCNC: 200 MG/DL (ref 74–99)
HCT VFR BLD AUTO: 36.8 % (ref 34–48)
HGB BLD-MCNC: 11.2 G/DL (ref 11.5–15.5)
LYMPHOCYTES NFR BLD: 0.35 K/UL (ref 1.5–4)
LYMPHOCYTES RELATIVE PERCENT: 4 % (ref 20–42)
MAGNESIUM SERPL-MCNC: 1.9 MG/DL (ref 1.6–2.6)
MCH RBC QN AUTO: 27.6 PG (ref 26–35)
MCHC RBC AUTO-ENTMCNC: 30.4 G/DL (ref 32–34.5)
MCV RBC AUTO: 90.6 FL (ref 80–99.9)
MICROORGANISM SPEC CULT: NO GROWTH
MICROORGANISM/AGENT SPEC: NORMAL
MONOCYTES NFR BLD: 0.52 K/UL (ref 0.1–0.95)
MONOCYTES NFR BLD: 5 % (ref 2–12)
NEUTROPHILS NFR BLD: 91 % (ref 43–80)
NEUTS SEG NFR BLD: 9.13 K/UL (ref 1.8–7.3)
PHOSPHATE SERPL-MCNC: 2.7 MG/DL (ref 2.5–4.5)
PLATELET, FLUORESCENCE: 121 K/UL (ref 130–450)
PMV BLD AUTO: ABNORMAL FL (ref 7–12)
POTASSIUM SERPL-SCNC: 4.2 MMOL/L (ref 3.5–5)
POTASSIUM SERPL-SCNC: 4.3 MMOL/L (ref 3.5–5)
PROT SERPL-MCNC: 6.9 G/DL (ref 6.4–8.3)
RBC # BLD AUTO: 4.06 M/UL (ref 3.5–5.5)
RBC # BLD: ABNORMAL 10*6/UL
SODIUM SERPL-SCNC: 139 MMOL/L (ref 132–146)
SODIUM SERPL-SCNC: 142 MMOL/L (ref 132–146)
SPECIMEN DESCRIPTION: NORMAL
WBC # BLD: ABNORMAL 10*3/UL
WBC OTHER # BLD: 10 K/UL (ref 4.5–11.5)

## 2024-02-04 PROCEDURE — 2000000000 HC ICU R&B

## 2024-02-04 PROCEDURE — 6360000002 HC RX W HCPCS: Performed by: INTERNAL MEDICINE

## 2024-02-04 PROCEDURE — 82962 GLUCOSE BLOOD TEST: CPT

## 2024-02-04 PROCEDURE — C9113 INJ PANTOPRAZOLE SODIUM, VIA: HCPCS | Performed by: INTERNAL MEDICINE

## 2024-02-04 PROCEDURE — 80053 COMPREHEN METABOLIC PANEL: CPT

## 2024-02-04 PROCEDURE — 2700000000 HC OXYGEN THERAPY PER DAY

## 2024-02-04 PROCEDURE — 97110 THERAPEUTIC EXERCISES: CPT

## 2024-02-04 PROCEDURE — 84100 ASSAY OF PHOSPHORUS: CPT

## 2024-02-04 PROCEDURE — 6370000000 HC RX 637 (ALT 250 FOR IP): Performed by: INTERNAL MEDICINE

## 2024-02-04 PROCEDURE — 71045 X-RAY EXAM CHEST 1 VIEW: CPT

## 2024-02-04 PROCEDURE — 6360000002 HC RX W HCPCS

## 2024-02-04 PROCEDURE — 99232 SBSQ HOSP IP/OBS MODERATE 35: CPT | Performed by: INTERNAL MEDICINE

## 2024-02-04 PROCEDURE — 2580000003 HC RX 258: Performed by: INTERNAL MEDICINE

## 2024-02-04 PROCEDURE — 97530 THERAPEUTIC ACTIVITIES: CPT

## 2024-02-04 PROCEDURE — 94640 AIRWAY INHALATION TREATMENT: CPT

## 2024-02-04 PROCEDURE — 85025 COMPLETE CBC W/AUTO DIFF WBC: CPT

## 2024-02-04 PROCEDURE — 99291 CRITICAL CARE FIRST HOUR: CPT | Performed by: INTERNAL MEDICINE

## 2024-02-04 PROCEDURE — 80048 BASIC METABOLIC PNL TOTAL CA: CPT

## 2024-02-04 PROCEDURE — 36592 COLLECT BLOOD FROM PICC: CPT

## 2024-02-04 PROCEDURE — 94660 CPAP INITIATION&MGMT: CPT

## 2024-02-04 PROCEDURE — 83735 ASSAY OF MAGNESIUM: CPT

## 2024-02-04 RX ORDER — MAGNESIUM SULFATE 1 G/100ML
1000 INJECTION INTRAVENOUS ONCE
Status: COMPLETED | OUTPATIENT
Start: 2024-02-04 | End: 2024-02-04

## 2024-02-04 RX ORDER — PANTOPRAZOLE SODIUM 40 MG/1
40 TABLET, DELAYED RELEASE ORAL
Status: DISCONTINUED | OUTPATIENT
Start: 2024-02-05 | End: 2024-02-11 | Stop reason: HOSPADM

## 2024-02-04 RX ADMIN — MAGNESIUM SULFATE IN DEXTROSE 1000 MG: 10 INJECTION, SOLUTION INTRAVENOUS at 06:46

## 2024-02-04 RX ADMIN — Medication: at 09:00

## 2024-02-04 RX ADMIN — CEFEPIME 2000 MG: 2 INJECTION, POWDER, FOR SOLUTION INTRAVENOUS at 08:55

## 2024-02-04 RX ADMIN — BUDESONIDE INHALATION 500 MCG: 0.5 SUSPENSION RESPIRATORY (INHALATION) at 16:26

## 2024-02-04 RX ADMIN — HEPARIN 300 UNITS: 100 SYRINGE at 21:51

## 2024-02-04 RX ADMIN — IPRATROPIUM BROMIDE AND ALBUTEROL SULFATE 1 DOSE: .5; 2.5 SOLUTION RESPIRATORY (INHALATION) at 04:56

## 2024-02-04 RX ADMIN — Medication 1000 UNITS: at 08:59

## 2024-02-04 RX ADMIN — Medication 10 ML: at 21:03

## 2024-02-04 RX ADMIN — Medication: at 16:24

## 2024-02-04 RX ADMIN — APIXABAN 5 MG: 5 TABLET, FILM COATED ORAL at 20:50

## 2024-02-04 RX ADMIN — APIXABAN 5 MG: 5 TABLET, FILM COATED ORAL at 08:59

## 2024-02-04 RX ADMIN — CEFEPIME 2000 MG: 2 INJECTION, POWDER, FOR SOLUTION INTRAVENOUS at 17:37

## 2024-02-04 RX ADMIN — Medication 10 ML: at 09:00

## 2024-02-04 RX ADMIN — IPRATROPIUM BROMIDE AND ALBUTEROL SULFATE 1 DOSE: .5; 2.5 SOLUTION RESPIRATORY (INHALATION) at 16:26

## 2024-02-04 RX ADMIN — HEPARIN 300 UNITS: 100 SYRINGE at 08:58

## 2024-02-04 RX ADMIN — INSULIN LISPRO 1 UNITS: 100 INJECTION, SOLUTION INTRAVENOUS; SUBCUTANEOUS at 21:01

## 2024-02-04 RX ADMIN — SODIUM CHLORIDE, PRESERVATIVE FREE 10 ML: 5 INJECTION INTRAVENOUS at 09:00

## 2024-02-04 RX ADMIN — IPRATROPIUM BROMIDE AND ALBUTEROL SULFATE 1 DOSE: .5; 2.5 SOLUTION RESPIRATORY (INHALATION) at 13:15

## 2024-02-04 RX ADMIN — METOPROLOL SUCCINATE 25 MG: 25 TABLET, EXTENDED RELEASE ORAL at 20:50

## 2024-02-04 RX ADMIN — CEFEPIME 2000 MG: 2 INJECTION, POWDER, FOR SOLUTION INTRAVENOUS at 02:10

## 2024-02-04 RX ADMIN — AMLODIPINE BESYLATE 10 MG: 10 TABLET ORAL at 08:58

## 2024-02-04 RX ADMIN — WATER 20 MG: 1 INJECTION INTRAMUSCULAR; INTRAVENOUS; SUBCUTANEOUS at 20:52

## 2024-02-04 RX ADMIN — BUDESONIDE INHALATION 500 MCG: 0.5 SUSPENSION RESPIRATORY (INHALATION) at 04:56

## 2024-02-04 RX ADMIN — LISINOPRIL 20 MG: 20 TABLET ORAL at 08:59

## 2024-02-04 RX ADMIN — WATER 20 MG: 1 INJECTION INTRAMUSCULAR; INTRAVENOUS; SUBCUTANEOUS at 08:56

## 2024-02-04 RX ADMIN — FUROSEMIDE 40 MG: 10 INJECTION, SOLUTION INTRAMUSCULAR; INTRAVENOUS at 17:36

## 2024-02-04 RX ADMIN — FUROSEMIDE 40 MG: 10 INJECTION, SOLUTION INTRAMUSCULAR; INTRAVENOUS at 08:59

## 2024-02-04 RX ADMIN — ATORVASTATIN CALCIUM 40 MG: 40 TABLET, FILM COATED ORAL at 20:50

## 2024-02-04 RX ADMIN — SODIUM CHLORIDE, PRESERVATIVE FREE 10 ML: 5 INJECTION INTRAVENOUS at 21:03

## 2024-02-04 RX ADMIN — SODIUM CHLORIDE, PRESERVATIVE FREE 40 MG: 5 INJECTION INTRAVENOUS at 08:55

## 2024-02-04 RX ADMIN — IPRATROPIUM BROMIDE AND ALBUTEROL SULFATE 1 DOSE: .5; 2.5 SOLUTION RESPIRATORY (INHALATION) at 08:58

## 2024-02-04 RX ADMIN — Medication: at 21:02

## 2024-02-04 RX ADMIN — METRONIDAZOLE 500 MG: 500 INJECTION, SOLUTION INTRAVENOUS at 06:44

## 2024-02-04 RX ADMIN — METOPROLOL SUCCINATE 25 MG: 25 TABLET, EXTENDED RELEASE ORAL at 08:58

## 2024-02-05 ENCOUNTER — APPOINTMENT (OUTPATIENT)
Dept: GENERAL RADIOLOGY | Age: 69
DRG: 133 | End: 2024-02-05
Payer: COMMERCIAL

## 2024-02-05 LAB
ALBUMIN SERPL-MCNC: 3.9 G/DL (ref 3.5–5.2)
ALP SERPL-CCNC: 147 U/L (ref 35–104)
ALT SERPL-CCNC: 10 U/L (ref 0–32)
ANION GAP SERPL CALCULATED.3IONS-SCNC: 2 MMOL/L (ref 7–16)
ANION GAP SERPL CALCULATED.3IONS-SCNC: 4 MMOL/L (ref 7–16)
AST SERPL-CCNC: 15 U/L (ref 0–31)
BASOPHILS # BLD: 0 K/UL (ref 0–0.2)
BASOPHILS NFR BLD: 0 % (ref 0–2)
BILIRUB SERPL-MCNC: 0.7 MG/DL (ref 0–1.2)
BUN SERPL-MCNC: 16 MG/DL (ref 6–23)
BUN SERPL-MCNC: 17 MG/DL (ref 6–23)
CALCIUM SERPL-MCNC: 10.1 MG/DL (ref 8.6–10.2)
CALCIUM SERPL-MCNC: 10.2 MG/DL (ref 8.6–10.2)
CHLORIDE SERPL-SCNC: 91 MMOL/L (ref 98–107)
CHLORIDE SERPL-SCNC: 93 MMOL/L (ref 98–107)
CO2 SERPL-SCNC: 44 MMOL/L (ref 22–29)
CO2 SERPL-SCNC: 45 MMOL/L (ref 22–29)
CREAT SERPL-MCNC: 0.7 MG/DL (ref 0.5–1)
CREAT SERPL-MCNC: 0.7 MG/DL (ref 0.5–1)
EOSINOPHIL # BLD: 0 K/UL (ref 0.05–0.5)
EOSINOPHILS RELATIVE PERCENT: 0 % (ref 0–6)
ERYTHROCYTE [DISTWIDTH] IN BLOOD BY AUTOMATED COUNT: 16.4 % (ref 11.5–15)
GFR SERPL CREATININE-BSD FRML MDRD: >60 ML/MIN/1.73M2
GFR SERPL CREATININE-BSD FRML MDRD: >60 ML/MIN/1.73M2
GLUCOSE BLD-MCNC: 148 MG/DL (ref 74–99)
GLUCOSE BLD-MCNC: 204 MG/DL (ref 74–99)
GLUCOSE BLD-MCNC: 213 MG/DL (ref 74–99)
GLUCOSE SERPL-MCNC: 161 MG/DL (ref 74–99)
GLUCOSE SERPL-MCNC: 183 MG/DL (ref 74–99)
HCT VFR BLD AUTO: 35.8 % (ref 34–48)
HGB BLD-MCNC: 11 G/DL (ref 11.5–15.5)
LYMPHOCYTES NFR BLD: 0.59 K/UL (ref 1.5–4)
LYMPHOCYTES RELATIVE PERCENT: 6 % (ref 20–42)
MAGNESIUM SERPL-MCNC: 1.9 MG/DL (ref 1.6–2.6)
MCH RBC QN AUTO: 27.8 PG (ref 26–35)
MCHC RBC AUTO-ENTMCNC: 30.7 G/DL (ref 32–34.5)
MCV RBC AUTO: 90.4 FL (ref 80–99.9)
MONOCYTES NFR BLD: 0.4 K/UL (ref 0.1–0.95)
MONOCYTES NFR BLD: 4 % (ref 2–12)
MYELOCYTES ABSOLUTE COUNT: 0.1 K/UL
MYELOCYTES: 1 %
NEUTROPHILS NFR BLD: 89 % (ref 43–80)
NEUTS SEG NFR BLD: 8.81 K/UL (ref 1.8–7.3)
NON-GYN CYTOLOGY REPORT: NORMAL
PHOSPHATE SERPL-MCNC: 2.7 MG/DL (ref 2.5–4.5)
PLATELET, FLUORESCENCE: 112 K/UL (ref 130–450)
PMV BLD AUTO: ABNORMAL FL (ref 7–12)
POTASSIUM SERPL-SCNC: 4.1 MMOL/L (ref 3.5–5)
POTASSIUM SERPL-SCNC: 4.3 MMOL/L (ref 3.5–5)
PROT SERPL-MCNC: 6.9 G/DL (ref 6.4–8.3)
RBC # BLD AUTO: 3.96 M/UL (ref 3.5–5.5)
RBC # BLD: ABNORMAL 10*6/UL
RBC # BLD: ABNORMAL 10*6/UL
SODIUM SERPL-SCNC: 138 MMOL/L (ref 132–146)
SODIUM SERPL-SCNC: 141 MMOL/L (ref 132–146)
WBC OTHER # BLD: 9.9 K/UL (ref 4.5–11.5)

## 2024-02-05 PROCEDURE — 94640 AIRWAY INHALATION TREATMENT: CPT

## 2024-02-05 PROCEDURE — 80053 COMPREHEN METABOLIC PANEL: CPT

## 2024-02-05 PROCEDURE — 6370000000 HC RX 637 (ALT 250 FOR IP): Performed by: INTERNAL MEDICINE

## 2024-02-05 PROCEDURE — 94660 CPAP INITIATION&MGMT: CPT

## 2024-02-05 PROCEDURE — 2580000003 HC RX 258: Performed by: INTERNAL MEDICINE

## 2024-02-05 PROCEDURE — 2700000000 HC OXYGEN THERAPY PER DAY

## 2024-02-05 PROCEDURE — 6360000002 HC RX W HCPCS: Performed by: INTERNAL MEDICINE

## 2024-02-05 PROCEDURE — 82962 GLUCOSE BLOOD TEST: CPT

## 2024-02-05 PROCEDURE — 84100 ASSAY OF PHOSPHORUS: CPT

## 2024-02-05 PROCEDURE — 97110 THERAPEUTIC EXERCISES: CPT | Performed by: PHYSICAL THERAPIST

## 2024-02-05 PROCEDURE — 97530 THERAPEUTIC ACTIVITIES: CPT | Performed by: PHYSICAL THERAPIST

## 2024-02-05 PROCEDURE — 2000000000 HC ICU R&B

## 2024-02-05 PROCEDURE — 36592 COLLECT BLOOD FROM PICC: CPT

## 2024-02-05 PROCEDURE — 99291 CRITICAL CARE FIRST HOUR: CPT | Performed by: INTERNAL MEDICINE

## 2024-02-05 PROCEDURE — 71045 X-RAY EXAM CHEST 1 VIEW: CPT

## 2024-02-05 PROCEDURE — 83735 ASSAY OF MAGNESIUM: CPT

## 2024-02-05 PROCEDURE — 80048 BASIC METABOLIC PNL TOTAL CA: CPT

## 2024-02-05 PROCEDURE — 85025 COMPLETE CBC W/AUTO DIFF WBC: CPT

## 2024-02-05 RX ADMIN — LISINOPRIL 20 MG: 20 TABLET ORAL at 08:15

## 2024-02-05 RX ADMIN — Medication 10 ML: at 08:15

## 2024-02-05 RX ADMIN — BUDESONIDE INHALATION 500 MCG: 0.5 SUSPENSION RESPIRATORY (INHALATION) at 04:45

## 2024-02-05 RX ADMIN — Medication 1000 UNITS: at 08:14

## 2024-02-05 RX ADMIN — PANTOPRAZOLE SODIUM 40 MG: 40 TABLET, DELAYED RELEASE ORAL at 05:30

## 2024-02-05 RX ADMIN — AMLODIPINE BESYLATE 10 MG: 10 TABLET ORAL at 08:14

## 2024-02-05 RX ADMIN — SODIUM CHLORIDE 500 MG: 9 INJECTION, SOLUTION INTRAVENOUS at 11:53

## 2024-02-05 RX ADMIN — Medication 10 ML: at 21:00

## 2024-02-05 RX ADMIN — WATER 20 MG: 1 INJECTION INTRAMUSCULAR; INTRAVENOUS; SUBCUTANEOUS at 20:59

## 2024-02-05 RX ADMIN — IPRATROPIUM BROMIDE AND ALBUTEROL SULFATE 1 DOSE: .5; 2.5 SOLUTION RESPIRATORY (INHALATION) at 18:26

## 2024-02-05 RX ADMIN — FUROSEMIDE 40 MG: 10 INJECTION, SOLUTION INTRAMUSCULAR; INTRAVENOUS at 17:45

## 2024-02-05 RX ADMIN — SODIUM CHLORIDE, PRESERVATIVE FREE 10 ML: 5 INJECTION INTRAVENOUS at 08:14

## 2024-02-05 RX ADMIN — CEFEPIME 2000 MG: 2 INJECTION, POWDER, FOR SOLUTION INTRAVENOUS at 01:23

## 2024-02-05 RX ADMIN — BUDESONIDE INHALATION 500 MCG: 0.5 SUSPENSION RESPIRATORY (INHALATION) at 18:26

## 2024-02-05 RX ADMIN — METOPROLOL SUCCINATE 25 MG: 25 TABLET, EXTENDED RELEASE ORAL at 20:57

## 2024-02-05 RX ADMIN — INSULIN LISPRO 1 UNITS: 100 INJECTION, SOLUTION INTRAVENOUS; SUBCUTANEOUS at 10:48

## 2024-02-05 RX ADMIN — HEPARIN 300 UNITS: 100 SYRINGE at 20:58

## 2024-02-05 RX ADMIN — IPRATROPIUM BROMIDE AND ALBUTEROL SULFATE 1 DOSE: .5; 2.5 SOLUTION RESPIRATORY (INHALATION) at 14:25

## 2024-02-05 RX ADMIN — METOPROLOL SUCCINATE 25 MG: 25 TABLET, EXTENDED RELEASE ORAL at 08:14

## 2024-02-05 RX ADMIN — Medication: at 20:57

## 2024-02-05 RX ADMIN — FUROSEMIDE 40 MG: 10 INJECTION, SOLUTION INTRAMUSCULAR; INTRAVENOUS at 08:11

## 2024-02-05 RX ADMIN — APIXABAN 5 MG: 5 TABLET, FILM COATED ORAL at 08:14

## 2024-02-05 RX ADMIN — SODIUM CHLORIDE, PRESERVATIVE FREE 10 ML: 5 INJECTION INTRAVENOUS at 20:59

## 2024-02-05 RX ADMIN — IPRATROPIUM BROMIDE AND ALBUTEROL SULFATE 1 DOSE: .5; 2.5 SOLUTION RESPIRATORY (INHALATION) at 08:36

## 2024-02-05 RX ADMIN — APIXABAN 5 MG: 5 TABLET, FILM COATED ORAL at 20:57

## 2024-02-05 RX ADMIN — Medication: at 14:15

## 2024-02-05 RX ADMIN — IPRATROPIUM BROMIDE AND ALBUTEROL SULFATE 1 DOSE: .5; 2.5 SOLUTION RESPIRATORY (INHALATION) at 04:45

## 2024-02-05 RX ADMIN — INSULIN LISPRO 1 UNITS: 100 INJECTION, SOLUTION INTRAVENOUS; SUBCUTANEOUS at 15:45

## 2024-02-05 RX ADMIN — Medication: at 08:15

## 2024-02-05 RX ADMIN — WATER 20 MG: 1 INJECTION INTRAMUSCULAR; INTRAVENOUS; SUBCUTANEOUS at 08:12

## 2024-02-05 RX ADMIN — ATORVASTATIN CALCIUM 40 MG: 40 TABLET, FILM COATED ORAL at 20:58

## 2024-02-05 RX ADMIN — HEPARIN 300 UNITS: 100 SYRINGE at 08:14

## 2024-02-06 LAB
ALBUMIN SERPL-MCNC: 3.9 G/DL (ref 3.5–5.2)
ALP SERPL-CCNC: 155 U/L (ref 35–104)
ALT SERPL-CCNC: 12 U/L (ref 0–32)
ANION GAP SERPL CALCULATED.3IONS-SCNC: 3 MMOL/L (ref 7–16)
ANION GAP SERPL CALCULATED.3IONS-SCNC: 5 MMOL/L (ref 7–16)
AST SERPL-CCNC: 15 U/L (ref 0–31)
BASOPHILS # BLD: 0 K/UL (ref 0–0.2)
BASOPHILS NFR BLD: 0 % (ref 0–2)
BILIRUB SERPL-MCNC: 0.8 MG/DL (ref 0–1.2)
BNP SERPL-MCNC: 2072 PG/ML (ref 0–450)
BUN SERPL-MCNC: 18 MG/DL (ref 6–23)
BUN SERPL-MCNC: 23 MG/DL (ref 6–23)
CA-I BLD-SCNC: 1.29 MMOL/L (ref 1.15–1.33)
CALCIUM SERPL-MCNC: 10.2 MG/DL (ref 8.6–10.2)
CALCIUM SERPL-MCNC: 10.4 MG/DL (ref 8.6–10.2)
CHLORIDE SERPL-SCNC: 93 MMOL/L (ref 98–107)
CHLORIDE SERPL-SCNC: 93 MMOL/L (ref 98–107)
CO2 SERPL-SCNC: 44 MMOL/L (ref 22–29)
CO2 SERPL-SCNC: 45 MMOL/L (ref 22–29)
CREAT SERPL-MCNC: 0.7 MG/DL (ref 0.5–1)
CREAT SERPL-MCNC: 0.7 MG/DL (ref 0.5–1)
CRP SERPL HS-MCNC: <3 MG/L (ref 0–5)
EOSINOPHIL # BLD: 0 K/UL (ref 0.05–0.5)
EOSINOPHILS RELATIVE PERCENT: 0 % (ref 0–6)
ERYTHROCYTE [DISTWIDTH] IN BLOOD BY AUTOMATED COUNT: 16.4 % (ref 11.5–15)
GFR SERPL CREATININE-BSD FRML MDRD: >60 ML/MIN/1.73M2
GFR SERPL CREATININE-BSD FRML MDRD: >60 ML/MIN/1.73M2
GLUCOSE BLD-MCNC: 182 MG/DL (ref 74–99)
GLUCOSE BLD-MCNC: 189 MG/DL (ref 74–99)
GLUCOSE BLD-MCNC: 265 MG/DL (ref 74–99)
GLUCOSE SERPL-MCNC: 177 MG/DL (ref 74–99)
GLUCOSE SERPL-MCNC: 252 MG/DL (ref 74–99)
HCT VFR BLD AUTO: 35.4 % (ref 34–48)
HGB BLD-MCNC: 10.8 G/DL (ref 11.5–15.5)
LYMPHOCYTES NFR BLD: 0.42 K/UL (ref 1.5–4)
LYMPHOCYTES RELATIVE PERCENT: 4 % (ref 20–42)
MAGNESIUM SERPL-MCNC: 2 MG/DL (ref 1.6–2.6)
MCH RBC QN AUTO: 27.6 PG (ref 26–35)
MCHC RBC AUTO-ENTMCNC: 30.5 G/DL (ref 32–34.5)
MCV RBC AUTO: 90.3 FL (ref 80–99.9)
MONOCYTES NFR BLD: 0.95 K/UL (ref 0.1–0.95)
MONOCYTES NFR BLD: 9 % (ref 2–12)
NEUTROPHILS NFR BLD: 87 % (ref 43–80)
NEUTS SEG NFR BLD: 9.14 K/UL (ref 1.8–7.3)
PHOSPHATE SERPL-MCNC: 2.9 MG/DL (ref 2.5–4.5)
PLATELET, FLUORESCENCE: 115 K/UL (ref 130–450)
PMV BLD AUTO: ABNORMAL FL (ref 7–12)
POTASSIUM SERPL-SCNC: 4 MMOL/L (ref 3.5–5)
POTASSIUM SERPL-SCNC: 4 MMOL/L (ref 3.5–5)
PROCALCITONIN SERPL-MCNC: 0.06 NG/ML (ref 0–0.08)
PROT SERPL-MCNC: 6.7 G/DL (ref 6.4–8.3)
RBC # BLD AUTO: 3.92 M/UL (ref 3.5–5.5)
RBC # BLD: ABNORMAL 10*6/UL
SODIUM SERPL-SCNC: 141 MMOL/L (ref 132–146)
SODIUM SERPL-SCNC: 142 MMOL/L (ref 132–146)
WBC OTHER # BLD: 10.5 K/UL (ref 4.5–11.5)

## 2024-02-06 PROCEDURE — 6360000002 HC RX W HCPCS: Performed by: INTERNAL MEDICINE

## 2024-02-06 PROCEDURE — 6370000000 HC RX 637 (ALT 250 FOR IP): Performed by: INTERNAL MEDICINE

## 2024-02-06 PROCEDURE — 2580000003 HC RX 258: Performed by: INTERNAL MEDICINE

## 2024-02-06 PROCEDURE — 80053 COMPREHEN METABOLIC PANEL: CPT

## 2024-02-06 PROCEDURE — 36592 COLLECT BLOOD FROM PICC: CPT

## 2024-02-06 PROCEDURE — 83735 ASSAY OF MAGNESIUM: CPT

## 2024-02-06 PROCEDURE — 82330 ASSAY OF CALCIUM: CPT

## 2024-02-06 PROCEDURE — 97530 THERAPEUTIC ACTIVITIES: CPT

## 2024-02-06 PROCEDURE — 94660 CPAP INITIATION&MGMT: CPT

## 2024-02-06 PROCEDURE — 83880 ASSAY OF NATRIURETIC PEPTIDE: CPT

## 2024-02-06 PROCEDURE — 94640 AIRWAY INHALATION TREATMENT: CPT

## 2024-02-06 PROCEDURE — 85025 COMPLETE CBC W/AUTO DIFF WBC: CPT

## 2024-02-06 PROCEDURE — 2000000000 HC ICU R&B

## 2024-02-06 PROCEDURE — 2500000003 HC RX 250 WO HCPCS: Performed by: INTERNAL MEDICINE

## 2024-02-06 PROCEDURE — 80048 BASIC METABOLIC PNL TOTAL CA: CPT

## 2024-02-06 PROCEDURE — 82962 GLUCOSE BLOOD TEST: CPT

## 2024-02-06 PROCEDURE — 84145 PROCALCITONIN (PCT): CPT

## 2024-02-06 PROCEDURE — 86140 C-REACTIVE PROTEIN: CPT

## 2024-02-06 PROCEDURE — 84100 ASSAY OF PHOSPHORUS: CPT

## 2024-02-06 PROCEDURE — 99291 CRITICAL CARE FIRST HOUR: CPT | Performed by: INTERNAL MEDICINE

## 2024-02-06 PROCEDURE — 2700000000 HC OXYGEN THERAPY PER DAY

## 2024-02-06 RX ORDER — DEXAMETHASONE SODIUM PHOSPHATE 10 MG/ML
10 INJECTION INTRAMUSCULAR; INTRAVENOUS EVERY 24 HOURS
Status: COMPLETED | OUTPATIENT
Start: 2024-02-06 | End: 2024-02-10

## 2024-02-06 RX ADMIN — APIXABAN 5 MG: 5 TABLET, FILM COATED ORAL at 20:49

## 2024-02-06 RX ADMIN — ACETAZOLAMIDE SODIUM 500 MG: 500 INJECTION, POWDER, LYOPHILIZED, FOR SOLUTION INTRAVENOUS at 20:57

## 2024-02-06 RX ADMIN — Medication 10 ML: at 08:34

## 2024-02-06 RX ADMIN — FUROSEMIDE 40 MG: 10 INJECTION, SOLUTION INTRAMUSCULAR; INTRAVENOUS at 08:32

## 2024-02-06 RX ADMIN — IPRATROPIUM BROMIDE AND ALBUTEROL SULFATE 1 DOSE: .5; 2.5 SOLUTION RESPIRATORY (INHALATION) at 04:47

## 2024-02-06 RX ADMIN — METOPROLOL SUCCINATE 25 MG: 25 TABLET, EXTENDED RELEASE ORAL at 20:49

## 2024-02-06 RX ADMIN — HEPARIN 300 UNITS: 100 SYRINGE at 20:49

## 2024-02-06 RX ADMIN — Medication: at 08:29

## 2024-02-06 RX ADMIN — ANTI-FUNGAL POWDER MICONAZOLE NITRATE TALC FREE: 1.42 POWDER TOPICAL at 14:39

## 2024-02-06 RX ADMIN — HEPARIN 300 UNITS: 100 SYRINGE at 08:33

## 2024-02-06 RX ADMIN — WATER 20 MG: 1 INJECTION INTRAMUSCULAR; INTRAVENOUS; SUBCUTANEOUS at 08:31

## 2024-02-06 RX ADMIN — ANTI-FUNGAL POWDER MICONAZOLE NITRATE TALC FREE: 1.42 POWDER TOPICAL at 20:49

## 2024-02-06 RX ADMIN — METOPROLOL SUCCINATE 25 MG: 25 TABLET, EXTENDED RELEASE ORAL at 08:31

## 2024-02-06 RX ADMIN — PANTOPRAZOLE SODIUM 40 MG: 40 TABLET, DELAYED RELEASE ORAL at 05:00

## 2024-02-06 RX ADMIN — ATORVASTATIN CALCIUM 40 MG: 40 TABLET, FILM COATED ORAL at 20:49

## 2024-02-06 RX ADMIN — Medication 1000 UNITS: at 08:30

## 2024-02-06 RX ADMIN — FUROSEMIDE 40 MG: 10 INJECTION, SOLUTION INTRAMUSCULAR; INTRAVENOUS at 16:54

## 2024-02-06 RX ADMIN — AMLODIPINE BESYLATE 10 MG: 10 TABLET ORAL at 08:31

## 2024-02-06 RX ADMIN — Medication: at 20:49

## 2024-02-06 RX ADMIN — SODIUM CHLORIDE 500 MG: 9 INJECTION, SOLUTION INTRAVENOUS at 09:20

## 2024-02-06 RX ADMIN — IPRATROPIUM BROMIDE AND ALBUTEROL SULFATE 1 DOSE: .5; 2.5 SOLUTION RESPIRATORY (INHALATION) at 14:34

## 2024-02-06 RX ADMIN — BUDESONIDE INHALATION 500 MCG: 0.5 SUSPENSION RESPIRATORY (INHALATION) at 18:56

## 2024-02-06 RX ADMIN — SODIUM CHLORIDE, PRESERVATIVE FREE 10 ML: 5 INJECTION INTRAVENOUS at 08:30

## 2024-02-06 RX ADMIN — BUDESONIDE INHALATION 500 MCG: 0.5 SUSPENSION RESPIRATORY (INHALATION) at 04:47

## 2024-02-06 RX ADMIN — DEXAMETHASONE SODIUM PHOSPHATE 10 MG: 10 INJECTION INTRAMUSCULAR; INTRAVENOUS at 11:03

## 2024-02-06 RX ADMIN — IPRATROPIUM BROMIDE AND ALBUTEROL SULFATE 1 DOSE: .5; 2.5 SOLUTION RESPIRATORY (INHALATION) at 18:55

## 2024-02-06 RX ADMIN — INSULIN LISPRO 2 UNITS: 100 INJECTION, SOLUTION INTRAVENOUS; SUBCUTANEOUS at 16:54

## 2024-02-06 RX ADMIN — LISINOPRIL 20 MG: 20 TABLET ORAL at 08:30

## 2024-02-06 RX ADMIN — Medication 10 ML: at 20:54

## 2024-02-06 RX ADMIN — Medication: at 14:40

## 2024-02-06 RX ADMIN — APIXABAN 5 MG: 5 TABLET, FILM COATED ORAL at 08:31

## 2024-02-06 RX ADMIN — SODIUM CHLORIDE, PRESERVATIVE FREE 10 ML: 5 INJECTION INTRAVENOUS at 20:53

## 2024-02-06 RX ADMIN — SODIUM CHLORIDE, PRESERVATIVE FREE 10 ML: 5 INJECTION INTRAVENOUS at 20:54

## 2024-02-07 LAB
ANION GAP SERPL CALCULATED.3IONS-SCNC: 2 MMOL/L (ref 7–16)
ANION GAP SERPL CALCULATED.3IONS-SCNC: 8 MMOL/L (ref 7–16)
BASOPHILS # BLD: 0 K/UL (ref 0–0.2)
BASOPHILS NFR BLD: 0 % (ref 0–2)
BUN SERPL-MCNC: 25 MG/DL (ref 6–23)
BUN SERPL-MCNC: 28 MG/DL (ref 6–23)
CA-I BLD-SCNC: 1.29 MMOL/L (ref 1.15–1.33)
CALCIUM SERPL-MCNC: 10 MG/DL (ref 8.6–10.2)
CALCIUM SERPL-MCNC: 10.4 MG/DL (ref 8.6–10.2)
CHLORIDE SERPL-SCNC: 92 MMOL/L (ref 98–107)
CHLORIDE SERPL-SCNC: 93 MMOL/L (ref 98–107)
CO2 SERPL-SCNC: 39 MMOL/L (ref 22–29)
CO2 SERPL-SCNC: 45 MMOL/L (ref 22–29)
CREAT SERPL-MCNC: 0.7 MG/DL (ref 0.5–1)
CREAT SERPL-MCNC: 0.7 MG/DL (ref 0.5–1)
EOSINOPHIL # BLD: 0 K/UL (ref 0.05–0.5)
EOSINOPHILS RELATIVE PERCENT: 0 % (ref 0–6)
ERYTHROCYTE [DISTWIDTH] IN BLOOD BY AUTOMATED COUNT: 16.1 % (ref 11.5–15)
GFR SERPL CREATININE-BSD FRML MDRD: >60 ML/MIN/1.73M2
GFR SERPL CREATININE-BSD FRML MDRD: >60 ML/MIN/1.73M2
GLUCOSE BLD-MCNC: 137 MG/DL (ref 74–99)
GLUCOSE BLD-MCNC: 195 MG/DL (ref 74–99)
GLUCOSE BLD-MCNC: 240 MG/DL (ref 74–99)
GLUCOSE SERPL-MCNC: 170 MG/DL (ref 74–99)
GLUCOSE SERPL-MCNC: 263 MG/DL (ref 74–99)
HCT VFR BLD AUTO: 34.8 % (ref 34–48)
HGB BLD-MCNC: 10.7 G/DL (ref 11.5–15.5)
LYMPHOCYTES NFR BLD: 0.54 K/UL (ref 1.5–4)
LYMPHOCYTES RELATIVE PERCENT: 6 % (ref 20–42)
MAGNESIUM SERPL-MCNC: 2.1 MG/DL (ref 1.6–2.6)
MCH RBC QN AUTO: 27.2 PG (ref 26–35)
MCHC RBC AUTO-ENTMCNC: 30.7 G/DL (ref 32–34.5)
MCV RBC AUTO: 88.5 FL (ref 80–99.9)
MONOCYTES NFR BLD: 0.31 K/UL (ref 0.1–0.95)
MONOCYTES NFR BLD: 4 % (ref 2–12)
NEUTROPHILS NFR BLD: 90 % (ref 43–80)
NEUTS SEG NFR BLD: 8.05 K/UL (ref 1.8–7.3)
PHOSPHATE SERPL-MCNC: 3.4 MG/DL (ref 2.5–4.5)
PLATELET, FLUORESCENCE: 116 K/UL (ref 130–450)
PMV BLD AUTO: ABNORMAL FL (ref 7–12)
POTASSIUM SERPL-SCNC: 3.6 MMOL/L (ref 3.5–5)
POTASSIUM SERPL-SCNC: 4 MMOL/L (ref 3.5–5)
RBC # BLD AUTO: 3.93 M/UL (ref 3.5–5.5)
RBC # BLD: ABNORMAL 10*6/UL
SODIUM SERPL-SCNC: 139 MMOL/L (ref 132–146)
SODIUM SERPL-SCNC: 140 MMOL/L (ref 132–146)
WBC OTHER # BLD: 8.9 K/UL (ref 4.5–11.5)

## 2024-02-07 PROCEDURE — 82330 ASSAY OF CALCIUM: CPT

## 2024-02-07 PROCEDURE — 2000000000 HC ICU R&B

## 2024-02-07 PROCEDURE — 83735 ASSAY OF MAGNESIUM: CPT

## 2024-02-07 PROCEDURE — 94660 CPAP INITIATION&MGMT: CPT

## 2024-02-07 PROCEDURE — 2580000003 HC RX 258: Performed by: INTERNAL MEDICINE

## 2024-02-07 PROCEDURE — 82962 GLUCOSE BLOOD TEST: CPT

## 2024-02-07 PROCEDURE — 97530 THERAPEUTIC ACTIVITIES: CPT | Performed by: PHYSICAL THERAPIST

## 2024-02-07 PROCEDURE — 80048 BASIC METABOLIC PNL TOTAL CA: CPT

## 2024-02-07 PROCEDURE — 36415 COLL VENOUS BLD VENIPUNCTURE: CPT

## 2024-02-07 PROCEDURE — 6360000002 HC RX W HCPCS

## 2024-02-07 PROCEDURE — 36592 COLLECT BLOOD FROM PICC: CPT

## 2024-02-07 PROCEDURE — 84100 ASSAY OF PHOSPHORUS: CPT

## 2024-02-07 PROCEDURE — 6360000002 HC RX W HCPCS: Performed by: INTERNAL MEDICINE

## 2024-02-07 PROCEDURE — 94640 AIRWAY INHALATION TREATMENT: CPT

## 2024-02-07 PROCEDURE — 6370000000 HC RX 637 (ALT 250 FOR IP): Performed by: INTERNAL MEDICINE

## 2024-02-07 PROCEDURE — 99233 SBSQ HOSP IP/OBS HIGH 50: CPT | Performed by: INTERNAL MEDICINE

## 2024-02-07 PROCEDURE — 85025 COMPLETE CBC W/AUTO DIFF WBC: CPT

## 2024-02-07 PROCEDURE — 2700000000 HC OXYGEN THERAPY PER DAY

## 2024-02-07 RX ORDER — POTASSIUM CHLORIDE 29.8 MG/ML
20 INJECTION INTRAVENOUS ONCE
Status: COMPLETED | OUTPATIENT
Start: 2024-02-07 | End: 2024-02-07

## 2024-02-07 RX ADMIN — POTASSIUM CHLORIDE 20 MEQ: 29.8 INJECTION, SOLUTION INTRAVENOUS at 10:55

## 2024-02-07 RX ADMIN — AMLODIPINE BESYLATE 10 MG: 10 TABLET ORAL at 08:54

## 2024-02-07 RX ADMIN — FUROSEMIDE 40 MG: 10 INJECTION, SOLUTION INTRAMUSCULAR; INTRAVENOUS at 18:00

## 2024-02-07 RX ADMIN — ANTI-FUNGAL POWDER MICONAZOLE NITRATE TALC FREE: 1.42 POWDER TOPICAL at 19:58

## 2024-02-07 RX ADMIN — BUDESONIDE INHALATION 500 MCG: 0.5 SUSPENSION RESPIRATORY (INHALATION) at 16:16

## 2024-02-07 RX ADMIN — ACETAZOLAMIDE SODIUM 500 MG: 500 INJECTION, POWDER, LYOPHILIZED, FOR SOLUTION INTRAVENOUS at 09:01

## 2024-02-07 RX ADMIN — INSULIN LISPRO 1 UNITS: 100 INJECTION, SOLUTION INTRAVENOUS; SUBCUTANEOUS at 20:04

## 2024-02-07 RX ADMIN — ANTI-FUNGAL POWDER MICONAZOLE NITRATE TALC FREE: 1.42 POWDER TOPICAL at 08:55

## 2024-02-07 RX ADMIN — POTASSIUM CHLORIDE 20 MEQ: 29.8 INJECTION, SOLUTION INTRAVENOUS at 05:57

## 2024-02-07 RX ADMIN — METOPROLOL SUCCINATE 25 MG: 25 TABLET, EXTENDED RELEASE ORAL at 19:57

## 2024-02-07 RX ADMIN — IPRATROPIUM BROMIDE AND ALBUTEROL SULFATE 1 DOSE: .5; 2.5 SOLUTION RESPIRATORY (INHALATION) at 16:16

## 2024-02-07 RX ADMIN — METOPROLOL SUCCINATE 25 MG: 25 TABLET, EXTENDED RELEASE ORAL at 08:54

## 2024-02-07 RX ADMIN — ACETAZOLAMIDE SODIUM 500 MG: 500 INJECTION, POWDER, LYOPHILIZED, FOR SOLUTION INTRAVENOUS at 20:11

## 2024-02-07 RX ADMIN — IPRATROPIUM BROMIDE AND ALBUTEROL SULFATE 1 DOSE: .5; 2.5 SOLUTION RESPIRATORY (INHALATION) at 08:29

## 2024-02-07 RX ADMIN — FUROSEMIDE 40 MG: 10 INJECTION, SOLUTION INTRAMUSCULAR; INTRAVENOUS at 08:58

## 2024-02-07 RX ADMIN — SODIUM CHLORIDE, PRESERVATIVE FREE 10 ML: 5 INJECTION INTRAVENOUS at 19:57

## 2024-02-07 RX ADMIN — IPRATROPIUM BROMIDE AND ALBUTEROL SULFATE 1 DOSE: .5; 2.5 SOLUTION RESPIRATORY (INHALATION) at 04:57

## 2024-02-07 RX ADMIN — Medication: at 15:43

## 2024-02-07 RX ADMIN — PANTOPRAZOLE SODIUM 40 MG: 40 TABLET, DELAYED RELEASE ORAL at 06:05

## 2024-02-07 RX ADMIN — ATORVASTATIN CALCIUM 40 MG: 40 TABLET, FILM COATED ORAL at 19:57

## 2024-02-07 RX ADMIN — LISINOPRIL 20 MG: 20 TABLET ORAL at 08:54

## 2024-02-07 RX ADMIN — DEXAMETHASONE SODIUM PHOSPHATE 10 MG: 10 INJECTION INTRAMUSCULAR; INTRAVENOUS at 10:52

## 2024-02-07 RX ADMIN — Medication 1000 UNITS: at 08:54

## 2024-02-07 RX ADMIN — APIXABAN 5 MG: 5 TABLET, FILM COATED ORAL at 19:57

## 2024-02-07 RX ADMIN — BUDESONIDE INHALATION 500 MCG: 0.5 SUSPENSION RESPIRATORY (INHALATION) at 04:57

## 2024-02-07 RX ADMIN — Medication 10 ML: at 19:58

## 2024-02-07 RX ADMIN — Medication: at 19:58

## 2024-02-07 RX ADMIN — APIXABAN 5 MG: 5 TABLET, FILM COATED ORAL at 08:55

## 2024-02-07 RX ADMIN — Medication: at 08:58

## 2024-02-07 RX ADMIN — HEPARIN 300 UNITS: 100 SYRINGE at 19:57

## 2024-02-07 RX ADMIN — SODIUM CHLORIDE, PRESERVATIVE FREE 10 ML: 5 INJECTION INTRAVENOUS at 08:55

## 2024-02-08 LAB
AADO2: 256.3 MMHG
ANION GAP SERPL CALCULATED.3IONS-SCNC: 5 MMOL/L (ref 7–16)
ANION GAP SERPL CALCULATED.3IONS-SCNC: 8 MMOL/L (ref 7–16)
B.E.: 10.9 MMOL/L (ref -3–3)
BASOPHILS # BLD: 0 K/UL (ref 0–0.2)
BASOPHILS NFR BLD: 0 % (ref 0–2)
BUN SERPL-MCNC: 30 MG/DL (ref 6–23)
BUN SERPL-MCNC: 38 MG/DL (ref 6–23)
CA-I BLD-SCNC: 1.3 MMOL/L (ref 1.15–1.33)
CALCIUM SERPL-MCNC: 10 MG/DL (ref 8.6–10.2)
CALCIUM SERPL-MCNC: 10.2 MG/DL (ref 8.6–10.2)
CHLORIDE SERPL-SCNC: 94 MMOL/L (ref 98–107)
CHLORIDE SERPL-SCNC: 95 MMOL/L (ref 98–107)
CO2 SERPL-SCNC: 39 MMOL/L (ref 22–29)
CO2 SERPL-SCNC: 40 MMOL/L (ref 22–29)
COHB: 0.9 % (ref 0–1.5)
COMMENT: ABNORMAL
CREAT SERPL-MCNC: 0.7 MG/DL (ref 0.5–1)
CREAT SERPL-MCNC: 0.8 MG/DL (ref 0.5–1)
CRITICAL: ABNORMAL
DATE ANALYZED: ABNORMAL
DATE OF COLLECTION: ABNORMAL
EOSINOPHIL # BLD: 0 K/UL (ref 0.05–0.5)
EOSINOPHILS RELATIVE PERCENT: 0 % (ref 0–6)
ERYTHROCYTE [DISTWIDTH] IN BLOOD BY AUTOMATED COUNT: 16.1 % (ref 11.5–15)
FIO2: 60 %
GFR SERPL CREATININE-BSD FRML MDRD: >60 ML/MIN/1.73M2
GFR SERPL CREATININE-BSD FRML MDRD: >60 ML/MIN/1.73M2
GLUCOSE BLD-MCNC: 258 MG/DL (ref 74–99)
GLUCOSE SERPL-MCNC: 164 MG/DL (ref 74–99)
GLUCOSE SERPL-MCNC: 299 MG/DL (ref 74–99)
HCO3: 38.6 MMOL/L (ref 22–26)
HCT VFR BLD AUTO: 35.1 % (ref 34–48)
HGB BLD-MCNC: 10.9 G/DL (ref 11.5–15.5)
HHB: 4.7 % (ref 0–5)
LAB: ABNORMAL
LYMPHOCYTES NFR BLD: 0.48 K/UL (ref 1.5–4)
LYMPHOCYTES RELATIVE PERCENT: 5 % (ref 20–42)
Lab: 1538
MAGNESIUM SERPL-MCNC: 2.1 MG/DL (ref 1.6–2.6)
MCH RBC QN AUTO: 28 PG (ref 26–35)
MCHC RBC AUTO-ENTMCNC: 31.1 G/DL (ref 32–34.5)
MCV RBC AUTO: 90.2 FL (ref 80–99.9)
METHB: 0.1 % (ref 0–1.5)
MODE: ABNORMAL
MONOCYTES NFR BLD: 0.16 K/UL (ref 0.1–0.95)
MONOCYTES NFR BLD: 2 % (ref 2–12)
NEUTROPHILS NFR BLD: 93 % (ref 43–80)
NEUTS SEG NFR BLD: 8.56 K/UL (ref 1.8–7.3)
O2 CONTENT: 15.5 ML/DL
O2 SATURATION: 95.3 % (ref 92–98.5)
O2HB: 94.3 % (ref 94–97)
OPERATOR ID: ABNORMAL
PATIENT TEMP: 37 C
PCO2: 68.7 MMHG (ref 35–45)
PEEP/CPAP: 12 CMH2O
PFO2: 1.35 MMHG/%
PH BLOOD GAS: 7.37 (ref 7.35–7.45)
PHOSPHATE SERPL-MCNC: 2.9 MG/DL (ref 2.5–4.5)
PIP: 18 CMH2O
PLATELET, FLUORESCENCE: 105 K/UL (ref 130–450)
PMV BLD AUTO: ABNORMAL FL (ref 7–12)
PO2: 80.9 MMHG (ref 75–100)
POTASSIUM SERPL-SCNC: 3.7 MMOL/L (ref 3.5–5)
POTASSIUM SERPL-SCNC: 4.1 MMOL/L (ref 3.5–5)
RBC # BLD AUTO: 3.89 M/UL (ref 3.5–5.5)
RBC # BLD: ABNORMAL 10*6/UL
RI(T): 3.17
SODIUM SERPL-SCNC: 139 MMOL/L (ref 132–146)
SODIUM SERPL-SCNC: 142 MMOL/L (ref 132–146)
SOURCE, BLOOD GAS: ABNORMAL
THB: 11.6 G/DL (ref 11.5–16.5)
TIME ANALYZED: 1542
WBC OTHER # BLD: 9.2 K/UL (ref 4.5–11.5)

## 2024-02-08 PROCEDURE — 6370000000 HC RX 637 (ALT 250 FOR IP): Performed by: INTERNAL MEDICINE

## 2024-02-08 PROCEDURE — 6360000002 HC RX W HCPCS: Performed by: INTERNAL MEDICINE

## 2024-02-08 PROCEDURE — 94640 AIRWAY INHALATION TREATMENT: CPT

## 2024-02-08 PROCEDURE — 99233 SBSQ HOSP IP/OBS HIGH 50: CPT | Performed by: INTERNAL MEDICINE

## 2024-02-08 PROCEDURE — 82962 GLUCOSE BLOOD TEST: CPT

## 2024-02-08 PROCEDURE — 2580000003 HC RX 258: Performed by: INTERNAL MEDICINE

## 2024-02-08 PROCEDURE — 83735 ASSAY OF MAGNESIUM: CPT

## 2024-02-08 PROCEDURE — 94660 CPAP INITIATION&MGMT: CPT

## 2024-02-08 PROCEDURE — 85025 COMPLETE CBC W/AUTO DIFF WBC: CPT

## 2024-02-08 PROCEDURE — 80048 BASIC METABOLIC PNL TOTAL CA: CPT

## 2024-02-08 PROCEDURE — 36592 COLLECT BLOOD FROM PICC: CPT

## 2024-02-08 PROCEDURE — 82330 ASSAY OF CALCIUM: CPT

## 2024-02-08 PROCEDURE — 2060000000 HC ICU INTERMEDIATE R&B

## 2024-02-08 PROCEDURE — 92526 ORAL FUNCTION THERAPY: CPT | Performed by: SPEECH-LANGUAGE PATHOLOGIST

## 2024-02-08 PROCEDURE — 94669 MECHANICAL CHEST WALL OSCILL: CPT

## 2024-02-08 PROCEDURE — 2700000000 HC OXYGEN THERAPY PER DAY

## 2024-02-08 PROCEDURE — 82805 BLOOD GASES W/O2 SATURATION: CPT

## 2024-02-08 PROCEDURE — 84100 ASSAY OF PHOSPHORUS: CPT

## 2024-02-08 PROCEDURE — 37799 UNLISTED PX VASCULAR SURGERY: CPT

## 2024-02-08 RX ORDER — IPRATROPIUM BROMIDE AND ALBUTEROL SULFATE 2.5; .5 MG/3ML; MG/3ML
1 SOLUTION RESPIRATORY (INHALATION)
Status: DISCONTINUED | OUTPATIENT
Start: 2024-02-08 | End: 2024-02-11 | Stop reason: HOSPADM

## 2024-02-08 RX ADMIN — Medication 10 ML: at 21:28

## 2024-02-08 RX ADMIN — FUROSEMIDE 40 MG: 10 INJECTION, SOLUTION INTRAMUSCULAR; INTRAVENOUS at 08:09

## 2024-02-08 RX ADMIN — ANTI-FUNGAL POWDER MICONAZOLE NITRATE TALC FREE: 1.42 POWDER TOPICAL at 08:10

## 2024-02-08 RX ADMIN — IPRATROPIUM BROMIDE AND ALBUTEROL SULFATE 1 DOSE: .5; 2.5 SOLUTION RESPIRATORY (INHALATION) at 09:05

## 2024-02-08 RX ADMIN — Medication: at 08:10

## 2024-02-08 RX ADMIN — FUROSEMIDE 40 MG: 10 INJECTION, SOLUTION INTRAMUSCULAR; INTRAVENOUS at 18:17

## 2024-02-08 RX ADMIN — BUDESONIDE INHALATION 500 MCG: 0.5 SUSPENSION RESPIRATORY (INHALATION) at 18:11

## 2024-02-08 RX ADMIN — Medication 10 ML: at 08:10

## 2024-02-08 RX ADMIN — IPRATROPIUM BROMIDE AND ALBUTEROL SULFATE 1 DOSE: .5; 2.5 SOLUTION RESPIRATORY (INHALATION) at 18:11

## 2024-02-08 RX ADMIN — SODIUM CHLORIDE, PRESERVATIVE FREE 10 ML: 5 INJECTION INTRAVENOUS at 21:17

## 2024-02-08 RX ADMIN — HEPARIN 300 UNITS: 100 SYRINGE at 21:16

## 2024-02-08 RX ADMIN — APIXABAN 5 MG: 5 TABLET, FILM COATED ORAL at 21:15

## 2024-02-08 RX ADMIN — IPRATROPIUM BROMIDE AND ALBUTEROL SULFATE 1 DOSE: .5; 2.5 SOLUTION RESPIRATORY (INHALATION) at 04:04

## 2024-02-08 RX ADMIN — Medication: at 18:18

## 2024-02-08 RX ADMIN — LISINOPRIL 20 MG: 20 TABLET ORAL at 08:09

## 2024-02-08 RX ADMIN — BUDESONIDE INHALATION 500 MCG: 0.5 SUSPENSION RESPIRATORY (INHALATION) at 04:04

## 2024-02-08 RX ADMIN — Medication 1000 UNITS: at 08:09

## 2024-02-08 RX ADMIN — METOPROLOL SUCCINATE 25 MG: 25 TABLET, EXTENDED RELEASE ORAL at 21:15

## 2024-02-08 RX ADMIN — SODIUM CHLORIDE, PRESERVATIVE FREE 10 ML: 5 INJECTION INTRAVENOUS at 08:10

## 2024-02-08 RX ADMIN — AMLODIPINE BESYLATE 10 MG: 10 TABLET ORAL at 08:09

## 2024-02-08 RX ADMIN — ATORVASTATIN CALCIUM 40 MG: 40 TABLET, FILM COATED ORAL at 21:15

## 2024-02-08 RX ADMIN — PANTOPRAZOLE SODIUM 40 MG: 40 TABLET, DELAYED RELEASE ORAL at 06:21

## 2024-02-08 RX ADMIN — IPRATROPIUM BROMIDE AND ALBUTEROL SULFATE 1 DOSE: .5; 2.5 SOLUTION RESPIRATORY (INHALATION) at 14:01

## 2024-02-08 RX ADMIN — Medication: at 21:16

## 2024-02-08 RX ADMIN — HEPARIN 300 UNITS: 100 SYRINGE at 08:11

## 2024-02-08 RX ADMIN — APIXABAN 5 MG: 5 TABLET, FILM COATED ORAL at 08:09

## 2024-02-08 RX ADMIN — METOPROLOL SUCCINATE 25 MG: 25 TABLET, EXTENDED RELEASE ORAL at 08:09

## 2024-02-08 RX ADMIN — ACETAZOLAMIDE SODIUM 500 MG: 500 INJECTION, POWDER, LYOPHILIZED, FOR SOLUTION INTRAVENOUS at 10:13

## 2024-02-08 RX ADMIN — ACETAZOLAMIDE SODIUM 500 MG: 500 INJECTION, POWDER, LYOPHILIZED, FOR SOLUTION INTRAVENOUS at 21:17

## 2024-02-08 RX ADMIN — DEXAMETHASONE SODIUM PHOSPHATE 10 MG: 10 INJECTION INTRAMUSCULAR; INTRAVENOUS at 10:12

## 2024-02-08 RX ADMIN — INSULIN LISPRO 2 UNITS: 100 INJECTION, SOLUTION INTRAVENOUS; SUBCUTANEOUS at 21:14

## 2024-02-08 RX ADMIN — ANTI-FUNGAL POWDER MICONAZOLE NITRATE TALC FREE: 1.42 POWDER TOPICAL at 21:16

## 2024-02-09 LAB
ANION GAP SERPL CALCULATED.3IONS-SCNC: 1 MMOL/L (ref 7–16)
BASOPHILS # BLD: 0 K/UL (ref 0–0.2)
BASOPHILS NFR BLD: 0 % (ref 0–2)
BUN SERPL-MCNC: 40 MG/DL (ref 6–23)
CA-I BLD-SCNC: 1.28 MMOL/L (ref 1.15–1.33)
CALCIUM SERPL-MCNC: 10 MG/DL (ref 8.6–10.2)
CHLORIDE SERPL-SCNC: 99 MMOL/L (ref 98–107)
CO2 SERPL-SCNC: 43 MMOL/L (ref 22–29)
CREAT SERPL-MCNC: 0.8 MG/DL (ref 0.5–1)
EOSINOPHIL # BLD: 0 K/UL (ref 0.05–0.5)
EOSINOPHILS RELATIVE PERCENT: 0 % (ref 0–6)
ERYTHROCYTE [DISTWIDTH] IN BLOOD BY AUTOMATED COUNT: 16.3 % (ref 11.5–15)
GFR SERPL CREATININE-BSD FRML MDRD: >60 ML/MIN/1.73M2
GLUCOSE BLD-MCNC: 167 MG/DL (ref 74–99)
GLUCOSE BLD-MCNC: 208 MG/DL (ref 74–99)
GLUCOSE BLD-MCNC: 294 MG/DL (ref 74–99)
GLUCOSE BLD-MCNC: 303 MG/DL (ref 74–99)
GLUCOSE BLD-MCNC: 410 MG/DL (ref 74–99)
GLUCOSE SERPL-MCNC: 146 MG/DL (ref 74–99)
HCT VFR BLD AUTO: 33.1 % (ref 34–48)
HGB BLD-MCNC: 10.1 G/DL (ref 11.5–15.5)
LYMPHOCYTES NFR BLD: 0.51 K/UL (ref 1.5–4)
LYMPHOCYTES RELATIVE PERCENT: 6 % (ref 20–42)
MAGNESIUM SERPL-MCNC: 2.2 MG/DL (ref 1.6–2.6)
MCH RBC QN AUTO: 28.1 PG (ref 26–35)
MCHC RBC AUTO-ENTMCNC: 30.5 G/DL (ref 32–34.5)
MCV RBC AUTO: 91.9 FL (ref 80–99.9)
MONOCYTES NFR BLD: 0.79 K/UL (ref 0.1–0.95)
MONOCYTES NFR BLD: 10 % (ref 2–12)
NEUTROPHILS NFR BLD: 84 % (ref 43–80)
NEUTS SEG NFR BLD: 7 K/UL (ref 1.8–7.3)
PHOSPHATE SERPL-MCNC: 3.6 MG/DL (ref 2.5–4.5)
PLATELET CONFIRMATION: NORMAL
PLATELET, FLUORESCENCE: 92 K/UL (ref 130–450)
PMV BLD AUTO: ABNORMAL FL (ref 7–12)
POTASSIUM SERPL-SCNC: 3.8 MMOL/L (ref 3.5–5)
RBC # BLD AUTO: 3.6 M/UL (ref 3.5–5.5)
RBC # BLD: ABNORMAL 10*6/UL
SODIUM SERPL-SCNC: 143 MMOL/L (ref 132–146)
WBC OTHER # BLD: 8.3 K/UL (ref 4.5–11.5)

## 2024-02-09 PROCEDURE — 6360000002 HC RX W HCPCS: Performed by: INTERNAL MEDICINE

## 2024-02-09 PROCEDURE — 83735 ASSAY OF MAGNESIUM: CPT

## 2024-02-09 PROCEDURE — 6370000000 HC RX 637 (ALT 250 FOR IP): Performed by: INTERNAL MEDICINE

## 2024-02-09 PROCEDURE — 2700000000 HC OXYGEN THERAPY PER DAY

## 2024-02-09 PROCEDURE — 94660 CPAP INITIATION&MGMT: CPT

## 2024-02-09 PROCEDURE — 97530 THERAPEUTIC ACTIVITIES: CPT | Performed by: PHYSICAL THERAPIST

## 2024-02-09 PROCEDURE — 2580000003 HC RX 258: Performed by: INTERNAL MEDICINE

## 2024-02-09 PROCEDURE — 2060000000 HC ICU INTERMEDIATE R&B

## 2024-02-09 PROCEDURE — 99233 SBSQ HOSP IP/OBS HIGH 50: CPT | Performed by: INTERNAL MEDICINE

## 2024-02-09 PROCEDURE — 85025 COMPLETE CBC W/AUTO DIFF WBC: CPT

## 2024-02-09 PROCEDURE — 82962 GLUCOSE BLOOD TEST: CPT

## 2024-02-09 PROCEDURE — 94668 MNPJ CHEST WALL SBSQ: CPT

## 2024-02-09 PROCEDURE — 84100 ASSAY OF PHOSPHORUS: CPT

## 2024-02-09 PROCEDURE — 94640 AIRWAY INHALATION TREATMENT: CPT

## 2024-02-09 PROCEDURE — 80048 BASIC METABOLIC PNL TOTAL CA: CPT

## 2024-02-09 PROCEDURE — 82330 ASSAY OF CALCIUM: CPT

## 2024-02-09 PROCEDURE — 94669 MECHANICAL CHEST WALL OSCILL: CPT

## 2024-02-09 RX ADMIN — FUROSEMIDE 40 MG: 10 INJECTION, SOLUTION INTRAMUSCULAR; INTRAVENOUS at 16:59

## 2024-02-09 RX ADMIN — APIXABAN 5 MG: 5 TABLET, FILM COATED ORAL at 21:08

## 2024-02-09 RX ADMIN — BUDESONIDE INHALATION 500 MCG: 0.5 SUSPENSION RESPIRATORY (INHALATION) at 18:32

## 2024-02-09 RX ADMIN — IPRATROPIUM BROMIDE AND ALBUTEROL SULFATE 1 DOSE: .5; 2.5 SOLUTION RESPIRATORY (INHALATION) at 04:22

## 2024-02-09 RX ADMIN — ACETAZOLAMIDE SODIUM 500 MG: 500 INJECTION, POWDER, LYOPHILIZED, FOR SOLUTION INTRAVENOUS at 20:50

## 2024-02-09 RX ADMIN — HEPARIN 300 UNITS: 100 SYRINGE at 10:16

## 2024-02-09 RX ADMIN — PANTOPRAZOLE SODIUM 40 MG: 40 TABLET, DELAYED RELEASE ORAL at 05:31

## 2024-02-09 RX ADMIN — FUROSEMIDE 40 MG: 10 INJECTION, SOLUTION INTRAMUSCULAR; INTRAVENOUS at 10:18

## 2024-02-09 RX ADMIN — ANTI-FUNGAL POWDER MICONAZOLE NITRATE TALC FREE: 1.42 POWDER TOPICAL at 20:54

## 2024-02-09 RX ADMIN — Medication: at 20:55

## 2024-02-09 RX ADMIN — LISINOPRIL 20 MG: 20 TABLET ORAL at 10:18

## 2024-02-09 RX ADMIN — Medication: at 17:02

## 2024-02-09 RX ADMIN — AMLODIPINE BESYLATE 10 MG: 10 TABLET ORAL at 10:18

## 2024-02-09 RX ADMIN — BUDESONIDE INHALATION 500 MCG: 0.5 SUSPENSION RESPIRATORY (INHALATION) at 04:22

## 2024-02-09 RX ADMIN — METOPROLOL SUCCINATE 25 MG: 25 TABLET, EXTENDED RELEASE ORAL at 10:18

## 2024-02-09 RX ADMIN — METOPROLOL SUCCINATE 25 MG: 25 TABLET, EXTENDED RELEASE ORAL at 20:55

## 2024-02-09 RX ADMIN — Medication 1000 UNITS: at 10:18

## 2024-02-09 RX ADMIN — APIXABAN 5 MG: 5 TABLET, FILM COATED ORAL at 10:18

## 2024-02-09 RX ADMIN — INSULIN LISPRO 3 UNITS: 100 INJECTION, SOLUTION INTRAVENOUS; SUBCUTANEOUS at 20:56

## 2024-02-09 RX ADMIN — DEXAMETHASONE SODIUM PHOSPHATE 10 MG: 10 INJECTION INTRAMUSCULAR; INTRAVENOUS at 10:26

## 2024-02-09 RX ADMIN — Medication 10 ML: at 21:36

## 2024-02-09 RX ADMIN — IPRATROPIUM BROMIDE AND ALBUTEROL SULFATE 1 DOSE: .5; 2.5 SOLUTION RESPIRATORY (INHALATION) at 18:32

## 2024-02-09 RX ADMIN — IPRATROPIUM BROMIDE AND ALBUTEROL SULFATE 1 DOSE: .5; 2.5 SOLUTION RESPIRATORY (INHALATION) at 09:37

## 2024-02-09 RX ADMIN — IPRATROPIUM BROMIDE AND ALBUTEROL SULFATE 1 DOSE: .5; 2.5 SOLUTION RESPIRATORY (INHALATION) at 14:22

## 2024-02-09 RX ADMIN — SODIUM CHLORIDE, PRESERVATIVE FREE 10 ML: 5 INJECTION INTRAVENOUS at 20:56

## 2024-02-09 RX ADMIN — HEPARIN 300 UNITS: 100 SYRINGE at 20:55

## 2024-02-09 RX ADMIN — ACETAZOLAMIDE SODIUM 500 MG: 500 INJECTION, POWDER, LYOPHILIZED, FOR SOLUTION INTRAVENOUS at 10:16

## 2024-02-09 RX ADMIN — Medication: at 10:19

## 2024-02-09 RX ADMIN — INSULIN LISPRO 2 UNITS: 100 INJECTION, SOLUTION INTRAVENOUS; SUBCUTANEOUS at 17:55

## 2024-02-09 RX ADMIN — ATORVASTATIN CALCIUM 40 MG: 40 TABLET, FILM COATED ORAL at 20:55

## 2024-02-09 RX ADMIN — ANTI-FUNGAL POWDER MICONAZOLE NITRATE TALC FREE: 1.42 POWDER TOPICAL at 17:03

## 2024-02-10 ENCOUNTER — APPOINTMENT (OUTPATIENT)
Dept: GENERAL RADIOLOGY | Age: 69
DRG: 133 | End: 2024-02-10
Payer: COMMERCIAL

## 2024-02-10 LAB
AMPHET UR QL SCN: NEGATIVE
ANION GAP SERPL CALCULATED.3IONS-SCNC: 7 MMOL/L (ref 7–16)
B.E.: 12.5 MMOL/L (ref -3–3)
BARBITURATES UR QL SCN: NEGATIVE
BASOPHILS # BLD: 0 K/UL (ref 0–0.2)
BASOPHILS NFR BLD: 0 % (ref 0–2)
BENZODIAZ UR QL: NEGATIVE
BUN SERPL-MCNC: 48 MG/DL (ref 6–23)
BUPRENORPHINE UR QL: NEGATIVE
CA-I BLD-SCNC: 1.29 MMOL/L (ref 1.15–1.33)
CALCIUM SERPL-MCNC: 9.9 MG/DL (ref 8.6–10.2)
CANNABINOIDS UR QL SCN: NEGATIVE
CHLORIDE SERPL-SCNC: 95 MMOL/L (ref 98–107)
CO2 SERPL-SCNC: 40 MMOL/L (ref 22–29)
COCAINE UR QL SCN: NEGATIVE
COHB: 1.1 % (ref 0–1.5)
COMMENT: ABNORMAL
CREAT SERPL-MCNC: 0.9 MG/DL (ref 0.5–1)
CRITICAL: ABNORMAL
DATE ANALYZED: ABNORMAL
DATE OF COLLECTION: ABNORMAL
EOSINOPHIL # BLD: 0 K/UL (ref 0.05–0.5)
EOSINOPHILS RELATIVE PERCENT: 0 % (ref 0–6)
ERYTHROCYTE [DISTWIDTH] IN BLOOD BY AUTOMATED COUNT: 16.3 % (ref 11.5–15)
FENTANYL UR QL: NEGATIVE
GFR SERPL CREATININE-BSD FRML MDRD: >60 ML/MIN/1.73M2
GLUCOSE BLD-MCNC: 151 MG/DL (ref 74–99)
GLUCOSE BLD-MCNC: 328 MG/DL (ref 74–99)
GLUCOSE BLD-MCNC: 376 MG/DL (ref 74–99)
GLUCOSE SERPL-MCNC: 177 MG/DL (ref 74–99)
HCO3: 41.1 MMOL/L (ref 22–26)
HCT VFR BLD AUTO: 32.4 % (ref 34–48)
HGB BLD-MCNC: 9.9 G/DL (ref 11.5–15.5)
HHB: 10.7 % (ref 0–5)
LAB: ABNORMAL
LYMPHOCYTES NFR BLD: 0.16 K/UL (ref 1.5–4)
LYMPHOCYTES RELATIVE PERCENT: 2 % (ref 20–42)
Lab: 1215
MAGNESIUM SERPL-MCNC: 2.2 MG/DL (ref 1.6–2.6)
MCH RBC QN AUTO: 27.9 PG (ref 26–35)
MCHC RBC AUTO-ENTMCNC: 30.6 G/DL (ref 32–34.5)
MCV RBC AUTO: 91.3 FL (ref 80–99.9)
METAMYELOCYTES ABSOLUTE COUNT: 0.08 K/UL (ref 0–0.12)
METAMYELOCYTES: 1 % (ref 0–1)
METHADONE UR QL: NEGATIVE
METHB: 0 % (ref 0–1.5)
MODE: ABNORMAL
MONOCYTES NFR BLD: 0.23 K/UL (ref 0.1–0.95)
MONOCYTES NFR BLD: 3 % (ref 2–12)
MYELOCYTES ABSOLUTE COUNT: 0.08 K/UL
MYELOCYTES: 1 %
NEUTROPHILS NFR BLD: 94 % (ref 43–80)
NEUTS SEG NFR BLD: 8.46 K/UL (ref 1.8–7.3)
O2 CONTENT: 14.3 ML/DL
O2 SATURATION: 89.2 % (ref 92–98.5)
O2HB: 88.2 % (ref 94–97)
OPERATOR ID: ABNORMAL
OPIATES UR QL SCN: NEGATIVE
OXYCODONE UR QL SCN: NEGATIVE
PATIENT TEMP: 37 C
PCO2: 78 MMHG (ref 35–45)
PCP UR QL SCN: NEGATIVE
PH BLOOD GAS: 7.34 (ref 7.35–7.45)
PHOSPHATE SERPL-MCNC: 3.6 MG/DL (ref 2.5–4.5)
PLATELET CONFIRMATION: NORMAL
PLATELET, FLUORESCENCE: 86 K/UL (ref 130–450)
PMV BLD AUTO: ABNORMAL FL (ref 7–12)
PO2: 61 MMHG (ref 75–100)
POTASSIUM SERPL-SCNC: 3.5 MMOL/L (ref 3.5–5)
RBC # BLD AUTO: 3.55 M/UL (ref 3.5–5.5)
RBC # BLD: ABNORMAL 10*6/UL
SODIUM SERPL-SCNC: 142 MMOL/L (ref 132–146)
SOURCE, BLOOD GAS: ABNORMAL
TEST INFORMATION: NORMAL
THB: 11.5 G/DL (ref 11.5–16.5)
TIME ANALYZED: 1223
WBC OTHER # BLD: 9 K/UL (ref 4.5–11.5)

## 2024-02-10 PROCEDURE — 94668 MNPJ CHEST WALL SBSQ: CPT

## 2024-02-10 PROCEDURE — 80307 DRUG TEST PRSMV CHEM ANLYZR: CPT

## 2024-02-10 PROCEDURE — 82805 BLOOD GASES W/O2 SATURATION: CPT

## 2024-02-10 PROCEDURE — 6370000000 HC RX 637 (ALT 250 FOR IP): Performed by: INTERNAL MEDICINE

## 2024-02-10 PROCEDURE — 6360000002 HC RX W HCPCS: Performed by: INTERNAL MEDICINE

## 2024-02-10 PROCEDURE — 2060000000 HC ICU INTERMEDIATE R&B

## 2024-02-10 PROCEDURE — 71045 X-RAY EXAM CHEST 1 VIEW: CPT

## 2024-02-10 PROCEDURE — 2700000000 HC OXYGEN THERAPY PER DAY

## 2024-02-10 PROCEDURE — 94640 AIRWAY INHALATION TREATMENT: CPT

## 2024-02-10 PROCEDURE — 84100 ASSAY OF PHOSPHORUS: CPT

## 2024-02-10 PROCEDURE — 2580000003 HC RX 258: Performed by: INTERNAL MEDICINE

## 2024-02-10 PROCEDURE — 83735 ASSAY OF MAGNESIUM: CPT

## 2024-02-10 PROCEDURE — 85025 COMPLETE CBC W/AUTO DIFF WBC: CPT

## 2024-02-10 PROCEDURE — 80048 BASIC METABOLIC PNL TOTAL CA: CPT

## 2024-02-10 PROCEDURE — 82330 ASSAY OF CALCIUM: CPT

## 2024-02-10 PROCEDURE — 99233 SBSQ HOSP IP/OBS HIGH 50: CPT | Performed by: INTERNAL MEDICINE

## 2024-02-10 PROCEDURE — 94669 MECHANICAL CHEST WALL OSCILL: CPT

## 2024-02-10 PROCEDURE — 94660 CPAP INITIATION&MGMT: CPT

## 2024-02-10 PROCEDURE — 82962 GLUCOSE BLOOD TEST: CPT

## 2024-02-10 RX ORDER — INSULIN LISPRO 100 [IU]/ML
2 INJECTION, SOLUTION INTRAVENOUS; SUBCUTANEOUS ONCE
Status: COMPLETED | OUTPATIENT
Start: 2024-02-10 | End: 2024-02-10

## 2024-02-10 RX ORDER — DEXAMETHASONE SODIUM PHOSPHATE 10 MG/ML
6 INJECTION INTRAMUSCULAR; INTRAVENOUS EVERY 24 HOURS
Status: DISCONTINUED | OUTPATIENT
Start: 2024-02-11 | End: 2024-02-11 | Stop reason: HOSPADM

## 2024-02-10 RX ADMIN — Medication: at 20:28

## 2024-02-10 RX ADMIN — INSULIN LISPRO 2 UNITS: 100 INJECTION, SOLUTION INTRAVENOUS; SUBCUTANEOUS at 17:59

## 2024-02-10 RX ADMIN — IPRATROPIUM BROMIDE AND ALBUTEROL SULFATE 1 DOSE: .5; 2.5 SOLUTION RESPIRATORY (INHALATION) at 13:02

## 2024-02-10 RX ADMIN — ANTI-FUNGAL POWDER MICONAZOLE NITRATE TALC FREE: 1.42 POWDER TOPICAL at 20:27

## 2024-02-10 RX ADMIN — BUDESONIDE INHALATION 500 MCG: 0.5 SUSPENSION RESPIRATORY (INHALATION) at 17:30

## 2024-02-10 RX ADMIN — INSULIN LISPRO 4 UNITS: 100 INJECTION, SOLUTION INTRAVENOUS; SUBCUTANEOUS at 17:22

## 2024-02-10 RX ADMIN — IPRATROPIUM BROMIDE AND ALBUTEROL SULFATE 1 DOSE: .5; 2.5 SOLUTION RESPIRATORY (INHALATION) at 08:58

## 2024-02-10 RX ADMIN — ATORVASTATIN CALCIUM 40 MG: 40 TABLET, FILM COATED ORAL at 20:33

## 2024-02-10 RX ADMIN — ACETAMINOPHEN 650 MG: 325 TABLET ORAL at 01:09

## 2024-02-10 RX ADMIN — HEPARIN 300 UNITS: 100 SYRINGE at 20:30

## 2024-02-10 RX ADMIN — APIXABAN 5 MG: 5 TABLET, FILM COATED ORAL at 09:33

## 2024-02-10 RX ADMIN — AMLODIPINE BESYLATE 10 MG: 10 TABLET ORAL at 11:23

## 2024-02-10 RX ADMIN — FUROSEMIDE 40 MG: 10 INJECTION, SOLUTION INTRAMUSCULAR; INTRAVENOUS at 17:59

## 2024-02-10 RX ADMIN — BUDESONIDE INHALATION 500 MCG: 0.5 SUSPENSION RESPIRATORY (INHALATION) at 05:28

## 2024-02-10 RX ADMIN — METOPROLOL SUCCINATE 25 MG: 25 TABLET, EXTENDED RELEASE ORAL at 09:33

## 2024-02-10 RX ADMIN — FUROSEMIDE 40 MG: 10 INJECTION, SOLUTION INTRAMUSCULAR; INTRAVENOUS at 09:33

## 2024-02-10 RX ADMIN — Medication 1000 UNITS: at 09:33

## 2024-02-10 RX ADMIN — Medication: at 15:58

## 2024-02-10 RX ADMIN — IPRATROPIUM BROMIDE AND ALBUTEROL SULFATE 1 DOSE: .5; 2.5 SOLUTION RESPIRATORY (INHALATION) at 17:30

## 2024-02-10 RX ADMIN — HEPARIN 300 UNITS: 100 SYRINGE at 09:32

## 2024-02-10 RX ADMIN — SODIUM CHLORIDE, PRESERVATIVE FREE 10 ML: 5 INJECTION INTRAVENOUS at 20:27

## 2024-02-10 RX ADMIN — ANTI-FUNGAL POWDER MICONAZOLE NITRATE TALC FREE: 1.42 POWDER TOPICAL at 09:33

## 2024-02-10 RX ADMIN — APIXABAN 5 MG: 5 TABLET, FILM COATED ORAL at 20:27

## 2024-02-10 RX ADMIN — DEXAMETHASONE SODIUM PHOSPHATE 10 MG: 10 INJECTION INTRAMUSCULAR; INTRAVENOUS at 09:33

## 2024-02-10 RX ADMIN — METOPROLOL SUCCINATE 25 MG: 25 TABLET, EXTENDED RELEASE ORAL at 20:27

## 2024-02-10 RX ADMIN — ACETAZOLAMIDE SODIUM 500 MG: 500 INJECTION, POWDER, LYOPHILIZED, FOR SOLUTION INTRAVENOUS at 20:26

## 2024-02-10 RX ADMIN — SODIUM CHLORIDE, PRESERVATIVE FREE 10 ML: 5 INJECTION INTRAVENOUS at 09:35

## 2024-02-10 RX ADMIN — INSULIN LISPRO 3 UNITS: 100 INJECTION, SOLUTION INTRAVENOUS; SUBCUTANEOUS at 20:30

## 2024-02-10 RX ADMIN — LISINOPRIL 20 MG: 20 TABLET ORAL at 11:23

## 2024-02-10 RX ADMIN — IPRATROPIUM BROMIDE AND ALBUTEROL SULFATE 1 DOSE: .5; 2.5 SOLUTION RESPIRATORY (INHALATION) at 05:28

## 2024-02-10 RX ADMIN — ACETAZOLAMIDE SODIUM 500 MG: 500 INJECTION, POWDER, LYOPHILIZED, FOR SOLUTION INTRAVENOUS at 09:43

## 2024-02-10 RX ADMIN — Medication: at 09:34

## 2024-02-10 RX ADMIN — PANTOPRAZOLE SODIUM 40 MG: 40 TABLET, DELAYED RELEASE ORAL at 04:51

## 2024-02-10 NOTE — PLAN OF CARE
Called by RT. Patient is not pulling adequate volumes and experiencing episodes of apnea on NIV despite multiple setting adjustments. I discussed the case with Dr. Romero of pulmonary/critical; transfer to ICU for closer monitoring as she is at risk for decompensation .     Electronically signed by Huber Pineda DO on 1/19/2024 at 2:42 PM     
  Problem: Chronic Conditions and Co-morbidities  Goal: Patient's chronic conditions and co-morbidity symptoms are monitored and maintained or improved  1/23/2024 0242 by Erum Hines RN  Outcome: Progressing     Problem: Safety - Adult  Goal: Free from fall injury  Recent Flowsheet Documentation  Taken 1/23/2024 0912 by Abdiaziz Ponce RN  Free From Fall Injury: Instruct family/caregiver on patient safety  1/23/2024 0242 by Erum Hines RN  Outcome: Progressing     Problem: Skin/Tissue Integrity  Goal: Absence of new skin breakdown  Description: 1.  Monitor for areas of redness and/or skin breakdown  2.  Assess vascular access sites hourly  3.  Every 4-6 hours minimum:  Change oxygen saturation probe site  4.  Every 4-6 hours:  If on nasal continuous positive airway pressure, respiratory therapy assess nares and determine need for appliance change or resting period.  1/23/2024 0242 by Erum Hines RN  Outcome: Progressing     Problem: Respiratory - Adult  Goal: Achieves optimal ventilation and oxygenation  1/23/2024 0242 by Erum Hines RN  Outcome: Progressing     Problem: Safety - Medical Restraint  Goal: Remains free of injury from restraints (Restraint for Interference with Medical Device)  Description: INTERVENTIONS:  1. Determine that other, less restrictive measures have been tried or would not be effective before applying the restraint  2. Evaluate the patient's condition at the time of restraint application  3. Inform patient/family regarding the reason for restraint  4. Q2H: Monitor safety, psychosocial status, comfort, nutrition and hydration  1/23/2024 0915 by Abdiaziz Ponce RN  Outcome: Progressing  Flowsheets  Taken 1/23/2024 0800 by Abdiaziz Ponce RN  Remains free of injury from restraints (restraint for interference with medical device): Every 2 hours: Monitor safety, psychosocial status, comfort, nutrition and hydration  Taken 1/23/2024 0600 by Erum Hines RN  Remains free 
  Problem: Chronic Conditions and Co-morbidities  Goal: Patient's chronic conditions and co-morbidity symptoms are monitored and maintained or improved  Outcome: Progressing     Problem: Safety - Adult  Goal: Free from fall injury  Outcome: Progressing     Problem: Skin/Tissue Integrity  Goal: Absence of new skin breakdown  Description: 1.  Monitor for areas of redness and/or skin breakdown  2.  Assess vascular access sites hourly  3.  Every 4-6 hours minimum:  Change oxygen saturation probe site  4.  Every 4-6 hours:  If on nasal continuous positive airway pressure, respiratory therapy assess nares and determine need for appliance change or resting period.  Outcome: Progressing     Problem: Pain  Goal: Verbalizes/displays adequate comfort level or baseline comfort level  Outcome: Progressing     Problem: ABCDS Injury Assessment  Goal: Absence of physical injury  Outcome: Progressing     Problem: Respiratory - Adult  Goal: Achieves optimal ventilation and oxygenation  Outcome: Progressing     Problem: Skin/Tissue Integrity - Adult  Goal: Skin integrity remains intact  Outcome: Progressing     Problem: Musculoskeletal - Adult  Goal: Return mobility to safest level of function  Outcome: Progressing  Goal: Maintain proper alignment of affected body part  Outcome: Progressing  Goal: Return ADL status to a safe level of function  Outcome: Progressing     Problem: Neurosensory - Adult  Goal: Achieves stable or improved neurological status  Outcome: Progressing  Goal: Achieves maximal functionality and self care  Outcome: Progressing     Problem: Cardiovascular - Adult  Goal: Maintains optimal cardiac output and hemodynamic stability  Outcome: Progressing     Problem: Gastrointestinal - Adult  Goal: Maintains or returns to baseline bowel function  Outcome: Progressing     Problem: Genitourinary - Adult  Goal: Urinary catheter remains patent  Outcome: Completed     Problem: Anxiety  Goal: Will report anxiety at manageable 
  Problem: Discharge Planning  Goal: Discharge to home or other facility with appropriate resources  1/22/2024 1132 by Ivone Newman RN  Outcome: Progressing  Flowsheets (Taken 1/22/2024 0800)  Discharge to home or other facility with appropriate resources: Identify barriers to discharge with patient and caregiver     Problem: Chronic Conditions and Co-morbidities  Goal: Patient's chronic conditions and co-morbidity symptoms are monitored and maintained or improved  Recent Flowsheet Documentation  Taken 1/22/2024 0800 by Ivone Newman RN  Care Plan - Patient's Chronic Conditions and Co-Morbidity Symptoms are Monitored and Maintained or Improved: Monitor and assess patient's chronic conditions and comorbid symptoms for stability, deterioration, or improvement     Problem: Safety - Adult  Goal: Free from fall injury  1/22/2024 1132 by Ivone Newman RN  Outcome: Progressing  Flowsheets (Taken 1/22/2024 0900)  Free From Fall Injury: Instruct family/caregiver on patient safety     Problem: Pain  Goal: Verbalizes/displays adequate comfort level or baseline comfort level  1/22/2024 1132 by Ivone Newman RN  Outcome: Progressing     Problem: ABCDS Injury Assessment  Goal: Absence of physical injury  Recent Flowsheet Documentation  Taken 1/22/2024 0900 by Ivone Newman RN  Absence of Physical Injury: Implement safety measures based on patient assessment     Problem: Respiratory - Adult  Goal: Achieves optimal ventilation and oxygenation  1/22/2024 1132 by Ivone Newman RN  Outcome: Progressing  Flowsheets (Taken 1/22/2024 0800)  Achieves optimal ventilation and oxygenation: Assess for changes in respiratory status     Problem: Skin/Tissue Integrity - Adult  Goal: Skin integrity remains intact  1/22/2024 1132 by Ivone Newman RN  Outcome: Progressing  Flowsheets  Taken 1/22/2024 0900  Skin Integrity Remains Intact: Monitor for areas of redness and/or skin breakdown  Taken 1/22/2024 0800  Skin Integrity Remains Intact: 
  Problem: Discharge Planning  Goal: Discharge to home or other facility with appropriate resources  2/10/2024 0629 by Beverley Mason RN  Outcome: Progressing  Flowsheets (Taken 2/9/2024 1945)  Discharge to home or other facility with appropriate resources: Identify barriers to discharge with patient and caregiver  2/9/2024 1940 by Jennifer Remy RN  Outcome: Progressing  Flowsheets (Taken 2/9/2024 0800)  Discharge to home or other facility with appropriate resources:   Identify barriers to discharge with patient and caregiver   Arrange for needed discharge resources and transportation as appropriate     Problem: Chronic Conditions and Co-morbidities  Goal: Patient's chronic conditions and co-morbidity symptoms are monitored and maintained or improved  2/10/2024 0629 by Beverley Mason RN  Outcome: Progressing  Flowsheets (Taken 2/9/2024 1945)  Care Plan - Patient's Chronic Conditions and Co-Morbidity Symptoms are Monitored and Maintained or Improved: Monitor and assess patient's chronic conditions and comorbid symptoms for stability, deterioration, or improvement  2/9/2024 1940 by Jennifer Remy RN  Outcome: Progressing  Flowsheets (Taken 2/9/2024 0800)  Care Plan - Patient's Chronic Conditions and Co-Morbidity Symptoms are Monitored and Maintained or Improved:   Monitor and assess patient's chronic conditions and comorbid symptoms for stability, deterioration, or improvement   Collaborate with multidisciplinary team to address chronic and comorbid conditions and prevent exacerbation or deterioration   Update acute care plan with appropriate goals if chronic or comorbid symptoms are exacerbated and prevent overall improvement and discharge     Problem: Safety - Adult  Goal: Free from fall injury  2/10/2024 0629 by Beverley Mason RN  Outcome: Progressing  2/9/2024 1940 by Jennifer Remy RN  Outcome: Progressing     Problem: Skin/Tissue Integrity  Goal: Absence of new skin 
  Problem: Discharge Planning  Goal: Discharge to home or other facility with appropriate resources  2/3/2024 0718 by PAULA Baez RN  Outcome: Progressing  2/3/2024 0008 by Alberta Ortega RN  Outcome: Progressing     Problem: Chronic Conditions and Co-morbidities  Goal: Patient's chronic conditions and co-morbidity symptoms are monitored and maintained or improved  2/3/2024 0718 by PAULA Baez RN  Outcome: Progressing  2/3/2024 0008 by Alberta Ortega RN  Outcome: Progressing  2/2/2024 1846 by Jf Salazar, RN  Outcome: Progressing     Problem: Safety - Adult  Goal: Free from fall injury  2/3/2024 0718 by PAULA Baez RN  Outcome: Progressing  2/3/2024 0008 by Alberta Ortega RN  Outcome: Progressing  2/2/2024 1846 by Jf Salazar, RN  Outcome: Progressing     Problem: Skin/Tissue Integrity  Goal: Absence of new skin breakdown  Description: 1.  Monitor for areas of redness and/or skin breakdown  2.  Assess vascular access sites hourly  3.  Every 4-6 hours minimum:  Change oxygen saturation probe site  4.  Every 4-6 hours:  If on nasal continuous positive airway pressure, respiratory therapy assess nares and determine need for appliance change or resting period.  2/3/2024 0718 by PAULA Baez RN  Outcome: Progressing  2/3/2024 0008 by Alberta Ortega RN  Outcome: Progressing  2/2/2024 1846 by Jf Salazar, RN  Outcome: Progressing     Problem: Pain  Goal: Verbalizes/displays adequate comfort level or baseline comfort level  2/3/2024 0718 by PAULA Baez RN  Outcome: Progressing  2/3/2024 0008 by Alberta Ortega RN  Outcome: Progressing  2/2/2024 1846 by Jf Salazar, RN  Outcome: Progressing     Problem: ABCDS Injury Assessment  Goal: Absence of physical injury  2/3/2024 0718 by PAULA Baez RN  Outcome: Progressing  2/3/2024 0008 by Alberta Ortega RN  Outcome: Progressing  2/2/2024 1846 by Jf Salazar, RN  Outcome: Progressing     
  Problem: Discharge Planning  Goal: Discharge to home or other facility with appropriate resources  Outcome: Not Progressing     Problem: Chronic Conditions and Co-morbidities  Goal: Patient's chronic conditions and co-morbidity symptoms are monitored and maintained or improved  Outcome: Not Progressing     Problem: ABCDS Injury Assessment  Goal: Absence of physical injury  Outcome: Not Progressing     Problem: Respiratory - Adult  Goal: Achieves optimal ventilation and oxygenation  Outcome: Not Progressing     Problem: Anxiety  Goal: Will report anxiety at manageable levels  Description: INTERVENTIONS:  1. Administer medication as ordered  2. Teach and rehearse alternative coping skills  3. Provide emotional support with 1:1 interaction with staff  1/23/2024 2058 by Gisselle Stanton, RN  Outcome: Not Progressing    Problem: Safety - Adult  Goal: Free from fall injury  Outcome: Progressing  Problem: Safety - Medical Restraint  Goal: Remains free of injury from restraints (Restraint for Interference with Medical Device)  Description: INTERVENTIONS:  1. Determine that other, less restrictive measures have been tried or would not be effective before applying the restraint  2. Evaluate the patient's condition at the time of restraint application  3. Inform patient/family regarding the reason for restraint  4. Q2H: Monitor safety, psychosocial status, comfort, nutrition and hydration  1/23/2024 2058 by Gisselle Stanton, RN  Outcome: Progressing  Flowsheets  Taken 1/23/2024 2000 by Gisselle Stanton RN  Remains free of injury from restraints (restraint for interference with medical device): Every 2 hours: Monitor safety, psychosocial status, comfort, nutrition and hydration     Problem: Neurosensory - Adult  Goal: Achieves stable or improved neurological status  Outcome: Not Progressing  Goal: Achieves maximal functionality and self care  Outcome: Not Progressing     Problem: Nutrition Deficit:  Goal: Optimize nutritional 
  Problem: Discharge Planning  Goal: Discharge to home or other facility with appropriate resources  Outcome: Not Progressing     Problem: Chronic Conditions and Co-morbidities  Goal: Patient's chronic conditions and co-morbidity symptoms are monitored and maintained or improved  Outcome: Progressing     Problem: Safety - Adult  Goal: Free from fall injury  Outcome: Progressing     Problem: Skin/Tissue Integrity  Goal: Absence of new skin breakdown  Description: 1.  Monitor for areas of redness and/or skin breakdown  2.  Assess vascular access sites hourly  3.  Every 4-6 hours minimum:  Change oxygen saturation probe site  4.  Every 4-6 hours:  If on nasal continuous positive airway pressure, respiratory therapy assess nares and determine need for appliance change or resting period.  Outcome: Progressing     Problem: Pain  Goal: Verbalizes/displays adequate comfort level or baseline comfort level  Outcome: Progressing     Problem: ABCDS Injury Assessment  Goal: Absence of physical injury  Outcome: Progressing     Problem: Respiratory - Adult  Goal: Achieves optimal ventilation and oxygenation  Outcome: Progressing     Problem: Skin/Tissue Integrity - Adult  Goal: Skin integrity remains intact  Outcome: Progressing     Problem: Musculoskeletal - Adult  Goal: Return mobility to safest level of function  Outcome: Progressing  Goal: Maintain proper alignment of affected body part  Outcome: Progressing  Goal: Return ADL status to a safe level of function  Outcome: Progressing     Problem: Infection - Adult  Goal: Absence of infection at discharge  Outcome: Progressing  Goal: Absence of infection during hospitalization  Outcome: Progressing     Problem: Neurosensory - Adult  Goal: Achieves stable or improved neurological status  Outcome: Progressing  Goal: Achieves maximal functionality and self care  Outcome: Progressing     Problem: Cardiovascular - Adult  Goal: Maintains optimal cardiac output and hemodynamic 
  Problem: Discharge Planning  Goal: Discharge to home or other facility with appropriate resources  Outcome: Not Progressing     Problem: Chronic Conditions and Co-morbidities  Goal: Patient's chronic conditions and co-morbidity symptoms are monitored and maintained or improved  Outcome: Progressing     Problem: Safety - Adult  Goal: Free from fall injury  Outcome: Progressing  Flowsheets (Taken 1/22/2024 0018)  Free From Fall Injury: Based on caregiver fall risk screen, instruct family/caregiver to ask for assistance with transferring infant if caregiver noted to have fall risk factors     Problem: Skin/Tissue Integrity  Goal: Absence of new skin breakdown  Description: 1.  Monitor for areas of redness and/or skin breakdown  2.  Assess vascular access sites hourly  3.  Every 4-6 hours minimum:  Change oxygen saturation probe site  4.  Every 4-6 hours:  If on nasal continuous positive airway pressure, respiratory therapy assess nares and determine need for appliance change or resting period.  Outcome: Progressing     Problem: Pain  Goal: Verbalizes/displays adequate comfort level or baseline comfort level  Outcome: Progressing     Problem: ABCDS Injury Assessment  Goal: Absence of physical injury  Outcome: Progressing  Flowsheets (Taken 1/22/2024 0018)  Absence of Physical Injury: Implement safety measures based on patient assessment     Problem: Respiratory - Adult  Goal: Achieves optimal ventilation and oxygenation  Outcome: Progressing     Problem: Skin/Tissue Integrity - Adult  Goal: Skin integrity remains intact  Outcome: Progressing  Flowsheets (Taken 1/22/2024 0018)  Skin Integrity Remains Intact:   Monitor for areas of redness and/or skin breakdown   Assess vascular access sites hourly     Problem: Musculoskeletal - Adult  Goal: Return mobility to safest level of function  Outcome: Progressing  Goal: Maintain proper alignment of affected body part  Outcome: Progressing  Goal: Return ADL status to a safe 
  Problem: Discharge Planning  Goal: Discharge to home or other facility with appropriate resources  Outcome: Progressing     Problem: Chronic Conditions and Co-morbidities  Goal: Patient's chronic conditions and co-morbidity symptoms are monitored and maintained or improved  2/3/2024 0008 by Alberta Ortega RN  Outcome: Progressing     Problem: Safety - Adult  Goal: Free from fall injury  2/3/2024 0008 by Alberta Ortega RN  Outcome: Progressing     Problem: Skin/Tissue Integrity  Goal: Absence of new skin breakdown  Description: 1.  Monitor for areas of redness and/or skin breakdown  2.  Assess vascular access sites hourly  3.  Every 4-6 hours minimum:  Change oxygen saturation probe site  4.  Every 4-6 hours:  If on nasal continuous positive airway pressure, respiratory therapy assess nares and determine need for appliance change or resting period.  2/3/2024 0008 by Alberta Ortega RN  Outcome: Progressing     Problem: Pain  Goal: Verbalizes/displays adequate comfort level or baseline comfort level  2/3/2024 0008 by Alberta Ortega RN  Outcome: Progressing     Problem: ABCDS Injury Assessment  Goal: Absence of physical injury  2/3/2024 0008 by Alberta Ortega RN  Outcome: Progressing     Problem: Respiratory - Adult  Goal: Achieves optimal ventilation and oxygenation  2/3/2024 0008 by Alberta Ortega RN  Outcome: Progressing     
  Problem: Discharge Planning  Goal: Discharge to home or other facility with appropriate resources  Outcome: Progressing     Problem: Chronic Conditions and Co-morbidities  Goal: Patient's chronic conditions and co-morbidity symptoms are monitored and maintained or improved  Outcome: Progressing     Problem: Safety - Adult  Goal: Free from fall injury  2/2/2024 0023 by Alberta Ortega RN  Outcome: Progressing     Problem: Skin/Tissue Integrity  Goal: Absence of new skin breakdown  Description: 1.  Monitor for areas of redness and/or skin breakdown  2.  Assess vascular access sites hourly  3.  Every 4-6 hours minimum:  Change oxygen saturation probe site  4.  Every 4-6 hours:  If on nasal continuous positive airway pressure, respiratory therapy assess nares and determine need for appliance change or resting period.  2/2/2024 0023 by Alberta Ortega RN  Outcome: Progressing     Problem: Pain  Goal: Verbalizes/displays adequate comfort level or baseline comfort level  2/2/2024 0023 by Alberta Ortega RN  Outcome: Progressing     Problem: ABCDS Injury Assessment  Goal: Absence of physical injury  2/2/2024 0023 by Alberta Ortega RN  Outcome: Progressing     Problem: Respiratory - Adult  Goal: Achieves optimal ventilation and oxygenation  2/2/2024 0023 by Alberta Ortega RN  Outcome: Progressing     Problem: Skin/Tissue Integrity - Adult  Goal: Skin integrity remains intact  2/2/2024 0023 by Alberta Ortega RN  Outcome: Progressing     
  Problem: Discharge Planning  Goal: Discharge to home or other facility with appropriate resources  Outcome: Progressing     Problem: Chronic Conditions and Co-morbidities  Goal: Patient's chronic conditions and co-morbidity symptoms are monitored and maintained or improved  Outcome: Progressing     Problem: Safety - Adult  Goal: Free from fall injury  2/5/2024 0713 by PAULA Baez RN  Outcome: Progressing  2/5/2024 0555 by Dedrick Huynh RN  Outcome: Progressing     Problem: Skin/Tissue Integrity  Goal: Absence of new skin breakdown  Description: 1.  Monitor for areas of redness and/or skin breakdown  2.  Assess vascular access sites hourly  3.  Every 4-6 hours minimum:  Change oxygen saturation probe site  4.  Every 4-6 hours:  If on nasal continuous positive airway pressure, respiratory therapy assess nares and determine need for appliance change or resting period.  2/5/2024 0713 by PAULA Baez RN  Outcome: Progressing  2/5/2024 0555 by Dedrick Huynh RN  Outcome: Progressing     Problem: Pain  Goal: Verbalizes/displays adequate comfort level or baseline comfort level  2/5/2024 0713 by PAULA Baez RN  Outcome: Progressing  2/5/2024 0555 by Dedrick Huynh RN  Outcome: Progressing     Problem: ABCDS Injury Assessment  Goal: Absence of physical injury  2/5/2024 0713 by PAULA Baez RN  Outcome: Progressing  2/5/2024 0555 by Dedrick Huynh RN  Outcome: Progressing     Problem: Respiratory - Adult  Goal: Achieves optimal ventilation and oxygenation  Outcome: Progressing     Problem: Skin/Tissue Integrity - Adult  Goal: Skin integrity remains intact  2/5/2024 0713 by PAULA Baez RN  Outcome: Progressing  2/5/2024 0555 by Dedrick Huynh RN  Outcome: Progressing  Flowsheets (Taken 2/5/2024 0552)  Skin Integrity Remains Intact:   Assess vascular access sites hourly   Monitor for areas of redness and/or skin breakdown     
  Problem: Discharge Planning  Goal: Discharge to home or other facility with appropriate resources  Outcome: Progressing     Problem: Chronic Conditions and Co-morbidities  Goal: Patient's chronic conditions and co-morbidity symptoms are monitored and maintained or improved  Outcome: Progressing     Problem: Safety - Adult  Goal: Free from fall injury  2/6/2024 0718 by PAULA Baez RN  Outcome: Progressing  2/6/2024 0431 by Dedrick Huynh RN  Outcome: Progressing     Problem: Skin/Tissue Integrity  Goal: Absence of new skin breakdown  Description: 1.  Monitor for areas of redness and/or skin breakdown  2.  Assess vascular access sites hourly  3.  Every 4-6 hours minimum:  Change oxygen saturation probe site  4.  Every 4-6 hours:  If on nasal continuous positive airway pressure, respiratory therapy assess nares and determine need for appliance change or resting period.  2/6/2024 0718 by PAULA Baez RN  Outcome: Progressing  2/6/2024 0431 by Dedrick Huynh RN  Outcome: Progressing     Problem: Pain  Goal: Verbalizes/displays adequate comfort level or baseline comfort level  Outcome: Progressing     Problem: ABCDS Injury Assessment  Goal: Absence of physical injury  2/6/2024 0718 by PAULA Baez RN  Outcome: Progressing  2/6/2024 0431 by Dedrick Huynh RN  Outcome: Progressing     Problem: Respiratory - Adult  Goal: Achieves optimal ventilation and oxygenation  Outcome: Progressing     Problem: Skin/Tissue Integrity - Adult  Goal: Skin integrity remains intact  2/6/2024 0718 by PAULA Baez RN  Outcome: Progressing  Flowsheets (Taken 2/6/2024 0433 by Dedrick Huynh RN)  Skin Integrity Remains Intact: Monitor for areas of redness and/or skin breakdown  2/6/2024 0431 by Dedrick Huynh RN  Outcome: Progressing     Problem: Musculoskeletal - Adult  Goal: Return mobility to safest level of function  Outcome: Progressing     
  Problem: Discharge Planning  Goal: Discharge to home or other facility with appropriate resources  Outcome: Progressing     Problem: Chronic Conditions and Co-morbidities  Goal: Patient's chronic conditions and co-morbidity symptoms are monitored and maintained or improved  Outcome: Progressing     Problem: Safety - Adult  Goal: Free from fall injury  Outcome: Progressing     Problem: Skin/Tissue Integrity  Goal: Absence of new skin breakdown  Description: 1.  Monitor for areas of redness and/or skin breakdown  2.  Assess vascular access sites hourly  3.  Every 4-6 hours minimum:  Change oxygen saturation probe site  4.  Every 4-6 hours:  If on nasal continuous positive airway pressure, respiratory therapy assess nares and determine need for appliance change or resting period.  Outcome: Progressing     Problem: Pain  Goal: Verbalizes/displays adequate comfort level or baseline comfort level  Outcome: Progressing     Problem: ABCDS Injury Assessment  Goal: Absence of physical injury  Outcome: Progressing     
  Problem: Discharge Planning  Goal: Discharge to home or other facility with appropriate resources  Outcome: Progressing     Problem: Chronic Conditions and Co-morbidities  Goal: Patient's chronic conditions and co-morbidity symptoms are monitored and maintained or improved  Outcome: Progressing     Problem: Safety - Adult  Goal: Free from fall injury  Outcome: Progressing     Problem: Skin/Tissue Integrity  Goal: Absence of new skin breakdown  Description: 1.  Monitor for areas of redness and/or skin breakdown  2.  Assess vascular access sites hourly  3.  Every 4-6 hours minimum:  Change oxygen saturation probe site  4.  Every 4-6 hours:  If on nasal continuous positive airway pressure, respiratory therapy assess nares and determine need for appliance change or resting period.  Outcome: Progressing     Problem: Pain  Goal: Verbalizes/displays adequate comfort level or baseline comfort level  Outcome: Progressing     Problem: ABCDS Injury Assessment  Goal: Absence of physical injury  Outcome: Progressing     Problem: Respiratory - Adult  Goal: Achieves optimal ventilation and oxygenation  Outcome: Progressing     Problem: Skin/Tissue Integrity - Adult  Goal: Skin integrity remains intact  Outcome: Progressing     
  Problem: Discharge Planning  Goal: Discharge to home or other facility with appropriate resources  Outcome: Progressing     Problem: Chronic Conditions and Co-morbidities  Goal: Patient's chronic conditions and co-morbidity symptoms are monitored and maintained or improved  Outcome: Progressing     Problem: Safety - Adult  Goal: Free from fall injury  Outcome: Progressing     Problem: Skin/Tissue Integrity  Goal: Absence of new skin breakdown  Description: 1.  Monitor for areas of redness and/or skin breakdown  2.  Assess vascular access sites hourly  3.  Every 4-6 hours minimum:  Change oxygen saturation probe site  4.  Every 4-6 hours:  If on nasal continuous positive airway pressure, respiratory therapy assess nares and determine need for appliance change or resting period.  Outcome: Progressing     Problem: Pain  Goal: Verbalizes/displays adequate comfort level or baseline comfort level  Outcome: Progressing     Problem: ABCDS Injury Assessment  Goal: Absence of physical injury  Outcome: Progressing     Problem: Respiratory - Adult  Goal: Achieves optimal ventilation and oxygenation  Outcome: Progressing     Problem: Skin/Tissue Integrity - Adult  Goal: Skin integrity remains intact  Outcome: Progressing     Problem: Musculoskeletal - Adult  Goal: Return mobility to safest level of function  Outcome: Progressing     
  Problem: Discharge Planning  Goal: Discharge to home or other facility with appropriate resources  Outcome: Progressing     Problem: Chronic Conditions and Co-morbidities  Goal: Patient's chronic conditions and co-morbidity symptoms are monitored and maintained or improved  Outcome: Progressing     Problem: Safety - Adult  Goal: Free from fall injury  Outcome: Progressing     Problem: Skin/Tissue Integrity  Goal: Absence of new skin breakdown  Description: 1.  Monitor for areas of redness and/or skin breakdown  2.  Assess vascular access sites hourly  3.  Every 4-6 hours minimum:  Change oxygen saturation probe site  4.  Every 4-6 hours:  If on nasal continuous positive airway pressure, respiratory therapy assess nares and determine need for appliance change or resting period.  Outcome: Progressing     Problem: Pain  Goal: Verbalizes/displays adequate comfort level or baseline comfort level  Outcome: Progressing     Problem: ABCDS Injury Assessment  Goal: Absence of physical injury  Outcome: Progressing     Problem: Respiratory - Adult  Goal: Achieves optimal ventilation and oxygenation  Outcome: Progressing     Problem: Skin/Tissue Integrity - Adult  Goal: Skin integrity remains intact  Outcome: Progressing     Problem: Musculoskeletal - Adult  Goal: Return mobility to safest level of function  Outcome: Progressing     Problem: Infection - Adult  Goal: Absence of infection at discharge  Outcome: Progressing     
  Problem: Discharge Planning  Goal: Discharge to home or other facility with appropriate resources  Outcome: Progressing     Problem: Chronic Conditions and Co-morbidities  Goal: Patient's chronic conditions and co-morbidity symptoms are monitored and maintained or improved  Outcome: Progressing     Problem: Safety - Adult  Goal: Free from fall injury  Outcome: Progressing     Problem: Skin/Tissue Integrity  Goal: Absence of new skin breakdown  Description: 1.  Monitor for areas of redness and/or skin breakdown  2.  Assess vascular access sites hourly  3.  Every 4-6 hours minimum:  Change oxygen saturation probe site  4.  Every 4-6 hours:  If on nasal continuous positive airway pressure, respiratory therapy assess nares and determine need for appliance change or resting period.  Outcome: Progressing     Problem: Pain  Goal: Verbalizes/displays adequate comfort level or baseline comfort level  Outcome: Progressing     Problem: ABCDS Injury Assessment  Goal: Absence of physical injury  Outcome: Progressing     Problem: Respiratory - Adult  Goal: Achieves optimal ventilation and oxygenation  Outcome: Progressing     Problem: Skin/Tissue Integrity - Adult  Goal: Skin integrity remains intact  Outcome: Progressing     Problem: Musculoskeletal - Adult  Goal: Return mobility to safest level of function  Outcome: Progressing     Problem: Infection - Adult  Goal: Absence of infection at discharge  Outcome: Progressing     Problem: Neurosensory - Adult  Goal: Achieves stable or improved neurological status  Outcome: Progressing     
  Problem: Discharge Planning  Goal: Discharge to home or other facility with appropriate resources  Outcome: Progressing     Problem: Chronic Conditions and Co-morbidities  Goal: Patient's chronic conditions and co-morbidity symptoms are monitored and maintained or improved  Outcome: Progressing     Problem: Safety - Adult  Goal: Free from fall injury  Outcome: Progressing     Problem: Skin/Tissue Integrity  Goal: Absence of new skin breakdown  Description: 1.  Monitor for areas of redness and/or skin breakdown  2.  Assess vascular access sites hourly  3.  Every 4-6 hours minimum:  Change oxygen saturation probe site  4.  Every 4-6 hours:  If on nasal continuous positive airway pressure, respiratory therapy assess nares and determine need for appliance change or resting period.  Outcome: Progressing     Problem: Pain  Goal: Verbalizes/displays adequate comfort level or baseline comfort level  Outcome: Progressing     Problem: ABCDS Injury Assessment  Goal: Absence of physical injury  Outcome: Progressing     Problem: Respiratory - Adult  Goal: Achieves optimal ventilation and oxygenation  Outcome: Progressing     Problem: Skin/Tissue Integrity - Adult  Goal: Skin integrity remains intact  Outcome: Progressing     Problem: Musculoskeletal - Adult  Goal: Return mobility to safest level of function  Outcome: Progressing     Problem: Musculoskeletal - Adult  Goal: Maintain proper alignment of affected body part  Outcome: Progressing     Problem: Musculoskeletal - Adult  Goal: Return ADL status to a safe level of function  Outcome: Progressing     Problem: Infection - Adult  Goal: Absence of infection at discharge  Outcome: Progressing     Problem: Infection - Adult  Goal: Absence of infection during hospitalization  Outcome: Progressing     Problem: Neurosensory - Adult  Goal: Achieves stable or improved neurological status  Outcome: Progressing     Problem: Neurosensory - Adult  Goal: Achieves maximal functionality 
  Problem: Discharge Planning  Goal: Discharge to home or other facility with appropriate resources  Outcome: Progressing     Problem: Chronic Conditions and Co-morbidities  Goal: Patient's chronic conditions and co-morbidity symptoms are monitored and maintained or improved  Outcome: Progressing     Problem: Safety - Adult  Goal: Free from fall injury  Outcome: Progressing     Problem: Skin/Tissue Integrity  Goal: Absence of new skin breakdown  Description: 1.  Monitor for areas of redness and/or skin breakdown  2.  Assess vascular access sites hourly  3.  Every 4-6 hours minimum:  Change oxygen saturation probe site  4.  Every 4-6 hours:  If on nasal continuous positive airway pressure, respiratory therapy assess nares and determine need for appliance change or resting period.  Outcome: Progressing     Problem: Pain  Goal: Verbalizes/displays adequate comfort level or baseline comfort level  Outcome: Progressing  Flowsheets (Taken 1/20/2024 0800)  Verbalizes/displays adequate comfort level or baseline comfort level: Encourage patient to monitor pain and request assistance     Problem: ABCDS Injury Assessment  Goal: Absence of physical injury  Outcome: Progressing     Problem: Respiratory - Adult  Goal: Achieves optimal ventilation and oxygenation  Outcome: Progressing     Problem: Skin/Tissue Integrity - Adult  Goal: Skin integrity remains intact  Outcome: Progressing     Problem: Musculoskeletal - Adult  Goal: Return mobility to safest level of function  Outcome: Progressing  Goal: Maintain proper alignment of affected body part  Outcome: Progressing  Goal: Return ADL status to a safe level of function  Outcome: Progressing     Problem: Infection - Adult  Goal: Absence of infection at discharge  Outcome: Progressing  Goal: Absence of infection during hospitalization  Outcome: Progressing     Problem: Anxiety  Goal: Will report anxiety at manageable levels  Description: INTERVENTIONS:  1. Administer medication as 
  Problem: Discharge Planning  Goal: Discharge to home or other facility with appropriate resources  Outcome: Progressing     Problem: Chronic Conditions and Co-morbidities  Goal: Patient's chronic conditions and co-morbidity symptoms are monitored and maintained or improved  Outcome: Progressing     Problem: Safety - Adult  Goal: Free from fall injury  Outcome: Progressing     Problem: Skin/Tissue Integrity  Goal: Absence of new skin breakdown  Description: 1.  Monitor for areas of redness and/or skin breakdown  2.  Assess vascular access sites hourly  3.  Every 4-6 hours minimum:  Change oxygen saturation probe site  4.  Every 4-6 hours:  If on nasal continuous positive airway pressure, respiratory therapy assess nares and determine need for appliance change or resting period.  Outcome: Progressing     Problem: Pain  Goal: Verbalizes/displays adequate comfort level or baseline comfort level  Outcome: Progressing  Flowsheets (Taken 1/31/2024 1315 by Sharon Acuna RN)  Verbalizes/displays adequate comfort level or baseline comfort level: Encourage patient to monitor pain and request assistance     Problem: ABCDS Injury Assessment  Goal: Absence of physical injury  Outcome: Progressing     
  Problem: Discharge Planning  Goal: Discharge to home or other facility with appropriate resources  Outcome: Progressing  Flowsheets (Taken 2/9/2024 0800)  Discharge to home or other facility with appropriate resources:   Identify barriers to discharge with patient and caregiver   Arrange for needed discharge resources and transportation as appropriate     Problem: Chronic Conditions and Co-morbidities  Goal: Patient's chronic conditions and co-morbidity symptoms are monitored and maintained or improved  Outcome: Progressing  Flowsheets (Taken 2/9/2024 0800)  Care Plan - Patient's Chronic Conditions and Co-Morbidity Symptoms are Monitored and Maintained or Improved:   Monitor and assess patient's chronic conditions and comorbid symptoms for stability, deterioration, or improvement   Collaborate with multidisciplinary team to address chronic and comorbid conditions and prevent exacerbation or deterioration   Update acute care plan with appropriate goals if chronic or comorbid symptoms are exacerbated and prevent overall improvement and discharge     Problem: Safety - Adult  Goal: Free from fall injury  Outcome: Progressing     Problem: Skin/Tissue Integrity  Goal: Absence of new skin breakdown  Description: 1.  Monitor for areas of redness and/or skin breakdown  2.  Assess vascular access sites hourly  3.  Every 4-6 hours minimum:  Change oxygen saturation probe site  4.  Every 4-6 hours:  If on nasal continuous positive airway pressure, respiratory therapy assess nares and determine need for appliance change or resting period.  Outcome: Progressing     Problem: Pain  Goal: Verbalizes/displays adequate comfort level or baseline comfort level  Outcome: Progressing     Problem: ABCDS Injury Assessment  Goal: Absence of physical injury  Outcome: Progressing     Problem: Respiratory - Adult  Goal: Achieves optimal ventilation and oxygenation  Outcome: Progressing     Problem: Skin/Tissue Integrity - Adult  Goal: Skin 
  Problem: Safety - Adult  Goal: Free from fall injury  Outcome: Progressing     Problem: Skin/Tissue Integrity  Goal: Absence of new skin breakdown  Description: 1.  Monitor for areas of redness and/or skin breakdown  2.  Assess vascular access sites hourly  3.  Every 4-6 hours minimum:  Change oxygen saturation probe site  4.  Every 4-6 hours:  If on nasal continuous positive airway pressure, respiratory therapy assess nares and determine need for appliance change or resting period.  Outcome: Progressing     Problem: ABCDS Injury Assessment  Goal: Absence of physical injury  Outcome: Progressing     Problem: Skin/Tissue Integrity - Adult  Goal: Skin integrity remains intact  Outcome: Progressing     
  Problem: Safety - Adult  Goal: Free from fall injury  Outcome: Progressing     Problem: Skin/Tissue Integrity  Goal: Absence of new skin breakdown  Description: 1.  Monitor for areas of redness and/or skin breakdown  2.  Assess vascular access sites hourly  3.  Every 4-6 hours minimum:  Change oxygen saturation probe site  4.  Every 4-6 hours:  If on nasal continuous positive airway pressure, respiratory therapy assess nares and determine need for appliance change or resting period.  Outcome: Progressing     Problem: Pain  Goal: Verbalizes/displays adequate comfort level or baseline comfort level  Outcome: Progressing     Problem: ABCDS Injury Assessment  Goal: Absence of physical injury  Outcome: Progressing     Problem: Respiratory - Adult  Goal: Achieves optimal ventilation and oxygenation  Outcome: Progressing     Problem: Skin/Tissue Integrity - Adult  Goal: Skin integrity remains intact  Outcome: Progressing     Problem: Musculoskeletal - Adult  Goal: Return mobility to safest level of function  Outcome: Progressing  Goal: Maintain proper alignment of affected body part  Outcome: Progressing  Goal: Return ADL status to a safe level of function  Outcome: Progressing     Problem: Neurosensory - Adult  Goal: Achieves stable or improved neurological status  Outcome: Progressing  Goal: Achieves maximal functionality and self care  Outcome: Progressing     Problem: Cardiovascular - Adult  Goal: Maintains optimal cardiac output and hemodynamic stability  Outcome: Progressing  Goal: Absence of cardiac dysrhythmias or at baseline  Outcome: Progressing     Problem: Genitourinary - Adult  Goal: Urinary catheter remains patent  Outcome: Progressing     Problem: Anxiety  Goal: Will report anxiety at manageable levels  Description: INTERVENTIONS:  1. Administer medication as ordered  2. Teach and rehearse alternative coping skills  3. Provide emotional support with 1:1 interaction with staff  Outcome: Progressing   
  Problem: Safety - Adult  Goal: Free from fall injury  Outcome: Progressing     Problem: Skin/Tissue Integrity  Goal: Absence of new skin breakdown  Description: 1.  Monitor for areas of redness and/or skin breakdown  2.  Assess vascular access sites hourly  3.  Every 4-6 hours minimum:  Change oxygen saturation probe site  4.  Every 4-6 hours:  If on nasal continuous positive airway pressure, respiratory therapy assess nares and determine need for appliance change or resting period.  Outcome: Progressing     Problem: Pain  Goal: Verbalizes/displays adequate comfort level or baseline comfort level  Outcome: Progressing     Problem: ABCDS Injury Assessment  Goal: Absence of physical injury  Outcome: Progressing     Problem: Skin/Tissue Integrity - Adult  Goal: Skin integrity remains intact  Outcome: Progressing  Flowsheets (Taken 2/5/2024 4452)  Skin Integrity Remains Intact:   Assess vascular access sites hourly   Monitor for areas of redness and/or skin breakdown     
  Problem: Safety - Medical Restraint  Goal: Remains free of injury from restraints (Restraint for Interference with Medical Device)  Description: INTERVENTIONS:  1. Determine that other, less restrictive measures have been tried or would not be effective before applying the restraint  2. Evaluate the patient's condition at the time of restraint application  3. Inform patient/family regarding the reason for restraint  4. Q2H: Monitor safety, psychosocial status, comfort, nutrition and hydration  1/23/2024 2250 by Gisselle Stanton RN  Outcome: Completed  Flowsheets (Taken 1/23/2024 2200)  Remains free of injury from restraints (restraint for interference with medical device): Every 2 hours: Monitor safety, psychosocial status, comfort, nutrition and hydration  
  Problem: Skin/Tissue Integrity  Goal: Absence of new skin breakdown  Description: 1.  Monitor for areas of redness and/or skin breakdown  2.  Assess vascular access sites hourly  3.  Every 4-6 hours minimum:  Change oxygen saturation probe site  4.  Every 4-6 hours:  If on nasal continuous positive airway pressure, respiratory therapy assess nares and determine need for appliance change or resting period.  Outcome: Progressing     Problem: Safety - Medical Restraint  Goal: Remains free of injury from restraints (Restraint for Interference with Medical Device)  Description: INTERVENTIONS:  1. Determine that other, less restrictive measures have been tried or would not be effective before applying the restraint  2. Evaluate the patient's condition at the time of restraint application  3. Inform patient/family regarding the reason for restraint  4. Q2H: Monitor safety, psychosocial status, comfort, nutrition and hydration  Outcome: Progressing  Flowsheets  Taken 1/21/2024 0600 by Virginia Ponce RN  Remains free of injury from restraints (restraint for interference with medical device): Every 2 hours: Monitor safety, psychosocial status, comfort, nutrition and hydration  Taken 1/21/2024 0400 by Virginia Ponce RN  Remains free of injury from restraints (restraint for interference with medical device): Every 2 hours: Monitor safety, psychosocial status, comfort, nutrition and hydration  Taken 1/21/2024 0200 by Virginia Ponce RN  Remains free of injury from restraints (restraint for interference with medical device): Every 2 hours: Monitor safety, psychosocial status, comfort, nutrition and hydration  Taken 1/21/2024 0000 by Virginia Ponce RN  Remains free of injury from restraints (restraint for interference with medical device): Every 2 hours: Monitor safety, psychosocial status, comfort, nutrition and hydration  Taken 1/20/2024 2200 by Virginia Ponce RN  Remains free of injury from restraints (restraint for 
Problem: Safety - Adult  Goal: Free from fall injury  Outcome: Progressing     Problem: Skin/Tissue Integrity  Goal: Absence of new skin breakdown  Description: 1.  Monitor for areas of redness and/or skin breakdown  2.  Assess vascular access sites hourly  3.  Every 4-6 hours minimum:  Change oxygen saturation probe site  4.  Every 4-6 hours:  If on nasal continuous positive airway pressure, respiratory therapy assess nares and determine need for appliance change or resting period.  Outcome: Progressing     Problem: Pain  Goal: Verbalizes/displays adequate comfort level or baseline comfort level  Outcome: Progressing     Problem: ABCDS Injury Assessment  Goal: Absence of physical injury  Outcome: Progressing     Problem: Respiratory - Adult  Goal: Achieves optimal ventilation and oxygenation  Outcome: Progressing     Problem: Skin/Tissue Integrity - Adult  Goal: Skin integrity remains intact  Outcome: Progressing     
status to a safe level of function  1/26/2024 0312 by Saumya Pate RN  Outcome: Progressing     Problem: Infection - Adult  Goal: Absence of infection at discharge  1/26/2024 0312 by Saumya Pate RN  Outcome: Progressing     Problem: Infection - Adult  Goal: Absence of infection during hospitalization  1/26/2024 0312 by Saumya Pate RN  Outcome: Progressing     Problem: Neurosensory - Adult  Goal: Achieves stable or improved neurological status  1/26/2024 0312 by Saumya Pate RN  Outcome: Progressing     Problem: Neurosensory - Adult  Goal: Achieves maximal functionality and self care  1/26/2024 0312 by Saumya Pate RN  Outcome: Progressing     Problem: Cardiovascular - Adult  Goal: Maintains optimal cardiac output and hemodynamic stability  1/26/2024 0312 by Saumya Pate RN  Outcome: Progressing     Problem: Cardiovascular - Adult  Goal: Absence of cardiac dysrhythmias or at baseline  1/26/2024 0312 by Saumya Pate RN  Outcome: Progressing     Problem: Gastrointestinal - Adult  Goal: Maintains or returns to baseline bowel function  1/26/2024 0312 by Saumya Pate RN  Outcome: Progressing     Problem: Genitourinary - Adult  Goal: Urinary catheter remains patent  1/26/2024 0312 by Saumya Pate RN  Outcome: Progressing     
diets, oral nutritional supplements, and vitamin/mineral supplements   Monitor oral intake, labs, and treatment plans  Taken 1/25/2024 8618  Nutrient intake appropriate for improving, restoring, or maintaining nutritional needs:   Assess nutritional status and recommend course of action   Monitor oral intake, labs, and treatment plans   Recommend appropriate diets, oral nutritional supplements, and vitamin/mineral supplements     
Progressing     Problem: Anxiety  Goal: Will report anxiety at manageable levels  Description: INTERVENTIONS:  1. Administer medication as ordered  2. Teach and rehearse alternative coping skills  3. Provide emotional support with 1:1 interaction with staff  1/24/2024 1028 by Kacy Padilla RN  Outcome: Progressing  1/23/2024 2058 by Gisselle Stanton RN  Outcome: Not Progressing     Problem: Drug Abuse/Detox  Goal: Will have no detox symptoms and will verbalize plan for changing drug-related behavior  Description: INTERVENTIONS:  1. Administer medication as ordered  2. Monitor physical status  3. Provide emotional support with 1:1 interaction with staff  4. Encourage  recovery focused treatment   1/24/2024 1028 by Kacy Padilla RN  Outcome: Not Progressing  1/23/2024 2058 by Gisselle Stanton RN  Outcome: Progressing     Problem: Neurosensory - Adult  Goal: Achieves stable or improved neurological status  1/24/2024 1028 by Kacy Padilla RN  Outcome: Progressing  1/23/2024 2058 by Gisselle Stanton RN  Outcome: Not Progressing  Goal: Achieves maximal functionality and self care  1/24/2024 1028 by Kacy Padilla RN  Outcome: Not Progressing  1/23/2024 2058 by Gisselle Stanton RN  Outcome: Not Progressing     Problem: Nutrition Deficit:  Goal: Optimize nutritional status  1/24/2024 1028 by Kacy Padilla RN  Outcome: Not Progressing  1/23/2024 2058 by Gisselle Stanton RN  Outcome: Not Progressing

## 2024-02-11 VITALS
OXYGEN SATURATION: 96 % | RESPIRATION RATE: 24 BRPM | TEMPERATURE: 98.4 F | SYSTOLIC BLOOD PRESSURE: 91 MMHG | WEIGHT: 293 LBS | HEART RATE: 88 BPM | DIASTOLIC BLOOD PRESSURE: 75 MMHG | BODY MASS INDEX: 51.91 KG/M2 | HEIGHT: 63 IN

## 2024-02-11 LAB
ALBUMIN SERPL-MCNC: 3.8 G/DL (ref 3.5–5.2)
ALP SERPL-CCNC: 217 U/L (ref 35–104)
ALT SERPL-CCNC: 32 U/L (ref 0–32)
ANION GAP SERPL CALCULATED.3IONS-SCNC: 4 MMOL/L (ref 7–16)
AST SERPL-CCNC: 26 U/L (ref 0–31)
BASOPHILS # BLD: 0 K/UL (ref 0–0.2)
BASOPHILS NFR BLD: 0 % (ref 0–2)
BILIRUB SERPL-MCNC: 0.8 MG/DL (ref 0–1.2)
BNP SERPL-MCNC: 990 PG/ML (ref 0–450)
BUN SERPL-MCNC: 44 MG/DL (ref 6–23)
CA-I BLD-SCNC: 1.32 MMOL/L (ref 1.15–1.33)
CALCIUM SERPL-MCNC: 9.9 MG/DL (ref 8.6–10.2)
CHLORIDE SERPL-SCNC: 95 MMOL/L (ref 98–107)
CO2 SERPL-SCNC: 42 MMOL/L (ref 22–29)
CREAT SERPL-MCNC: 0.8 MG/DL (ref 0.5–1)
CRP SERPL HS-MCNC: <3 MG/L (ref 0–5)
EOSINOPHIL # BLD: 0 K/UL (ref 0.05–0.5)
EOSINOPHILS RELATIVE PERCENT: 0 % (ref 0–6)
ERYTHROCYTE [DISTWIDTH] IN BLOOD BY AUTOMATED COUNT: 16.4 % (ref 11.5–15)
GFR SERPL CREATININE-BSD FRML MDRD: >60 ML/MIN/1.73M2
GLUCOSE BLD-MCNC: 191 MG/DL (ref 74–99)
GLUCOSE SERPL-MCNC: 170 MG/DL (ref 74–99)
HCT VFR BLD AUTO: 33 % (ref 34–48)
HGB BLD-MCNC: 10 G/DL (ref 11.5–15.5)
LYMPHOCYTES NFR BLD: 0.38 K/UL (ref 1.5–4)
LYMPHOCYTES RELATIVE PERCENT: 4 % (ref 20–42)
MAGNESIUM SERPL-MCNC: 2.1 MG/DL (ref 1.6–2.6)
MCH RBC QN AUTO: 27 PG (ref 26–35)
MCHC RBC AUTO-ENTMCNC: 30.3 G/DL (ref 32–34.5)
MCV RBC AUTO: 88.9 FL (ref 80–99.9)
MONOCYTES NFR BLD: 0.46 K/UL (ref 0.1–0.95)
MONOCYTES NFR BLD: 5 % (ref 2–12)
NEUTROPHILS NFR BLD: 90 % (ref 43–80)
NEUTS SEG NFR BLD: 7.96 K/UL (ref 1.8–7.3)
PHOSPHATE SERPL-MCNC: 2.9 MG/DL (ref 2.5–4.5)
PLATELET CONFIRMATION: NORMAL
PLATELET, FLUORESCENCE: 85 K/UL (ref 130–450)
PMV BLD AUTO: ABNORMAL FL (ref 7–12)
POTASSIUM SERPL-SCNC: 3.3 MMOL/L (ref 3.5–5)
PROCALCITONIN SERPL-MCNC: 0.05 NG/ML (ref 0–0.08)
PROT SERPL-MCNC: 6.4 G/DL (ref 6.4–8.3)
RBC # BLD AUTO: 3.71 M/UL (ref 3.5–5.5)
RBC # BLD: ABNORMAL 10*6/UL
SODIUM SERPL-SCNC: 141 MMOL/L (ref 132–146)
WBC OTHER # BLD: 8.8 K/UL (ref 4.5–11.5)

## 2024-02-11 PROCEDURE — 6370000000 HC RX 637 (ALT 250 FOR IP): Performed by: INTERNAL MEDICINE

## 2024-02-11 PROCEDURE — 84100 ASSAY OF PHOSPHORUS: CPT

## 2024-02-11 PROCEDURE — 82962 GLUCOSE BLOOD TEST: CPT

## 2024-02-11 PROCEDURE — 83880 ASSAY OF NATRIURETIC PEPTIDE: CPT

## 2024-02-11 PROCEDURE — 6360000002 HC RX W HCPCS: Performed by: INTERNAL MEDICINE

## 2024-02-11 PROCEDURE — 2580000003 HC RX 258: Performed by: INTERNAL MEDICINE

## 2024-02-11 PROCEDURE — 94660 CPAP INITIATION&MGMT: CPT

## 2024-02-11 PROCEDURE — 80053 COMPREHEN METABOLIC PANEL: CPT

## 2024-02-11 PROCEDURE — 85025 COMPLETE CBC W/AUTO DIFF WBC: CPT

## 2024-02-11 PROCEDURE — 84145 PROCALCITONIN (PCT): CPT

## 2024-02-11 PROCEDURE — 2700000000 HC OXYGEN THERAPY PER DAY

## 2024-02-11 PROCEDURE — 82330 ASSAY OF CALCIUM: CPT

## 2024-02-11 PROCEDURE — 99233 SBSQ HOSP IP/OBS HIGH 50: CPT | Performed by: INTERNAL MEDICINE

## 2024-02-11 PROCEDURE — 83735 ASSAY OF MAGNESIUM: CPT

## 2024-02-11 PROCEDURE — 86140 C-REACTIVE PROTEIN: CPT

## 2024-02-11 PROCEDURE — 94668 MNPJ CHEST WALL SBSQ: CPT

## 2024-02-11 PROCEDURE — 94640 AIRWAY INHALATION TREATMENT: CPT

## 2024-02-11 RX ORDER — LISINOPRIL 20 MG/1
20 TABLET ORAL DAILY
Qty: 30 TABLET | Refills: 3 | DISCHARGE
Start: 2024-02-12

## 2024-02-11 RX ORDER — BUDESONIDE 0.5 MG/2ML
0.5 INHALANT ORAL
Qty: 60 EACH | Refills: 3 | DISCHARGE
Start: 2024-02-11

## 2024-02-11 RX ORDER — POLYETHYLENE GLYCOL 3350 17 G/17G
17 POWDER, FOR SOLUTION ORAL DAILY PRN
Qty: 527 G | Refills: 0 | DISCHARGE
Start: 2024-02-11 | End: 2024-03-13

## 2024-02-11 RX ORDER — AMLODIPINE BESYLATE 10 MG/1
10 TABLET ORAL DAILY
Qty: 30 TABLET | Refills: 3 | DISCHARGE
Start: 2024-02-12

## 2024-02-11 RX ORDER — METOPROLOL SUCCINATE 25 MG/1
25 TABLET, EXTENDED RELEASE ORAL 2 TIMES DAILY
Qty: 30 TABLET | Refills: 0 | DISCHARGE
Start: 2024-02-11

## 2024-02-11 RX ORDER — ACETAZOLAMIDE 250 MG/1
500 TABLET ORAL 2 TIMES DAILY
Qty: 120 TABLET | Refills: 0 | DISCHARGE
Start: 2024-02-11 | End: 2024-03-12

## 2024-02-11 RX ORDER — DIMETHICONE, OXYBENZONE, AND PADIMATE O 2; 2.5; 6.6 G/100G; G/100G; G/100G
STICK TOPICAL PRN
Refills: 0 | DISCHARGE
Start: 2024-02-11

## 2024-02-11 RX ORDER — PANTOPRAZOLE SODIUM 40 MG/1
40 TABLET, DELAYED RELEASE ORAL
Qty: 30 TABLET | Refills: 3 | DISCHARGE
Start: 2024-02-12

## 2024-02-11 RX ORDER — FUROSEMIDE 40 MG/1
40 TABLET ORAL 2 TIMES DAILY
Qty: 60 TABLET | Refills: 0 | DISCHARGE
Start: 2024-02-11

## 2024-02-11 RX ORDER — POTASSIUM CHLORIDE 20 MEQ/1
40 TABLET, EXTENDED RELEASE ORAL ONCE
Status: COMPLETED | OUTPATIENT
Start: 2024-02-11 | End: 2024-02-11

## 2024-02-11 RX ORDER — DEXAMETHASONE 6 MG/1
6 TABLET ORAL
Qty: 7 TABLET | Refills: 0 | DISCHARGE
Start: 2024-02-11 | End: 2024-02-18

## 2024-02-11 RX ADMIN — METOPROLOL SUCCINATE 25 MG: 25 TABLET, EXTENDED RELEASE ORAL at 08:38

## 2024-02-11 RX ADMIN — IPRATROPIUM BROMIDE AND ALBUTEROL SULFATE 1 DOSE: .5; 2.5 SOLUTION RESPIRATORY (INHALATION) at 09:55

## 2024-02-11 RX ADMIN — IPRATROPIUM BROMIDE AND ALBUTEROL SULFATE 1 DOSE: .5; 2.5 SOLUTION RESPIRATORY (INHALATION) at 13:33

## 2024-02-11 RX ADMIN — Medication: at 08:39

## 2024-02-11 RX ADMIN — PANTOPRAZOLE SODIUM 40 MG: 40 TABLET, DELAYED RELEASE ORAL at 05:07

## 2024-02-11 RX ADMIN — HEPARIN 300 UNITS: 100 SYRINGE at 08:38

## 2024-02-11 RX ADMIN — IPRATROPIUM BROMIDE AND ALBUTEROL SULFATE 1 DOSE: .5; 2.5 SOLUTION RESPIRATORY (INHALATION) at 05:21

## 2024-02-11 RX ADMIN — DEXAMETHASONE SODIUM PHOSPHATE 6 MG: 10 INJECTION INTRAMUSCULAR; INTRAVENOUS at 08:38

## 2024-02-11 RX ADMIN — ANTI-FUNGAL POWDER MICONAZOLE NITRATE TALC FREE: 1.42 POWDER TOPICAL at 08:39

## 2024-02-11 RX ADMIN — LISINOPRIL 20 MG: 20 TABLET ORAL at 08:38

## 2024-02-11 RX ADMIN — POTASSIUM CHLORIDE 40 MEQ: 1500 TABLET, EXTENDED RELEASE ORAL at 13:03

## 2024-02-11 RX ADMIN — SODIUM CHLORIDE, PRESERVATIVE FREE 10 ML: 5 INJECTION INTRAVENOUS at 08:39

## 2024-02-11 RX ADMIN — ACETAZOLAMIDE SODIUM 500 MG: 500 INJECTION, POWDER, LYOPHILIZED, FOR SOLUTION INTRAVENOUS at 08:37

## 2024-02-11 RX ADMIN — Medication 1000 UNITS: at 08:38

## 2024-02-11 RX ADMIN — AMLODIPINE BESYLATE 10 MG: 10 TABLET ORAL at 08:38

## 2024-02-11 RX ADMIN — BUDESONIDE INHALATION 500 MCG: 0.5 SUSPENSION RESPIRATORY (INHALATION) at 05:21

## 2024-02-11 RX ADMIN — FUROSEMIDE 40 MG: 10 INJECTION, SOLUTION INTRAMUSCULAR; INTRAVENOUS at 08:38

## 2024-02-11 RX ADMIN — APIXABAN 5 MG: 5 TABLET, FILM COATED ORAL at 08:38

## 2024-02-11 NOTE — DISCHARGE SUMMARY
(PRINZIDE;ZESTORETIC) 20-25 MG per tablet Comments:   Reason for Stopping:         omeprazole (PRILOSEC) 20 MG delayed release capsule Comments:   Reason for Stopping:             Case discussed with Dr. Alex of pulmonary/critical care over the phone.    Note  that  31  minutes were spent in preparing discharge papers, discussing discharge with patient, medication review, etc.    NOTE: This report was transcribed using voice recognition software. Every effort was made to ensure accuracy; however, inadvertent computerized transcription errors may be present.     Signed:  Electronically signed by Gm Tena MD on 2/11/2024 at 12:44 PM

## 2024-02-11 NOTE — CARE COORDINATION
1/23/2024. ICU, Vent/sedation, IV Solumedrol, Protonix, Zosyn.  Likely SNF needed at discharge. CM spoke to s/o Neftali- per pts legal nok sister Franci Díaz- she wants Neftali to decide which SNF would be best for him to visit. Referrals to [facility] and [facility]- [facility]. PRECERT needed, Signed AUDI and HENS at discharge. Spiritual care following.  Electronically signed by Radha Hargrove RN-BC on 1/23/2024 at 2:50 PM  
1/24/2024 Transfer to [facility] [place] being arranged, Precedex gtt- off, 6 liters oxygen, Solumedrol, Protonix, Zosyn. Referrals to [facility] [place] and [facility] K[facility]na Burgess. PRECERT needed, Signed AUDI and HENS at discharge. PT/OT newly consulted- pending review. Electronically signed by Radha Hargrove RN-BC on 1/24/2024 at 1:47 PM  
1/25/2024 1530 CM Note:  Referral was made to [facility] [place] [facility] liaison and has been accepted. Also referral to [facility] Jenifer whom also accepted .  CM spoke with pt's family and d/t location [facility] is first choice. Jenifer made aware.  WILL NEED PRECERT, JACEK(initiated), and SIGNED AUDI.  CM will follow. Electronically signed by Clare Bhakta RN on 1/25/2024 at 3:45 PM   
1/26/2024 1510 CM Note:  Pt has been accepted to [facility] [place] per Ana buck and PRECERT was started today.  Pt will need to be sitter free for 24 hours before she can transfer there.  HENS is completed, Will need signed AUDI.  CM will follow. Electronically signed by Clare Bhakta RN on 1/26/2024 at 3:30 PM   
1/29/2024 1145 CM Note:  Pt plan is to go to SNF at d/c.  She has been accepted at [facility] [place] and per Ana PRECERT was started 1/26/2024.  HENS completed to start PRECERT.  Will need Signed AUDI.  Pt has been sitter and TSM free for more than 24 hours.  Ana updated that she has been sitter free.  CM will follow. Electronically signed by Clare Bhakta RN on 1/29/2024 at 12:00 PM   Addendum 1615 Per Megan liaison at [facility], PRECERT has been obtained and is good through 2/11/2024.  Megan made aware that pt will need a Bipap for naps and at HS.  Dr Pineda aware of PRECERT and states pt may discharge tomorrow.  Bipap order has been placed on the AUDI. Electronically signed by Clare Bhakta RN on 1/29/2024 at 4:24 PM   
1/30/2024 1515 CM Note:  Pt has been accepted at [facility] [place] and per Megan PRECERT was obtained on 1/29 and is good through 2/11/2024.  Pt had a decline in resp status and is wearing 15 liters vs Bipap.  HENS completed, AUDI needs signed.  CM updated pt's DPOA Franci(sister).  Megan aware that pt will need a Bipap at the facility and orders are on the AUDI. CM will follow. Electronically signed by Clare Bhakta RN on 1/30/2024 at 3:33 PM   
1/31/2024 1015CM Note: Pt has been accepted at [facility] [place] and per Megan PRECERT was obtained on 1/29 and is good through 2/11/2024. Pt had a decline in resp status and is wearing 15 liters vs Bipap. HENS completed, AUDI needs signed.  Megan aware that pt will need a Bipap at the facility and orders are on the AUDI. CM will follow. Electronically signed by Clare Bhakta RN on 1/31/2024 at 10:20 AM   
2/1/2024 ICU, Airvo versus Bipap alternating. Maxipime, IVF, Vibramycin, Solumedrol, Flagyl, Protonix. Accepted at [facility] [place] and per Megan PRECERT was obtained on 1/29 and is good through 2/11/2024. HENS completed, AUDI needs signed. Megan aware that pt will need a Bipap at the facility and orders are on the AUDI. Palliative care may be beneficial. Cm following. Electronically signed by Radha Hargrove RN-BC on 2/1/2024 at 10:50 AM      
2/11/2024 1245 CM for transition of care needs at d/c.  Pt is being discharged to [facility] today, per Ana liaison they can take her on the 10 liters high flow and also has a Bipap at the facility for her 18/12 with BU rate of 14 FIO2 40%.  HENS is completed and AUDI is signed.  [facility] scheduled to transport pt at 1430, ambulance form completed and placed with the soft chart.  Pt's DPOA Franci aware of d/c and time also [facility] aware of the  time.  CM will follow. Electronically signed by Clare Bhakta RN on 2/11/2024 at 1:02 PM   
2/2/2024 ICU, Airvo versus bipap Telesitter. Rt thoracentesis 920 cc removed. Maxipime, Vibramycin, Solumedrol, Flagyl, Protonix. Accepted at [facility] [place] and per Megan PRECERT was obtained on 1/29 and is good through 2/11/2024. HENS completed, AUDI needs signed.  Megan aware that pt will need a Bipap at the facility and orders are on the AUDI. Cm following.  Electronically signed by Radha Hargrove RN-BC on 2/2/2024 at 2:01 PM  
2/6/2024 ICU, 13 liters high flow oxygen, telesitter. IV Diamox, Lasix, Decadron. Accepted at [facility] [place] and per Megan PRECERT good through 2/11/2024. HENS completed, AUDI needs signed.  Megan aware that pt will need a Bipap at the facility and orders are on the AUDI. Pastoral care following.  Electronically signed by Radha Hargrove RN-BC on 2/6/2024 at 10:34 AM   
2/7/2024 ICU, Telesitter, AMS. IV Diamox, Lasix and Decadron.  Accepted at [facility] [place] and per Megan PRECERT good through 2/11/2024. HENS completed, AUDI needs signed.  Megan aware that pt will need a Bipap at the facility and orders are on the AUDI. [facility] following. ALEX placed a return call with clinical updates to April VAZQUEZ Cm with [facility], as requested. Electronically signed by Radha Hargrove RN-BC on 2/7/2024 at 2:06 PM  
2/8/2024 Planning transfer out of icu. Pt cooperative but pleasantly confused- Telesitter. Accepted at [facility] [place] PRECERT good through 2/11/2024. HENS completed, AUDI is signed.  SNF aware that Bipap is needed- orders are on the AUDI. [place] following. Pastoral care and CM/SS assigned to follow. Ambulance sheet in soft chart, planning discharge tomorrow - [facility] aware. Electronically signed by Radha Hargrove RN-BC on 2/8/2024 at 9:30 AM  
2/9/24 0941 CM note: covid (-) 1/31/24. IV decadron, diamox, & lasix. PICC. 8-10L hfnc vs bipap FIO2 50%. Telesitter. Tx from ICU 2/8/24. Discharge plan is [facility]. Per [facility] liaison, auth has been obtained and good through 2/11/24. WILL NEED SIGNED AUDI AND DC ORDER. HENS completed. Ambulance sheet started and on pts soft chart. CM will follow. Electronically signed by Sophy Walker RN on 2/9/2024 at 9:45 AM    
Assistance with the following:, Mobility, Bathing (family assists as needed- pt uses an electric scooter at home)  Current functional level: Assistance with the following:, Mobility, Bathing (hoping for return to baseline when off the vent and off the sedation)    Family can provide assistance at DC: Yes  Would you like Case Management to discuss the discharge plan with any other family members/significant others, and if so, who? Yes (pt on vent- sedation- call to [facility] sister Franci Díaz)  Plans to Return to Present Housing: Other (see comment) (family- sister agrees pt needs rehab SNF stay)  Other Identified Issues/Barriers to RETURNING to current housing: increased dependency   Potential Assistance needed at discharge: Meals On Wheels, Skilled Nursing Facility            Potential DME:  SNF  Patient expects to discharge to: Unknown  Plan for transportation at discharge:  SNF    Financial    [facility] / Plan: [employer] [place] MEDICAID / Product Type: *No Product type* /     Potential assistance Purchasing Medications: No        [facility] [address] [place], [place] - [address] [facility] - P [phone] -  [phone]  1978 [facility] [address]  Phone: [phone] Fax: [phone]      Notes:    Factors facilitating achievement of predicted outcomes: Family support    Barriers to discharge: dependent on others, previous reported falls, limited mobility uses an electric scooter.     Additional Case Management Notes: 1/22/2024 ICU, Vent/Sedation. IV Lasix, Solumedrol, Protonix, Zosyn, Electrolyte replacement.  for discharge placement needs. Spoke to [facility] sister Franci Díaz- she said ask her s/o domestic partner (attempted to call him) which facilities he is interested in her going to for temporary skilled placement. Pt had been at [facility] previously due to no local beds at [facility].  PRECERT Needed, Signed AUDI and HENS at discharge. llk    The Plan for Transition of Care is related

## 2024-02-12 ENCOUNTER — OUTSIDE SERVICES (OUTPATIENT)
Dept: PRIMARY CARE CLINIC | Age: 69
End: 2024-02-12

## 2024-02-12 DIAGNOSIS — E11.9 TYPE 2 DIABETES MELLITUS WITHOUT COMPLICATION, WITH LONG-TERM CURRENT USE OF INSULIN (HCC): ICD-10-CM

## 2024-02-12 DIAGNOSIS — R53.1 WEAKNESS: ICD-10-CM

## 2024-02-12 DIAGNOSIS — I10 PRIMARY HYPERTENSION: ICD-10-CM

## 2024-02-12 DIAGNOSIS — Z79.4 TYPE 2 DIABETES MELLITUS WITHOUT COMPLICATION, WITH LONG-TERM CURRENT USE OF INSULIN (HCC): ICD-10-CM

## 2024-02-12 DIAGNOSIS — I50.33 ACUTE ON CHRONIC DIASTOLIC HEART FAILURE (HCC): ICD-10-CM

## 2024-02-12 DIAGNOSIS — E78.2 MIXED HYPERLIPIDEMIA: ICD-10-CM

## 2024-02-12 DIAGNOSIS — J96.21 ACUTE AND CHRONIC RESPIRATORY FAILURE WITH HYPOXIA (HCC): Primary | ICD-10-CM

## 2024-02-12 DIAGNOSIS — J44.1 CHRONIC OBSTRUCTIVE PULMONARY DISEASE WITH ACUTE EXACERBATION (HCC): ICD-10-CM

## 2024-02-12 DIAGNOSIS — J44.9 CHRONIC OBSTRUCTIVE PULMONARY DISEASE, UNSPECIFIED COPD TYPE (HCC): ICD-10-CM

## 2024-02-12 DIAGNOSIS — I48.0 PAROXYSMAL ATRIAL FIBRILLATION (HCC): ICD-10-CM

## 2024-02-12 DIAGNOSIS — F14.10 COCAINE ABUSE (HCC): ICD-10-CM

## 2024-02-13 NOTE — PROGRESS NOTES
[facility] Hospitalist   Progress Note    Admitting Date and Time: 1/19/2024  3:44 AM  Admit Dx: COPD (chronic obstructive pulmonary disease) ([facility]) [J44.9]  Failure to thrive in adult [R62.7]  Acute on chronic respiratory failure with hypoxia (HCC) [J96.21]  Community acquired pneumonia of right lung, unspecified part of lung [J18.9]  Acute on chronic congestive heart failure, unspecified heart failure type (HCC) [I50.9]    Subjective/interval history:    1/20: Pt was transferred to ICU yesterday afternoon for worsening hypercapnic respiratory failure requiring BiPAP.  In the ICU, she was agitated and combative, requiring IM Geodon and initiation of Precedex.  After this, she was very calm, was conversing appropriately, placed back on BiPAP for hypercapnia.  Despite optimized treatment with noninvasive ventilation, patient's hypercapnia worsened and she required endotracheal intubation.    This morning she is intubated, sedated with Versed and fentanyl.    1/21: Patient remains intubated and sedated.  Per RN, she required additional Versed push to have CT of the chest done.  CT chest was done which shows probable pulmonary edema pattern along with probable pneumonia in the bases.  Creatinine up to 1.4 today.    1/22: Patient remains intubated and sedated.  Appears comfortable on mechanical ventilator.    1/23: Patient remains intubated. SBT planned for today. Propofol off but remains on Precedex.    1/24:  Patient extubated late yesterday afternoon. Precedex weaned off and patient sent to to [facility]. Upon arrival she was agitated and did not know why she was here. After some redirecting she was fine.    1/25: Patient sitting up in bed. She is very paranoid when asked questions and would not volunteer much. Apparently, upset that is was her 's birthday today and she was unable to be with him.    1/26: Patient lying in bed.  She did not eat any dinner but did eat breakfast and lunch for the 
       [facility] Hospitalist   Progress Note    Admitting Date and Time: 1/19/2024  3:44 AM  Admit Dx: COPD (chronic obstructive pulmonary disease) (HCC) [J44.9]  Failure to thrive in adult [R62.7]  Acute on chronic respiratory failure with hypoxia (HCC) [J96.21]  Community acquired pneumonia of right lung, unspecified part of lung [J18.9]  Acute on chronic congestive heart failure, unspecified heart failure type (HCC) [I50.9]    Subjective/interval history:    1/20: Pt was transferred to ICU yesterday afternoon for worsening hypercapnic respiratory failure requiring BiPAP.  In the ICU, she was agitated and combative, requiring IM Geodon and initiation of Precedex.  After this, she was very calm, was conversing appropriately, placed back on BiPAP for hypercapnia.  Despite optimized treatment with noninvasive ventilation, patient's hypercapnia worsened and she required endotracheal intubation.    This morning she is intubated, sedated with Versed and fentanyl.    1/21: Patient remains intubated and sedated.  Per RN, she required additional Versed push to have CT of the chest done.  CT chest was done which shows probable pulmonary edema pattern along with probable pneumonia in the bases.  Creatinine up to 1.4 today.    ROS: Unable to obtain ROS due to patient condition     pantoprazole (PROTONIX) 40 mg in sodium chloride (PF) 0.9 % 10 mL injection  40 mg IntraVENous Daily    metroNIDAZOLE  500 mg Orogastric 2 times per day    atorvastatin  40 mg Oral Nightly    [Held by provider] furosemide  40 mg Oral Daily    [Held by provider] metoprolol succinate  100 mg Oral Daily    Vitamin D  1,000 Units Oral Daily    sodium chloride flush  5-40 mL IntraVENous 2 times per day    [Held by provider] furosemide  40 mg IntraVENous BID    insulin lispro  0-4 Units SubCUTAneous TID WC    insulin lispro  0-4 Units SubCUTAneous Nightly    [Held by provider] lisinopril  20 mg Oral Daily    And    [Held by provider] 
       [facility] Hospitalist   Progress Note    Admitting Date and Time: 1/19/2024  3:44 AM  Admit Dx: COPD (chronic obstructive pulmonary disease) ([facility]) [J44.9]  Failure to thrive in adult [R62.7]  Acute on chronic respiratory failure with hypoxia (HCC) [J96.21]  Community acquired pneumonia of right lung, unspecified part of lung [J18.9]  Acute on chronic congestive heart failure, unspecified heart failure type (HCC) [I50.9]    Subjective/interval history:    1/20: Pt was transferred to ICU yesterday afternoon for worsening hypercapnic respiratory failure requiring BiPAP.  In the ICU, she was agitated and combative, requiring IM Geodon and initiation of Precedex.  After this, she was very calm, was conversing appropriately, placed back on BiPAP for hypercapnia.  Despite optimized treatment with noninvasive ventilation, patient's hypercapnia worsened and she required endotracheal intubation.    This morning she is intubated, sedated with Versed and fentanyl.    1/21: Patient remains intubated and sedated.  Per RN, she required additional Versed push to have CT of the chest done.  CT chest was done which shows probable pulmonary edema pattern along with probable pneumonia in the bases.  Creatinine up to 1.4 today.    1/22: Patient remains intubated and sedated.  Appears comfortable on mechanical ventilator.    1/23: Patient remains intubated. SBT planned for today. Propofol off but remains on Precedex.    1/24:  Patient extubated late yesterday afternoon. Precedex weaned off and [place] [facility] she was agitated and did not know why she was here. After some redirecting she was fine.    1/25: Patient sitting up in bed. She is very paranoid when asked questions and would not volunteer much. Apparently, upset that is was her 's birthday today and she was unable to be with him.    1/26: Patient lying in bed.  She did not eat any dinner but did eat breakfast and lunch for the 
       [facility] Hospitalist   Progress Note    Admitting Date and Time: 1/19/2024  3:44 AM  Admit Dx: COPD (chronic obstructive pulmonary disease) ([facility]) [J44.9]  Failure to thrive in adult [R62.7]  Acute on chronic respiratory failure with hypoxia (HCC) [J96.21]  Community acquired pneumonia of right lung, unspecified part of lung [J18.9]  Acute on chronic congestive heart failure, unspecified heart failure type (HCC) [I50.9]    Subjective/interval history:    1/20: Pt was transferred to ICU yesterday afternoon for worsening hypercapnic respiratory failure requiring BiPAP.  In the ICU, she was agitated and combative, requiring IM Geodon and initiation of Precedex.  After this, she was very calm, was conversing appropriately, placed back on BiPAP for hypercapnia.  Despite optimized treatment with noninvasive ventilation, patient's hypercapnia worsened and she required endotracheal intubation.    This morning she is intubated, sedated with Versed and fentanyl.    1/21: Patient remains intubated and sedated.  Per RN, she required additional Versed push to have CT of the chest done.  CT chest was done which shows probable pulmonary edema pattern along with probable pneumonia in the bases.  Creatinine up to 1.4 today.    1/22: Patient remains intubated and sedated.  Appears comfortable on mechanical ventilator.    1/23: Patient remains intubated. SBT planned for today. Propofol off but remains on Precedex.    1/24:  Patient extubated late yesterday afternoon. Precedex weaned off and [place] [facility] she was agitated and did not know why she was here. After some redirecting she was fine.    1/25: Patient sitting up in bed. She is very paranoid when asked questions and would not volunteer much. Apparently, upset that is was her 's birthday today and she was unable to be with him.    1/26: Patient lying in bed.  She did not eat any dinner but did eat breakfast and lunch for the 
       [facility] Hospitalist   Progress Note    Admitting Date and Time: 1/19/2024  3:44 AM  Admit Dx: COPD (chronic obstructive pulmonary disease) ([facility]) [J44.9]  Failure to thrive in adult [R62.7]  Acute on chronic respiratory failure with hypoxia (HCC) [J96.21]  Community acquired pneumonia of right lung, unspecified part of lung [J18.9]  Acute on chronic congestive heart failure, unspecified heart failure type (HCC) [I50.9]    Subjective/interval history:    1/20: Pt was transferred to ICU yesterday afternoon for worsening hypercapnic respiratory failure requiring BiPAP.  In the ICU, she was agitated and combative, requiring IM Geodon and initiation of Precedex.  After this, she was very calm, was conversing appropriately, placed back on BiPAP for hypercapnia.  Despite optimized treatment with noninvasive ventilation, patient's hypercapnia worsened and she required endotracheal intubation.    This morning she is intubated, sedated with Versed and fentanyl.    1/21: Patient remains intubated and sedated.  Per RN, she required additional Versed push to have CT of the chest done.  CT chest was done which shows probable pulmonary edema pattern along with probable pneumonia in the bases.  Creatinine up to 1.4 today.    1/22: Patient remains intubated and sedated.  Appears comfortable on mechanical ventilator.    1/23: Patient remains intubated. SBT planned for today. Propofol off but remains on Precedex.    1/24:  Patient extubated late yesterday afternoon. Precedex weaned off and patient sent to to [facility]. Upon arrival she was agitated and did not know why she was here. After some redirecting she was fine.    1/25: Patient sitting up in bed. She is very paranoid when asked questions and would not volunteer much. Apparently, upset that is was her 's birthday today and she was unable to be with him.    1/26: Patient lying in bed.  She did not eat any dinner but did eat breakfast and lunch for the 
       [facility] Hospitalist   Progress Note    Admitting Date and Time: 1/19/2024  3:44 AM  Admit Dx: COPD (chronic obstructive pulmonary disease) ([facility]) [J44.9]  Failure to thrive in adult [R62.7]  Acute on chronic respiratory failure with hypoxia (HCC) [J96.21]  Community acquired pneumonia of right lung, unspecified part of lung [J18.9]  Acute on chronic congestive heart failure, unspecified heart failure type (HCC) [I50.9]    Subjective/interval history:    1/20: Pt was transferred to ICU yesterday afternoon for worsening hypercapnic respiratory failure requiring BiPAP.  In the ICU, she was agitated and combative, requiring IM Geodon and initiation of Precedex.  After this, she was very calm, was conversing appropriately, placed back on BiPAP for hypercapnia.  Despite optimized treatment with noninvasive ventilation, patient's hypercapnia worsened and she required endotracheal intubation.    This morning she is intubated, sedated with Versed and fentanyl.    1/21: Patient remains intubated and sedated.  Per RN, she required additional Versed push to have CT of the chest done.  CT chest was done which shows probable pulmonary edema pattern along with probable pneumonia in the bases.  Creatinine up to 1.4 today.    1/22: Patient remains intubated and sedated.  Appears comfortable on mechanical ventilator.    1/23: Patient remains intubated. SBT planned for today. Propofol off but remains on Precedex.    1/24:  Patient extubated late yesterday afternoon. Precedex weaned off and patient sent to to [facility]. Upon arrival she was agitated and did not know why she was here. After some redirecting she was fine.    1/25: Patient sitting up in bed. She is very paranoid when asked questions and would not volunteer much. Apparently, upset that is was her 's birthday today and she was unable to be with him.    1/26: Patient lying in bed.  She did not eat any dinner but did eat breakfast and lunch for the 
       [facility] Hospitalist   Progress Note    Admitting Date and Time: 1/19/2024  3:44 AM  Admit Dx: COPD (chronic obstructive pulmonary disease) ([facility]) [J44.9]  Failure to thrive in adult [R62.7]  Acute on chronic respiratory failure with hypoxia (HCC) [J96.21]  Community acquired pneumonia of right lung, unspecified part of lung [J18.9]  Acute on chronic congestive heart failure, unspecified heart failure type (HCC) [I50.9]    Subjective/interval history:    1/20: Pt was transferred to ICU yesterday afternoon for worsening hypercapnic respiratory failure requiring BiPAP.  In the ICU, she was agitated and combative, requiring IM Geodon and initiation of Precedex.  After this, she was very calm, was conversing appropriately, placed back on BiPAP for hypercapnia.  Despite optimized treatment with noninvasive ventilation, patient's hypercapnia worsened and she required endotracheal intubation.    This morning she is intubated, sedated with Versed and fentanyl.    1/21: Patient remains intubated and sedated.  Per RN, she required additional Versed push to have CT of the chest done.  CT chest was done which shows probable pulmonary edema pattern along with probable pneumonia in the bases.  Creatinine up to 1.4 today.    1/22: Patient remains intubated and sedated.  Appears comfortable on mechanical ventilator.    1/23: Patient remains intubated. SBT planned for today. Propofol off but remains on Precedex.    1/24:  Patient extubated late yesterday afternoon. Precedex weaned off and patient sent to [place] [facility] [place] Upon arrival she was agitated and did not know why she was here. After some redirecting she was fine.    1/25: Patient sitting up in bed. She is very paranoid when asked questions and would not volunteer much. Apparently, upset that is was her 's birthday today and she was unable to be with him.    1/26: Patient lying in bed.  She did not eat any dinner but did eat breakfast and lunch for the 
       [facility] Hospitalist   Progress Note    Admitting Date and Time: 1/19/2024  3:44 AM  Admit Dx: COPD (chronic obstructive pulmonary disease) ([facility]) [J44.9]  Failure to thrive in adult [R62.7]  Acute on chronic respiratory failure with hypoxia (HCC) [J96.21]  Community acquired pneumonia of right lung, unspecified part of lung [J18.9]  Acute on chronic congestive heart failure, unspecified heart failure type (HCC) [I50.9]    Subjective/interval history:    1/20: Pt was transferred to ICU yesterday afternoon for worsening hypercapnic respiratory failure requiring BiPAP.  In the ICU, she was agitated and combative, requiring IM Geodon and initiation of Precedex.  After this, she was very calm, was conversing appropriately, placed back on BiPAP for hypercapnia.  Despite optimized treatment with noninvasive ventilation, patient's hypercapnia worsened and she required endotracheal intubation.    This morning she is intubated, sedated with Versed and fentanyl.    1/21: Patient remains intubated and sedated.  Per RN, she required additional Versed push to have CT of the chest done.  CT chest was done which shows probable pulmonary edema pattern along with probable pneumonia in the bases.  Creatinine up to 1.4 today.    1/22: Patient remains intubated and sedated.  Appears comfortable on mechanical ventilator.    1/23: Patient remains intubated. SBT planned for today. Propofol off but remains on Precedex.    1/24:  Patient extubated late yesterday afternoon. Precedex weaned off and patient sent to to [facility]. Upon arrival she was agitated and did not know why she was here. After some redirecting she was fine.    1/25: Patient sitting up in bed. She is very paranoid when asked questions and would not volunteer much. Apparently, upset that is was her 's birthday today and she was unable to be with him.    1/26: Patient lying in bed.  She did not eat any dinner but did eat breakfast and lunch for the 
       [facility] Hospitalist   Progress Note    Admitting Date and Time: 1/19/2024  3:44 AM  Admit Dx: COPD (chronic obstructive pulmonary disease) ([facility]) [J44.9]  Failure to thrive in adult [R62.7]  Acute on chronic respiratory failure with hypoxia (HCC) [J96.21]  Community acquired pneumonia of right lung, unspecified part of lung [J18.9]  Acute on chronic congestive heart failure, unspecified heart failure type (HCC) [I50.9]    Subjective/interval history:    1/20: Pt was transferred to ICU yesterday afternoon for worsening hypercapnic respiratory failure requiring BiPAP.  In the ICU, she was agitated and combative, requiring IM Geodon and initiation of Precedex.  After this, she was very calm, was conversing appropriately, placed back on BiPAP for hypercapnia.  Despite optimized treatment with noninvasive ventilation, patient's hypercapnia worsened and she required endotracheal intubation.    This morning she is intubated, sedated with Versed and fentanyl.    1/21: Patient remains intubated and sedated.  Per RN, she required additional Versed push to have CT of the chest done.  CT chest was done which shows probable pulmonary edema pattern along with probable pneumonia in the bases.  Creatinine up to 1.4 today.    1/22: Patient remains intubated and sedated.  Appears comfortable on mechanical ventilator.    1/23: Patient remains intubated. SBT planned for today. Propofol off but remains on Precedex.    1/24:  Patient extubated late yesterday afternoon. Precedex weaned off and patient sent to to [facility] [place]. Upon arrival she was agitated and did not know why she was here. After some redirecting she was fine.    1/25: Patient sitting up in bed. She is very paranoid when asked questions and would not volunteer much. Apparently, upset that is was her 's birthday today and she was unable to be with him.    1/26: Patient lying in bed.  She did not eat any dinner but did eat breakfast and lunch for the 
       [facility]    Admitting Date and Time: 1/19/2024  3:44 AM  Admit Dx: COPD (chronic obstructive pulmonary disease) ([facility]) [J44.9]  Failure to thrive in adult [R62.7]  Acute on chronic respiratory failure with hypoxia (HCC) [J96.21]  Community acquired pneumonia of right lung, unspecified part of lung [J18.9]  Acute on chronic congestive heart failure, unspecified heart failure type (HCC) [I50.9]    Subjective/interval history:    1/20: Pt was transferred to ICU yesterday afternoon for worsening hypercapnic respiratory failure requiring BiPAP.  In the ICU, she was agitated and combative, requiring IM Geodon and initiation of Precedex.  After this, she was very calm, was conversing appropriately, placed back on BiPAP for hypercapnia.  Despite optimized treatment with noninvasive ventilation, patient's hypercapnia worsened and she required endotracheal intubation.    This morning she is intubated, sedated with Versed and fentanyl.    1/21: Patient remains intubated and sedated.  Per RN, she required additional Versed push to have CT of the chest done.  CT chest was done which shows probable pulmonary edema pattern along with probable pneumonia in the bases.  Creatinine up to 1.4 today.    1/22: Patient remains intubated and sedated.  Appears comfortable on mechanical ventilator.    1/23: Patient remains intubated. SBT planned for today. Propofol off but remains on Precedex.    ROS: Unable to obtain ROS due to patient condition     methylPREDNISolone  40 mg IntraVENous Q12H    insulin lispro  0-4 Units SubCUTAneous Q4H    chlorhexidine  15 mL Mouth/Throat BID    pantoprazole (PROTONIX) 40 mg in sodium chloride (PF) 0.9 % 10 mL injection  40 mg IntraVENous Daily    piperacillin-tazobactam  4,500 mg IntraVENous Q8H    metoprolol tartrate  50 mg Per G Tube BID    atorvastatin  40 mg Oral Nightly    Vitamin D  1,000 Units Oral Daily    sodium chloride flush  5-40 mL 
       [facility] Hospitalist   Progress Note    Admitting Date and Time: 1/19/2024  3:44 AM  Admit Dx: COPD (chronic obstructive pulmonary disease) ([facility]) [J44.9]  Failure to thrive in adult [R62.7]  Acute on chronic respiratory failure with hypoxia (HCC) [J96.21]  Community acquired pneumonia of right lung, unspecified part of lung [J18.9]  Acute on chronic congestive heart failure, unspecified heart failure type (HCC) [I50.9]    Subjective/interval history:    1/20: Pt was transferred to ICU yesterday afternoon for worsening hypercapnic respiratory failure requiring BiPAP.  In the ICU, she was agitated and combative, requiring IM Geodon and initiation of Precedex.  After this, she was very calm, was conversing appropriately, placed back on BiPAP for hypercapnia.  Despite optimized treatment with noninvasive ventilation, patient's hypercapnia worsened and she required endotracheal intubation.    This morning she is intubated, sedated with Versed and fentanyl.    1/21: Patient remains intubated and sedated.  Per RN, she required additional Versed push to have CT of the chest done.  CT chest was done which shows probable pulmonary edema pattern along with probable pneumonia in the bases.  Creatinine up to 1.4 today.    1/22: Patient remains intubated and sedated.  Appears comfortable on mechanical ventilator.    1/23: Patient remains intubated. SBT planned for today. Propofol off but remains on Precedex.    1/24:  Patient extubated late yesterday afternoon. Precedex weaned off and patient sent to to [facility]. Upon arrival she was agitated and did not know why she was here. After some redirecting she was fine.    1/25: Patient sitting up in bed. She is very paranoid when asked questions and would not volunteer much. Apparently, upset that is was her 's birthday today and she was unable to be with him.    1/26: Patient lying in bed.  She did not eat any dinner but did eat breakfast and lunch for the 
     Nutrition Note    Pt consuming % of most meals and intermittently drinking % of ONS. Recommend continue ONS, encourage PO intake. See RD note 2/5 for full assessment, will follow up as scheduled.     Electronically signed by Zoe Malone RD on 2/9/24 at 9:10 AM EST    Contact: [phone]     
    OCCUPATIONAL THERAPY BEDSIDE TREATMENT NOTE  LAST [facility]  [address]  [place]. [place]    Date:2024  Patient Name: Emily Weiss  MRN: [id number]  : 1955  Room: [place] [address]        Evaluating OT: Lynne Simpson, OTR/L; [id number]        Referring Provider and Orders/Date:        OT eval and treat  Start:  24 1400,   End:  24 1400,   ONE TIME,   Standing Count:  1 Occurrences,   R       Gm Tena MD     Recommended placement: subacute        Diagnosis:   1. Acute on chronic respiratory failure with hypoxia (HCC)    2. Acute on chronic congestive heart failure, unspecified heart failure type (HCC)    3. Community acquired pneumonia of right lung, unspecified part of lung    4. Failure to thrive in adult    5. Atrial fibrillation, unspecified type (HCC)          Surgery: : intubated;  extubated      Pertinent Medical History:        Past Medical History        Past Medical History:   Diagnosis Date    Acute congestive heart failure with left ventricular diastolic dysfunction (HCC) 2020    Atrial fibrillation ([facility]) 2024    Cardiomegaly 2017    COPD (chronic obstructive pulmonary disease) ([facility]) 2024    Hyperlipidemia      Hypertension      Hypoxemia 2017    Nodule of right lung 2017    Obese      Oxygen dependent       2l n/c    Renal failure 2018    Substance abuse ([facility])      Type 2 diabetes mellitus with hyperglycemia, without long-term current use of insulin ([facility]) 2024             Past Surgical History   No past surgical history on file.       Precautions:  Fall Risk, lowered to the floor in facility with 5+ people to stand-only to stand with multiple assist and nursing, 9L O2, BIPAP on this date (24)        Assessment of current deficits     [x] Functional mobility           [x]ADLs           [x] Strength                   [x]Cognition     [x] Functional transfers      
  Physician Progress Note      PATIENT:               MALCOM CA  [facility] #:                  [id number]  :                       1955  ADMIT DATE:       2024 3:44 AM  DISCH DATE:        2024 4:26 PM  RESPONDING  PROVIDER #:        Huber Pineda DO          QUERY TEXT:    Pt admitted with Aspiration pneumonia, respiratory failure.  Noted   documentation of septic shock on  in Critical care pn. If possible, please   document in progress notes and discharge summary:    The medical record reflects the following:  Risk Factors: Aspiration pneumonia, A/C respiratory failure, COPD, DM, morbid   obesity,  Clinical Indicators: : Wbc 7.5, Procal 0.03, lactic 0.8, Ph 7.2,  hr 95,   rr 25, Resp panel and culture negative  Treatment: Rocephin, Vibramycin, Flagyl, Hiram-synephrine, Zosyn, Maxipime,   Solumedrol.    Thank you, Margaret Guillen RN [facility]  [phone]  Options provided:  -- Sepsis with septic shock confirmed present on admission  -- Sepsis with septic shock ruled out  -- Other - I will add my own diagnosis  -- Disagree - Not applicable / Not valid  -- Disagree - Clinically unable to determine / Unknown  -- Refer to Clinical Documentation Reviewer    PROVIDER RESPONSE TEXT:    The diagnosis of Sepsis with septic shock was ruled out.    Query created by: Margaret Guillen on 2024 9:42 AM      Electronically signed by:  Huber Pineda DO 2024 1:00 PM          
  Speech Language Pathology  NAME:  Emily Weiss  :  1955  DATE: 2024  ROOM:  0622/0622-01    Pt unavailable at 1436 for Dysphagia therapy services due to:    Other: Patient currently on bipap.     Will re-attempt as appropriate. Thank you.       Bela Rivera M.S., CCC-SLP  Speech-Language Pathologist  SP. [id number]      
.4 Eyes Skin Assessment     NAME:  Emily Weiss  YOB: 1955  MEDICAL RECORD NUMBER:  [id number]    The patient is being assessed for  Transfer to New Unit    I agree that at least one RN has performed a thorough Head to Toe Skin Assessment on the patient. ALL assessment sites listed below have been assessed.      Areas assessed by both nurses:    Head, Face, Ears, Shoulders, Back, Chest, Arms, Elbows, Hands, Sacrum. Buttock, Coccyx, Ischium, Legs. Feet and Heels, Under Medical Devices , and Other reddened area in rectal cleft        Does the Patient have a Wound? Yes wound(s) were present on assessment. LDA wound assessment was Initiated and completed by RN       Jacinto Prevention initiated by RN: Yes  Wound Care Orders initiated by RN: Yes    Pressure Injury (Stage 3,4, Unstageable, DTI, NWPT, and Complex wounds) if present, place Wound referral order by RN under : No    New Ostomies, if present place, Ostomy referral order under : No     Nurse 1 eSignature: Electronically signed by Jf Salazar RN on 1/31/24 at 7:29 PM EST    **SHARE this note so that the co-signing nurse can place an eSignature**    Nurse 2 eSignature: {Esignature:[id number]}   
1833 - Phone call from Lexie in Lab - critical CO2 43. Pt's RN Kingsley updated.    Beth Diaz RN  2/4/2024    
4 Eyes Skin Assessment     NAME:  Emily Weiss  YOB: 1955  MEDICAL RECORD NUMBER:  [id number]    The patient is being assessed for  Transfer to New Unit    I agree that at least one RN has performed a thorough Head to Toe Skin Assessment on the patient. ALL assessment sites listed below have been assessed.      Areas assessed by both nurses:    Head, Face, Ears, Shoulders, Back, Chest, Arms, Elbows, Hands, Sacrum. Buttock, Coccyx, Ischium, Legs. Feet and Heels, and Under Medical Devices         Does the Patient have a Wound? No noted wound(s)       Jacinto Prevention initiated by RN: Yes  Wound Care Orders initiated by RN: No    Pressure Injury (Stage 3,4, Unstageable, DTI, NWPT, and Complex wounds) if present, place Wound referral order by RN under : No    New Ostomies, if present place, Ostomy referral order under : No     Nurse 1 eSignature: Electronically signed by Antonio Cannon RN on 1/19/24 at 4:07 PM EST    **SHARE this note so that the co-signing nurse can place an eSignature**    Nurse 2 eSignature: Electronically signed by PAULA Baez RN on 1/19/24 at 4:09 PM EST   
4 Eyes Skin Assessment     NAME:  Emily Weiss  YOB: 1955  MEDICAL RECORD NUMBER:  [id number]    The patient is being assessed for  Transfer to New Unit    I agree that at least one RN has performed a thorough Head to Toe Skin Assessment on the patient. ALL assessment sites listed below have been assessed.      Areas assessed by both nurses:    Head, Face, Ears, Shoulders, Back, Chest, Arms, Elbows, Hands, Sacrum. Buttock, Coccyx, Ischium, Legs. Feet and Heels, and Under Medical Devices         Does the Patient have a Wound? Yes wound(s) were present on assessment. LDA wound assessment was Initiated and completed by RN       Jacinto Prevention initiated by RN: No completed by previous floor  Wound Care Orders initiated by RN: No completed by previous floor    Pressure Injury (Stage 3,4, Unstageable, DTI, NWPT, and Complex wounds) if present, place Wound referral order by RN under : No    New Ostomies, if present place, Ostomy referral order under : No     Nurse 1 eSignature: Electronically signed by Sridhar Gaxiola RN on 1/24/24 at 2:32 PM EST    **SHARE this note so that the co-signing nurse can place an eSignature**    Nurse 2 eSignature: Electronically signed by Ziyad Boyle RN on 1/24/24 at 4:28 PM EST   
ABG drawn x 1 from Right Radial. Patient had NormalAllen's Test.  Patient was on 80% O2 via  BiPAP   at time of puncture. Pressure held for 5.  No bleeding or bruising noted at puncture site.  Patient tolerated procedure well      Performed by Mic Lincoln RCP    
Assessment and Plan  Patient is a 68 y.o. female with the following medical Problems:     Acute metabolic encephalopathy  h/o substance abuse    Acute on chronic hypoxic and hypercapnic respiratory failure   s/p intubation and mechanical ventilation.   Extubated 1/23 - currently on high flow oxygen    Acute on chronic heart failure with preserved ejection fraction  Severe pulmonary hypertension    New onset atrial fibrillation with [id number] score of 5 - on Eliquis    COPD with acute exacerbation  Aspiration pneumonitis  Possible KIMBERLEE/OHV    Type 2 diabetes (hemoglobin A1c 6.1)  Trichomonas vaginalis.  Thrombocytopenia    Plan of care:    Multiple comorbidities with COPD, HFpEF, pulmonary hypertension, morbid obesity/OHV, possibly crack lung.    Unfortunately protracted / poor recovery and high oxygen requirements may be her new baseline.    Continue oxygen supplementation, currently requiring 8 to 10 L high flow to maintain sats above 90%    Continue diuresis with Lasix/Diamox    Continue steroids - Decadron 6 mg daily    Continue bronchodilators - DuoNeb, Pulmicort    Incentive spirometry, flutter valve  PT/OT/OOB    Aspiration precautions    BiPAP QHS    Discharge planning:    Home oxygen   Home BiPAP QHS    Will need continued bronchodilator nebulizations  Prolonged Decadron taper  Will switch diuretics to oral on discharge.    I reviewed the patients chart including labs and images with the patient.    35 Minutes of which greater than 50% was spent counseling or coordinating care.     My signature verifies the accuracy and relevance of my note, including documentation of information that has been copy pasted/forward, note templates, and Notewriter Macros.    Kojo Alex MD MS  Pulmonary & Critical Care Medicine  [facility] -- [facility], [facility]      High flow oxygen alternating with BiPAP  Completed cefepime on 2/5. Flagyl, and doxycycline were 
Assessment and Plan  Patient is a 68 y.o. female with the following medical Problems:     Acute metabolic encephalopathy  h/o substance abuse    Acute on chronic hypoxic and hypercapnic respiratory failure   s/p intubation and mechanical ventilation.   Extubated 1/23 - currently on high flow oxygen    Acute on chronic heart failure with preserved ejection fraction  Severe pulmonary hypertension    New onset atrial fibrillation with [id number] score of 5 - on Eliquis    COPD with acute exacerbation  Aspiration pneumonitis  Possible KIMBERLEE/OHV    Type 2 diabetes (hemoglobin A1c 6.1)  Trichomonas vaginalis.  Thrombocytopenia    Plan of care:    Multiple comorbidities with COPD, HFpEF, pulmonary hypertension, morbid obesity/OHV, possibly crack lung.    Unfortunately protracted / poor recovery and high oxygen requirements may be her new baseline.    Continue oxygen supplementation, currently requiring 8 to 10 L high flow to maintain sats above 90%    Continue diuresis with Lasix/Diamox    Continue steroids - Decadron 6 mg daily    Continue bronchodilators - DuoNeb, Pulmicort    Incentive spirometry, flutter valve  PT/OT/OOB    Aspiration precautions    BiPAP QHS    Discharge planning:    Home oxygen   Home BiPAP QHS    Will need continued bronchodilator nebulizations  Prolonged Decadron taper -currently on 6 mg daily  P.o. Lasix and Diamox    Discussed with .  Will arrange for outpatient BMP and pulmonology follow-up with Dr. Aguilar at [facility]    I reviewed the patients chart including labs and images with the patient.     My signature verifies the accuracy and relevance of my note, including documentation of information that has been copy pasted/forward, note templates, and Notewriter Macros.    Kojo Alex MD MS  Pulmonary & Critical Care Medicine  [place] -- [facility] & [facility], [facility]    History of Present Illness:   68 y.o. female with a history of 
Assessment and Plan  Patient is a 68 y.o. female with the following medical Problems:     Acute metabolic encephalopathy  h/o substance abuse    Acute on chronic hypoxic and hypercapnic respiratory failure   s/p intubation and mechanical ventilation.   Extubated 1/23 - currently on high flow oxygen    Acute on chronic heart failure with preserved ejection fraction  Severe pulmonary hypertension    New onset atrial fibrillation with [id number] score of 5 - on Eliquis    COPD with acute exacerbation  Aspiration pneumonitis  Possible KIMBERLEE/OHV    Type 2 diabetes (hemoglobin A1c 6.1)  Trichomonas vaginalis.  Thrombocytopenia    Plan of care:    On 8 L nasal cannula, wean down oxygen to maintain sats around 92%.    Continue diuresis with Lasix/Diamox    Continue steroids -Decadron 10 mg daily    Continue bronchodilators - DuoNeb, Pulmicort    Incentive spirometry, flutter valve  PT/OT/OOB    Aspiration precautions    BiPAP QHS    I reviewed the patients chart including labs and images with the patient.    35 Minutes of which greater than 50% was spent counseling or coordinating care.     My signature verifies the accuracy and relevance of my note, including documentation of information that has been copy pasted/forward, note templates, and Notewriter Macros.    Kojo Alex MD MS  Pulmonary & Critical Care Medicine  [facility] -- [facility], [facility]      High flow oxygen alternating with BiPAP  Completed cefepime on 2/5. Flagyl, and doxycycline were discontinued.    Scheduled DuoNeb with Pulmicort and Solu-Medrol.  Insulin sliding scale  Speech therapy evaluation  Judicious diuresis with close monitoring of kidney function  Compliance with BiPAP was encouraged.  PT, OT and out of bed as tolerated  Patient is currently on Eliquis which covers for DVT prophylaxis.    History of Present Illness:   68 y.o. female with a history of chronic heart failure with 
Assessment and Plan  Patient is a 68 y.o. female with the following medical Problems:     Acute metabolic encephalopathy  h/o substance abuse    Acute on chronic hypoxic and hypercapnic respiratory failure   s/p intubation and mechanical ventilation.   Extubated 1/23 - currently on high flow oxygen    Acute on chronic heart failure with preserved ejection fraction  Severe pulmonary hypertension    New onset atrial fibrillation with [id number] score of 5 - on Eliquis    COPD with acute exacerbation  Aspiration pneumonitis  Possible KIMBERLEE/OHV    Type 2 diabetes (hemoglobin A1c 6.1)  Trichomonas vaginalis.  Thrombocytopenia    Plan of care:    On 6 L nasal cannula, wean down oxygen to maintain sats around 92%.    Continue diuresis with Lasix/Diamox    Continue steroids -Decadron 10 mg daily    Continue bronchodilators - DuoNeb, Pulmicort    Incentive spirometry, flutter valve  PT/OT/OOB    Aspiration precautions    BiPAP QHS    Discharge planning:    Can potentially plan for discharge.  Will need continued bronchodilator nebulizations  Prolonged Decadron taper  Will switch diuretics to oral on discharge.    I reviewed the patients chart including labs and images with the patient.    35 Minutes of which greater than 50% was spent counseling or coordinating care.     My signature verifies the accuracy and relevance of my note, including documentation of information that has been copy pasted/forward, note templates, and Notewriter Macros.    Kojo Alex MD MS  Pulmonary & Critical Care Medicine  [facility] -- [facility], [facility]      High flow oxygen alternating with BiPAP  Completed cefepime on 2/5. Flagyl, and doxycycline were discontinued.    Scheduled DuoNeb with Pulmicort and Solu-Medrol.  Insulin sliding scale  Speech therapy evaluation  Judicious diuresis with close monitoring of kidney function  Compliance with BiPAP was encouraged.  PT, OT and out of 
Assessment and Plan  Patient is a 68 y.o. female with the following medical Problems:     Acute metabolic encephalopathy  h/o substance abuse    Acute on chronic hypoxic and hypercapnic respiratory failure   s/p intubation and mechanical ventilation.   Extubated 1/23 - currently on high flow oxygen    Acute on chronic heart failure with preserved ejection fraction  Severe pulmonary hypertension  New onset atrial fibrillation with [id number] score of 5 - on Eliquis    COPD with acute exacerbation  Aspiration pneumonitis  Possible KIMBERLEE/OHV    Type 2 diabetes (hemoglobin A1c 6.1)  Trichomonas vaginalis.  Thrombocytopenia    Plan of care:    High flow oxygen alternating with BiPAP  Completed cefepime on 2/5. Flagyl, and doxycycline were discontinued.    Scheduled DuoNeb with Pulmicort and Solu-Medrol.  Insulin sliding scale  Speech therapy evaluation  Judicious diuresis with close monitoring of kidney function  Compliance with BiPAP was encouraged.  PT, OT and out of bed as tolerated  Patient is currently on Eliquis which covers for DVT prophylaxis.    History of Present Illness:   68 y.o. female with a history of chronic heart failure with preserved ejection fraction, COPD, hypertension, hyperlipidemia presenting with above complaints.  At this time patient is awake, alert, oriented x 3, very tangential and speech is mostly nonsensical.  On review of systems she does endorse shortness of breath and worsening lower extremity edema.  No chest pain, syncope, or presyncope.  No fevers, chills, or known sick contacts.  In the ED, found to be hypoxic and placed on cannula oxygen, given DuoNeb treatment.  ABG consistent with acute on chronic hypercapnic respiratory failure.  Per RN, when patient was brought up from the ED oxygen saturation was in the 70s on nasal cannula and she was subsequently placed on high flow nasal cannula.     Daily progress:  January 22, 2024: Patient continues to be sedated, intubated, mechanically 
Assessment and Plan  Patient is a 68 y.o. female with the following medical Problems:     Acute metabolic encephalopathy  h/o substance abuse    Acute on chronic hypoxic and hypercapnic respiratory failure   s/p intubation and mechanical ventilation.   Extubated 1/23 - currently on high flow oxygen    Acute on chronic heart failure with preserved ejection fraction  Severe pulmonary hypertension  New onset atrial fibrillation with [id number] score of 5 - on Eliquis    COPD with acute exacerbation  Aspiration pneumonitis  Possible KIMBERLEE/OHV    Type 2 diabetes (hemoglobin A1c 6.1)  Trichomonas vaginalis.  Thrombocytopenia    Plan of care:    High flow oxygen alternating with BiPAP  Completed cefepime on 2/5. Flagyl, and doxycycline were discontinued.    Scheduled DuoNeb with Pulmicort and Solu-Medrol.  Insulin sliding scale  Speech therapy evaluation  Judicious diuresis with close monitoring of kidney function  Compliance with BiPAP was encouraged.  PT, OT and out of bed as tolerated  Patient is currently on Eliquis which covers for DVT prophylaxis.    History of Present Illness:   68 y.o. female with a history of chronic heart failure with preserved ejection fraction, COPD, hypertension, hyperlipidemia presenting with above complaints.  At this time patient is awake, alert, oriented x 3, very tangential and speech is mostly nonsensical.  On review of systems she does endorse shortness of breath and worsening lower extremity edema.  No chest pain, syncope, or presyncope.  No fevers, chills, or known sick contacts.  In the ED, found to be hypoxic and placed on cannula oxygen, given DuoNeb treatment.  ABG consistent with acute on chronic hypercapnic respiratory failure.  Per RN, when patient was brought up from the ED oxygen saturation was in the 70s on nasal cannula and she was subsequently placed on high flow nasal cannula.     Daily progress:  January 22, 2024: Patient continues to be sedated, intubated, mechanically 
Assessment and Plan  Patient is a 68 y.o. female with the following medical Problems:   Acute on chronic hypoxic and hypercapnic respiratory failure requiring intubation and mechanical ventilation  Acute on chronic heart failure with preserved ejection fraction  Severe secondary pulmonary hypertension  COPD with acute exacerbation  Aspiration pneumonitis  New onset atrial fibrillation with [id number] score of 5  Type 2 diabetes (hemoglobin A1c 6.1)  Trichomonas vaginalis.    Plan of care:  Supplemental oxygen to keep sat 88-94  Patient is currently on Eliquis which DVT prophylaxis  GI prophylaxis  Patient is currently on ceftriaxone.  Cultures are negative to date.  Scheduled DuoNeb with pulmonary and scheduled Solu-Medrol  Insulin sliding scale  Speech therapy evaluation  Judicious diuresis with close monitoring of kidney function      History of Present Illness:   68 y.o. female with a history of chronic heart failure with preserved ejection fraction, COPD, hypertension, hyperlipidemia presenting with above complaints.  At this time patient is awake, alert, oriented x 3, very tangential and speech is mostly nonsensical.  On review of systems she does endorse shortness of breath and worsening lower extremity edema.  No chest pain, syncope, or presyncope.  No fevers, chills, or known sick contacts.  In the ED, found to be hypoxic and placed on cannula oxygen, given DuoNeb treatment.  ABG consistent with acute on chronic hypercapnic respiratory failure.  Per RN, when patient was brought up from the ED oxygen saturation was in the 70s on nasal cannula and she was subsequently placed on high flow nasal cannula.     Daily progress:  January 22, 2024: Patient continues to be sedated, intubated, mechanically ventilated.  She is currently on assist-control mechanical ventilation with tidal volume 350, rate 15, FiO2 50%, PEEP of 8.  Patient wakes up but does not follow commands off sedation.    January 23, 2024: Patient 
Assessment and Plan  Patient is a 68 y.o. female with the following medical Problems:   Acute on chronic hypoxic and hypercapnic respiratory failure requiring intubation and mechanical ventilation  Acute on chronic heart failure with preserved ejection fraction  Severe secondary pulmonary hypertension  COPD with acute exacerbation  Aspiration pneumonitis  New onset atrial fibrillation with [id number] score of 5  Type 2 diabetes (hemoglobin A1c 6.1)  Trichomonas vaginalis.    Plan of care:  Supplemental oxygen to keep sat 88-94  Patient is currently on Eliquis which DVT prophylaxis  GI prophylaxis  Patient is currently on ceftriaxone and flagyl.  Cultures are negative to date.  Scheduled DuoNeb with Pulmicort and scheduled Solu-Medrol  Insulin sliding scale  Speech therapy evaluation  Judicious diuresis with close monitoring of kidney function      History of Present Illness:   68 y.o. female with a history of chronic heart failure with preserved ejection fraction, COPD, hypertension, hyperlipidemia presenting with above complaints.  At this time patient is awake, alert, oriented x 3, very tangential and speech is mostly nonsensical.  On review of systems she does endorse shortness of breath and worsening lower extremity edema.  No chest pain, syncope, or presyncope.  No fevers, chills, or known sick contacts.  In the ED, found to be hypoxic and placed on cannula oxygen, given DuoNeb treatment.  ABG consistent with acute on chronic hypercapnic respiratory failure.  Per RN, when patient was brought up from the ED oxygen saturation was in the 70s on nasal cannula and she was subsequently placed on high flow nasal cannula.     Daily progress:  January 22, 2024: Patient continues to be sedated, intubated, mechanically ventilated.  She is currently on assist-control mechanical ventilation with tidal volume 350, rate 15, FiO2 50%, PEEP of 8.  Patient wakes up but does not follow commands off sedation.    January 23, 
Assessment and Plan  Patient is a 68 y.o. female with the following medical Problems:   Acute on chronic hypoxic and hypercapnic respiratory failure requiring intubation and mechanical ventilation  Acute on chronic heart failure with preserved ejection fraction  Severe secondary pulmonary hypertension  COPD with acute exacerbation  Aspiration pneumonitis  New onset atrial fibrillation with [id number] score of 5  Type 2 diabetes (hemoglobin A1c 6.1)  Trichomonas vaginalis.    Plan of care:  Daily sedation vacation and awakening trial followed by extubation if possible.  Patient is currently on Eliquis which DVT prophylaxis  GI prophylaxis  Zosyn for suspected aspiration pneumonitis  Scheduled DuoNeb with pulmonary and scheduled Solu-Medrol  Insulin sliding scale  Dietitian consult for tube feeding      History of Present Illness:   68 y.o. female with a history of chronic heart failure with preserved ejection fraction, COPD, hypertension, hyperlipidemia presenting with above complaints.  At this time patient is awake, alert, oriented x 3, very tangential and speech is mostly nonsensical.  On review of systems she does endorse shortness of breath and worsening lower extremity edema.  No chest pain, syncope, or presyncope.  No fevers, chills, or known sick contacts.  In the ED, found to be hypoxic and placed on cannula oxygen, given DuoNeb treatment.  ABG consistent with acute on chronic hypercapnic respiratory failure.  Per RN, when patient was brought up from the ED oxygen saturation was in the 70s on nasal cannula and she was subsequently placed on high flow nasal cannula.     Daily progress:  January 22, 2024: Patient continues to be sedated, intubated, mechanically ventilated.  She is currently on assist-control mechanical ventilation with tidal volume 350, rate 15, FiO2 50%, PEEP of 8.  Patient wakes up but does not follow commands off sedation.    January 23, 2024: Patient remains sedated, intubated, 
Assessment and Plan  Patient is a 68 y.o. female with the following medical Problems:   Acute on chronic hypoxic and hypercapnic respiratory failure requiring intubation and mechanical ventilation  Acute on chronic heart failure with preserved ejection fraction  Severe secondary pulmonary hypertension  COPD with acute exacerbation  Aspiration pneumonitis  New onset atrial fibrillation with [id number] score of 5  Type 2 diabetes (hemoglobin A1c 6.1)  Trichomonas vaginalis.    Plan of care:  Supplemental oxygen to keep sat 88-94  Patient is currently on Eliquis which DVT prophylaxis  GI prophylaxis  Patient is currently on cefepime, Flagyl, and doxycycline for aspiration pneumonia cultures are negative to date.  Scheduled DuoNeb with Pulmicort and prednisone.  Insulin sliding scale  Speech therapy evaluation  Judicious diuresis with close monitoring of kidney function  Compliance with BiPAP was encouraged.  X-ray of the right knee  Recheck proBNP  PT, OT and out of bed as tolerated        History of Present Illness:   68 y.o. female with a history of chronic heart failure with preserved ejection fraction, COPD, hypertension, hyperlipidemia presenting with above complaints.  At this time patient is awake, alert, oriented x 3, very tangential and speech is mostly nonsensical.  On review of systems she does endorse shortness of breath and worsening lower extremity edema.  No chest pain, syncope, or presyncope.  No fevers, chills, or known sick contacts.  In the ED, found to be hypoxic and placed on cannula oxygen, given DuoNeb treatment.  ABG consistent with acute on chronic hypercapnic respiratory failure.  Per RN, when patient was brought up from the ED oxygen saturation was in the 70s on nasal cannula and she was subsequently placed on high flow nasal cannula.     Daily progress:  January 22, 2024: Patient continues to be sedated, intubated, mechanically ventilated.  She is currently on assist-control mechanical 
Assessment and Plan  Patient is a 68 y.o. female with the following medical Problems:   Acute on chronic hypoxic and hypercapnic respiratory failure requiring intubation and mechanical ventilation (improved) (currently on high flow oxygen)  Acute on chronic heart failure with preserved ejection fraction  Severe secondary pulmonary hypertension  COPD with acute exacerbation  Aspiration pneumonitis  New onset atrial fibrillation with [id number] score of 5  Type 2 diabetes (hemoglobin A1c 6.1)  Trichomonas vaginalis.  Thrombocytopenia    Plan of care:  Supplemental oxygen to keep sat 88-94  Patient is currently on Eliquis which DVT prophylaxis  GI prophylaxis  Patient is currently on cefepime. Flagyl, and doxycycline were discontinued.  Scheduled DuoNeb with Pulmicort and Solu-Medrol.  Insulin sliding scale  Speech therapy evaluation  Judicious diuresis with close monitoring of kidney function  Compliance with BiPAP was encouraged.  X-ray of the right knee with moderate to severe osteoarthritis.  Recheck proBNP improved  PT, OT and out of bed as tolerated  Patient is currently on Eliquis which covers for DVT prophylaxis.        History of Present Illness:   68 y.o. female with a history of chronic heart failure with preserved ejection fraction, COPD, hypertension, hyperlipidemia presenting with above complaints.  At this time patient is awake, alert, oriented x 3, very tangential and speech is mostly nonsensical.  On review of systems she does endorse shortness of breath and worsening lower extremity edema.  No chest pain, syncope, or presyncope.  No fevers, chills, or known sick contacts.  In the ED, found to be hypoxic and placed on cannula oxygen, given DuoNeb treatment.  ABG consistent with acute on chronic hypercapnic respiratory failure.  Per RN, when patient was brought up from the ED oxygen saturation was in the 70s on nasal cannula and she was subsequently placed on high flow nasal cannula.     Daily 
Attempted to contact patients domestic partner, phone is out of order.   
Attempted tx with pt, however pt declined occupational therapy at this time d/t c/o fatigue.  Pt states she worked with PT earlier on this date. Pt on bipap machine; pt asked to have water. Nsg approved.  Assisted pt to take drink of water. Will attempt tx with pt at later time/date. LISHA Ramirez/DUSTIN #[id number]      
Attempted tx with pt, however pt is not appropriate per nsg d/t respiratory status.  Will attempt tx with pt at later time/date. LISHA Ramirez/DUSTIN #[id number]      
CMU called to report monitor reading, observed patient had removed leads and placed them under her skin fold, stated \"yes, yes I did that.\" Patient educated on purpose and states she understand and will leave them on this time.   
CMU called to report monitor was not reading. When this RN entered the room, the patient had taken telemetry monitors off and was sitting in bed arranging her personal belongings. The patient then requested help getting her an uber to go home for \"just a few hours for Woody's birthday\". Patient was alert and oriented x3, disoriented to situation. Patient educated on purpose of telemetry monitor and risk of leaving the hospital during inpatient course. Patient became tearful, but verbalizes understanding at this time. Patient visibly upset but no longer requesting help to leave. Vitals stable and safety maintained.  
Called to bedside to retract ETT from 24cm to 21cm per the CXR. Once retracted the  RT listened for bilateral breath sounds which were confirmed via auscultation. Will continue to monitor patient.   
Comprehensive Nutrition Assessment    Type and Reason for Visit:  Reassess    Nutrition Recommendations/Plan:   Continue Current Diet     Malnutrition Assessment:  Malnutrition Status:  At risk for malnutrition (Comment) (01/22/24 1326)    Context:  Acute Illness     Findings of the 6 clinical characteristics of malnutrition:  Energy Intake:  Mild decrease in energy intake (Comment) (NPO status)  Weight Loss:  Unable to assess (d/t lack of wt hx per EMR x 1 year)     Body Fat Loss:  No significant body fat loss     Muscle Mass Loss:  No significant muscle mass loss    Fluid Accumulation:  Unable to assess     Strength:  Not Performed    Nutrition Assessment:    Pt admit w/ acute on chronic resp failure requiring intubation, possible asp. PNA, acute on chronic HF, new onset afib. PMHx of HTN/HLD, DM, COPD w/ exac., R lung nodule, renal failure. Pt is currently NPO, will provide TF recs/orders per consult & continue to monitor while inpatient. 01/25/24: Pt extubated on 01/23/24, continues on high flow O2 and Bipap overnight. Pt authorized for SNF w/in the next two days. Pt seen at bedside sitting up and talking. Pt states appetite is good and eating % of all meals. Pt presents w/ new wound (broken blister on arm). Protein intake encouraged. Pt declined ONS. Continue current diet, continue inpatient monitroing, f/up per policy.    Nutrition Related Findings:    a/o X4, abd obese, bowel sounds active x4, BLLE +2, Co2 35, Creatinine 1.2, GFR 49, Glucose 133-185, 6L/Min O2 Wound Type: Open Wounds (broken blister on arm)       Current Nutrition Intake & Therapies:    Average Meal Intake: %  Average Supplements Intake: None Ordered  ADULT DIET; Regular; 4 carb choices (60 gm/meal)    Anthropometric Measures:  Height: 160 cm (5' 2.99\")  Ideal Body Weight (IBW): 115 lbs (52 kg)    Admission Body Weight: 158.4 kg (349 lb 3.3 oz) (01/19/24  bedscale)  Current Body Weight: 155.2 kg (342 lb 2.5 oz), 297.5 % IBW. 
Comprehensive Nutrition Assessment    Type and Reason for Visit:  Reassess    Nutrition Recommendations/Plan:   Continue current diet   Continue ONS - Switch to Glucerna Shake BID        Malnutrition Assessment:  Malnutrition Status:  At risk for malnutrition (Comment) (01/22/24 1326)    Context:  Acute Illness     Findings of the 6 clinical characteristics of malnutrition:  Energy Intake:  Mild decrease in energy intake (Comment) (NPO status)  Weight Loss:  Unable to assess (d/t lack of wt hx per EMR x 1 year)     Body Fat Loss:  No significant body fat loss     Muscle Mass Loss:  No significant muscle mass loss    Fluid Accumulation:  Unable to assess     Strength:  Not Performed    Nutrition Assessment:    Pt admit w/ acute on chronic resp failure requiring intubation, possible asp. PNA, acute on chronic HF, new onset afib. PMHx of HTN/HLD, DM, COPD w/ exac., R lung nodule, renal failure. Pt is currently NPO, will provide TF recs/orders per consult & continue to monitor while inpatient. 01/25/24: Pt extubated on 01/23/24. Pt authorized for SNF. Pt transferred abck to ICU for resp status, requiring Airvo & Bipap alternating. s/p thoracentesis 2/1. Pt advanced to pureed diet, noted HFNC. Intake 26-50% of meals, 25-75% of ONS. Recommend continue ONS until meal intake improved.    Nutrition Related Findings:    abd soft, NT, ND, active BSx4, +1/+2 edema, I/O's -1.1L since admit, missing teeth, decr. appetite, A&Ox4 Wound Type: Open Wounds (broken blister on arm)       Current Nutrition Intake & Therapies:    Average Meal Intake: 26-50%  Average Supplements Intake: 51-75%, 26-50%  ADULT DIET; Dysphagia - Pureed; 5 carb choices (75 gm/meal)  ADULT ORAL NUTRITION SUPPLEMENT; Lunch, Breakfast; Diabetic Oral Supplement    Anthropometric Measures:  Height: 160 cm (5' 2.99\")  Ideal Body Weight (IBW): 115 lbs (52 kg)    Admission Body Weight: 158.4 kg (349 lb 3.3 oz) (01/19/24  bedscale)  Current Body Weight: 139.6 kg 
Comprehensive Nutrition Assessment    Type and Reason for Visit:  Reassess    Nutrition Recommendations/Plan:   Continue current diet   Recommend ONS Ensure Enlive BID   Monitor need for nutrition support (TF vs PN) if unable to tolerate PO w/ respiratory status      Malnutrition Assessment:  Malnutrition Status:  At risk for malnutrition (Comment) (01/22/24 1326)    Context:  Acute Illness     Findings of the 6 clinical characteristics of malnutrition:  Energy Intake:  Mild decrease in energy intake (Comment) (NPO status)  Weight Loss:  Unable to assess (d/t lack of wt hx per EMR x 1 year)     Body Fat Loss:  No significant body fat loss     Muscle Mass Loss:  No significant muscle mass loss    Fluid Accumulation:  Unable to assess     Strength:  Not Performed    Nutrition Assessment:    Pt admit w/ acute on chronic resp failure requiring intubation, possible asp. PNA, acute on chronic HF, new onset afib. PMHx of HTN/HLD, DM, COPD w/ exac., R lung nodule, renal failure. Pt is currently NPO, will provide TF recs/orders per consult & continue to monitor while inpatient. 01/25/24: Pt extubated on 01/23/24. Pt authorized for SNF. Pt transferred abck to ICU for resp status, requiring Airvo & Bipap alternating. Pt on full liquids diet, likely inadequate intake d/t resp status, will add ONS and monitor need for nutrition support (TF vs PN), encourage ONS intake when off BiPAP.    Nutrition Related Findings:    abd soft, NT, ND, active BSx4, obese, A&Ox4, +1/+3 edema, I/O's -3.0L since admit Wound Type: Open Wounds (broken blister on arm)       Current Nutrition Intake & Therapies:    Average Meal Intake: 0%, 1-25%  Average Supplements Intake: None Ordered  ADULT DIET; Full Liquid  ADULT ORAL NUTRITION SUPPLEMENT; Lunch, Breakfast; Standard High Calorie/High Protein Oral Supplement    Anthropometric Measures:  Height: 160 cm (5' 2.99\")  Ideal Body Weight (IBW): 115 lbs (52 kg)    Admission Body Weight: 158.4 kg (349 lb 
Dr Pickering called regarding pt BP of 156/102. Amlodipine 5mg daily with first dose now ordered.   
Dr Pickering made aware of pt 7 beats of VTACH as well as the increase of FiO2 to 70%. Repeat magnesium in morning and told to notify if respiratory status continues to deteriorate.   
Dr. Tena in at the bed side.   
Dr. Tena updated.  Aware patient started screaming as she was leaving the unit and also when she was admitted to ICU she came screaming.  Aware precedex weaned off.  On Bipap at HS and 6Liters hi venus N/C during day.  Aware on Lasix and potassium for 3 days and seeing speech therapy.  Aware of BUN and creatinine.  
Franci vieira sister aware of new room number 622 bed 1.  
I was called to patient's bedside due to hypotension, bradycardia and abnormal ABGs showing severe respiratory acidosis even with maximum use of BiPAP.  Due to patient's decline patient was started on phenylephrine and subsequently intubated with a 7.5 ETT and sedated with a Versed drip and fentanyl drip.  OG tube was placed.  De Jesus catheter was placed.  Stat chest x-ray and KUB were both ordered.  Patient's vital signs have since stabilized.  We will repeat ABG at 2 AM.  
ICU Intensivist Attestation:    I saw, examined, and discussed the patient with Tania MENDES and agree with her assessment and plan.    I am in complete agreement with the assessment and plan of VAUGHN Rodríguez.    I examined the patient myself and had an lengthy conversation with her with regard to what is happening with her and her current situation.    We have changed her to BiPAP from AVAPS and she has improved significantly.  We have reduced her FiO2 to 75% on BiPAP of 18/12, which she is tolerating very well.    She will also need a small amount of IV fluid and we have written this.  However, she is modestly hyponatremic and therefore, we will give hypotonic fluid.    Antibiotics earlier today have been changed after consultation with her attending physician Dr. Pineda.  Intravenous steroids are also appropriate.    Additionally, the patient's echocardiogram done on January 19 demonstrates severe right heart failure likely secondary to untreated obstructive sleep apnea and obesity for many years.  The infiltrates currently visible are therefore most consistent with an infectious process.  Appropriate empiric antibiotics are as discussed with a clinical response so far.    ICU Staff Physician note of personal involvement in Care  As the attending physician, I certify that I personally reviewed the patient’s history and personnally examined the patient to confirm the physical findings described above,  And that I reviewed the relevant imaging studies and available reports.  I also discussed the differential diagnosis and all of the proposed management plans with the patient and individuals accompanying the patient to this visit.  They had the opportunity to ask questions about the proposed management plans and to have those questions answered.     This patient has a high probability of sudden, clinically significant deterioration, which requires the highest level of physician preparedness to 
Inpatient Wound Care (initial consult) ICU 6     Admit Date: 1/19/2024  3:44 AM    Reason for consult:  left arm    Significant history:  per H&P    CHIEF COMPLAINT: Shortness of breath     Past medical history of chronic heart failure with preserved ejection fraction, COPD, hypertension, hyperlipidemia presenting with above complaints    Findings:     Left arm intact dry scabs  Bilateral buttocks intact, blanching  Both heels intact, blanching  Groins with erosion and moist    **Informed Consent**    The patient has given verbal consent to have photos taken of wound and inserted into their chart as part of their permanent medical record for purposes of documentation, treatment management and/or medical review.   All Images taken on 2/6/24 of patient name: Emily Weiss were transmitted and stored on secured Epic  Site located within Media Folder Tab by a registered Epic-[employer] Mobile Application Device.     Impression:  see above description    Plan: Antifungal powder to abdominal folds  Comfort glide  Wedges  Heel protectors  Bariatric low air loss bed  Chair waffle cushion  Dietary consult  Patient will need continued preventative care    Abeba Sanchez RN 2/6/2024 12:09 PM      
OCCUPATIONAL THERAPY INITIAL EVALUATION    [facility]  [address] [facility]         Date:2024                                                   Patient Name: Emily Weiss     MRN: [id number]     : 1955     Room: [address] [place] [address]       Evaluating OT: Lynne Simpson, MAUREENR/L; [id number]       Referring Provider and Orders/Date:    OT eval and treat  Start:  24 1400,   End:  24 1400,   ONE TIME,   Standing Count:  1 Occurrences,   R       Gm Tena MD    Recommended placement: subacute       Diagnosis:   1. Acute on chronic respiratory failure with hypoxia (HCC)    2. Acute on chronic congestive heart failure, unspecified heart failure type (HCC)    3. Community acquired pneumonia of right lung, unspecified part of lung    4. Failure to thrive in adult    5. Atrial fibrillation, unspecified type ([facility])         Surgery: : intubated;  extubated      Pertinent Medical History:        Past Medical History:   Diagnosis Date    Acute congestive heart failure with left ventricular diastolic dysfunction ([facility]) 2020    Atrial fibrillation ([facility]) 2024    Cardiomegaly 2017    COPD (chronic obstructive pulmonary disease) ([facility]) 2024    Hyperlipidemia     Hypertension     Hypoxemia 2017    Nodule of right lung 2017    Obese     Oxygen dependent     2l n/c    Renal failure 2018    Substance abuse ([facility])     Type 2 diabetes mellitus with hyperglycemia, without long-term current use of insulin ([facility]) 2024        No past surgical history on file.    Precautions:  Fall Risk, lowered to the floor in facility with 5+ people to stand-only to stand with multiple assist and nursing, 9L O2        Assessment of current deficits     [x] Functional mobility  [x]ADLs  [x] Strength               [x]Cognition     [x] Functional transfers   [x] IADLs         [x] Safety Awareness   [x]Endurance     [] Fine 
Patient came down to Special Procedures for ultrasound guided right thoracentesis.    Procedure was explained, questions were answered.    1415   Starting procedure /98 107 30 94% on the Bipap    1425   Ending procedure /109 107 29 95% on the bipap    920 cc of cloudy light kermit color pleural fluid drained from patient, petrolatum dressing folded 4 x 4 and tegaerm applied to right back.     Patients DSD dressing can be removed in 24 hours    Patient tolerated procedure    Post procedure chest xray taken    Respiratory came took specimen for PH    Specimen sent to lab, with printed labels from the floor   Nurse to nurse given to  Nima VAZQUEZ,ICU nurse in specials with patient, notified of above information    Patient transported back to floor with ICU nurse and respiratory    
Patient extubated to 15 L HFNC. No stridor noted.  
Patient is refusing POCT glucose this mornig. Patient states \"I haven't checled my sugar in over a   
Patient placed on SBT at this time. PS 5 PEEP 5. Patient is tolerating, HR 88 RR 23  SPO2 94%.   
Patient transferred from ICU, ICU nurse found 20 dollars in patient cubby and brought it up to 5th floor.  This nurse put the 20 dollars in a bag and placed in patients belongings.  
Patient yelling and pulling gown off. Patient is A&Ox3 and is not redirectable at leaving telemetry in place and hospital gown. Patient states, \"you can't put stolen things on me.\" Dr. Tena updated.   
Physical Therapy      Physical Therapy    Room #:   0622/0622-01    Date: 2024       Patient Name: Emily Weiss  : 1955      MRN: [id number]     Patient unavailable for physical therapy treatment due to unavailable/not appropriate per nursing due to patient with decline in respiratory status and waiting for transfer to ICU . Will attempt PT treatment when medically stable. Thank you.      Mariia Jc, [facility]  #[id number]    
Physical Therapy      Physical Therapy    Room #:   0622/0622-01    Date: 2024       Patient Name: Emily Weiss  : 1955      MRN: [id number]     Patient unavailable for physical therapy treatment due to  patient having test done in room . Will attempt PT treatment at a later time. Thank you.      Mariia Jc, PTA    #[id number]  
Physical Therapy    Date: 1/31/2024 6:48 PM       PT on hold due to transfer to icu    ; please reorder PT EVAL AND TREAT when patient able to participate.     Thank you.  Electronically signed by Juan Jose Senior PT on 1/31/2024 at 6:48 PM     
Physical Therapy    Room #:   0622/0622-01    Date: 2024       Patient Name: Emily Weiss  : 1955      MRN: [id number]     Patient unavailable for physical therapy treatment due to  working with RT and on bipap . Will attempt PT treatment tomorrow. Thank you.      Guero Leary, PT      
Physical Therapy Initial Evaluation/Plan of Care    Room #:  0622/0622-01  Patient Name: Emily Weiss  YOB: 1955  MRN: [id number]    Date of Service: 1/25/2024     Tentative placement recommendation: Subacute Rehab  Equipment recommendation: To be determined      Evaluating Physical Therapist: Antonette Vigil, PT #[id number]      Specific Provider Orders/Date/Referring Provider :  01/24/24 1400    PT eval and treat  Start:  01/24/24 1400,   End:  01/24/24 1400,   ONE TIME,   Standing Count:  1 Occurrences,   R       Gm Tena MD        Admitting Diagnosis:   COPD (chronic obstructive pulmonary disease) ([facility]) [J44.9]  Failure to thrive in adult [R62.7]  Acute on chronic respiratory failure with hypoxia (HCC) [J96.21]  Community acquired pneumonia of right lung, unspecified part of lung [J18.9]  Acute on chronic congestive heart failure, unspecified heart failure type (HCC) [I50.9]     fatigue  Surgery: none  Visit Diagnoses         Codes    Acute on chronic congestive heart failure, unspecified heart failure type (HCC)     I50.9    Community acquired pneumonia of right lung, unspecified part of lung     J18.9    Failure to thrive in adult     R62.7            Patient Active Problem List   Diagnosis    Hypoxemia    Cardiomegaly    Nodule of right lung    Acute renal failure with tubular necrosis (HCC)    Rectal bleeding    Sepsis affecting skin    Cocaine abuse (HCC)    Metabolic acidosis    Volume overload    Physical deconditioning    Morbid obesity with BMI of 60.0-69.9, adult (HCC)    Skin breakdown    Vitamin D deficiency    COPD (chronic obstructive pulmonary disease) (HCC)    Acute on chronic respiratory failure with hypoxia and hypercapnia (HCC)    Acute on chronic heart failure with preserved ejection fraction (HFpEF) (HCC)    Atrial fibrillation (HCC)    Primary hypertension    Type 2 diabetes mellitus with hyperglycemia, without long-term current use of insulin (HCC)    Acute right 
Physical Therapy RE-Evaluation/Plan of Care    Room #:  IC06/IC06-01  Patient Name: Emily Weiss  YOB: 1955  MRN: [id number]    Date of Service: 2/2/2024     Tentative placement recommendation: Subacute Rehab  Equipment recommendation: To be determined      Evaluating Physical Therapist: Juan Jose Senior, PT  #[id number]      Specific Provider Orders/Date/Referring Provider : PT eval and treat  Start:  02/02/24 1015,   End:  02/02/24 1015,   ONE TIME,   Standing Count:  1 Occurrences,   R       Linden Rogel MD    Admitting Diagnosis:   COPD (chronic obstructive pulmonary disease) ([facility]) [J44.9]  Failure to thrive in adult [R62.7]  Acute on chronic respiratory failure with hypoxia ([facility]) [J96.21]  Community acquired pneumonia of right lung, unspecified part of lung [J18.9]  Acute on chronic congestive heart failure, unspecified heart failure type (HCC) [I50.9]     Admitted 1/9/23 and trf to icu 1/31 for increased o2 demands  Last PT encounter was 1/25      Surgery: thorocentesis 2/1/24  Visit Diagnoses         Codes    Acute on chronic congestive heart failure, unspecified heart failure type (HCC)     I50.9    Failure to thrive in adult     R62.7            Patient Active Problem List   Diagnosis    Hypoxemia    Cardiomegaly    Nodule of right lung    Acute renal failure with tubular necrosis (HCC)    Rectal bleeding    Sepsis affecting skin    Cocaine abuse (HCC)    Metabolic acidosis    Volume overload    Physical deconditioning    Morbid obesity with BMI of 60.0-69.9, adult (HCC)    Skin breakdown    Vitamin D deficiency    COPD (chronic obstructive pulmonary disease) (HCC)    Acute on chronic respiratory failure with hypoxia and hypercapnia (HCC)    Acute on chronic heart failure with preserved ejection fraction (HFpEF) (HCC)    Atrial fibrillation (HCC)    Primary hypertension    Type 2 diabetes mellitus with hyperglycemia, without long-term current use of insulin (HCC)    Acute right heart failure 
Physical Therapy Treatment Note/Plan of Care    Room #:  0538/0538-01  Patient Name: Emily Weiss  YOB: 1955  MRN: [id number]    Date of Service: 2/9/2024     Tentative placement recommendation: Subacute Rehab  Equipment recommendation: To be determined      Evaluating Physical Therapist: Juan Jose Senior, PT  #[id number]      Specific Provider Orders/Date/Referring Provider : PT eval and treat  Start:  02/02/24 1015,   End:  02/02/24 1015,   ONE TIME,   Standing Count:  1 Occurrences,   R       Linden Rogel MD    Admitting Diagnosis:   COPD (chronic obstructive pulmonary disease) ([facility]) [J44.9]  Failure to thrive in adult [R62.7]  Acute on chronic respiratory failure with hypoxia ([facility]) [J96.21]  Community acquired pneumonia of right lung, unspecified part of lung [J18.9]  Acute on chronic congestive heart failure, unspecified heart failure type (HCC) [I50.9]     Admitted 1/9/23 and trf to icu 1/31 for increased o2 demands  Last PT encounter was 1/25      Surgery: thorocentesis 2/1/24  Visit Diagnoses         Codes    Acute on chronic congestive heart failure, unspecified heart failure type (HCC)     I50.9    Failure to thrive in adult     R62.7            Patient Active Problem List   Diagnosis    Hypoxemia    Cardiomegaly    Nodule of right lung    Acute renal failure with tubular necrosis (HCC)    Rectal bleeding    Sepsis affecting skin    Cocaine abuse (HCC)    Metabolic acidosis    Volume overload    Physical deconditioning    Morbid obesity with BMI of 60.0-69.9, adult (HCC)    Skin breakdown    Vitamin D deficiency    COPD (chronic obstructive pulmonary disease) (HCC)    Acute on chronic respiratory failure with hypoxia and hypercapnia (HCC)    Acute on chronic heart failure with preserved ejection fraction (HFpEF) (HCC)    Atrial fibrillation (HCC)    Primary hypertension    Type 2 diabetes mellitus with hyperglycemia, without long-term current use of insulin (HCC)    Acute right heart failure 
Physical Therapy Treatment Note/Plan of Care    Room #:  IC06/IC06-01  Patient Name: Emily Weiss  YOB: 1955  MRN: [id number]    Date of Service: 2/4/2024     Tentative placement recommendation: Subacute Rehab  Equipment recommendation: To be determined      Evaluating Physical Therapist: Juan Jose Senior, PT  #[id number]      Specific Provider Orders/Date/Referring Provider : PT eval and treat  Start:  02/02/24 1015,   End:  02/02/24 1015,   ONE TIME,   Standing Count:  1 Occurrences,   R       Linden Rogel MD    Admitting Diagnosis:   COPD (chronic obstructive pulmonary disease) ([facility]) [J44.9]  Failure to thrive in adult [R62.7]  Acute on chronic respiratory failure with hypoxia ([facility]) [J96.21]  Community acquired pneumonia of right lung, unspecified part of lung [J18.9]  Acute on chronic congestive heart failure, unspecified heart failure type (HCC) [I50.9]     Admitted 1/9/23 and trf to icu 1/31 for increased o2 demands  Last PT encounter was 1/25      Surgery: thorocentesis 2/1/24  Visit Diagnoses         Codes    Acute on chronic congestive heart failure, unspecified heart failure type (HCC)     I50.9    Failure to thrive in adult     R62.7            Patient Active Problem List   Diagnosis    Hypoxemia    Cardiomegaly    Nodule of right lung    Acute renal failure with tubular necrosis (HCC)    Rectal bleeding    Sepsis affecting skin    Cocaine abuse (HCC)    Metabolic acidosis    Volume overload    Physical deconditioning    Morbid obesity with BMI of 60.0-69.9, adult (HCC)    Skin breakdown    Vitamin D deficiency    COPD (chronic obstructive pulmonary disease) (HCC)    Acute on chronic respiratory failure with hypoxia and hypercapnia (HCC)    Acute on chronic heart failure with preserved ejection fraction (HFpEF) (HCC)    Atrial fibrillation (HCC)    Primary hypertension    Type 2 diabetes mellitus with hyperglycemia, without long-term current use of insulin (HCC)    Acute right heart failure 
Physical Therapy Treatment Note/Plan of Care    Room #:  IC06/IC06-01  Patient Name: Emily Weiss  YOB: 1955  MRN: [id number]    Date of Service: 2/5/2024     Tentative placement recommendation: Subacute Rehab  Equipment recommendation: To be determined      Evaluating Physical Therapist: Juan Jose Senior, PT  #[id number]      Specific Provider Orders/Date/Referring Provider : PT eval and treat  Start:  02/02/24 1015,   End:  02/02/24 1015,   ONE TIME,   Standing Count:  1 Occurrences,   R       Linden Rogel MD    Admitting Diagnosis:   COPD (chronic obstructive pulmonary disease) ([facility]) [J44.9]  Failure to thrive in adult [R62.7]  Acute on chronic respiratory failure with hypoxia ([facility]) [J96.21]  Community acquired pneumonia of right lung, unspecified part of lung [J18.9]  Acute on chronic congestive heart failure, unspecified heart failure type (HCC) [I50.9]     Admitted 1/9/23 and trf to icu 1/31 for increased o2 demands  Last PT encounter was 1/25      Surgery: thorocentesis 2/1/24  Visit Diagnoses         Codes    Acute on chronic congestive heart failure, unspecified heart failure type (HCC)     I50.9    Failure to thrive in adult     R62.7            Patient Active Problem List   Diagnosis    Hypoxemia    Cardiomegaly    Nodule of right lung    Acute renal failure with tubular necrosis (HCC)    Rectal bleeding    Sepsis affecting skin    Cocaine abuse (HCC)    Metabolic acidosis    Volume overload    Physical deconditioning    Morbid obesity with BMI of 60.0-69.9, adult (HCC)    Skin breakdown    Vitamin D deficiency    COPD (chronic obstructive pulmonary disease) (HCC)    Acute on chronic respiratory failure with hypoxia and hypercapnia (HCC)    Acute on chronic heart failure with preserved ejection fraction (HFpEF) (HCC)    Atrial fibrillation (HCC)    Primary hypertension    Type 2 diabetes mellitus with hyperglycemia, without long-term current use of insulin (HCC)    Acute right heart failure 
Physical Therapy Treatment Note/Plan of Care    Room #:  IC06/IC06-01  Patient Name: Emily Weiss  YOB: 1955  MRN: [id number]    Date of Service: 2/6/2024     Tentative placement recommendation: Subacute Rehab  Equipment recommendation: To be determined      Evaluating Physical Therapist: Juan Jose Senior, PT  #[id number]      Specific Provider Orders/Date/Referring Provider : PT eval and treat  Start:  02/02/24 1015,   End:  02/02/24 1015,   ONE TIME,   Standing Count:  1 Occurrences,   R       Linden Rogel MD    Admitting Diagnosis:   COPD (chronic obstructive pulmonary disease) ([facility]) [J44.9]  Failure to thrive in adult [R62.7]  Acute on chronic respiratory failure with hypoxia ([facility]) [J96.21]  Community acquired pneumonia of right lung, unspecified part of lung [J18.9]  Acute on chronic congestive heart failure, unspecified heart failure type (HCC) [I50.9]     Admitted 1/9/23 and trf to icu 1/31 for increased o2 demands  Last PT encounter was 1/25      Surgery: thorocentesis 2/1/24  Visit Diagnoses         Codes    Acute on chronic congestive heart failure, unspecified heart failure type (HCC)     I50.9    Failure to thrive in adult     R62.7            Patient Active Problem List   Diagnosis    Hypoxemia    Cardiomegaly    Nodule of right lung    Acute renal failure with tubular necrosis (HCC)    Rectal bleeding    Sepsis affecting skin    Cocaine abuse (HCC)    Metabolic acidosis    Volume overload    Physical deconditioning    Morbid obesity with BMI of 60.0-69.9, adult (HCC)    Skin breakdown    Vitamin D deficiency    COPD (chronic obstructive pulmonary disease) (HCC)    Acute on chronic respiratory failure with hypoxia and hypercapnia (HCC)    Acute on chronic heart failure with preserved ejection fraction (HFpEF) (HCC)    Atrial fibrillation (HCC)    Primary hypertension    Type 2 diabetes mellitus with hyperglycemia, without long-term current use of insulin (HCC)    Acute right heart failure 
Physical Therapy Treatment Note/Plan of Care    Room #:  IC06/IC06-01  Patient Name: Emily Weiss  YOB: 1955  MRN: [id number]    Date of Service: 2/7/2024     Tentative placement recommendation: Subacute Rehab  Equipment recommendation: To be determined      Evaluating Physical Therapist: Juan Jose Senior, PT  #[id number]      Specific Provider Orders/Date/Referring Provider : PT eval and treat  Start:  02/02/24 1015,   End:  02/02/24 1015,   ONE TIME,   Standing Count:  1 Occurrences,   R       Linden Rogel MD    Admitting Diagnosis:   COPD (chronic obstructive pulmonary disease) ([facility]) [J44.9]  Failure to thrive in adult [R62.7]  Acute on chronic respiratory failure with hypoxia ([facility]) [J96.21]  Community acquired pneumonia of right lung, unspecified part of lung [J18.9]  Acute on chronic congestive heart failure, unspecified heart failure type (HCC) [I50.9]     Admitted 1/9/23 and trf to icu 1/31 for increased o2 demands  Last PT encounter was 1/25      Surgery: thorocentesis 2/1/24  Visit Diagnoses         Codes    Acute on chronic congestive heart failure, unspecified heart failure type (HCC)     I50.9    Failure to thrive in adult     R62.7            Patient Active Problem List   Diagnosis    Hypoxemia    Cardiomegaly    Nodule of right lung    Acute renal failure with tubular necrosis (HCC)    Rectal bleeding    Sepsis affecting skin    Cocaine abuse (HCC)    Metabolic acidosis    Volume overload    Physical deconditioning    Morbid obesity with BMI of 60.0-69.9, adult (HCC)    Skin breakdown    Vitamin D deficiency    COPD (chronic obstructive pulmonary disease) (HCC)    Acute on chronic respiratory failure with hypoxia and hypercapnia (HCC)    Acute on chronic heart failure with preserved ejection fraction (HFpEF) (HCC)    Atrial fibrillation (HCC)    Primary hypertension    Type 2 diabetes mellitus with hyperglycemia, without long-term current use of insulin (HCC)    Acute right heart failure 
Pt 79% on 4L O2, O2 increased to 5L .  Pt 86% on 5LO2,  [id number] to 6L.Pt 92% on 6 L O2 will cont to monitor O2 sats  
Pt found to be 85% on Bipap, FiO2 increased from 60% to 65%.   
Pulmonary/Critical Care Progress Note    We are following patient for severe right heart failure, probable diastolic dysfunction, transudative right pleural effusion tapped yesterday for almost 1 L, morbid obesity, obstructive sleep apnea, acute, superimposed on chronic hypoxemic and hypercapnic respiratory failure, right lower lobe infiltrate (possible pneumonia), atrial fibrillation on rate control and anticoagulation, COPD, diabetes mellitus type 2    SUBJECTIVE:  The patient has made a great deal of improvement on antibiotic treatment, positive pressure ventilation (noninvasive), and evacuation of the right pleural space for over 1 L of transudative pleural fluid.    We will begin to remove her steroids further and continue on antibiotics.  I would agree to hold her diuretic therapy at the moment but this could probably be reduced in dose and still be safe especially if we consider the pleural effusion is being a result of diastolic dysfunction.  We are alternating between BiPAP and heated high flow nasal cannula at the moment.  The patient is taking a diet in a reasonable way.    MEDICATIONS:   sodium phosphate IVPB (CENTRAL line)  30 mmol IntraVENous Once    cefepime  2,000 mg IntraVENous Q8H    insulin lispro  0-4 Units SubCUTAneous TID WC    amLODIPine  10 mg Oral Daily    methylPREDNISolone  40 mg IntraVENous Q12H    doxycycline (VIBRAMYCIN) IV  100 mg IntraVENous Q12H    sodium chloride flush  5-40 mL IntraVENous 2 times per day    heparin flush  3 mL IntraVENous 2 times per day    [Held by provider] furosemide  40 mg Oral Daily    metoprolol succinate  25 mg Oral BID    metroNIDAZOLE  500 mg IntraVENous Q8H    pantoprazole (PROTONIX) 40 mg in sodium chloride (PF) 0.9 % 10 mL injection  40 mg IntraVENous Daily    atorvastatin  40 mg Oral Nightly    Vitamin D  1,000 Units Oral Daily    sodium chloride flush  5-40 mL IntraVENous 2 times per day    lisinopril  20 mg Oral Daily    ipratropium 0.5 mg-albuterol 
Pulmonary/Critical Care Progress Note    We are following patient for severe right heart failure, untreated, or at least inconsistently treated obstructive sleep apnea, morbid obesity, acute superimposed on chronic hypoxemic and hypercapnic respiratory failure, parapneumonic effusion on the right, right lower lobe greater than left lower lobe infiltrates, atrial fibrillation treated with anticoagulation and rate control (apixaban and metoprolol), COPD, diabetes mellitus type 2, history of recent trichomonas infection    SUBJECTIVE:  The patient's mental status has improved considerably.  She is bright and engaging.    Arterial blood gases show a compensated respiratory acidosis with both the PaO2 and pCO2 in the 80s on an FiO2 of 70% using BiPAP 18/12.    There is also on the new CT scan, a persistent right lower lobe infiltrate although the chest x-ray itself looks better.  There is a pleural effusion on the right as well and we will ask the interventional radiologist to assess whether it is worth an attempt at thoracentesis.        Antibiotics currently include cefepime, doxycycline, and metronidazole which we reconfigured yesterday and appears to be having a salutary effect.    Right now, she is on Airvo which we will alternate with BiPAP depending on her saturations and clinical condition.    MEDICATIONS:   cefepime  2,000 mg IntraVENous Q8H    amLODIPine  10 mg Oral Daily    methylPREDNISolone  40 mg IntraVENous Q12H    doxycycline (VIBRAMYCIN) IV  100 mg IntraVENous Q12H    sodium chloride flush  5-40 mL IntraVENous 2 times per day    heparin flush  3 mL IntraVENous 2 times per day    [Held by provider] furosemide  40 mg Oral Daily    metoprolol succinate  25 mg Oral BID    metroNIDAZOLE  500 mg IntraVENous Q8H    insulin lispro  0-4 Units SubCUTAneous Q4H    pantoprazole (PROTONIX) 40 mg in sodium chloride (PF) 0.9 % 10 mL injection  40 mg IntraVENous Daily    atorvastatin  40 mg Oral Nightly    Vitamin D  
Renal Dose Adjustment Policy (Generic)     This patient is on medication that requires renal, weight, and/or indication dose adjustment.      Date Body Weight IBW  Adjusted BW SCr  CrCl Dialysis status   2/1/2024 114.7 kg (252 lb 13.9 oz) Ideal body weight: 52.4 kg (115 lb 7.7 oz)  Adjusted ideal body weight: 77.3 kg (170 lb 7 oz) Serum creatinine: 0.8 mg/dL 02/01/24 0425  Estimated creatinine clearance: 81 mL/min N/a       Pharmacy has dose-adjusted the following medication(s):    Date Previous Order Adjusted Order   2/1/2024 Cefepime 2000 mg every 12 hours Cefepime 2000 mg every 8 hours       These changes were made per protocol according to the [facility]   Automatic Renal Dose Adjustment Policy.     *Please note this dose may need readjusted if patient's condition changes.    Please contact pharmacy with any questions regarding these changes.    Rossy Retana RPH  2/1/2024  10:03 AM   
Report called to 622 bed 1 nurse at this time.  
SPEECH LANGUAGE PATHOLOGY  DAILY PROGRESS NOTE      PATIENT NAME:  Emily Weiss      :  1955          TODAY'S DATE:  2024 ROOM:  [address] [place] [address]    Current Diet: ADULT DIET; Regular; 4 carb choices (60 gm/meal)    Patient tolerated solids during session able to feed self while sitting up in chair.  No cough with thin liquids.  Still with confusion and statements that are off topic and not related to questions asked by therapist or nursing staff that was present in the room     Recommendation: continue on regular and thin       CPT code(s) 97675  dysphagia tx  Total minutes :  10 minutes    Clare Feldman MSCCC/SLP  Speech Language Pathologist  [id number]     
SPEECH LANGUAGE PATHOLOGY  DAILY PROGRESS NOTE      PATIENT NAME:  Emily Weiss      :  1955          TODAY'S DATE:  2024 ROOM:  [address] [place] [address]    Current Diet: ADULT DIET; Dysphagia - Pureed; 5 carb choices (75 gm/meal)  ADULT ORAL NUTRITION SUPPLEMENT; Lunch, Breakfast; Diabetic Oral Supplement    Patient seen for dysphagia therapy she was finishing her lunch tray of purees she all items no difficulty present.  Discussed with patient option of attempting solids she continually stated the food was great and that she liked how it was she was able to eat well.  Unable to determine if she was fully understanding questions as she has been having episodes of confusion      Recommendation: continue on purees patient stating she prefers them and wants to continue will continue to work towards safely advancing to solids as cognition permits        CPT code(s) 46596  dysphagia tx  Total minutes :  15 minutes    Clare Feldman MSCCC/SLP  Speech Language Pathologist  [id number]     
SPEECH LANGUAGE PATHOLOGY  DAILY PROGRESS NOTE      PATIENT NAME:  Emily Weiss      :  1955          TODAY'S DATE:  24 ROOM:  [address] [place] [address]    Current Diet: ADULT DIET; Regular; 4 carb choices (60 gm/meal)    Patient seen for dysphagia therapy patient willing to take PO items today.  Tolerated thin, puree and solids with no clinical indicators of dysphagia. Able to feed self     Recommendation: Regular consistency solids (IDDSI level 7) with  thin liquids (IDDSI level 0)        CPT code(s) 46979  dysphagia tx  Total minutes :  15 minutes    Clare Feldman MSCCC/SLP  Speech Language Pathologist  [id number]     
SpO2 75% on O2 6 following hands on care- Placed on bi-pap 60% per RT- Dr. Pineda notified with ABGs ordered  
Speech in to see patient, recommending regular and thin. Dr. Tena has been made aware.   
This  attempted to have a length of stay visit with the patient. Staff was busy with the patient. The patient was in isolation. No contact made. This  will follow up as needed.  
This  attempted to meet with the patient for a length of stay visit and found staff to be busy with the patient. This  will follow up as needed.  
Told patient her signifigant other called to check on her. She immediately ripped off her bipap and wouldn't let staff put any oxygen on her. Instructed patient need to allow staff to put oxygen on her. Patient being combative with staff. Attempted to call patient's signifigant other but his phone is out of order. Told patient that Neftali's phone does not work and trying to confirm phone numbers.  called to let him know what has transpired. See orders. Dr. Romero also called to let him know what has transpired. Agreed with Dr. Pineda's order.   
Transported patient from 6th floor to ICU on Cortex portable ventilator while on PC mode with BiPAP mask. No problems encountered. Total time 30 minutes.  
[place].    1/27: Patient resting comfortably.  She states she feels great.  Asking if she can go home to spend one night with her . Per RN, Sitter discontinued this afternoon but remains on telesitter.  Oxygen weaned down to 6 L..    1/28: Patient states she loves her new larger bed.  Continues to make improvements and is aware that she has more clear minded over the last few days    Per RN: Currently maintained on 6 L with pulse ox in the mid to high 90s    1/29: Patient sitting up in bed, awake, alert, but she is oriented to person only and confused.  ABG was done which shows recurrence of acute on chronic hypercapnia for which she was started on BiPAP.    1/30: Patient tolerated BiPAP through yesterday evening, and then again overnight.  This morning she feels much better, however her oxygen requirement has increased to 15 L high flow currently maintaining oxygen saturation in the high 80s to low 90s.    1/31: Patient sitting up in bed, awake, alert.  As of this morning she was requiring 15 L high flow nasal cannula.  During encounter, she became short of breath and was placed back on BiPAP.  ABG was obtained which showed acute on chronic hypercapnia and oxygen saturation in the high 80s to low 90s with FiO2 90% on AVAPS.  Spoke with Dr. Rogel pulmonary/critical care, will change back to BiPAP 18/12 with FiO2 80%, request PICC line placement, and plan to transfer to intensive care unit.    2/1: Patient sitting up in bed, currently on BiPAP.  Feels well, denies any complaints, pleasant and cooperative today.  ABG yesterday evening with persistent hypercapnia and pH of 7.29.     2/2: Patient sitting up in bed, currently on heated high flow nasal cannula.  She denies any specific complaints.  ABG this morning with improvement and hypercapnia; pCO2 still elevated but normal pH which is consistent with chronic hypercapnic respiratory failure.  Per RN patient has not been acting her normal self, repeat ABG 
safely initiated    Specific instructions for next treatment:  ongoing skilled PO analysis to determine if PO diet can be initiated   Patient Treatment Goals:    Short Term Goals:  Pt will participate in ongoing evaluation of swallow function to determine when PO diet can be safely initiated    Long Term Goals:   Pt will improve oropharyngeal swallow function to ensure airway protection during PO intake to maintain adequate nutrition/hydration and decrease signs/symptoms of aspiration to less than 1 x/day.      Patient/family Goal:    Patient not able to accurately state due to impaired cognition and/or communication at time of eval     Plan of care discussed with Patient   The Patient did not demonstrate complete understanding of the diagnosis, prognosis and plan of care     Rehabilitation Potential/Prognosis: good                    ADMITTING DIAGNOSIS: COPD (chronic obstructive pulmonary disease) ([facility]) [J44.9]  Failure to thrive in adult [R62.7]  Acute on chronic respiratory failure with hypoxia ([facility]) [J96.21]  Community acquired pneumonia of right lung, unspecified part of lung [J18.9]  Acute on chronic congestive heart failure, unspecified heart failure type ([facility]) [I50.9]    VISIT DIAGNOSIS:   Visit Diagnoses         Codes    Acute on chronic congestive heart failure, unspecified heart failure type ([facility])     I50.9    Community acquired pneumonia of right lung, unspecified part of lung     J18.9    Failure to thrive in adult     R62.7             PATIENT REPORT/COMPLAINT: patient not able to accurately report  RN cleared patient for participation in assessment     yes     PRIOR LEVEL OF SWALLOW FUNCTION:    PAST HISTORY OF OROPHARYNGEAL DYSPHAGIA?: none reported    Home diet: Diet information not available     Current Diet Order:  Diet NPO  ADULT TUBE FEEDING; Orogastric; Peptide Based; Continuous; 15; Yes; 10; Q 6 hours; 48; 30; Q 4 hours; Protein; Proteinex 2go - 2 servings daily via 
, no crackles.   Cardiovascular-  S1, S2 , Mo, No rubs or gallops.    Abdomen-  Soft , non distended , Bowel sounds present. No organomegaly   Neuro- Intubated and sedated,` Pupils equal and reactive.   Extremities- 1 + pitting edema , No deformities   Skin- warm , Capillary refill normal , no new rashes.      Ventilator Settings:    Vent Mode: AC/VC  Resp Rate (Set): 15 bpm   Vt (Set, mL): 400 mL       PEEP/CPAP (cmH2O): 8   FiO2 : (S) 80 %      CBC:   Recent Labs     01/19/24  0516 01/20/24  0514   WBC 7.5 6.3   HGB 11.7 11.0*   HCT 41.4 37.7    147     BMP:    Recent Labs     01/19/24  0516 01/19/24  2336 01/20/24  0514    145 144   K 4.0 4.3 3.8   CL 95* 93* 92*   CO2 45* 48* 43*   BUN 15 15 15   CREATININE 1.0 1.1* 1.0   GLUCOSE 112* 126* 110*   CALCIUM 10.7* 9.8 10.0   MG 2.2 2.0 1.8   PHOS  --  4.3 2.3*     HEPATIC:   Recent Labs     01/19/24  0516 01/19/24  2336 01/20/24  0514   AST 13 10 12   ALT 6 <5 <5   BILITOT 0.5 0.3 0.6   ALKPHOS 100 72 78     LACTATE:   Recent Labs     01/19/24  0516   LACTA 1.0     PROCALCITONIN:   Recent Labs     01/19/24  0516   PROCAL 0.03       BLOOD GAS:   Recent Labs     01/20/24  0219 01/20/24  0530   PH 7.638* 7.648*   PCO2 44.8 40.7   PO2 62.8* 65.1*   HCO3 46.9* 43.6*   O2SAT 96.0 96.3     UA:  Recent Labs     01/19/24  1311   COLORU Yellow   NITRU NEGATIVE   LEUKOCYTESUR TRACE*   GLUCOSEU NEGATIVE   KETUA NEGATIVE   RBCUA 0 TO 2   WBCUA 0 TO 5       Radiology and available imaging -   Personally reviewed    ASSESSMENT AND PLAN:    Acute on chronic hypercapnic and hypoxic respiratory failure - this is secondary to  multifactorial etiologies-   COPD +HFpEF- 55% +  OHS/ KIMBERLEE with pickwickian physiology + acute on chronic cor pulmonale + chronic Pulmonary vascular congestion +  Pulmonary HTN( PA systolic in ECHO- 50, WHO Group II + III, NYHA class III) + mediastinal lipomatosis causing compressive atelectasis      Pulmonary nodules and mediastinal lymphadenopathy 
3.0* 2.8*   BILITOT 0.6 0.7 0.6   AST 12 10 10   ALT <5 <5 <5         Recent Labs     01/20/24  0514 01/21/24  0508 01/22/24  0459   WBC 6.3 6.2 6.0   RBC 4.04 3.89 3.87   HGB 11.0* 10.6* 10.6*   HCT 37.7 34.8 35.1   MCV 93.3 89.5 90.7   MCH 27.2 27.2 27.4   MCHC 29.2* 30.5* 30.2*   RDW 15.3* 16.0* 16.1*    153 125*   MPV 13.0* 12.6* 12.5*       Radiology:   XR CHEST PORTABLE   Final Result   1. Limited study due to technique and body habitus.   2. Right basilar infiltrate and effusion suspected.  .         CT CHEST WO CONTRAST   Final Result   1. Bilateral pulmonary opacifications right greater than left consistent with   airspace disease bronchopneumonia versus edema may be intermixed favored   edema pattern given small to moderate right and small left pleural effusions   in the setting of cardiomegaly.   2. Ascites.         XR ABDOMEN (KUB) (SINGLE AP VIEW)   Final Result   Gastric tube with good positioning.      RECOMMENDATION:   Careful clinical correlation and follow up recommended.         XR ABDOMEN (KUB) (SINGLE AP VIEW)   Final Result   Gastric tube appears with good positioning.      RECOMMENDATION:   Careful clinical correlation and follow up recommended.         XR CHEST PORTABLE   Final Result   ET tube tip great retracted 3.0 cm.         XR CHEST PORTABLE   Final Result   Cardiomediastinal silhouette is enlarged.      Predominantly right-sided interstitial and alveolar opacities, suspect edema   however infection is possible.  Small right pleural effusion not excluded.   No gross pneumothorax.      RECOMMENDATION:   Short-term radiographic follow-up             Assessment and Plan:  Principal Problem:    Acute on chronic respiratory failure with hypoxia (HCC)  Active Problems:    COPD (chronic obstructive pulmonary disease) (HCC)    Acute on chronic heart failure with preserved ejection fraction (HFpEF) (HCC)    Atrial fibrillation (HCC)    Primary hypertension    Type 2 diabetes mellitus with 
mellitus with hyperglycemia, without long-term current use of insulin ([facility]) 01/19/2024      No past surgical history on file.    Family History:   Family History   Problem Relation Age of Onset    Colon Cancer Mother     No Known Problems Father         Allergies:         Patient has no known allergies.    Social history:  Social History     Socioeconomic History    Marital status: Single     Spouse name: Not on file    Number of children: Not on file    Years of education: Not on file    Highest education level: Not on file   Occupational History    Not on file   Tobacco Use    Smoking status: Every Day     Current packs/day: 0.20     Types: Cigarettes    Smokeless tobacco: Never    Tobacco comments:     cutting back   Vaping Use    Vaping Use: Never used   Substance and Sexual Activity    Alcohol use: Yes     Comment: Rare    Drug use: Yes     Types: Cocaine    Sexual activity: Not on file   Other Topics Concern    Not on file   Social History Narrative    Not on file     Social Determinants of Health     Financial Resource Strain: Not on file   Food Insecurity: No Food Insecurity (1/19/2024)    Hunger Vital Sign     Worried About Running Out of Food in the Last Year: Never true     Ran Out of Food in the Last Year: Never true   Transportation Needs: No Transportation Needs (1/19/2024)    PRAPARE - Transportation     Lack of Transportation (Medical): No     Lack of Transportation (Non-Medical): No   Physical Activity: Not on file   Stress: Not on file   Social Connections: Not on file   Intimate Partner Violence: Not on file   Housing Stability: Low Risk  (1/19/2024)    Housing Stability Vital Sign     Unable to Pay for Housing in the Last Year: No     Number of Places Lived in the Last Year: 1     Unstable Housing in the Last Year: No       Current Medications:     Current Facility-Administered Medications:     [Held by provider] furosemide (LASIX) tablet 40 mg, 40 mg, Oral, Daily, Gm Tena MD, 40 mg 
shows findings similar to earlier this morning.    Right thoracentesis done yesterday with 920 mL of fluid drained.    2/3: Patient sitting up in bed, awake, alert, oriented x 3 but she is intermittently confused.  Does not remember events leading up to her hospitalization or much of her hospital stay.  On entering the room, she had removed her heated high flow nasal cannula and oxygen saturation quickly dropped to the 70s.  RN placed this back, and oxygen saturation quickly recovered to mid 90s.    2/4: Patient sitting up in bed, currently on heated high flow nasal cannula.  She is oriented x 3, but still intermittently confused.  She denies any complaints, breathing comfortably.     ipratropium 0.5 mg-albuterol 2.5 mg  1 Dose Inhalation 4x Daily RT    acetaZOLAMIDE (DIAMOX) 500 mg in sodium chloride 0.9 % 100 mL IVPB  500 mg IntraVENous BID    dexAMETHasone  10 mg IntraVENous Q24H    miconazole   Topical BID    pantoprazole  40 mg Oral QAM AC    furosemide  40 mg IntraVENous BID    insulin lispro  0-4 Units SubCUTAneous 4x Daily AC & HS    amLODIPine  10 mg Oral Daily    sodium chloride flush  5-40 mL IntraVENous 2 times per day    heparin flush  3 mL IntraVENous 2 times per day    metoprolol succinate  25 mg Oral BID    atorvastatin  40 mg Oral Nightly    Vitamin D  1,000 Units Oral Daily    sodium chloride flush  5-40 mL IntraVENous 2 times per day    lisinopril  20 mg Oral Daily    budesonide  0.5 mg Nebulization BID RT    apixaban  5 mg Oral BID    ammonium lactate   Topical TID     sodium chloride flush, 5-40 mL, PRN  sodium chloride, , PRN  heparin flush, 3 mL, PRN  medicated lip balm, , PRN  atropine, 1 mg, PRN  sodium chloride flush, 5-40 mL, PRN  sodium chloride, , PRN  ondansetron, 4 mg, Q8H PRN   Or  ondansetron, 4 mg, Q6H PRN  polyethylene glycol, 17 g, Daily PRN  acetaminophen, 650 mg, Q6H PRN   Or  acetaminophen, 650 mg, Q6H PRN  glucose, 4 tablet, PRN  dextrose bolus, 125 mL, PRN   Or  dextrose 
study due to technique and body habitus.   2. Right basilar infiltrate and effusion suspected.  .         CT CHEST WO CONTRAST   Final Result   1. Bilateral pulmonary opacifications right greater than left consistent with   airspace disease bronchopneumonia versus edema may be intermixed favored   edema pattern given small to moderate right and small left pleural effusions   in the setting of cardiomegaly.   2. Ascites.         XR ABDOMEN (KUB) (SINGLE AP VIEW)   Final Result   Gastric tube with good positioning.      RECOMMENDATION:   Careful clinical correlation and follow up recommended.         XR ABDOMEN (KUB) (SINGLE AP VIEW)   Final Result   Gastric tube appears with good positioning.      RECOMMENDATION:   Careful clinical correlation and follow up recommended.         XR CHEST PORTABLE   Final Result   ET tube tip great retracted 3.0 cm.         XR CHEST PORTABLE   Final Result   Cardiomediastinal silhouette is enlarged.      Predominantly right-sided interstitial and alveolar opacities, suspect edema   however infection is possible.  Small right pleural effusion not excluded.   No gross pneumothorax.      RECOMMENDATION:   Short-term radiographic follow-up             Assessment and Plan:  Principal Problem:    Acute on chronic respiratory failure with hypoxia and hypercapnia (HCC)  Active Problems:    COPD (chronic obstructive pulmonary disease) (HCC)    Acute on chronic heart failure with preserved ejection fraction (HFpEF) (HCC)    Atrial fibrillation (HCC)    Primary hypertension    Type 2 diabetes mellitus with hyperglycemia, without long-term current use of insulin (HCC)    Acute right heart failure (HCC)    Community acquired pneumonia of right lung    Acute on chronic respiratory failure with hypoxia (HCC)  Resolved Problems:    * No resolved hospital problems. *      Acute on chronic hypoxic and hypercapnic respiratory failure requiring intubation   -Decompensated overnight/early morning 1/20 
DO    ondansetron (ZOFRAN-ODT) disintegrating tablet 4 mg, 4 mg, Oral, Q8H PRN **OR** ondansetron (ZOFRAN) injection 4 mg, 4 mg, IntraVENous, Q6H PRN, Huber Pineda DO    polyethylene glycol (GLYCOLAX) packet 17 g, 17 g, Oral, Daily PRN, Huber Pineda DO    acetaminophen (TYLENOL) tablet 650 mg, 650 mg, Oral, Q6H PRN, 650 mg at 01/20/24 1056 **OR** acetaminophen (TYLENOL) suppository 650 mg, 650 mg, Rectal, Q6H PRN, Huber Pineda DO    furosemide (LASIX) injection 40 mg, 40 mg, IntraVENous, BID, Huber Pineda DO, 40 mg at 01/20/24 1744    insulin lispro (HUMALOG) injection vial 0-4 Units, 0-4 Units, SubCUTAneous, TID WC, Huber Pineda DO    insulin lispro (HUMALOG) injection vial 0-4 Units, 0-4 Units, SubCUTAneous, Nightly, Huber Pineda DO    lisinopril (PRINIVIL;ZESTRIL) tablet 20 mg, 20 mg, Oral, Daily, 20 mg at 01/20/24 0804 **AND** [DISCONTINUED] hydroCHLOROthiazide (HYDRODIURIL) tablet 25 mg, 25 mg, Oral, Daily, Huber Pineda DO    ipratropium 0.5 mg-albuterol 2.5 mg (DUONEB) nebulizer solution 1 Dose, 1 Dose, Inhalation, Q4H WA RT, Huber Pineda DO, 1 Dose at 01/21/24 2130    budesonide (PULMICORT) nebulizer suspension 500 mcg, 0.5 mg, Nebulization, BID RT, Huber Pineda DO, 500 mcg at 01/21/24 1709    apixaban (ELIQUIS) tablet 5 mg, 5 mg, Oral, BID, Huber Pineda, , 5 mg at 01/21/24 2016    glucose chewable tablet 16 g, 4 tablet, Oral, PRN, Raspovic, Huber, DO    dextrose bolus 10% 125 mL, 125 mL, IntraVENous, PRN **OR** dextrose bolus 10% 250 mL, 250 mL, IntraVENous, PRN, Huber Pineda DO    Glucagon Emergency KIT 1 mg, 1 mg, SubCUTAneous, PRN, Huber Pineda,     dextrose 10 % infusion, , IntraVENous, Continuous PRN, Huber Pineda DO    dexmedeTOMIDine (PRECEDEX) 400 mcg in sodium chloride 0.9 % 100 mL infusion, 0.1-1.5 mcg/kg/hr, IntraVENous, Continuous, Huber Pineda, , Last Rate: 15.8 mL/hr at 01/22/24 0002, 0.4 
RV function  -Diuresis with Lasix 40 mg IV twice daily.  Dose of Diamox ordered for today per pulmonary/critical care  -Continue home ACE inhibitor and beta-blocker  -Hold hydrochlorothiazide for now  -Strict intake and output, daily weights  -Given new onset atrial fibrillation and unclear timeline for development of RV failure, PE is a consideration.  Will discuss further workup with pulmonary/critical care     3.  COPD with acute exacerbation  -Symptoms more so driven by heart failure, though unclear if she is on home maintenance medications for COPD  -Continue ICS and scheduled DuoNeb treatment     4.  New onset atrial fibrillation  -Already on metoprolol succinate, rate well-controlled  -[id number] of 5, started on apixaban 5 mg twice daily     5.  Type 2 diabetes mellitus  -Hold metformin  -Corrective scale  -Hypoglycemia protocol     6. Trichomoniasis   -UA positive for trichomonas   -Started on Flagyl 500 mg IV every 12 hours as she was unable to safely take oral medications yesterday.  Will transition to tablet via OG tube to complete 7 days total     Code Status: Full  DVT prophylaxis: Apixaban     Disposition: Unclear timeline and disposition.  She remains critically ill in ICU    Discussed with RN in ICU    NOTE: This report was transcribed using voice recognition software. Every effort was made to ensure accuracy; however, inadvertent computerized transcription errors may be present.     Electronically signed by Huber Pineda DO on 1/20/2024 at 7:48 AM                  
the setting of cardiomegaly.   2. Ascites.         XR ABDOMEN (KUB) (SINGLE AP VIEW)   Final Result   Gastric tube with good positioning.      RECOMMENDATION:   Careful clinical correlation and follow up recommended.         XR ABDOMEN (KUB) (SINGLE AP VIEW)   Final Result   Gastric tube appears with good positioning.      RECOMMENDATION:   Careful clinical correlation and follow up recommended.         XR CHEST PORTABLE   Final Result   ET tube tip great retracted 3.0 cm.         XR CHEST PORTABLE   Final Result   Cardiomediastinal silhouette is enlarged.      Predominantly right-sided interstitial and alveolar opacities, suspect edema   however infection is possible.  Small right pleural effusion not excluded.   No gross pneumothorax.      RECOMMENDATION:   Short-term radiographic follow-up             Assessment and Plan:  Principal Problem:    Acute on chronic respiratory failure with hypoxia and hypercapnia (HCC)  Active Problems:    COPD (chronic obstructive pulmonary disease) (HCC)    Acute on chronic heart failure with preserved ejection fraction (HFpEF) (HCC)    Atrial fibrillation (HCC)    Primary hypertension    Type 2 diabetes mellitus with hyperglycemia, without long-term current use of insulin (HCC)    Acute right heart failure (HCC)    Community acquired pneumonia of right lung    Acute on chronic respiratory failure with hypoxia (HCC)  Resolved Problems:    * No resolved hospital problems. *      Acute on chronic hypoxic and hypercapnic respiratory failure requiring intubation   -Decompensated overnight/early morning 1/20 requiring endotracheal intubation  -Ventilator management per pulmonary/critical care; extubated 1/23 afternoon to 15L HFNC.  On Friday, she was still requiring 8-9L HFNC but yesterday down to 6L. I was under impression she does not were home oxygen but today she states she is on 2 at home.  -Treatment of underlying causes as noted below    Acute on chronic HFpEF, acute right heart 
DM  -good outpatient control with A1c 6.1  -Hold metformin  -Corrective scale if needed  -Hypoglycemia protocol     Trichomoniasis  -UA positive for trichomonas   -She was on Flagyl 500 mg twice daily, discontinued per critical care but we are resuming today to treat aspiration.     Code Status: Full  DVT prophylaxis: Apixaban     Disposition: Continue to monitor response to the above treatments but anticipate will require least 24 to 48 hours for medical optimization to ensure safe discharge.    Case discussed with RN at bedside    Case discussed with Pharm.D. earlier today.    NOTE: This report was transcribed using voice recognition software. Every effort was made to ensure accuracy; however, inadvertent computerized transcription errors may be present.     Electronically signed by Gm Tena MD on 1/24/2024 at 3:25 PM                  
pneumonia  -Started on Zosyn per critical care; day #4 of treatment 1/24. However, after talking to Pharm.D,  we decided to de-escalate to ceftriaxone and metronidazole; day #3 of Tx.  -Per SP evaluation after extubation 1/23, NPO. SP reevaluated 1/24 and cleared for regular consistency solids with thin liquids.     New onset atrial fibrillation  -on beta blocker, rate well-controlled  -[id number] of 5, started on apixaban 5 mg twice daily     Hypokalemia/hypophosphatemia  -K 3.2 and phosphorus 2.2.  Ordered K-Phos 30 mmol as well as K-Dur 40 mill equivalent.  Recheck in a.m..    Type II DM  -good outpatient control with A1c 6.1  -Hold metformin  -Corrective scale if needed  -Hypoglycemia protocol     Trichomoniasis  -UA positive for trichomonas   -She was on Flagyl 500 mg twice daily, discontinued per critical care but we resumed  1/24 to treat aspiration.     Code Status: Full  DVT prophylaxis: Apixaban     Disposition: Continue to monitor response to the above treatments but anticipate will require least 24 to 48 hours for medical optimization to ensure safe discharge. Plan is for [facility]. Accepted but needs precert; asked CM to start precert today. She will need to be sitter free for 24 hours prior to transfer there.    Case discussed with RN on the floor.    NOTE: This report was transcribed using voice recognition software. Every effort was made to ensure accuracy; however, inadvertent computerized transcription errors may be present.     Electronically signed by Gm Tena MD on 1/26/2024 at 6:26 PM                  
1-3 days/wk for 2 weeks PRN     Treatment Time also includes thorough review of current medical information, gathering information on past medical history/social history and prior level of function, informal observation of tasks, assessment of data and education on plan of care and goals.    Treatment Time In: 3:28 PM     Treatment Time Out: 3:53 PM           Treatment Charges: Mins Units   ADL/Home Mgt     85496 25 2   Thera Activities     35416       Ther Ex                 44946       Manual Therapy    62485     Neuro Re-ed         50374     Orthotic manage/training                               50031     Non Billable Time     Total Timed Treatment 25 2        LISHA Ramirez/DUSTIN #[id number]    
18   Ht 1.6 m (5' 2.99\")   Wt (!) 155.2 kg (342 lb 1.6 oz)   LMP 07/18/2017   SpO2 90%   BMI 60.62 kg/m²     Input/Output:  In: 480 [P.O.:480]  Out: -     Oxygen requirements: NC    Ventilator Information:  Vent Information  Ventilator ID: [id number]  Ventilator Initiate: Yes  Ventilator Discontinue: Yes  Vent Mode: (S) CPAP/PS    General appearance: not in pain or distress, in no respiratory distress    HEENT: Atraumatic/normocephalic, EOMI, DMITRI, pharynx clear, moist mucosa, redness of the uvula appreciated,   Neck: Supple, no jugular venous distension, lymphadenopathy, thyromegaly or carotid bruits  Chest: Decreased breath sounds, no wheezing, +ve crackles and no tenderness over ribs   Cardiovascular: Normal S1 , S2, regular rate and rhythm, no murmur, rub or gallop  Abdomen: Normal sounds present, soft, lax with no tenderness, no hepatosplenomegaly, and no masses  Extremities: No edema. Pulses are equally present.   Skin: intact, no rashes   Neurologic: Alert and oriented x 3, No focal deficit     Investigations:  Labs, radiological imaging and cardiac work up were reviewed         STAFF PHYSICIAN NOTE OF PERSONAL INVOLVEMENT IN CARE  As the attending physician, I certify that I personally reviewed the patient's history and personally examined the patient to confirm the physical findings described above, and that I reviewed the relevant imaging studies and available reports.  I also discussed the differential diagnosis and all of the proposed management plans with the patient and individuals accompanying the patient to this visit.  They had the opportunity to ask questions about the proposed management plans and to have those questions answered.        Electronically signed by Chandler Cabral MD on 1/26/2024 at 2:03 PM   
Initiate: Yes  Vent Mode: (S) CPAP/PS    General appearance: not in pain or distress, in no respiratory distress    HEENT: Atraumatic/normocephalic, EOMI, DMITRI, pharynx clear, moist mucosa, redness of the uvula appreciated,   Neck: Supple, no jugular venous distension, lymphadenopathy, thyromegaly or carotid bruits  Chest: Decreased breath sounds, no wheezing, +ve crackles and no tenderness over ribs   Cardiovascular: Normal S1 , S2, regular rate and rhythm, no murmur, rub or gallop  Abdomen: Normal sounds present, soft, lax with no tenderness, no hepatosplenomegaly, and no masses  Extremities: No edema. Pulses are equally present.   Skin: intact, no rashes   Neurologic: Alert and oriented x 3, No focal deficit     Investigations:  Labs, radiological imaging and cardiac work up were reviewed        ICU STAFF PHYSICIAN NOTE OF PERSONAL INVOLVEMENT IN CARE  As the attending physician, I certify that I personally reviewed the patient's history and personally examined the patient to confirm the physical findings described above, and that I reviewed the relevant imaging studies and available reports.  I also discussed the differential diagnosis and all of the proposed management plans with the patient and individuals accompanying the patient to this visit.  They had the opportunity to ask questions about the proposed management plans and to have those questions answered.    This patient has a high probability of sudden, clinically significant deterioration, which requires the highest level of physician preparedness to intervene urgently.  I managed/supervised life or organ supporting interventions that required frequent physician assessment.  I devoted my full attention to the direct care of this patient for the amount of time indicated below.  Time I spent with family or surrogate(s) is included only if the patient was incapable of providing the necessary information or participating in medical decision-making.  Time 
(Formerly [facility])  Active Problems:    COPD (chronic obstructive pulmonary disease) (HCC)    Acute on chronic heart failure with preserved ejection fraction (HFpEF) ([facility])    Atrial fibrillation (HCC)    Primary hypertension    Type 2 diabetes mellitus with hyperglycemia, without long-term current use of insulin (HCC)    Acute right heart failure (HCC)    Community acquired pneumonia of right lung    Acute on chronic respiratory failure with hypoxia ([facility])  Resolved Problems:    * No resolved hospital problems. *      Acute on chronic hypoxic and hypercapnic respiratory failure requiring intubation   -Decompensated overnight/early morning 1/20 requiring endotracheal intubation, subsequently extubated 1/23 to high flow nasal cannula.  She then decompensated 1/31 requiring transfer to the ICU for continuous NIV and close monitoring  -Currently alternating between heated high flow nasal cannula during the day and BiPAP at night and as needed for work of breathing  -PICC line placed 1/31    Acute on chronic HFpEF, acute right heart failure, primary hypertension   -Echocardiogram with preserved LV ejection fraction but severely dilated RV with reduced function.  To note, echocardiogram in 2020 with normal RV function  -Was being diuresed, but this was placed on hold and she was transferred back to ICU given mild hypernatremia.  IV fluids discontinued 2/2 and she was placed on oral Lasix, subsequently back on IV Lasix 2/3  -Continue home ACE inhibitor and beta-blocker   -Hydrochlorothiazide discontinued given initiation of Lasix  -Strict intake and output, daily weights     COPD with acute exacerbation  -Was on prednisone, but changed back to Solu-Medrol 1/31 which we will continue for now.  Dose reduced to 20 mg IV every 12 hours 2/2  -Continue ICS and scheduled DuoNeb treatment    Right lower and middle lobe pneumonia  -She has been on multiple antimicrobial regimens including Zosyn, doxycycline, ceftriaxone, metronidazole to cover 
I.V.:146.8]  Out: 5150     Oxygen requirements: NC    Ventilator Information:  Vent Information  Ventilator ID: [id number]  Ventilator Initiate: Yes  Ventilator Discontinue: Yes  Vent Mode: (S) CPAP/PS    General appearance: not in pain but in mild respiratory distress    HEENT: Atraumatic/normocephalic, EOMI, DMITRI, pharynx clear, moist mucosa, redness of the uvula appreciated,   Neck: Supple, no jugular venous distension, lymphadenopathy, thyromegaly or carotid bruits  Chest: Decreased breath sounds, +ve wheezing, +ve crackles and no tenderness over ribs   Cardiovascular: Normal S1 , S2, regular rate and rhythm, no murmur, rub or gallop  Abdomen: Normal sounds present, soft, lax with no tenderness, no hepatosplenomegaly, and no masses  Extremities: No edema. Pulses are equally present.   Skin: intact, no rashes   Neurologic: Alert and oriented x 3, No focal deficit     Investigations:  Labs, radiological imaging and cardiac work up were reviewed        ATTESTATION:  ICU Staff Physician note of personal involvement in Care  As the attending physician, I certify that I personally reviewed the patient’s history and personnally examined the patient to confirm the physical findings described above,  And that I reviewed the relevant imaging studies and available reports.  I also discussed the differential diagnosis and all of the proposed management plans with the patient and individuals accompanying the patient to this visit.  They had the opportunity to ask questions about the proposed management plans and to have those questions answered.     This patient has a high probability of sudden, clinically significant deterioration, which requires the highest level of physician preparedness to intervene urgently.  I managed/supervised life or organ supporting interventions that required frequent physician assessment.   I devoted my full attention to the direct care of this patient for the amount of time indicated below.  
2/3    Type II DM  -good outpatient control with A1c 6.1  -Hold metformin  -Corrective scale if needed  -Hypoglycemia protocol     Trichomoniasis  -UA positive for trichomonas   -Currently on Flagyl for indication of probable aspiration pneumonia which will cover for this as well    Code Status: Full  DVT prophylaxis: Apixaban      Disposition: She has pre-CERT for skilled nursing facility which is good through 2/11 but she is not yet medically stable for transfer.  Anticipate several more days inpatient treatment and monitoring      NOTE: This report was transcribed using voice recognition software. Every effort was made to ensure accuracy; however, inadvertent computerized transcription errors may be present.     Electronically signed by Huber Pineda DO on 2/3/2024 at 7:44 AM                  
discontinued 2/3  -Respiratory PCR panel negative on admission and again 1/31  -CT of the chest revealed large right pleural effusion, had thoracentesis on 2/1 with 920 mL of transudative fluid drained     New onset atrial fibrillation  -on beta blocker, rate well-controlled  -[id number] of 5, started on apixaban 5 mg twice daily.  This was placed on hold for thoracentesis.  Restarted on 2/3     Hypokalemia/hypophosphatemia  -IV replacement as needed    Type II DM  -good outpatient control with A1c 6.1  -Hold metformin  -Corrective scale if needed  -Hypoglycemia protocol     Trichomoniasis  -Completed course of metronidazole    Code Status: Full  DVT prophylaxis: Apixaban      Disposition: She has pre-CERT for skilled nursing facility which is good through 2/11 but she is not yet medically stable for transfer.  Anticipate transfer early to middle of this week      NOTE: This report was transcribed using voice recognition software. Every effort was made to ensure accuracy; however, inadvertent computerized transcription errors may be present.     Electronically signed by Gm Tena MD on 2/10/2024 at 12:26 PM                  
to above, time was devoted to teaching and to any procedure.     NOTE: This report, in part or full, may have been transcribed using voice recognition software. Every effort was made to ensure accuracy; however, inadvertent computerized transcription errors may be present. Please excuse any transcriptional grammatical or spelling errors that may have escaped my editorial review.    Total critical care time caring for this patient with life threatening, unstable organ failure, including direct patient contact, management of life support systems, review of data including imaging and labs, discussions with other team members and physicians is at least 35 Min so far today, excluding procedures.      Electronically signed by Jan Marrero MD on 1/21/2024 at 2:14 PM  Critical Care Medicine

## 2024-02-18 LAB
MICROORGANISM SPEC CULT: NORMAL
MICROORGANISM/AGENT SPEC: NORMAL
SPECIMEN DESCRIPTION: NORMAL

## 2024-02-19 ENCOUNTER — OUTSIDE SERVICES (OUTPATIENT)
Dept: PRIMARY CARE CLINIC | Age: 69
End: 2024-02-19

## 2024-02-19 DIAGNOSIS — I10 PRIMARY HYPERTENSION: ICD-10-CM

## 2024-02-19 DIAGNOSIS — R53.1 WEAKNESS: ICD-10-CM

## 2024-02-19 DIAGNOSIS — I48.0 PAROXYSMAL ATRIAL FIBRILLATION (HCC): ICD-10-CM

## 2024-02-19 DIAGNOSIS — Z79.4 TYPE 2 DIABETES MELLITUS WITHOUT COMPLICATION, WITH LONG-TERM CURRENT USE OF INSULIN (HCC): ICD-10-CM

## 2024-02-19 DIAGNOSIS — J96.21 ACUTE AND CHRONIC RESPIRATORY FAILURE WITH HYPOXIA (HCC): Primary | ICD-10-CM

## 2024-02-19 DIAGNOSIS — E11.9 TYPE 2 DIABETES MELLITUS WITHOUT COMPLICATION, WITH LONG-TERM CURRENT USE OF INSULIN (HCC): ICD-10-CM

## 2024-02-19 DIAGNOSIS — F14.10 COCAINE ABUSE (HCC): ICD-10-CM

## 2024-02-19 DIAGNOSIS — E78.2 MIXED HYPERLIPIDEMIA: ICD-10-CM

## 2024-02-19 DIAGNOSIS — J44.9 CHRONIC OBSTRUCTIVE PULMONARY DISEASE, UNSPECIFIED COPD TYPE (HCC): ICD-10-CM

## 2024-02-19 DIAGNOSIS — I50.33 ACUTE ON CHRONIC DIASTOLIC HEART FAILURE (HCC): ICD-10-CM

## 2024-02-25 LAB
MICROORGANISM SPEC CULT: NORMAL
MICROORGANISM/AGENT SPEC: NORMAL
SPECIMEN DESCRIPTION: NORMAL

## 2024-02-26 ASSESSMENT — VISUAL ACUITY: OU: 1

## 2024-02-26 ASSESSMENT — ENCOUNTER SYMPTOMS
RHINORRHEA: 0
SORE THROAT: 0
SINUS PAIN: 0
TROUBLE SWALLOWING: 0
WHEEZING: 0
GASTROINTESTINAL NEGATIVE: 1
EYE ITCHING: 0
EYE DISCHARGE: 0
SHORTNESS OF BREATH: 0
SINUS PRESSURE: 0
COUGH: 0
EYE REDNESS: 0
VOICE CHANGE: 0

## 2024-02-26 ASSESSMENT — COPD QUESTIONNAIRES: COPD: 1

## 2024-02-26 NOTE — PROGRESS NOTES
trouble swallowing and voice change.    Eyes:  Negative for discharge, redness, itching and visual disturbance.        Denies diplopia, recent change in visual acuity, blurred vision, and night vision loss. Does not wear corrective lenses.   Respiratory:  Negative for cough, shortness of breath and wheezing.         Denies asthma, COPD, hemoptysis   Cardiovascular: Negative.    Gastrointestinal: Negative.    Endocrine: Negative.    Genitourinary:  Negative for difficulty urinating and urgency.        Denies hesitancy, incomplete voiding, and polyuria   Musculoskeletal:  Negative for myalgias.        Denies joint pain   Skin: Negative.    Hematological: Negative.    Psychiatric/Behavioral:  Negative for confusion and suicidal ideas. The patient is not nervous/anxious.         Denies difficulty concentrating, depression, flight of ideas, slow thought processes, suicide attempts        Current Meds: Refer to nursing home record    PMH:    Medical Problems: reviewed and updated       Diagnosis Date    Acute congestive heart failure with left ventricular diastolic dysfunction ([facility]) 12/21/2020    Atrial fibrillation ([facility]) 01/19/2024    Cardiomegaly 08/08/2017    COPD (chronic obstructive pulmonary disease) ([facility]) 01/19/2024    Hyperlipidemia     Hypertension     Hypoxemia 08/08/2017    Nodule of right lung 08/09/2017    Obese     Oxygen dependent     2l n/c    Renal failure 04/08/2018    Substance abuse ([facility])     Type 2 diabetes mellitus with hyperglycemia, without long-term current use of insulin ([facility]) 01/19/2024        Surgical Hx: reviewed and updated   No past surgical history on file. Pt is non-contributory     FH: reviewed and updated       Problem Relation Age of Onset    Colon Cancer Mother     No Known Problems Father         SH: reviewed and updated    reports that she has been smoking cigarettes. She has never used smokeless tobacco. She reports current alcohol use. She reports current drug use. Drug: Cocaine.

## 2024-03-02 LAB
MICROORGANISM SPEC CULT: NORMAL
MICROORGANISM/AGENT SPEC: NORMAL
SPECIMEN DESCRIPTION: NORMAL

## 2024-03-04 ASSESSMENT — ENCOUNTER SYMPTOMS
TROUBLE SWALLOWING: 0
SHORTNESS OF BREATH: 0
RHINORRHEA: 0
SINUS PAIN: 0
SINUS PRESSURE: 0
EYE DISCHARGE: 0
VOICE CHANGE: 0
EYE ITCHING: 0
SORE THROAT: 0
COUGH: 0
GASTROINTESTINAL NEGATIVE: 1
WHEEZING: 0
EYE REDNESS: 0

## 2024-03-04 ASSESSMENT — VISUAL ACUITY: OU: 1

## 2024-03-04 ASSESSMENT — COPD QUESTIONNAIRES: COPD: 1

## 2024-03-04 NOTE — PROGRESS NOTES
Normal.      Left shoulder: Normal.      Right upper arm: Normal.      Left upper arm: Normal.      Cervical back: Normal range of motion.      Right hip: Normal.      Left hip: Normal.      Right upper leg: Normal.      Left upper leg: Normal.      Right lower leg: Normal.      Left lower leg: Normal.      Right foot: Normal.      Left foot: Normal.   Lymphadenopathy:      Comments: No palpable or visible regional lymphadenopathy   Skin:     General: Skin is warm and dry.      Findings: No bruising, ecchymosis, lesion or rash.   Neurological:      General: No focal deficit present.      Mental Status: She is alert. Mental status is at baseline.      Cranial Nerves: No cranial nerve deficit.      Deep Tendon Reflexes: Reflexes normal.   Psychiatric:         Mood and Affect: Mood and affect normal. Mood is not depressed.         Behavior: Behavior is not agitated. Behavior is cooperative.         Cognition and Memory: Cognition and memory normal.      Comments: No apparent anxiety         Assessment & Plan:  1. Acute and chronic respiratory failure with hypoxia ([facility])  2. Acute on chronic diastolic heart failure ([facility])  3. Weakness  4. Chronic obstructive pulmonary disease, unspecified COPD type ([facility])  5. Paroxysmal atrial fibrillation ([facility])  6. Primary hypertension  7. Type 2 diabetes mellitus without complication, with long-term current use of insulin ([facility])  8. Mixed hyperlipidemia  9. Cocaine abuse ([facility])       Chart reviewed   medications reviewed   Working with therapy improving slowly   Copd with acute on chronic respiratory failure feeling better and is using oxygen   Chf resolved on diuretics   Afib on medications and heart rate stable   Dm 2 monitor blood sugars and continue medications A1c on 2/12/24 was 6.7%  Htn blood pressure under good control   Monitor labs   Continue current treatment       I have spent a total of 30 minutes with the patient and the family reviewing the above stated recommendations.  And

## 2024-03-29 ENCOUNTER — APPOINTMENT (OUTPATIENT)
Dept: GENERAL RADIOLOGY | Age: 69
DRG: 720 | End: 2024-03-29
Payer: COMMERCIAL

## 2024-03-29 ENCOUNTER — HOSPITAL ENCOUNTER (INPATIENT)
Age: 69
LOS: 9 days | Discharge: HOME | DRG: 720 | End: 2024-04-07
Attending: EMERGENCY MEDICINE | Admitting: INTERNAL MEDICINE
Payer: COMMERCIAL

## 2024-03-29 DIAGNOSIS — I50.23 ACUTE ON CHRONIC SYSTOLIC CHF (CONGESTIVE HEART FAILURE) (HCC): ICD-10-CM

## 2024-03-29 DIAGNOSIS — J96.01 ACUTE RESPIRATORY FAILURE WITH HYPOXIA AND HYPERCAPNIA (HCC): Primary | ICD-10-CM

## 2024-03-29 DIAGNOSIS — E87.29 RESPIRATORY ACIDOSIS: ICD-10-CM

## 2024-03-29 DIAGNOSIS — J96.02 ACUTE RESPIRATORY FAILURE WITH HYPOXIA AND HYPERCAPNIA (HCC): Primary | ICD-10-CM

## 2024-03-29 PROBLEM — R65.10 SIRS (SYSTEMIC INFLAMMATORY RESPONSE SYNDROME) (HCC): Status: ACTIVE | Noted: 2024-03-29

## 2024-03-29 PROBLEM — I50.813 ACUTE ON CHRONIC RIGHT HEART FAILURE (HCC): Status: ACTIVE | Noted: 2024-01-20

## 2024-03-29 PROBLEM — J96.22 ACUTE ON CHRONIC RESPIRATORY FAILURE WITH HYPERCAPNIA (HCC): Status: ACTIVE | Noted: 2024-01-25

## 2024-03-29 LAB
ANION GAP SERPL CALCULATED.3IONS-SCNC: 10 MMOL/L (ref 7–16)
B PARAP IS1001 DNA NPH QL NAA+NON-PROBE: NOT DETECTED
B PERT DNA SPEC QL NAA+PROBE: NOT DETECTED
BNP SERPL-MCNC: 3071 PG/ML (ref 0–450)
BUN SERPL-MCNC: 13 MG/DL (ref 6–23)
C PNEUM DNA NPH QL NAA+NON-PROBE: NOT DETECTED
CALCIUM SERPL-MCNC: 10.6 MG/DL (ref 8.6–10.2)
CHLORIDE SERPL-SCNC: 105 MMOL/L (ref 98–107)
CO2 SERPL-SCNC: 28 MMOL/L (ref 22–29)
CREAT SERPL-MCNC: 0.8 MG/DL (ref 0.5–1)
CRITICAL ACTION: NORMAL
CRITICAL ACTION: NORMAL
CRITICAL NOTIFICATION DATE/TIME: NORMAL
CRITICAL NOTIFICATION DATE/TIME: NORMAL
CRITICAL NOTIFICATION: NORMAL
CRITICAL NOTIFICATION: NORMAL
CRITICAL VALUE READ BACK: YES
CRITICAL VALUE READ BACK: YES
EKG ATRIAL RATE: 182 BPM
EKG Q-T INTERVAL: 366 MS
EKG QRS DURATION: 102 MS
EKG QTC CALCULATION (BAZETT): 467 MS
EKG R AXIS: 30 DEGREES
EKG T AXIS: 61 DEGREES
EKG VENTRICULAR RATE: 98 BPM
ERYTHROCYTE [DISTWIDTH] IN BLOOD BY AUTOMATED COUNT: 17.4 % (ref 11.5–15)
FIO2: 100
FLOW RATE: 6
FLUAV RNA NPH QL NAA+NON-PROBE: NOT DETECTED
FLUBV RNA NPH QL NAA+NON-PROBE: NOT DETECTED
GFR SERPL CREATININE-BSD FRML MDRD: 75 ML/MIN/1.73M2
GLUCOSE BLD-MCNC: 160 MG/DL (ref 74–99)
GLUCOSE SERPL-MCNC: 231 MG/DL (ref 74–99)
HADV DNA NPH QL NAA+NON-PROBE: NOT DETECTED
HCOV 229E RNA NPH QL NAA+NON-PROBE: NOT DETECTED
HCOV HKU1 RNA NPH QL NAA+NON-PROBE: NOT DETECTED
HCOV NL63 RNA NPH QL NAA+NON-PROBE: NOT DETECTED
HCOV OC43 RNA NPH QL NAA+NON-PROBE: NOT DETECTED
HCT VFR BLD AUTO: 33.8 % (ref 34–48)
HGB BLD-MCNC: 10 G/DL (ref 11.5–15.5)
HMPV RNA NPH QL NAA+NON-PROBE: NOT DETECTED
HPIV1 RNA NPH QL NAA+NON-PROBE: NOT DETECTED
HPIV2 RNA NPH QL NAA+NON-PROBE: NOT DETECTED
HPIV3 RNA NPH QL NAA+NON-PROBE: NOT DETECTED
HPIV4 RNA NPH QL NAA+NON-PROBE: NOT DETECTED
M PNEUMO DNA NPH QL NAA+NON-PROBE: NOT DETECTED
MCH RBC QN AUTO: 27.8 PG (ref 26–35)
MCHC RBC AUTO-ENTMCNC: 29.6 G/DL (ref 32–34.5)
MCV RBC AUTO: 93.9 FL (ref 80–99.9)
MODE: ABNORMAL
NEGATIVE BASE EXCESS, ART: 3.7 MMOL/L
NEGATIVE BASE EXCESS, ART: 4.2 MMOL/L
O2 DELIVERY DEVICE: ABNORMAL
PEEP: 10
PLATELET, FLUORESCENCE: 199 K/UL (ref 130–450)
PMV BLD AUTO: 13.4 FL (ref 7–12)
POC HCO3: 25.7 MMOL/L (ref 22–26)
POC HCO3: 29.2 MMOL/L (ref 22–26)
POC O2 SATURATION: 66.2 % (ref 92–98.5)
POC O2 SATURATION: 82.6 % (ref 92–98.5)
POC PCO2: 109.3 MM HG (ref 35–45)
POC PCO2: 65.9 MM HG (ref 35–45)
POC PH: 7.04 (ref 7.35–7.45)
POC PH: 7.2 (ref 7.35–7.45)
POC PO2: 52.1 MM HG (ref 60–80)
POC PO2: 58.8 MM HG (ref 60–80)
POTASSIUM SERPL-SCNC: 3.8 MMOL/L (ref 3.5–5)
RBC # BLD AUTO: 3.6 M/UL (ref 3.5–5.5)
RSV RNA NPH QL NAA+NON-PROBE: NOT DETECTED
RV+EV RNA NPH QL NAA+NON-PROBE: NOT DETECTED
SAMPLE SITE: ABNORMAL
SAMPLE SITE: ABNORMAL
SARS-COV-2 RNA NPH QL NAA+NON-PROBE: NOT DETECTED
SET RATE, POC: 26
SODIUM SERPL-SCNC: 143 MMOL/L (ref 132–146)
SPECIMEN DESCRIPTION: NORMAL
TIDAL VOLUME: 600
TROPONIN I SERPL HS-MCNC: 14 NG/L (ref 0–9)
WBC OTHER # BLD: 21.1 K/UL (ref 4.5–11.5)

## 2024-03-29 PROCEDURE — 94660 CPAP INITIATION&MGMT: CPT

## 2024-03-29 PROCEDURE — 83880 ASSAY OF NATRIURETIC PEPTIDE: CPT

## 2024-03-29 PROCEDURE — 87040 BLOOD CULTURE FOR BACTERIA: CPT

## 2024-03-29 PROCEDURE — 6360000002 HC RX W HCPCS: Performed by: EMERGENCY MEDICINE

## 2024-03-29 PROCEDURE — A4216 STERILE WATER/SALINE, 10 ML: HCPCS | Performed by: INTERNAL MEDICINE

## 2024-03-29 PROCEDURE — 36600 WITHDRAWAL OF ARTERIAL BLOOD: CPT

## 2024-03-29 PROCEDURE — 99223 1ST HOSP IP/OBS HIGH 75: CPT | Performed by: INTERNAL MEDICINE

## 2024-03-29 PROCEDURE — 6360000002 HC RX W HCPCS: Performed by: INTERNAL MEDICINE

## 2024-03-29 PROCEDURE — 84484 ASSAY OF TROPONIN QUANT: CPT

## 2024-03-29 PROCEDURE — 5A09557 ASSISTANCE WITH RESPIRATORY VENTILATION, GREATER THAN 96 CONSECUTIVE HOURS, CONTINUOUS POSITIVE AIRWAY PRESSURE: ICD-10-PCS | Performed by: INTERNAL MEDICINE

## 2024-03-29 PROCEDURE — 80048 BASIC METABOLIC PNL TOTAL CA: CPT

## 2024-03-29 PROCEDURE — 87154 CUL TYP ID BLD PTHGN 6+ TRGT: CPT

## 2024-03-29 PROCEDURE — 71045 X-RAY EXAM CHEST 1 VIEW: CPT

## 2024-03-29 PROCEDURE — 2580000003 HC RX 258: Performed by: EMERGENCY MEDICINE

## 2024-03-29 PROCEDURE — 96375 TX/PRO/DX INJ NEW DRUG ADDON: CPT

## 2024-03-29 PROCEDURE — 82803 BLOOD GASES ANY COMBINATION: CPT

## 2024-03-29 PROCEDURE — 85027 COMPLETE CBC AUTOMATED: CPT

## 2024-03-29 PROCEDURE — 96365 THER/PROPH/DIAG IV INF INIT: CPT

## 2024-03-29 PROCEDURE — 2580000003 HC RX 258: Performed by: INTERNAL MEDICINE

## 2024-03-29 PROCEDURE — 0202U NFCT DS 22 TRGT SARS-COV-2: CPT

## 2024-03-29 PROCEDURE — 6360000002 HC RX W HCPCS

## 2024-03-29 PROCEDURE — 99285 EMERGENCY DEPT VISIT HI MDM: CPT

## 2024-03-29 PROCEDURE — 6370000000 HC RX 637 (ALT 250 FOR IP): Performed by: INTERNAL MEDICINE

## 2024-03-29 PROCEDURE — 6370000000 HC RX 637 (ALT 250 FOR IP): Performed by: EMERGENCY MEDICINE

## 2024-03-29 PROCEDURE — C9113 INJ PANTOPRAZOLE SODIUM, VIA: HCPCS | Performed by: INTERNAL MEDICINE

## 2024-03-29 PROCEDURE — 93005 ELECTROCARDIOGRAM TRACING: CPT | Performed by: EMERGENCY MEDICINE

## 2024-03-29 PROCEDURE — 2000000000 HC ICU R&B

## 2024-03-29 PROCEDURE — 99291 CRITICAL CARE FIRST HOUR: CPT | Performed by: INTERNAL MEDICINE

## 2024-03-29 PROCEDURE — 87077 CULTURE AEROBIC IDENTIFY: CPT

## 2024-03-29 PROCEDURE — 94640 AIRWAY INHALATION TREATMENT: CPT

## 2024-03-29 PROCEDURE — 82962 GLUCOSE BLOOD TEST: CPT

## 2024-03-29 PROCEDURE — 93010 ELECTROCARDIOGRAM REPORT: CPT | Performed by: INTERNAL MEDICINE

## 2024-03-29 RX ORDER — IPRATROPIUM BROMIDE AND ALBUTEROL SULFATE 2.5; .5 MG/3ML; MG/3ML
3 SOLUTION RESPIRATORY (INHALATION) ONCE
Status: COMPLETED | OUTPATIENT
Start: 2024-03-29 | End: 2024-03-29

## 2024-03-29 RX ORDER — ONDANSETRON 2 MG/ML
4 INJECTION INTRAMUSCULAR; INTRAVENOUS EVERY 6 HOURS PRN
Status: DISCONTINUED | OUTPATIENT
Start: 2024-03-29 | End: 2024-04-07 | Stop reason: HOSPADM

## 2024-03-29 RX ORDER — LISINOPRIL 20 MG/1
20 TABLET ORAL DAILY
Status: DISCONTINUED | OUTPATIENT
Start: 2024-03-29 | End: 2024-04-07 | Stop reason: HOSPADM

## 2024-03-29 RX ORDER — ACETAZOLAMIDE 250 MG/1
500 TABLET ORAL 2 TIMES DAILY
Status: DISCONTINUED | OUTPATIENT
Start: 2024-03-29 | End: 2024-04-07

## 2024-03-29 RX ORDER — IPRATROPIUM BROMIDE AND ALBUTEROL SULFATE 2.5; .5 MG/3ML; MG/3ML
1 SOLUTION RESPIRATORY (INHALATION)
Status: DISCONTINUED | OUTPATIENT
Start: 2024-03-29 | End: 2024-04-07 | Stop reason: HOSPADM

## 2024-03-29 RX ORDER — SODIUM CHLORIDE 0.9 % (FLUSH) 0.9 %
5-40 SYRINGE (ML) INJECTION EVERY 12 HOURS SCHEDULED
Status: DISCONTINUED | OUTPATIENT
Start: 2024-03-29 | End: 2024-04-07 | Stop reason: HOSPADM

## 2024-03-29 RX ORDER — ONDANSETRON 4 MG/1
4 TABLET, ORALLY DISINTEGRATING ORAL EVERY 8 HOURS PRN
Status: DISCONTINUED | OUTPATIENT
Start: 2024-03-29 | End: 2024-04-07 | Stop reason: HOSPADM

## 2024-03-29 RX ORDER — ACETAMINOPHEN 650 MG/1
650 SUPPOSITORY RECTAL EVERY 6 HOURS PRN
Status: DISCONTINUED | OUTPATIENT
Start: 2024-03-29 | End: 2024-04-07 | Stop reason: HOSPADM

## 2024-03-29 RX ORDER — INSULIN LISPRO 100 [IU]/ML
0-4 INJECTION, SOLUTION INTRAVENOUS; SUBCUTANEOUS
Status: DISCONTINUED | OUTPATIENT
Start: 2024-03-29 | End: 2024-04-07 | Stop reason: HOSPADM

## 2024-03-29 RX ORDER — SODIUM CHLORIDE 9 MG/ML
INJECTION, SOLUTION INTRAVENOUS PRN
Status: DISCONTINUED | OUTPATIENT
Start: 2024-03-29 | End: 2024-04-07 | Stop reason: HOSPADM

## 2024-03-29 RX ORDER — MAGNESIUM SULFATE IN WATER 40 MG/ML
INJECTION, SOLUTION INTRAVENOUS
Status: COMPLETED
Start: 2024-03-29 | End: 2024-03-29

## 2024-03-29 RX ORDER — AMLODIPINE BESYLATE 10 MG/1
10 TABLET ORAL DAILY
Status: DISCONTINUED | OUTPATIENT
Start: 2024-03-29 | End: 2024-04-07 | Stop reason: HOSPADM

## 2024-03-29 RX ORDER — GLUCAGON 1 MG/ML
1 KIT INJECTION PRN
Status: DISCONTINUED | OUTPATIENT
Start: 2024-03-29 | End: 2024-04-07 | Stop reason: HOSPADM

## 2024-03-29 RX ORDER — DIMETHICONE, OXYBENZONE, AND PADIMATE O 2; 2.5; 6.6 G/100G; G/100G; G/100G
STICK TOPICAL PRN
Status: DISCONTINUED | OUTPATIENT
Start: 2024-03-29 | End: 2024-04-07 | Stop reason: HOSPADM

## 2024-03-29 RX ORDER — FUROSEMIDE 10 MG/ML
40 INJECTION INTRAMUSCULAR; INTRAVENOUS ONCE
Status: COMPLETED | OUTPATIENT
Start: 2024-03-29 | End: 2024-03-29

## 2024-03-29 RX ORDER — ATORVASTATIN CALCIUM 40 MG/1
40 TABLET, FILM COATED ORAL NIGHTLY
Status: DISCONTINUED | OUTPATIENT
Start: 2024-03-29 | End: 2024-04-07 | Stop reason: HOSPADM

## 2024-03-29 RX ORDER — ENOXAPARIN SODIUM 100 MG/ML
30 INJECTION SUBCUTANEOUS 2 TIMES DAILY
Status: DISCONTINUED | OUTPATIENT
Start: 2024-03-29 | End: 2024-03-30

## 2024-03-29 RX ORDER — POLYETHYLENE GLYCOL 3350 17 G/17G
17 POWDER, FOR SOLUTION ORAL DAILY PRN
Status: DISCONTINUED | OUTPATIENT
Start: 2024-03-29 | End: 2024-04-07 | Stop reason: HOSPADM

## 2024-03-29 RX ORDER — BUDESONIDE 0.5 MG/2ML
0.5 INHALANT ORAL
Status: DISCONTINUED | OUTPATIENT
Start: 2024-03-29 | End: 2024-04-07 | Stop reason: HOSPADM

## 2024-03-29 RX ORDER — METOPROLOL SUCCINATE 25 MG/1
25 TABLET, EXTENDED RELEASE ORAL 2 TIMES DAILY
Status: DISCONTINUED | OUTPATIENT
Start: 2024-03-29 | End: 2024-04-07 | Stop reason: HOSPADM

## 2024-03-29 RX ORDER — SODIUM CHLORIDE 0.9 % (FLUSH) 0.9 %
5-40 SYRINGE (ML) INJECTION PRN
Status: DISCONTINUED | OUTPATIENT
Start: 2024-03-29 | End: 2024-04-07 | Stop reason: HOSPADM

## 2024-03-29 RX ORDER — MAGNESIUM SULFATE IN WATER 40 MG/ML
2000 INJECTION, SOLUTION INTRAVENOUS ONCE
Status: COMPLETED | OUTPATIENT
Start: 2024-03-29 | End: 2024-03-29

## 2024-03-29 RX ORDER — METHYLPREDNISOLONE SODIUM SUCCINATE 125 MG/2ML
INJECTION, POWDER, LYOPHILIZED, FOR SOLUTION INTRAMUSCULAR; INTRAVENOUS
Status: DISPENSED
Start: 2024-03-29 | End: 2024-03-30

## 2024-03-29 RX ORDER — DEXTROSE MONOHYDRATE 100 MG/ML
INJECTION, SOLUTION INTRAVENOUS CONTINUOUS PRN
Status: DISCONTINUED | OUTPATIENT
Start: 2024-03-29 | End: 2024-04-07 | Stop reason: HOSPADM

## 2024-03-29 RX ORDER — INSULIN LISPRO 100 [IU]/ML
0-4 INJECTION, SOLUTION INTRAVENOUS; SUBCUTANEOUS NIGHTLY
Status: DISCONTINUED | OUTPATIENT
Start: 2024-03-29 | End: 2024-04-07 | Stop reason: HOSPADM

## 2024-03-29 RX ORDER — FUROSEMIDE 40 MG/1
40 TABLET ORAL 2 TIMES DAILY
Status: DISCONTINUED | OUTPATIENT
Start: 2024-03-29 | End: 2024-03-31

## 2024-03-29 RX ORDER — ACETAMINOPHEN 325 MG/1
650 TABLET ORAL EVERY 6 HOURS PRN
Status: DISCONTINUED | OUTPATIENT
Start: 2024-03-29 | End: 2024-04-07 | Stop reason: HOSPADM

## 2024-03-29 RX ORDER — INSULIN GLARGINE 100 [IU]/ML
10 INJECTION, SOLUTION SUBCUTANEOUS NIGHTLY
Status: DISCONTINUED | OUTPATIENT
Start: 2024-03-29 | End: 2024-04-07 | Stop reason: HOSPADM

## 2024-03-29 RX ORDER — FUROSEMIDE 10 MG/ML
40 INJECTION INTRAMUSCULAR; INTRAVENOUS 2 TIMES DAILY
Status: DISCONTINUED | OUTPATIENT
Start: 2024-03-30 | End: 2024-03-31

## 2024-03-29 RX ORDER — VITAMIN B COMPLEX
1000 TABLET ORAL DAILY
Status: DISCONTINUED | OUTPATIENT
Start: 2024-03-29 | End: 2024-04-07 | Stop reason: HOSPADM

## 2024-03-29 RX ADMIN — MAGNESIUM SULFATE HEPTAHYDRATE 2000 MG: 40 INJECTION, SOLUTION INTRAVENOUS at 15:45

## 2024-03-29 RX ADMIN — IPRATROPIUM BROMIDE AND ALBUTEROL SULFATE 3 DOSE: .5; 3 SOLUTION RESPIRATORY (INHALATION) at 15:55

## 2024-03-29 RX ADMIN — MAGNESIUM SULFATE IN WATER 2000 MG: 40 INJECTION, SOLUTION INTRAVENOUS at 15:45

## 2024-03-29 RX ADMIN — SODIUM CHLORIDE, PRESERVATIVE FREE 40 MG: 5 INJECTION INTRAVENOUS at 19:24

## 2024-03-29 RX ADMIN — IPRATROPIUM BROMIDE AND ALBUTEROL SULFATE 1 DOSE: .5; 2.5 SOLUTION RESPIRATORY (INHALATION) at 19:03

## 2024-03-29 RX ADMIN — VANCOMYCIN HYDROCHLORIDE 1000 MG: 1 INJECTION, POWDER, LYOPHILIZED, FOR SOLUTION INTRAVENOUS at 18:43

## 2024-03-29 RX ADMIN — CEFEPIME 2000 MG: 2 INJECTION, POWDER, FOR SOLUTION INTRAVENOUS at 20:17

## 2024-03-29 RX ADMIN — WATER 125 MG: 1 INJECTION INTRAMUSCULAR; INTRAVENOUS; SUBCUTANEOUS at 15:47

## 2024-03-29 RX ADMIN — BUDESONIDE INHALATION 500 MCG: 0.5 SUSPENSION RESPIRATORY (INHALATION) at 19:03

## 2024-03-29 RX ADMIN — FUROSEMIDE 40 MG: 10 INJECTION, SOLUTION INTRAMUSCULAR; INTRAVENOUS at 17:12

## 2024-03-29 RX ADMIN — ENOXAPARIN SODIUM 30 MG: 100 INJECTION SUBCUTANEOUS at 22:17

## 2024-03-29 NOTE — CONSULTS
Assessment and Plan  Patient is a 68 y.o. female with the following medical Problems:   Acute on chronic hypoxic and hypercapnic respiratory failure requiring BiPAP  Metabolic Encephalopathy  Sepsis without septic shock   Morbid obesity with obesity hypoventilation syndrome  Acute on chronic heart failure with preserved ejection fraction  Severe secondary pulmonary hypertension  COPD with acute exacerbation  Aspiration pneumonitis  Atrial fibrillation with [id number] score of 5  Type 2 diabetes (hemoglobin A1c 6.1 03/29/24)  Hx of Trichomonas vaginalis.    Plan of care:  Continue with BiPAP with AVAPS settings.  AVAPS settings were adjusted.  Serial blood gases.  Strictly avoid benzodiazepines and opioids.  Maintenance IV fluid  Continue with broad-spectrum antibiotic with cefepime and vancomycin.  Follow-up culture results.  DVT prophylaxis  GI prophylaxis  Urine toxicology  Schedule Solu-Medrol, DuoNeb, Pulmicort for acute exacerbation of COPD    History of Present Illness:   Patient is a 68-year-old woman with above-mentioned medical problems who was admitted on March 29, 2024.  Patient was brought from a nursing home due to respiratory distress and altered mental status.  Blood gases showed pH of 7.036, pCO2 of 109.3, and a pO2 of 66.2.  Patient was placed on BiPAP after settings were adjusted and BiPAP mode was changed to AVAPS.  Gases improved.  Patient was lethargic but responsive.  She denies fever, chills, rigors.    Past Medical History:  Past Medical History:   Diagnosis Date    Acute congestive heart failure with left ventricular diastolic dysfunction ([facility]) 12/21/2020    Atrial fibrillation ([facility]) 01/19/2024    Cardiomegaly 08/08/2017    COPD (chronic obstructive pulmonary disease) ([facility]) 01/19/2024    Hyperlipidemia     Hypertension     Hypoxemia 08/08/2017    Nodule of right lung 08/09/2017    Obese     Oxygen dependent     2l n/c    Renal failure 04/08/2018    Substance abuse ([facility])     Type 2 diabetes

## 2024-03-29 NOTE — ED PROVIDER NOTES
Patient presents via ambulance.  Someone found To be in respiratory distress and called EMS.  When they arrived she was in a room without an electrical outlet so her oxygen concentrator was not plugged in.  Patient is on chronic oxygen.  At this time she is unable to fully answer questions due to her respiratory distress.  She is shaking her head no to chest pain but yes to tightness.  Further history will attempt to be obtained after treatment.    The history is provided by the patient and the EMS personnel.       Review of Systems   Unable to perform ROS: Severe respiratory distress       Physical Exam  Vitals and nursing note reviewed.   Constitutional:       General: She is in acute distress.      Appearance: She is well-developed. She is obese.   HENT:      Head: Normocephalic and atraumatic.   Eyes:      Pupils: Pupils are equal, round, and reactive to light.   Cardiovascular:      Rate and Rhythm: Normal rate and regular rhythm.      Pulses: Normal pulses.      Heart sounds: Normal heart sounds. No murmur heard.  Pulmonary:      Effort: Respiratory distress present.      Breath sounds: No wheezing or rales.      Comments: Diminished on the left coarse on the right  Abdominal:      General: Bowel sounds are normal.      Palpations: Abdomen is soft.      Tenderness: There is no abdominal tenderness. There is no guarding or rebound.   Musculoskeletal:      Cervical back: Normal range of motion and neck supple.   Skin:     General: Skin is warm and dry.      Capillary Refill: Capillary refill takes more than 3 seconds. Distally skin is cool to the touch.     Coloration: Skin is not pale.   Neurological:      Mental Status: She is alert and oriented to person, place, and time.      Cranial Nerves: No cranial nerve deficit.      Coordination: Coordination normal.         Procedures    Medical Decision Making  Amount and/or Complexity of Data Reviewed  Labs: ordered.  Radiology: ordered.  ECG/medicine tests:

## 2024-03-29 NOTE — H&P
continuous    Provider, Esme, MD       Allergies:    Patient has no known allergies.    Social History:    reports that she has been smoking cigarettes. She has never used smokeless tobacco. She reports current alcohol use. She reports current drug use. Drug: Cocaine.      Family History:   family history includes Colon Cancer in her mother; No Known Problems in her father.     PHYSICAL EXAM:  Vitals:  /85   Pulse 89   Temp 97.7 °F (36.5 °C) (Oral)   Resp 22   Wt 136.1 kg (300 lb)   LMP 07/18/2017   SpO2 100%   BMI 53.16 kg/m²     General Appearance: Disheveled appearing, oriented to person and place, speech is mostly nonsensical but awake and alert  Skin: warm and dry  Head: normocephalic and atraumatic  Eyes: pupils equal, round, and reactive to light, extraocular eye movements intact, conjunctivae normal  Neck: neck supple and non tender without mass   Pulmonary/Chest: Tachypneic on noninvasive ventilation.  Coarse bilaterally  Cardiovascular: Somewhat distant, normal rate, normal S1 and S2 and no carotid bruits  Abdomen: Obese, soft, non-tender, non-distended, normal bowel sounds, no masses or organomegaly  Extremities: No bilateral ankle edema.  No peripheral cyanosis.  No digital clubbing.  Neurologic: no cranial nerve deficit and speech normal, encephalopathic but no focal deficits    LABS:  Recent Labs     03/29/24  1547      K 3.8      CO2 28   BUN 13   CREATININE 0.8   GLUCOSE 231*   CALCIUM 10.6*       Recent Labs     03/29/24  1547   WBC 21.1*   RBC 3.60   HGB 10.0*   HCT 33.8*   MCV 93.9   MCH 27.8   MCHC 29.6*   RDW 17.4*   MPV 13.4*       Radiology: XR CHEST PORTABLE    Result Date: 3/29/2024  EXAMINATION: ONE XRAY VIEW OF THE CHEST 3/29/2024 4:14 pm COMPARISON: February 3 through February HISTORY: ORDERING SYSTEM PROVIDED HISTORY: SOB TECHNOLOGIST PROVIDED HISTORY: Reason for exam:->SOB FINDINGS: Heart is enlarged.  CTR: 18.7/29.2 cm. Presence of bilateral pleural

## 2024-03-30 LAB
AADO2: 186.2 MMHG
ALBUMIN SERPL-MCNC: 3.8 G/DL (ref 3.5–5.2)
ALP SERPL-CCNC: 112 U/L (ref 35–104)
ALT SERPL-CCNC: 6 U/L (ref 0–32)
AMPHET UR QL SCN: NEGATIVE
ANION GAP SERPL CALCULATED.3IONS-SCNC: 12 MMOL/L (ref 7–16)
AST SERPL-CCNC: 16 U/L (ref 0–31)
B.E.: 0.4 MMOL/L (ref -3–3)
B.E.: 0.7 MMOL/L (ref -3–3)
BARBITURATES UR QL SCN: NEGATIVE
BASOPHILS # BLD: 0 K/UL (ref 0–0.2)
BASOPHILS NFR BLD: 0 % (ref 0–2)
BENZODIAZ UR QL: NEGATIVE
BILIRUB SERPL-MCNC: 0.5 MG/DL (ref 0–1.2)
BILIRUB UR QL STRIP: NEGATIVE
BUN SERPL-MCNC: 16 MG/DL (ref 6–23)
BUPRENORPHINE UR QL: NEGATIVE
CALCIUM SERPL-MCNC: 10.4 MG/DL (ref 8.6–10.2)
CANNABINOIDS UR QL SCN: NEGATIVE
CHLORIDE SERPL-SCNC: 106 MMOL/L (ref 98–107)
CLARITY UR: CLEAR
CO2 SERPL-SCNC: 29 MMOL/L (ref 22–29)
COCAINE UR QL SCN: POSITIVE
COHB: 0.6 % (ref 0–1.5)
COHB: 0.6 % (ref 0–1.5)
COLOR UR: YELLOW
CREAT SERPL-MCNC: 0.9 MG/DL (ref 0.5–1)
CRITICAL: ABNORMAL
CRITICAL: ABNORMAL
DATE ANALYZED: ABNORMAL
DATE ANALYZED: ABNORMAL
DATE OF COLLECTION: ABNORMAL
DATE OF COLLECTION: ABNORMAL
EOSINOPHIL # BLD: 0 K/UL (ref 0.05–0.5)
EOSINOPHILS RELATIVE PERCENT: 0 % (ref 0–6)
EPI CELLS #/AREA URNS HPF: NORMAL /HPF
ERYTHROCYTE [DISTWIDTH] IN BLOOD BY AUTOMATED COUNT: 17.4 % (ref 11.5–15)
FENTANYL UR QL: NEGATIVE
FIO2: 50 %
GFR SERPL CREATININE-BSD FRML MDRD: 68 ML/MIN/1.73M2
GLUCOSE BLD-MCNC: 147 MG/DL (ref 74–99)
GLUCOSE BLD-MCNC: 149 MG/DL (ref 74–99)
GLUCOSE BLD-MCNC: 189 MG/DL (ref 74–99)
GLUCOSE BLD-MCNC: 216 MG/DL (ref 74–99)
GLUCOSE SERPL-MCNC: 149 MG/DL (ref 74–99)
GLUCOSE UR STRIP-MCNC: NEGATIVE MG/DL
HCO3: 27.8 MMOL/L (ref 22–26)
HCO3: 28.1 MMOL/L (ref 22–26)
HCT VFR BLD AUTO: 32.4 % (ref 34–48)
HGB BLD-MCNC: 9.6 G/DL (ref 11.5–15.5)
HGB UR QL STRIP.AUTO: NEGATIVE
HHB: 3.6 % (ref 0–5)
HHB: 4.7 % (ref 0–5)
IMM GRANULOCYTES # BLD AUTO: 0.1 K/UL (ref 0–0.58)
IMM GRANULOCYTES NFR BLD: 1 % (ref 0–5)
KETONES UR STRIP-MCNC: NEGATIVE MG/DL
LAB: ABNORMAL
LAB: ABNORMAL
LEUKOCYTE ESTERASE UR QL STRIP: NEGATIVE
LYMPHOCYTES NFR BLD: 0.76 K/UL (ref 1.5–4)
LYMPHOCYTES RELATIVE PERCENT: 9 % (ref 20–42)
Lab: 1328
Lab: 548
MAGNESIUM SERPL-MCNC: 2.2 MG/DL (ref 1.6–2.6)
MCH RBC QN AUTO: 27.1 PG (ref 26–35)
MCHC RBC AUTO-ENTMCNC: 29.6 G/DL (ref 32–34.5)
MCV RBC AUTO: 91.5 FL (ref 80–99.9)
METHADONE UR QL: NEGATIVE
METHB: 0 % (ref 0–1.5)
METHB: 0.1 % (ref 0–1.5)
MODE: ABNORMAL
MODE: ABNORMAL
MONOCYTES NFR BLD: 0.23 K/UL (ref 0.1–0.95)
MONOCYTES NFR BLD: 3 % (ref 2–12)
NEUTROPHILS NFR BLD: 87 % (ref 43–80)
NEUTS SEG NFR BLD: 7.4 K/UL (ref 1.8–7.3)
NITRITE UR QL STRIP: NEGATIVE
O2 CONTENT: 14.2 ML/DL
O2 CONTENT: 14.4 ML/DL
O2 SATURATION: 95.3 % (ref 92–98.5)
O2 SATURATION: 96.4 % (ref 92–98.5)
O2HB: 94.6 % (ref 94–97)
O2HB: 95.8 % (ref 94–97)
OPERATOR ID: 30
OPERATOR ID: 8219
OPIATES UR QL SCN: NEGATIVE
OXYCODONE UR QL SCN: NEGATIVE
PATIENT TEMP: 37 C
PATIENT TEMP: 37 C
PCO2: 59.1 MMHG (ref 35–45)
PCO2: 59.4 MMHG (ref 35–45)
PCP UR QL SCN: NEGATIVE
PEEP/CPAP: 10 CMH2O
PFO2: 1.83 MMHG/%
PH BLOOD GAS: 7.29 (ref 7.35–7.45)
PH BLOOD GAS: 7.29 (ref 7.35–7.45)
PH UR STRIP: 5.5 [PH] (ref 5–9)
PHOSPHATE SERPL-MCNC: 3.8 MG/DL (ref 2.5–4.5)
PLATELET, FLUORESCENCE: 181 K/UL (ref 130–450)
PMV BLD AUTO: 13.5 FL (ref 7–12)
PO2: 82.2 MMHG (ref 75–100)
PO2: 91.3 MMHG (ref 75–100)
POTASSIUM SERPL-SCNC: 4.4 MMOL/L (ref 3.5–5)
PROT SERPL-MCNC: 7.6 G/DL (ref 6.4–8.3)
PROT UR STRIP-MCNC: NEGATIVE MG/DL
RBC # BLD AUTO: 3.54 M/UL (ref 3.5–5.5)
RBC #/AREA URNS HPF: NORMAL /HPF
RI(T): 2.04
RR MECHANICAL: 20 B/MIN
SODIUM SERPL-SCNC: 147 MMOL/L (ref 132–146)
SOURCE, BLOOD GAS: ABNORMAL
SOURCE, BLOOD GAS: ABNORMAL
SP GR UR STRIP: 1.02 (ref 1–1.03)
TEST INFORMATION: ABNORMAL
THB: 10.6 G/DL (ref 11.5–16.5)
THB: 10.6 G/DL (ref 11.5–16.5)
TIME ANALYZED: 1333
TIME ANALYZED: 553
UROBILINOGEN UR STRIP-ACNC: 0.2 EU/DL (ref 0–1)
VT MECHANICAL: 600 ML
WBC #/AREA URNS HPF: NORMAL /HPF
WBC OTHER # BLD: 8.5 K/UL (ref 4.5–11.5)

## 2024-03-30 PROCEDURE — 99291 CRITICAL CARE FIRST HOUR: CPT | Performed by: INTERNAL MEDICINE

## 2024-03-30 PROCEDURE — 81001 URINALYSIS AUTO W/SCOPE: CPT

## 2024-03-30 PROCEDURE — 87081 CULTURE SCREEN ONLY: CPT

## 2024-03-30 PROCEDURE — 6370000000 HC RX 637 (ALT 250 FOR IP): Performed by: INTERNAL MEDICINE

## 2024-03-30 PROCEDURE — 82962 GLUCOSE BLOOD TEST: CPT

## 2024-03-30 PROCEDURE — 99233 SBSQ HOSP IP/OBS HIGH 50: CPT | Performed by: INTERNAL MEDICINE

## 2024-03-30 PROCEDURE — 2580000003 HC RX 258: Performed by: INTERNAL MEDICINE

## 2024-03-30 PROCEDURE — 85025 COMPLETE CBC W/AUTO DIFF WBC: CPT

## 2024-03-30 PROCEDURE — C9113 INJ PANTOPRAZOLE SODIUM, VIA: HCPCS | Performed by: INTERNAL MEDICINE

## 2024-03-30 PROCEDURE — 6360000002 HC RX W HCPCS: Performed by: INTERNAL MEDICINE

## 2024-03-30 PROCEDURE — 2000000000 HC ICU R&B

## 2024-03-30 PROCEDURE — 80053 COMPREHEN METABOLIC PANEL: CPT

## 2024-03-30 PROCEDURE — 84100 ASSAY OF PHOSPHORUS: CPT

## 2024-03-30 PROCEDURE — 80307 DRUG TEST PRSMV CHEM ANLYZR: CPT

## 2024-03-30 PROCEDURE — 82805 BLOOD GASES W/O2 SATURATION: CPT

## 2024-03-30 PROCEDURE — 83735 ASSAY OF MAGNESIUM: CPT

## 2024-03-30 PROCEDURE — 94660 CPAP INITIATION&MGMT: CPT

## 2024-03-30 PROCEDURE — 94640 AIRWAY INHALATION TREATMENT: CPT

## 2024-03-30 PROCEDURE — A4216 STERILE WATER/SALINE, 10 ML: HCPCS | Performed by: INTERNAL MEDICINE

## 2024-03-30 PROCEDURE — 87086 URINE CULTURE/COLONY COUNT: CPT

## 2024-03-30 RX ADMIN — SODIUM CHLORIDE, PRESERVATIVE FREE 40 MG: 5 INJECTION INTRAVENOUS at 09:29

## 2024-03-30 RX ADMIN — CEFEPIME 2000 MG: 2 INJECTION, POWDER, FOR SOLUTION INTRAVENOUS at 11:28

## 2024-03-30 RX ADMIN — BUDESONIDE INHALATION 500 MCG: 0.5 SUSPENSION RESPIRATORY (INHALATION) at 18:23

## 2024-03-30 RX ADMIN — IPRATROPIUM BROMIDE AND ALBUTEROL SULFATE 1 DOSE: .5; 2.5 SOLUTION RESPIRATORY (INHALATION) at 18:23

## 2024-03-30 RX ADMIN — Medication 10 ML: at 09:31

## 2024-03-30 RX ADMIN — INSULIN GLARGINE 10 UNITS: 100 INJECTION, SOLUTION SUBCUTANEOUS at 21:12

## 2024-03-30 RX ADMIN — CEFEPIME 2000 MG: 2 INJECTION, POWDER, FOR SOLUTION INTRAVENOUS at 04:39

## 2024-03-30 RX ADMIN — IPRATROPIUM BROMIDE AND ALBUTEROL SULFATE 1 DOSE: .5; 2.5 SOLUTION RESPIRATORY (INHALATION) at 08:57

## 2024-03-30 RX ADMIN — VANCOMYCIN HYDROCHLORIDE 1000 MG: 1 INJECTION, POWDER, LYOPHILIZED, FOR SOLUTION INTRAVENOUS at 06:36

## 2024-03-30 RX ADMIN — Medication 10 ML: at 21:19

## 2024-03-30 RX ADMIN — ENOXAPARIN SODIUM 30 MG: 100 INJECTION SUBCUTANEOUS at 09:32

## 2024-03-30 RX ADMIN — VANCOMYCIN HYDROCHLORIDE 1000 MG: 1 INJECTION, POWDER, LYOPHILIZED, FOR SOLUTION INTRAVENOUS at 18:41

## 2024-03-30 RX ADMIN — WATER 40 MG: 1 INJECTION INTRAMUSCULAR; INTRAVENOUS; SUBCUTANEOUS at 18:42

## 2024-03-30 RX ADMIN — WATER 40 MG: 1 INJECTION INTRAMUSCULAR; INTRAVENOUS; SUBCUTANEOUS at 03:15

## 2024-03-30 RX ADMIN — IPRATROPIUM BROMIDE AND ALBUTEROL SULFATE 1 DOSE: .5; 2.5 SOLUTION RESPIRATORY (INHALATION) at 05:41

## 2024-03-30 RX ADMIN — WATER 40 MG: 1 INJECTION INTRAMUSCULAR; INTRAVENOUS; SUBCUTANEOUS at 11:26

## 2024-03-30 RX ADMIN — IPRATROPIUM BROMIDE AND ALBUTEROL SULFATE 1 DOSE: .5; 2.5 SOLUTION RESPIRATORY (INHALATION) at 12:46

## 2024-03-30 RX ADMIN — APIXABAN 5 MG: 5 TABLET, FILM COATED ORAL at 21:11

## 2024-03-30 RX ADMIN — CEFEPIME 2000 MG: 2 INJECTION, POWDER, FOR SOLUTION INTRAVENOUS at 18:42

## 2024-03-30 RX ADMIN — BUDESONIDE INHALATION 500 MCG: 0.5 SUSPENSION RESPIRATORY (INHALATION) at 05:41

## 2024-03-30 NOTE — ED NOTES
Patient trying to rip off BP cuff, pulse ox, tele monitors, when asked why patient stated she wants to get up to the bathroom. Redirected and re-educated pt that she has a purewick in place. Repositioned in bed and hooked back up to vital machine. Patient able to urinate 500 cc in purewick. Refuses gown.

## 2024-03-30 NOTE — ED NOTES
Unable to obtain spo2 saturation on patient via finger or ear at this time. RT called to bedside to reevaluate patient. FiO2 turned to 100%

## 2024-03-30 NOTE — ED NOTES
Dr. Pineda down to see pt, pt is ok to eat. ABG to be obtained after. Pt to have bipap on after lunch.

## 2024-03-30 NOTE — CONSULTS
Assessment and Plan  Patient is a 68 y.o. female with the following medical Problems:   Acute on chronic hypoxic and hypercapnic respiratory failure requiring BiPAP  Metabolic Encephalopathy  Sepsis without septic shock   Morbid obesity with obesity hypoventilation syndrome  Acute on chronic heart failure with preserved ejection fraction  Severe secondary pulmonary hypertension  COPD with acute exacerbation  Aspiration pneumonitis  Atrial fibrillation with [id number] score of 5  Type 2 diabetes (hemoglobin A1c 6.1 03/29/24)  Hx of Trichomonas vaginalis.    Plan of care:  BiPAP while napping, sleeping and as needed  Supplemental oxygen to keep sat 88 to 94%  Strictly avoid benzodiazepines and opioids.  Maintenance IV fluid  Continue with broad-spectrum antibiotic with cefepime and vancomycin.  Follow-up culture results.  DVT prophylaxis  GI prophylaxis  Urine toxicology  Schedule Solu-Medrol, DuoNeb, Pulmicort for acute exacerbation of COPD  PT, OT, and out of bed as tolerated.    History of Present Illness:   Patient is a 68-year-old woman with above-mentioned medical problems who was admitted on March 29, 2024.  Patient was brought from a nursing home due to respiratory distress and altered mental status.  Blood gases showed pH of 7.036, pCO2 of 109.3, and a pO2 of 66.2.  Patient was placed on BiPAP after settings were adjusted and BiPAP mode was changed to AVAPS.  Gases improved.  Patient was lethargic but responsive.  She denies fever, chills, rigors.    Daily progress:  March 30, 2024: Patient remained on BiPAP overnight however she is more awake and responsive today.  Urine toxicology is pending.  She has no fever, chills, rigors.  White blood cell count is trending down.  BiPAP will be transition to nasal cannula to keep sat 88 to 94%.    Past Medical History:  Past Medical History:   Diagnosis Date    Acute congestive heart failure with left ventricular diastolic dysfunction ([facility]) 12/21/2020    Atrial

## 2024-03-31 PROBLEM — A41.9 SEPSIS (HCC): Status: ACTIVE | Noted: 2024-03-31

## 2024-03-31 PROBLEM — R78.81 STREPTOCOCCAL BACTEREMIA: Status: ACTIVE | Noted: 2024-03-31

## 2024-03-31 PROBLEM — B95.5 STREPTOCOCCAL BACTEREMIA: Status: ACTIVE | Noted: 2024-03-31

## 2024-03-31 PROBLEM — I50.23 ACUTE ON CHRONIC SYSTOLIC CHF (CONGESTIVE HEART FAILURE) (HCC): Status: ACTIVE | Noted: 2024-03-31

## 2024-03-31 LAB
ALBUMIN SERPL-MCNC: 3.5 G/DL (ref 3.5–5.2)
ALP SERPL-CCNC: 98 U/L (ref 35–104)
ALT SERPL-CCNC: 8 U/L (ref 0–32)
ANION GAP SERPL CALCULATED.3IONS-SCNC: 9 MMOL/L (ref 7–16)
AST SERPL-CCNC: 10 U/L (ref 0–31)
BASOPHILS # BLD: 0 K/UL (ref 0–0.2)
BASOPHILS NFR BLD: 0 % (ref 0–2)
BILIRUB SERPL-MCNC: 0.3 MG/DL (ref 0–1.2)
BUN SERPL-MCNC: 29 MG/DL (ref 6–23)
CALCIUM SERPL-MCNC: 10.1 MG/DL (ref 8.6–10.2)
CHLORIDE SERPL-SCNC: 105 MMOL/L (ref 98–107)
CO2 SERPL-SCNC: 29 MMOL/L (ref 22–29)
CREAT SERPL-MCNC: 1.1 MG/DL (ref 0.5–1)
DATE LAST DOSE: NORMAL
EOSINOPHIL # BLD: 0 K/UL (ref 0.05–0.5)
EOSINOPHILS RELATIVE PERCENT: 0 % (ref 0–6)
ERYTHROCYTE [DISTWIDTH] IN BLOOD BY AUTOMATED COUNT: 17.6 % (ref 11.5–15)
GFR SERPL CREATININE-BSD FRML MDRD: 53 ML/MIN/1.73M2
GLUCOSE BLD-MCNC: 178 MG/DL (ref 74–99)
GLUCOSE BLD-MCNC: 193 MG/DL (ref 74–99)
GLUCOSE BLD-MCNC: 216 MG/DL (ref 74–99)
GLUCOSE BLD-MCNC: 242 MG/DL (ref 74–99)
GLUCOSE SERPL-MCNC: 159 MG/DL (ref 74–99)
HCT VFR BLD AUTO: 28.6 % (ref 34–48)
HGB BLD-MCNC: 8.8 G/DL (ref 11.5–15.5)
LYMPHOCYTES NFR BLD: 0.7 K/UL (ref 1.5–4)
LYMPHOCYTES RELATIVE PERCENT: 7 % (ref 20–42)
MAGNESIUM SERPL-MCNC: 2.2 MG/DL (ref 1.6–2.6)
MCH RBC QN AUTO: 27.8 PG (ref 26–35)
MCHC RBC AUTO-ENTMCNC: 30.8 G/DL (ref 32–34.5)
MCV RBC AUTO: 90.2 FL (ref 80–99.9)
MICROORGANISM SPEC CULT: ABNORMAL
MICROORGANISM SPEC CULT: ABNORMAL
MONOCYTES NFR BLD: 0.88 K/UL (ref 0.1–0.95)
MONOCYTES NFR BLD: 9 % (ref 2–12)
NEUTROPHILS NFR BLD: 84 % (ref 43–80)
NEUTS SEG NFR BLD: 8.23 K/UL (ref 1.8–7.3)
NUCLEATED RED BLOOD CELLS: 4 PER 100 WBC
PHOSPHATE SERPL-MCNC: 3.3 MG/DL (ref 2.5–4.5)
PLATELET, FLUORESCENCE: 189 K/UL (ref 130–450)
PMV BLD AUTO: 13.1 FL (ref 7–12)
POTASSIUM SERPL-SCNC: 4.1 MMOL/L (ref 3.5–5)
PROT SERPL-MCNC: 6.8 G/DL (ref 6.4–8.3)
RBC # BLD AUTO: 3.17 M/UL (ref 3.5–5.5)
RBC # BLD: ABNORMAL 10*6/UL
SODIUM SERPL-SCNC: 143 MMOL/L (ref 132–146)
SPECIMEN DESCRIPTION: ABNORMAL
TME LAST DOSE: NORMAL H
VANCOMYCIN DOSE: NORMAL MG
VANCOMYCIN TROUGH SERPL-MCNC: 12.6 UG/ML (ref 5–16)
WBC OTHER # BLD: 9.8 K/UL (ref 4.5–11.5)

## 2024-03-31 PROCEDURE — 87040 BLOOD CULTURE FOR BACTERIA: CPT

## 2024-03-31 PROCEDURE — 94660 CPAP INITIATION&MGMT: CPT

## 2024-03-31 PROCEDURE — 6370000000 HC RX 637 (ALT 250 FOR IP): Performed by: INTERNAL MEDICINE

## 2024-03-31 PROCEDURE — 2580000003 HC RX 258: Performed by: INTERNAL MEDICINE

## 2024-03-31 PROCEDURE — 80053 COMPREHEN METABOLIC PANEL: CPT

## 2024-03-31 PROCEDURE — 94640 AIRWAY INHALATION TREATMENT: CPT

## 2024-03-31 PROCEDURE — 99233 SBSQ HOSP IP/OBS HIGH 50: CPT | Performed by: INTERNAL MEDICINE

## 2024-03-31 PROCEDURE — 2700000000 HC OXYGEN THERAPY PER DAY

## 2024-03-31 PROCEDURE — 99291 CRITICAL CARE FIRST HOUR: CPT | Performed by: INTERNAL MEDICINE

## 2024-03-31 PROCEDURE — 6360000002 HC RX W HCPCS: Performed by: INTERNAL MEDICINE

## 2024-03-31 PROCEDURE — 80202 ASSAY OF VANCOMYCIN: CPT

## 2024-03-31 PROCEDURE — 82962 GLUCOSE BLOOD TEST: CPT

## 2024-03-31 PROCEDURE — C9113 INJ PANTOPRAZOLE SODIUM, VIA: HCPCS | Performed by: INTERNAL MEDICINE

## 2024-03-31 PROCEDURE — 84100 ASSAY OF PHOSPHORUS: CPT

## 2024-03-31 PROCEDURE — 85025 COMPLETE CBC W/AUTO DIFF WBC: CPT

## 2024-03-31 PROCEDURE — 83735 ASSAY OF MAGNESIUM: CPT

## 2024-03-31 PROCEDURE — 2060000000 HC ICU INTERMEDIATE R&B

## 2024-03-31 RX ORDER — SODIUM CHLORIDE, SODIUM LACTATE, POTASSIUM CHLORIDE, CALCIUM CHLORIDE 600; 310; 30; 20 MG/100ML; MG/100ML; MG/100ML; MG/100ML
INJECTION, SOLUTION INTRAVENOUS CONTINUOUS
Status: ACTIVE | OUTPATIENT
Start: 2024-03-31 | End: 2024-04-01

## 2024-03-31 RX ADMIN — IPRATROPIUM BROMIDE AND ALBUTEROL SULFATE 1 DOSE: .5; 2.5 SOLUTION RESPIRATORY (INHALATION) at 13:13

## 2024-03-31 RX ADMIN — WATER 40 MG: 1 INJECTION INTRAMUSCULAR; INTRAVENOUS; SUBCUTANEOUS at 10:51

## 2024-03-31 RX ADMIN — APIXABAN 5 MG: 5 TABLET, FILM COATED ORAL at 20:32

## 2024-03-31 RX ADMIN — VANCOMYCIN HYDROCHLORIDE 1000 MG: 1 INJECTION, POWDER, LYOPHILIZED, FOR SOLUTION INTRAVENOUS at 18:24

## 2024-03-31 RX ADMIN — Medication 10 ML: at 08:02

## 2024-03-31 RX ADMIN — IPRATROPIUM BROMIDE AND ALBUTEROL SULFATE 1 DOSE: .5; 2.5 SOLUTION RESPIRATORY (INHALATION) at 04:29

## 2024-03-31 RX ADMIN — CEFEPIME 2000 MG: 2 INJECTION, POWDER, FOR SOLUTION INTRAVENOUS at 18:22

## 2024-03-31 RX ADMIN — IPRATROPIUM BROMIDE AND ALBUTEROL SULFATE 1 DOSE: .5; 2.5 SOLUTION RESPIRATORY (INHALATION) at 08:47

## 2024-03-31 RX ADMIN — SODIUM CHLORIDE, POTASSIUM CHLORIDE, SODIUM LACTATE AND CALCIUM CHLORIDE: 600; 310; 30; 20 INJECTION, SOLUTION INTRAVENOUS at 12:36

## 2024-03-31 RX ADMIN — BUDESONIDE INHALATION 500 MCG: 0.5 SUSPENSION RESPIRATORY (INHALATION) at 17:19

## 2024-03-31 RX ADMIN — BUDESONIDE INHALATION 500 MCG: 0.5 SUSPENSION RESPIRATORY (INHALATION) at 04:30

## 2024-03-31 RX ADMIN — APIXABAN 5 MG: 5 TABLET, FILM COATED ORAL at 08:01

## 2024-03-31 RX ADMIN — SODIUM CHLORIDE, PRESERVATIVE FREE 40 MG: 5 INJECTION INTRAVENOUS at 08:01

## 2024-03-31 RX ADMIN — Medication 10 ML: at 20:28

## 2024-03-31 RX ADMIN — CEFEPIME 2000 MG: 2 INJECTION, POWDER, FOR SOLUTION INTRAVENOUS at 10:51

## 2024-03-31 RX ADMIN — IPRATROPIUM BROMIDE AND ALBUTEROL SULFATE 1 DOSE: .5; 2.5 SOLUTION RESPIRATORY (INHALATION) at 17:19

## 2024-03-31 RX ADMIN — INSULIN GLARGINE 10 UNITS: 100 INJECTION, SOLUTION SUBCUTANEOUS at 20:30

## 2024-03-31 RX ADMIN — CEFEPIME 2000 MG: 2 INJECTION, POWDER, FOR SOLUTION INTRAVENOUS at 03:28

## 2024-03-31 RX ADMIN — INSULIN LISPRO 1 UNITS: 100 INJECTION, SOLUTION INTRAVENOUS; SUBCUTANEOUS at 16:50

## 2024-03-31 RX ADMIN — VANCOMYCIN HYDROCHLORIDE 1000 MG: 1 INJECTION, POWDER, LYOPHILIZED, FOR SOLUTION INTRAVENOUS at 06:20

## 2024-03-31 RX ADMIN — WATER 40 MG: 1 INJECTION INTRAMUSCULAR; INTRAVENOUS; SUBCUTANEOUS at 03:22

## 2024-03-31 RX ADMIN — WATER 40 MG: 1 INJECTION INTRAMUSCULAR; INTRAVENOUS; SUBCUTANEOUS at 18:24

## 2024-03-31 ASSESSMENT — PAIN SCALES - GENERAL: PAINLEVEL_OUTOF10: 0

## 2024-04-01 LAB
ALBUMIN SERPL-MCNC: 3.9 G/DL (ref 3.5–5.2)
ALP SERPL-CCNC: 101 U/L (ref 35–104)
ALT SERPL-CCNC: 6 U/L (ref 0–32)
ANION GAP SERPL CALCULATED.3IONS-SCNC: 7 MMOL/L (ref 7–16)
AST SERPL-CCNC: 13 U/L (ref 0–31)
BASOPHILS # BLD: 0 K/UL (ref 0–0.2)
BASOPHILS NFR BLD: 0 % (ref 0–2)
BILIRUB SERPL-MCNC: 0.3 MG/DL (ref 0–1.2)
BUN SERPL-MCNC: 28 MG/DL (ref 6–23)
CALCIUM SERPL-MCNC: 10.1 MG/DL (ref 8.6–10.2)
CHLORIDE SERPL-SCNC: 105 MMOL/L (ref 98–107)
CO2 SERPL-SCNC: 29 MMOL/L (ref 22–29)
CREAT SERPL-MCNC: 1 MG/DL (ref 0.5–1)
DATE LAST DOSE: ABNORMAL
EKG ATRIAL RATE: 125 BPM
EKG P-R INTERVAL: 232 MS
EKG Q-T INTERVAL: 356 MS
EKG QRS DURATION: 96 MS
EKG QTC CALCULATION (BAZETT): 513 MS
EKG R AXIS: 2 DEGREES
EKG T AXIS: 161 DEGREES
EKG VENTRICULAR RATE: 125 BPM
EOSINOPHIL # BLD: 0 K/UL (ref 0.05–0.5)
EOSINOPHILS RELATIVE PERCENT: 0 % (ref 0–6)
ERYTHROCYTE [DISTWIDTH] IN BLOOD BY AUTOMATED COUNT: 17.2 % (ref 11.5–15)
GFR SERPL CREATININE-BSD FRML MDRD: 63 ML/MIN/1.73M2
GLUCOSE BLD-MCNC: 180 MG/DL (ref 74–99)
GLUCOSE BLD-MCNC: 195 MG/DL (ref 74–99)
GLUCOSE BLD-MCNC: 213 MG/DL (ref 74–99)
GLUCOSE BLD-MCNC: 283 MG/DL (ref 74–99)
GLUCOSE SERPL-MCNC: 188 MG/DL (ref 74–99)
HCT VFR BLD AUTO: 29.3 % (ref 34–48)
HGB BLD-MCNC: 8.9 G/DL (ref 11.5–15.5)
LYMPHOCYTES NFR BLD: 0.51 K/UL (ref 1.5–4)
LYMPHOCYTES RELATIVE PERCENT: 5 % (ref 20–42)
MCH RBC QN AUTO: 27.3 PG (ref 26–35)
MCHC RBC AUTO-ENTMCNC: 30.4 G/DL (ref 32–34.5)
MCV RBC AUTO: 89.9 FL (ref 80–99.9)
MICROORGANISM SPEC CULT: NORMAL
MONOCYTES NFR BLD: 0.08 K/UL (ref 0.1–0.95)
MONOCYTES NFR BLD: 1 % (ref 2–12)
NEUTROPHILS NFR BLD: 94 % (ref 43–80)
NEUTS SEG NFR BLD: 9.01 K/UL (ref 1.8–7.3)
PHOSPHATE SERPL-MCNC: 2.5 MG/DL (ref 2.5–4.5)
PLATELET, FLUORESCENCE: 197 K/UL (ref 130–450)
PMV BLD AUTO: 12.6 FL (ref 7–12)
POTASSIUM SERPL-SCNC: 4.2 MMOL/L (ref 3.5–5)
PROT SERPL-MCNC: 7.1 G/DL (ref 6.4–8.3)
RBC # BLD AUTO: 3.26 M/UL (ref 3.5–5.5)
RBC # BLD: ABNORMAL 10*6/UL
RBC # BLD: ABNORMAL 10*6/UL
SODIUM SERPL-SCNC: 141 MMOL/L (ref 132–146)
SPECIMEN DESCRIPTION: NORMAL
TME LAST DOSE: ABNORMAL H
VANCOMYCIN DOSE: ABNORMAL MG
VANCOMYCIN TROUGH SERPL-MCNC: 19.8 UG/ML (ref 5–16)
WBC OTHER # BLD: 9.6 K/UL (ref 4.5–11.5)

## 2024-04-01 PROCEDURE — 85025 COMPLETE CBC W/AUTO DIFF WBC: CPT

## 2024-04-01 PROCEDURE — 99232 SBSQ HOSP IP/OBS MODERATE 35: CPT | Performed by: INTERNAL MEDICINE

## 2024-04-01 PROCEDURE — 36415 COLL VENOUS BLD VENIPUNCTURE: CPT

## 2024-04-01 PROCEDURE — 6370000000 HC RX 637 (ALT 250 FOR IP): Performed by: INTERNAL MEDICINE

## 2024-04-01 PROCEDURE — 2580000003 HC RX 258: Performed by: INTERNAL MEDICINE

## 2024-04-01 PROCEDURE — 82962 GLUCOSE BLOOD TEST: CPT

## 2024-04-01 PROCEDURE — 93010 ELECTROCARDIOGRAM REPORT: CPT | Performed by: INTERNAL MEDICINE

## 2024-04-01 PROCEDURE — 80053 COMPREHEN METABOLIC PANEL: CPT

## 2024-04-01 PROCEDURE — 2500000003 HC RX 250 WO HCPCS: Performed by: INTERNAL MEDICINE

## 2024-04-01 PROCEDURE — 94640 AIRWAY INHALATION TREATMENT: CPT

## 2024-04-01 PROCEDURE — C9113 INJ PANTOPRAZOLE SODIUM, VIA: HCPCS | Performed by: INTERNAL MEDICINE

## 2024-04-01 PROCEDURE — 80202 ASSAY OF VANCOMYCIN: CPT

## 2024-04-01 PROCEDURE — 2060000000 HC ICU INTERMEDIATE R&B

## 2024-04-01 PROCEDURE — 6360000002 HC RX W HCPCS: Performed by: INTERNAL MEDICINE

## 2024-04-01 PROCEDURE — 84100 ASSAY OF PHOSPHORUS: CPT

## 2024-04-01 PROCEDURE — 99232 SBSQ HOSP IP/OBS MODERATE 35: CPT | Performed by: NURSE PRACTITIONER

## 2024-04-01 PROCEDURE — 97535 SELF CARE MNGMENT TRAINING: CPT

## 2024-04-01 PROCEDURE — 2700000000 HC OXYGEN THERAPY PER DAY

## 2024-04-01 PROCEDURE — A4216 STERILE WATER/SALINE, 10 ML: HCPCS | Performed by: INTERNAL MEDICINE

## 2024-04-01 PROCEDURE — 97530 THERAPEUTIC ACTIVITIES: CPT

## 2024-04-01 PROCEDURE — 97165 OT EVAL LOW COMPLEX 30 MIN: CPT

## 2024-04-01 PROCEDURE — 94660 CPAP INITIATION&MGMT: CPT

## 2024-04-01 PROCEDURE — 93005 ELECTROCARDIOGRAM TRACING: CPT | Performed by: INTERNAL MEDICINE

## 2024-04-01 RX ORDER — METOPROLOL TARTRATE 1 MG/ML
5 INJECTION, SOLUTION INTRAVENOUS ONCE
Status: COMPLETED | OUTPATIENT
Start: 2024-04-01 | End: 2024-04-01

## 2024-04-01 RX ADMIN — CEFEPIME 2000 MG: 2 INJECTION, POWDER, FOR SOLUTION INTRAVENOUS at 03:22

## 2024-04-01 RX ADMIN — IPRATROPIUM BROMIDE AND ALBUTEROL SULFATE 1 DOSE: .5; 2.5 SOLUTION RESPIRATORY (INHALATION) at 18:00

## 2024-04-01 RX ADMIN — SODIUM CHLORIDE, POTASSIUM CHLORIDE, SODIUM LACTATE AND CALCIUM CHLORIDE: 600; 310; 30; 20 INJECTION, SOLUTION INTRAVENOUS at 03:23

## 2024-04-01 RX ADMIN — IPRATROPIUM BROMIDE AND ALBUTEROL SULFATE 1 DOSE: .5; 2.5 SOLUTION RESPIRATORY (INHALATION) at 13:41

## 2024-04-01 RX ADMIN — INSULIN GLARGINE 10 UNITS: 100 INJECTION, SOLUTION SUBCUTANEOUS at 21:32

## 2024-04-01 RX ADMIN — CEFEPIME 2000 MG: 2 INJECTION, POWDER, FOR SOLUTION INTRAVENOUS at 19:51

## 2024-04-01 RX ADMIN — CEFEPIME 2000 MG: 2 INJECTION, POWDER, FOR SOLUTION INTRAVENOUS at 11:51

## 2024-04-01 RX ADMIN — BUDESONIDE INHALATION 500 MCG: 0.5 SUSPENSION RESPIRATORY (INHALATION) at 18:00

## 2024-04-01 RX ADMIN — WATER 40 MG: 1 INJECTION INTRAMUSCULAR; INTRAVENOUS; SUBCUTANEOUS at 03:19

## 2024-04-01 RX ADMIN — METOPROLOL TARTRATE 5 MG: 5 INJECTION INTRAVENOUS at 18:17

## 2024-04-01 RX ADMIN — APIXABAN 5 MG: 5 TABLET, FILM COATED ORAL at 09:00

## 2024-04-01 RX ADMIN — Medication 10 ML: at 11:29

## 2024-04-01 RX ADMIN — IPRATROPIUM BROMIDE AND ALBUTEROL SULFATE 1 DOSE: .5; 2.5 SOLUTION RESPIRATORY (INHALATION) at 06:44

## 2024-04-01 RX ADMIN — VANCOMYCIN HYDROCHLORIDE 1000 MG: 1 INJECTION, POWDER, LYOPHILIZED, FOR SOLUTION INTRAVENOUS at 21:31

## 2024-04-01 RX ADMIN — METOPROLOL SUCCINATE 25 MG: 25 TABLET, FILM COATED, EXTENDED RELEASE ORAL at 21:31

## 2024-04-01 RX ADMIN — SODIUM CHLORIDE, PRESERVATIVE FREE 40 MG: 5 INJECTION INTRAVENOUS at 09:00

## 2024-04-01 RX ADMIN — ACETAMINOPHEN 650 MG: 325 TABLET ORAL at 11:34

## 2024-04-01 RX ADMIN — INSULIN LISPRO 2 UNITS: 100 INJECTION, SOLUTION INTRAVENOUS; SUBCUTANEOUS at 17:23

## 2024-04-01 RX ADMIN — VANCOMYCIN HYDROCHLORIDE 1000 MG: 1 INJECTION, POWDER, LYOPHILIZED, FOR SOLUTION INTRAVENOUS at 06:17

## 2024-04-01 RX ADMIN — BUDESONIDE INHALATION 500 MCG: 0.5 SUSPENSION RESPIRATORY (INHALATION) at 06:44

## 2024-04-01 RX ADMIN — Medication 10 ML: at 19:49

## 2024-04-01 RX ADMIN — IPRATROPIUM BROMIDE AND ALBUTEROL SULFATE 1 DOSE: .5; 2.5 SOLUTION RESPIRATORY (INHALATION) at 09:58

## 2024-04-01 RX ADMIN — WATER 40 MG: 1 INJECTION INTRAMUSCULAR; INTRAVENOUS; SUBCUTANEOUS at 19:48

## 2024-04-01 RX ADMIN — WATER 40 MG: 1 INJECTION INTRAMUSCULAR; INTRAVENOUS; SUBCUTANEOUS at 11:26

## 2024-04-01 RX ADMIN — APIXABAN 5 MG: 5 TABLET, FILM COATED ORAL at 21:32

## 2024-04-01 ASSESSMENT — PAIN SCALES - GENERAL
PAINLEVEL_OUTOF10: 0
PAINLEVEL_OUTOF10: 3
PAINLEVEL_OUTOF10: 0

## 2024-04-01 ASSESSMENT — PAIN DESCRIPTION - LOCATION: LOCATION: GENERALIZED

## 2024-04-01 ASSESSMENT — PAIN - FUNCTIONAL ASSESSMENT: PAIN_FUNCTIONAL_ASSESSMENT: ACTIVITIES ARE NOT PREVENTED

## 2024-04-01 ASSESSMENT — PAIN DESCRIPTION - PAIN TYPE: TYPE: CHRONIC PAIN

## 2024-04-01 ASSESSMENT — PAIN DESCRIPTION - FREQUENCY: FREQUENCY: CONTINUOUS

## 2024-04-01 ASSESSMENT — PAIN DESCRIPTION - DESCRIPTORS: DESCRIPTORS: ACHING

## 2024-04-01 ASSESSMENT — PAIN DESCRIPTION - ONSET: ONSET: ON-GOING

## 2024-04-01 NOTE — CARE COORDINATION
Case Management Assessment  Initial Evaluation    Date/Time of Evaluation: 4/1/2024 3:17 PM  Assessment Completed by: Lynda Hobbs RN    If patient is discharged prior to next notation, then this note serves as note for discharge by case management.    Patient Name: Emily Weiss                   YOB: 1955  Diagnosis: Respiratory acidosis [E87.29]  Acute on chronic systolic CHF (congestive heart failure) (HCC) [I50.23]  Acute respiratory failure with hypoxia and hypercapnia (HCC) [J96.01, J96.02]  Acute on chronic respiratory failure with hypoxia and hypercapnia (HCC) [J96.21, J96.22]                   Date / Time: 3/29/2024  3:32 PM    Patient Admission Status: Inpatient   Readmission Risk (Low < 19, Mod (19-27), High > 27): Readmission Risk Score: 21.6    Current PCP: Sabas Vargas MD  PCP verified by CM? Yes    Chart Reviewed: Yes      History Provided by: Patient  Patient Orientation: Alert and Oriented    Patient Cognition: Alert    Hospitalization in the last 30 days (Readmission):  No    If yes, Readmission Assessment in CM Navigator will be completed.    Advance Directives:      Code Status: Full Code   Patient's Primary Decision Maker is: Named in Scanned ACP Document    Primary Decision Maker: raina noe - Brother/Sister - [phone]    Discharge Planning:    Patient lives with: Spouse/Significant Other, Children Type of Home: House  Primary Care Giver: Self  Patient Support Systems include: Spouse/Significant Other, Children, Family Members     ADLS  Prior functional level: Assistance with the following:, Mobility  Current functional level: Assistance with the following:, Mobility      Family can provide assistance at DC: Yes  Would you like Case Management to discuss the discharge plan with any other family members/significant others, and if so, who? No  Plans to Return to Present Housing: Yes  Other Identified Issues/Barriers to RETURNING to current housing: none

## 2024-04-01 NOTE — ACP (ADVANCE CARE PLANNING)
Advance Care Planning   Healthcare Decision Maker:    Primary Decision Maker: raina noe - Brother/Sister - [phone]      Today we documented Decision Maker(s) consistent with ACP documents on file.

## 2024-04-02 PROBLEM — J96.22 ACUTE ON CHRONIC RESPIRATORY FAILURE WITH HYPERCAPNIA (HCC): Status: ACTIVE | Noted: 2024-04-02

## 2024-04-02 LAB
ACB COMPLEX DNA BLD POS QL NAA+NON-PROBE: NOT DETECTED
ALBUMIN SERPL-MCNC: 3.9 G/DL (ref 3.5–5.2)
ALP SERPL-CCNC: 99 U/L (ref 35–104)
ALT SERPL-CCNC: 9 U/L (ref 0–32)
ANION GAP SERPL CALCULATED.3IONS-SCNC: 7 MMOL/L (ref 7–16)
AST SERPL-CCNC: 9 U/L (ref 0–31)
B FRAGILIS DNA BLD POS QL NAA+NON-PROBE: NOT DETECTED
BASOPHILS # BLD: 0.01 K/UL (ref 0–0.2)
BASOPHILS NFR BLD: 0 % (ref 0–2)
BILIRUB SERPL-MCNC: 0.3 MG/DL (ref 0–1.2)
BIOFIRE TEST COMMENT: ABNORMAL
BUN SERPL-MCNC: 29 MG/DL (ref 6–23)
C ALBICANS DNA BLD POS QL NAA+NON-PROBE: NOT DETECTED
C AURIS DNA BLD POS QL NAA+NON-PROBE: NOT DETECTED
C GATTII+NEOFOR DNA BLD POS QL NAA+N-PRB: NOT DETECTED
C GLABRATA DNA BLD POS QL NAA+NON-PROBE: NOT DETECTED
C KRUSEI DNA BLD POS QL NAA+NON-PROBE: NOT DETECTED
C PARAP DNA BLD POS QL NAA+NON-PROBE: NOT DETECTED
C TROPICLS DNA BLD POS QL NAA+NON-PROBE: NOT DETECTED
CALCIUM SERPL-MCNC: 10.2 MG/DL (ref 8.6–10.2)
CHLORIDE SERPL-SCNC: 104 MMOL/L (ref 98–107)
CO2 SERPL-SCNC: 31 MMOL/L (ref 22–29)
CREAT SERPL-MCNC: 1 MG/DL (ref 0.5–1)
DATE LAST DOSE: NORMAL
E CLOAC COMP DNA BLD POS NAA+NON-PROBE: NOT DETECTED
E COLI DNA BLD POS QL NAA+NON-PROBE: NOT DETECTED
E FAECALIS DNA BLD POS QL NAA+NON-PROBE: NOT DETECTED
E FAECIUM DNA BLD POS QL NAA+NON-PROBE: NOT DETECTED
ENTEROBACTERALES DNA BLD POS NAA+N-PRB: NOT DETECTED
EOSINOPHIL # BLD: 0 K/UL (ref 0.05–0.5)
EOSINOPHILS RELATIVE PERCENT: 0 % (ref 0–6)
ERYTHROCYTE [DISTWIDTH] IN BLOOD BY AUTOMATED COUNT: 17.4 % (ref 11.5–15)
GFR SERPL CREATININE-BSD FRML MDRD: 61 ML/MIN/1.73M2
GLUCOSE BLD-MCNC: 149 MG/DL (ref 74–99)
GLUCOSE BLD-MCNC: 189 MG/DL (ref 74–99)
GLUCOSE BLD-MCNC: 191 MG/DL (ref 74–99)
GLUCOSE BLD-MCNC: 226 MG/DL (ref 74–99)
GLUCOSE SERPL-MCNC: 165 MG/DL (ref 74–99)
GP B STREP DNA BLD POS QL NAA+NON-PROBE: NOT DETECTED
HAEM INFLU DNA BLD POS QL NAA+NON-PROBE: NOT DETECTED
HCT VFR BLD AUTO: 31.8 % (ref 34–48)
HGB BLD-MCNC: 9.4 G/DL (ref 11.5–15.5)
IMM GRANULOCYTES # BLD AUTO: 0.24 K/UL (ref 0–0.58)
IMM GRANULOCYTES NFR BLD: 3 % (ref 0–5)
K OXYTOCA DNA BLD POS QL NAA+NON-PROBE: NOT DETECTED
KLEBSIELLA SP DNA BLD POS QL NAA+NON-PRB: NOT DETECTED
KLEBSIELLA SP DNA BLD POS QL NAA+NON-PRB: NOT DETECTED
L MONOCYTOG DNA BLD POS QL NAA+NON-PROBE: NOT DETECTED
LYMPHOCYTES NFR BLD: 0.72 K/UL (ref 1.5–4)
LYMPHOCYTES RELATIVE PERCENT: 7 % (ref 20–42)
MCH RBC QN AUTO: 26.7 PG (ref 26–35)
MCHC RBC AUTO-ENTMCNC: 29.6 G/DL (ref 32–34.5)
MCV RBC AUTO: 90.3 FL (ref 80–99.9)
MICROORGANISM SPEC CULT: ABNORMAL
MICROORGANISM SPEC CULT: ABNORMAL
MICROORGANISM/AGENT SPEC: ABNORMAL
MICROORGANISM/AGENT SPEC: ABNORMAL
MONOCYTES NFR BLD: 0.64 K/UL (ref 0.1–0.95)
MONOCYTES NFR BLD: 7 % (ref 2–12)
N MEN DNA BLD POS QL NAA+NON-PROBE: NOT DETECTED
NEUTROPHILS NFR BLD: 83 % (ref 43–80)
NEUTS SEG NFR BLD: 8.11 K/UL (ref 1.8–7.3)
P AERUGINOSA DNA BLD POS NAA+NON-PROBE: NOT DETECTED
PHOSPHATE SERPL-MCNC: 2.7 MG/DL (ref 2.5–4.5)
PLATELET # BLD AUTO: 214 K/UL (ref 130–450)
PMV BLD AUTO: 12.9 FL (ref 7–12)
POTASSIUM SERPL-SCNC: 4.5 MMOL/L (ref 3.5–5)
PROT SERPL-MCNC: 7.4 G/DL (ref 6.4–8.3)
PROTEUS SP DNA BLD POS QL NAA+NON-PROBE: NOT DETECTED
RBC # BLD AUTO: 3.52 M/UL (ref 3.5–5.5)
S AUREUS DNA BLD POS QL NAA+NON-PROBE: NOT DETECTED
S AUREUS+CONS DNA BLD POS NAA+NON-PROBE: NOT DETECTED
S EPIDERMIDIS DNA BLD POS QL NAA+NON-PRB: NOT DETECTED
S LUGDUNENSIS DNA BLD POS QL NAA+NON-PRB: NOT DETECTED
S MALTOPHILIA DNA BLD POS QL NAA+NON-PRB: NOT DETECTED
S MARCESCENS DNA BLD POS NAA+NON-PROBE: NOT DETECTED
S PNEUM DNA BLD POS QL NAA+NON-PROBE: NOT DETECTED
S PYO DNA BLD POS QL NAA+NON-PROBE: NOT DETECTED
SALMONELLA DNA BLD POS QL NAA+NON-PROBE: NOT DETECTED
SERVICE CMNT-IMP: ABNORMAL
SODIUM SERPL-SCNC: 142 MMOL/L (ref 132–146)
SPECIMEN DESCRIPTION: ABNORMAL
STREPTOCOCCUS DNA BLD POS NAA+NON-PROBE: NOT DETECTED
TME LAST DOSE: NORMAL H
VANCOMYCIN DOSE: NORMAL MG
VANCOMYCIN SERPL-MCNC: 23.4 UG/ML (ref 5–40)
WBC OTHER # BLD: 9.7 K/UL (ref 4.5–11.5)

## 2024-04-02 PROCEDURE — 97161 PT EVAL LOW COMPLEX 20 MIN: CPT | Performed by: PHYSICAL THERAPIST

## 2024-04-02 PROCEDURE — 94660 CPAP INITIATION&MGMT: CPT

## 2024-04-02 PROCEDURE — 6360000002 HC RX W HCPCS: Performed by: INTERNAL MEDICINE

## 2024-04-02 PROCEDURE — 84100 ASSAY OF PHOSPHORUS: CPT

## 2024-04-02 PROCEDURE — 97110 THERAPEUTIC EXERCISES: CPT | Performed by: PHYSICAL THERAPIST

## 2024-04-02 PROCEDURE — 94640 AIRWAY INHALATION TREATMENT: CPT

## 2024-04-02 PROCEDURE — 2580000003 HC RX 258: Performed by: INTERNAL MEDICINE

## 2024-04-02 PROCEDURE — 6370000000 HC RX 637 (ALT 250 FOR IP): Performed by: INTERNAL MEDICINE

## 2024-04-02 PROCEDURE — C9113 INJ PANTOPRAZOLE SODIUM, VIA: HCPCS | Performed by: INTERNAL MEDICINE

## 2024-04-02 PROCEDURE — 2060000000 HC ICU INTERMEDIATE R&B

## 2024-04-02 PROCEDURE — 99233 SBSQ HOSP IP/OBS HIGH 50: CPT | Performed by: INTERNAL MEDICINE

## 2024-04-02 PROCEDURE — 85025 COMPLETE CBC W/AUTO DIFF WBC: CPT

## 2024-04-02 PROCEDURE — 99232 SBSQ HOSP IP/OBS MODERATE 35: CPT

## 2024-04-02 PROCEDURE — 82962 GLUCOSE BLOOD TEST: CPT

## 2024-04-02 PROCEDURE — 36415 COLL VENOUS BLD VENIPUNCTURE: CPT

## 2024-04-02 PROCEDURE — 80053 COMPREHEN METABOLIC PANEL: CPT

## 2024-04-02 PROCEDURE — 97530 THERAPEUTIC ACTIVITIES: CPT | Performed by: PHYSICAL THERAPIST

## 2024-04-02 PROCEDURE — 99232 SBSQ HOSP IP/OBS MODERATE 35: CPT | Performed by: INTERNAL MEDICINE

## 2024-04-02 PROCEDURE — 87040 BLOOD CULTURE FOR BACTERIA: CPT

## 2024-04-02 PROCEDURE — 80202 ASSAY OF VANCOMYCIN: CPT

## 2024-04-02 PROCEDURE — A4216 STERILE WATER/SALINE, 10 ML: HCPCS | Performed by: INTERNAL MEDICINE

## 2024-04-02 RX ORDER — PREDNISONE 20 MG/1
40 TABLET ORAL DAILY
Status: DISCONTINUED | OUTPATIENT
Start: 2024-04-03 | End: 2024-04-07

## 2024-04-02 RX ADMIN — BUDESONIDE INHALATION 500 MCG: 0.5 SUSPENSION RESPIRATORY (INHALATION) at 20:38

## 2024-04-02 RX ADMIN — Medication 10 ML: at 22:43

## 2024-04-02 RX ADMIN — CEFEPIME 2000 MG: 2 INJECTION, POWDER, FOR SOLUTION INTRAVENOUS at 04:34

## 2024-04-02 RX ADMIN — CEFEPIME 2000 MG: 2 INJECTION, POWDER, FOR SOLUTION INTRAVENOUS at 22:31

## 2024-04-02 RX ADMIN — SODIUM CHLORIDE, PRESERVATIVE FREE 40 MG: 5 INJECTION INTRAVENOUS at 09:26

## 2024-04-02 RX ADMIN — METOPROLOL SUCCINATE 25 MG: 25 TABLET, FILM COATED, EXTENDED RELEASE ORAL at 22:19

## 2024-04-02 RX ADMIN — IPRATROPIUM BROMIDE AND ALBUTEROL SULFATE 1 DOSE: .5; 2.5 SOLUTION RESPIRATORY (INHALATION) at 06:44

## 2024-04-02 RX ADMIN — APIXABAN 5 MG: 5 TABLET, FILM COATED ORAL at 09:26

## 2024-04-02 RX ADMIN — BUDESONIDE INHALATION 500 MCG: 0.5 SUSPENSION RESPIRATORY (INHALATION) at 06:44

## 2024-04-02 RX ADMIN — IPRATROPIUM BROMIDE AND ALBUTEROL SULFATE 1 DOSE: .5; 2.5 SOLUTION RESPIRATORY (INHALATION) at 15:45

## 2024-04-02 RX ADMIN — INSULIN GLARGINE 10 UNITS: 100 INJECTION, SOLUTION SUBCUTANEOUS at 22:41

## 2024-04-02 RX ADMIN — Medication 10 ML: at 09:27

## 2024-04-02 RX ADMIN — APIXABAN 5 MG: 5 TABLET, FILM COATED ORAL at 22:19

## 2024-04-02 RX ADMIN — WATER 40 MG: 1 INJECTION INTRAMUSCULAR; INTRAVENOUS; SUBCUTANEOUS at 04:32

## 2024-04-02 RX ADMIN — VITAMIN D, TAB 1000IU (100/BT) 1000 UNITS: 25 TAB at 09:26

## 2024-04-02 RX ADMIN — METOPROLOL SUCCINATE 25 MG: 25 TABLET, FILM COATED, EXTENDED RELEASE ORAL at 09:26

## 2024-04-02 RX ADMIN — IPRATROPIUM BROMIDE AND ALBUTEROL SULFATE 1 DOSE: .5; 2.5 SOLUTION RESPIRATORY (INHALATION) at 20:38

## 2024-04-02 RX ADMIN — CEFEPIME 2000 MG: 2 INJECTION, POWDER, FOR SOLUTION INTRAVENOUS at 12:11

## 2024-04-02 RX ADMIN — IPRATROPIUM BROMIDE AND ALBUTEROL SULFATE 1 DOSE: .5; 2.5 SOLUTION RESPIRATORY (INHALATION) at 10:31

## 2024-04-02 RX ADMIN — INSULIN LISPRO 1 UNITS: 100 INJECTION, SOLUTION INTRAVENOUS; SUBCUTANEOUS at 11:58

## 2024-04-02 RX ADMIN — VANCOMYCIN HYDROCHLORIDE 1000 MG: 1 INJECTION, POWDER, LYOPHILIZED, FOR SOLUTION INTRAVENOUS at 06:15

## 2024-04-02 ASSESSMENT — PAIN SCALES - GENERAL: PAINLEVEL_OUTOF10: 0

## 2024-04-02 NOTE — CARE COORDINATION
4-2-Cm note: pt resting in bed, she is on her baseline o2 of 2l (wears 2-4 at home) per Rn pt ambulated well to the bathroom, pt lives with her S/O and her son, pt has a scooter, cane and a walker at home, she has a CPAP and wears 2-4l nc from [facility] . Pt plans on returning home with no needs. Cm following. Electronically signed by Lynda Hobbs RN on 4/2/2024 at 12:24 PM

## 2024-04-03 LAB
ACB COMPLEX DNA BLD POS QL NAA+NON-PROBE: NOT DETECTED
B FRAGILIS DNA BLD POS QL NAA+NON-PROBE: NOT DETECTED
BASOPHILS # BLD: 0.08 K/UL (ref 0–0.2)
BASOPHILS NFR BLD: 1 % (ref 0–2)
BIOFIRE TEST COMMENT: ABNORMAL
C ALBICANS DNA BLD POS QL NAA+NON-PROBE: NOT DETECTED
C AURIS DNA BLD POS QL NAA+NON-PROBE: NOT DETECTED
C GATTII+NEOFOR DNA BLD POS QL NAA+N-PRB: NOT DETECTED
C GLABRATA DNA BLD POS QL NAA+NON-PROBE: NOT DETECTED
C KRUSEI DNA BLD POS QL NAA+NON-PROBE: NOT DETECTED
C PARAP DNA BLD POS QL NAA+NON-PROBE: NOT DETECTED
C TROPICLS DNA BLD POS QL NAA+NON-PROBE: NOT DETECTED
E CLOAC COMP DNA BLD POS NAA+NON-PROBE: NOT DETECTED
E COLI DNA BLD POS QL NAA+NON-PROBE: NOT DETECTED
E FAECALIS DNA BLD POS QL NAA+NON-PROBE: NOT DETECTED
E FAECIUM DNA BLD POS QL NAA+NON-PROBE: NOT DETECTED
ENTEROBACTERALES DNA BLD POS NAA+N-PRB: NOT DETECTED
EOSINOPHIL # BLD: 0.1 K/UL (ref 0.05–0.5)
EOSINOPHILS RELATIVE PERCENT: 1 % (ref 0–6)
ERYTHROCYTE [DISTWIDTH] IN BLOOD BY AUTOMATED COUNT: 17.9 % (ref 11.5–15)
GLUCOSE BLD-MCNC: 116 MG/DL (ref 74–99)
GLUCOSE BLD-MCNC: 208 MG/DL (ref 74–99)
GLUCOSE BLD-MCNC: 299 MG/DL (ref 74–99)
GLUCOSE BLD-MCNC: 96 MG/DL (ref 74–99)
GP B STREP DNA BLD POS QL NAA+NON-PROBE: NOT DETECTED
HAEM INFLU DNA BLD POS QL NAA+NON-PROBE: NOT DETECTED
HCT VFR BLD AUTO: 42.2 % (ref 34–48)
HGB BLD-MCNC: 11.7 G/DL (ref 11.5–15.5)
IMM GRANULOCYTES # BLD AUTO: 0.14 K/UL (ref 0–0.58)
IMM GRANULOCYTES NFR BLD: 1 % (ref 0–5)
K OXYTOCA DNA BLD POS QL NAA+NON-PROBE: NOT DETECTED
KLEBSIELLA SP DNA BLD POS QL NAA+NON-PRB: NOT DETECTED
KLEBSIELLA SP DNA BLD POS QL NAA+NON-PRB: NOT DETECTED
L MONOCYTOG DNA BLD POS QL NAA+NON-PROBE: NOT DETECTED
LYMPHOCYTES NFR BLD: 1.36 K/UL (ref 1.5–4)
LYMPHOCYTES RELATIVE PERCENT: 10 % (ref 20–42)
MCH RBC QN AUTO: 27.3 PG (ref 26–35)
MCHC RBC AUTO-ENTMCNC: 27.7 G/DL (ref 32–34.5)
MCV RBC AUTO: 98.4 FL (ref 80–99.9)
MECA+MECC ISLT/SPM QL: DETECTED
MICROORGANISM SPEC CULT: ABNORMAL
MICROORGANISM SPEC CULT: ABNORMAL
MICROORGANISM/AGENT SPEC: ABNORMAL
MONOCYTES NFR BLD: 1.44 K/UL (ref 0.1–0.95)
MONOCYTES NFR BLD: 10 % (ref 2–12)
N MEN DNA BLD POS QL NAA+NON-PROBE: NOT DETECTED
NEUTROPHILS NFR BLD: 78 % (ref 43–80)
NEUTS SEG NFR BLD: 10.93 K/UL (ref 1.8–7.3)
P AERUGINOSA DNA BLD POS NAA+NON-PROBE: NOT DETECTED
PLATELET CONFIRMATION: NORMAL
PLATELET, FLUORESCENCE: 197 K/UL (ref 130–450)
PMV BLD AUTO: 12.7 FL (ref 7–12)
PROTEUS SP DNA BLD POS QL NAA+NON-PROBE: NOT DETECTED
RBC # BLD AUTO: 4.29 M/UL (ref 3.5–5.5)
RBC # BLD: ABNORMAL 10*6/UL
RBC # BLD: ABNORMAL 10*6/UL
S AUREUS DNA BLD POS QL NAA+NON-PROBE: NOT DETECTED
S AUREUS+CONS DNA BLD POS NAA+NON-PROBE: DETECTED
S EPIDERMIDIS DNA BLD POS QL NAA+NON-PRB: DETECTED
S LUGDUNENSIS DNA BLD POS QL NAA+NON-PRB: NOT DETECTED
S MALTOPHILIA DNA BLD POS QL NAA+NON-PRB: NOT DETECTED
S MARCESCENS DNA BLD POS NAA+NON-PROBE: NOT DETECTED
S PNEUM DNA BLD POS QL NAA+NON-PROBE: NOT DETECTED
S PYO DNA BLD POS QL NAA+NON-PROBE: NOT DETECTED
SALMONELLA DNA BLD POS QL NAA+NON-PROBE: NOT DETECTED
SERVICE CMNT-IMP: ABNORMAL
SPECIMEN DESCRIPTION: ABNORMAL
STREPTOCOCCUS DNA BLD POS NAA+NON-PROBE: DETECTED
WBC OTHER # BLD: 14.1 K/UL (ref 4.5–11.5)

## 2024-04-03 PROCEDURE — 85025 COMPLETE CBC W/AUTO DIFF WBC: CPT

## 2024-04-03 PROCEDURE — 6360000002 HC RX W HCPCS: Performed by: INTERNAL MEDICINE

## 2024-04-03 PROCEDURE — 94660 CPAP INITIATION&MGMT: CPT

## 2024-04-03 PROCEDURE — 87040 BLOOD CULTURE FOR BACTERIA: CPT

## 2024-04-03 PROCEDURE — 97530 THERAPEUTIC ACTIVITIES: CPT | Performed by: PHYSICAL THERAPIST

## 2024-04-03 PROCEDURE — 2060000000 HC ICU INTERMEDIATE R&B

## 2024-04-03 PROCEDURE — 6370000000 HC RX 637 (ALT 250 FOR IP): Performed by: INTERNAL MEDICINE

## 2024-04-03 PROCEDURE — 94640 AIRWAY INHALATION TREATMENT: CPT

## 2024-04-03 PROCEDURE — 6370000000 HC RX 637 (ALT 250 FOR IP): Performed by: NURSE PRACTITIONER

## 2024-04-03 PROCEDURE — 99232 SBSQ HOSP IP/OBS MODERATE 35: CPT | Performed by: INTERNAL MEDICINE

## 2024-04-03 PROCEDURE — A4216 STERILE WATER/SALINE, 10 ML: HCPCS | Performed by: INTERNAL MEDICINE

## 2024-04-03 PROCEDURE — 2580000003 HC RX 258: Performed by: INTERNAL MEDICINE

## 2024-04-03 PROCEDURE — C9113 INJ PANTOPRAZOLE SODIUM, VIA: HCPCS | Performed by: INTERNAL MEDICINE

## 2024-04-03 PROCEDURE — 82962 GLUCOSE BLOOD TEST: CPT

## 2024-04-03 PROCEDURE — 97110 THERAPEUTIC EXERCISES: CPT | Performed by: PHYSICAL THERAPIST

## 2024-04-03 PROCEDURE — 36415 COLL VENOUS BLD VENIPUNCTURE: CPT

## 2024-04-03 RX ORDER — PANTOPRAZOLE SODIUM 40 MG/1
40 TABLET, DELAYED RELEASE ORAL
Status: DISCONTINUED | OUTPATIENT
Start: 2024-04-04 | End: 2024-04-07 | Stop reason: HOSPADM

## 2024-04-03 RX ADMIN — INSULIN LISPRO 1 UNITS: 100 INJECTION, SOLUTION INTRAVENOUS; SUBCUTANEOUS at 17:06

## 2024-04-03 RX ADMIN — Medication 10 ML: at 09:29

## 2024-04-03 RX ADMIN — INSULIN GLARGINE 10 UNITS: 100 INJECTION, SOLUTION SUBCUTANEOUS at 20:51

## 2024-04-03 RX ADMIN — VITAMIN D, TAB 1000IU (100/BT) 1000 UNITS: 25 TAB at 09:28

## 2024-04-03 RX ADMIN — CEFEPIME 2000 MG: 2 INJECTION, POWDER, FOR SOLUTION INTRAVENOUS at 06:03

## 2024-04-03 RX ADMIN — IPRATROPIUM BROMIDE AND ALBUTEROL SULFATE 1 DOSE: .5; 2.5 SOLUTION RESPIRATORY (INHALATION) at 07:08

## 2024-04-03 RX ADMIN — CEFEPIME 2000 MG: 2 INJECTION, POWDER, FOR SOLUTION INTRAVENOUS at 20:49

## 2024-04-03 RX ADMIN — CEFEPIME 2000 MG: 2 INJECTION, POWDER, FOR SOLUTION INTRAVENOUS at 14:28

## 2024-04-03 RX ADMIN — BUDESONIDE INHALATION 500 MCG: 0.5 SUSPENSION RESPIRATORY (INHALATION) at 18:33

## 2024-04-03 RX ADMIN — APIXABAN 5 MG: 5 TABLET, FILM COATED ORAL at 20:39

## 2024-04-03 RX ADMIN — IPRATROPIUM BROMIDE AND ALBUTEROL SULFATE 1 DOSE: .5; 2.5 SOLUTION RESPIRATORY (INHALATION) at 14:25

## 2024-04-03 RX ADMIN — METOPROLOL SUCCINATE 25 MG: 25 TABLET, FILM COATED, EXTENDED RELEASE ORAL at 20:39

## 2024-04-03 RX ADMIN — APIXABAN 5 MG: 5 TABLET, FILM COATED ORAL at 09:28

## 2024-04-03 RX ADMIN — IPRATROPIUM BROMIDE AND ALBUTEROL SULFATE 1 DOSE: .5; 2.5 SOLUTION RESPIRATORY (INHALATION) at 10:32

## 2024-04-03 RX ADMIN — PREDNISONE 40 MG: 20 TABLET ORAL at 09:27

## 2024-04-03 RX ADMIN — BUDESONIDE INHALATION 500 MCG: 0.5 SUSPENSION RESPIRATORY (INHALATION) at 07:08

## 2024-04-03 RX ADMIN — IPRATROPIUM BROMIDE AND ALBUTEROL SULFATE 1 DOSE: .5; 2.5 SOLUTION RESPIRATORY (INHALATION) at 18:33

## 2024-04-03 RX ADMIN — Medication 10 ML: at 20:44

## 2024-04-03 RX ADMIN — SODIUM CHLORIDE, PRESERVATIVE FREE 40 MG: 5 INJECTION INTRAVENOUS at 09:27

## 2024-04-03 RX ADMIN — METOPROLOL SUCCINATE 25 MG: 25 TABLET, FILM COATED, EXTENDED RELEASE ORAL at 09:28

## 2024-04-03 RX ADMIN — ATORVASTATIN CALCIUM 40 MG: 40 TABLET, FILM COATED ORAL at 20:39

## 2024-04-03 RX ADMIN — ACETAZOLAMIDE 500 MG: 250 TABLET ORAL at 09:31

## 2024-04-03 RX ADMIN — ACETAZOLAMIDE 500 MG: 250 TABLET ORAL at 20:39

## 2024-04-03 NOTE — CARE COORDINATION
4-3-Cm note: Pt on her baseline oxygen 3l nc (wears 2-4 at home ) also has a CPAP from [facility]. Pt ordered a CXR in AM, cm following , pt denies any needs for homegoing,pt lives with her S/O and her son, pt has a scooter, cane and a walker at home her family will transport.  Cm placed a call to [facility] to see pt when she goes home to re-train her on her CPAP, pt states she forgot how to use it, they are aware and will visit as soon as she gets home. Electronically signed by Lynda Hobbs RN on 4/3/2024 at 1:39 PM

## 2024-04-04 ENCOUNTER — APPOINTMENT (OUTPATIENT)
Dept: GENERAL RADIOLOGY | Age: 69
DRG: 720 | End: 2024-04-04
Payer: COMMERCIAL

## 2024-04-04 LAB
ANION GAP SERPL CALCULATED.3IONS-SCNC: 6 MMOL/L (ref 7–16)
BASOPHILS # BLD: 0.02 K/UL (ref 0–0.2)
BASOPHILS NFR BLD: 0 % (ref 0–2)
BUN SERPL-MCNC: 27 MG/DL (ref 6–23)
CALCIUM SERPL-MCNC: 9.8 MG/DL (ref 8.6–10.2)
CHLORIDE SERPL-SCNC: 102 MMOL/L (ref 98–107)
CO2 SERPL-SCNC: 34 MMOL/L (ref 22–29)
CREAT SERPL-MCNC: 1 MG/DL (ref 0.5–1)
EOSINOPHIL # BLD: 0.07 K/UL (ref 0.05–0.5)
EOSINOPHILS RELATIVE PERCENT: 1 % (ref 0–6)
ERYTHROCYTE [DISTWIDTH] IN BLOOD BY AUTOMATED COUNT: 17.3 % (ref 11.5–15)
GFR SERPL CREATININE-BSD FRML MDRD: 60 ML/MIN/1.73M2
GLUCOSE BLD-MCNC: 116 MG/DL (ref 74–99)
GLUCOSE BLD-MCNC: 118 MG/DL (ref 74–99)
GLUCOSE BLD-MCNC: 203 MG/DL (ref 74–99)
GLUCOSE BLD-MCNC: 209 MG/DL (ref 74–99)
GLUCOSE SERPL-MCNC: 113 MG/DL (ref 74–99)
HCT VFR BLD AUTO: 31.9 % (ref 34–48)
HGB BLD-MCNC: 9.4 G/DL (ref 11.5–15.5)
IMM GRANULOCYTES # BLD AUTO: 0.12 K/UL (ref 0–0.58)
IMM GRANULOCYTES NFR BLD: 1 % (ref 0–5)
LYMPHOCYTES NFR BLD: 1.39 K/UL (ref 1.5–4)
LYMPHOCYTES RELATIVE PERCENT: 14 % (ref 20–42)
MCH RBC QN AUTO: 27.3 PG (ref 26–35)
MCHC RBC AUTO-ENTMCNC: 29.5 G/DL (ref 32–34.5)
MCV RBC AUTO: 92.7 FL (ref 80–99.9)
MONOCYTES NFR BLD: 0.84 K/UL (ref 0.1–0.95)
MONOCYTES NFR BLD: 9 % (ref 2–12)
NEUTROPHILS NFR BLD: 75 % (ref 43–80)
NEUTS SEG NFR BLD: 7.31 K/UL (ref 1.8–7.3)
PLATELET # BLD AUTO: 199 K/UL (ref 130–450)
PMV BLD AUTO: 12.7 FL (ref 7–12)
POTASSIUM SERPL-SCNC: 4.1 MMOL/L (ref 3.5–5)
RBC # BLD AUTO: 3.44 M/UL (ref 3.5–5.5)
SODIUM SERPL-SCNC: 142 MMOL/L (ref 132–146)
WBC OTHER # BLD: 9.8 K/UL (ref 4.5–11.5)

## 2024-04-04 PROCEDURE — 97530 THERAPEUTIC ACTIVITIES: CPT | Performed by: PHYSICAL THERAPIST

## 2024-04-04 PROCEDURE — 2580000003 HC RX 258: Performed by: INTERNAL MEDICINE

## 2024-04-04 PROCEDURE — 6370000000 HC RX 637 (ALT 250 FOR IP): Performed by: INTERNAL MEDICINE

## 2024-04-04 PROCEDURE — 6360000002 HC RX W HCPCS: Performed by: INTERNAL MEDICINE

## 2024-04-04 PROCEDURE — 85025 COMPLETE CBC W/AUTO DIFF WBC: CPT

## 2024-04-04 PROCEDURE — 6370000000 HC RX 637 (ALT 250 FOR IP): Performed by: NURSE PRACTITIONER

## 2024-04-04 PROCEDURE — 99232 SBSQ HOSP IP/OBS MODERATE 35: CPT

## 2024-04-04 PROCEDURE — 94640 AIRWAY INHALATION TREATMENT: CPT

## 2024-04-04 PROCEDURE — 94660 CPAP INITIATION&MGMT: CPT

## 2024-04-04 PROCEDURE — 36415 COLL VENOUS BLD VENIPUNCTURE: CPT

## 2024-04-04 PROCEDURE — 80048 BASIC METABOLIC PNL TOTAL CA: CPT

## 2024-04-04 PROCEDURE — 71045 X-RAY EXAM CHEST 1 VIEW: CPT

## 2024-04-04 PROCEDURE — 2060000000 HC ICU INTERMEDIATE R&B

## 2024-04-04 PROCEDURE — 99232 SBSQ HOSP IP/OBS MODERATE 35: CPT | Performed by: INTERNAL MEDICINE

## 2024-04-04 PROCEDURE — 82962 GLUCOSE BLOOD TEST: CPT

## 2024-04-04 RX ADMIN — IPRATROPIUM BROMIDE AND ALBUTEROL SULFATE 1 DOSE: .5; 2.5 SOLUTION RESPIRATORY (INHALATION) at 18:55

## 2024-04-04 RX ADMIN — BUDESONIDE INHALATION 500 MCG: 0.5 SUSPENSION RESPIRATORY (INHALATION) at 18:55

## 2024-04-04 RX ADMIN — BUDESONIDE INHALATION 500 MCG: 0.5 SUSPENSION RESPIRATORY (INHALATION) at 05:25

## 2024-04-04 RX ADMIN — Medication 10 ML: at 21:41

## 2024-04-04 RX ADMIN — METOPROLOL SUCCINATE 25 MG: 25 TABLET, FILM COATED, EXTENDED RELEASE ORAL at 21:40

## 2024-04-04 RX ADMIN — CEFEPIME 2000 MG: 2 INJECTION, POWDER, FOR SOLUTION INTRAVENOUS at 13:51

## 2024-04-04 RX ADMIN — METOPROLOL SUCCINATE 25 MG: 25 TABLET, FILM COATED, EXTENDED RELEASE ORAL at 08:37

## 2024-04-04 RX ADMIN — PANTOPRAZOLE SODIUM 40 MG: 40 TABLET, DELAYED RELEASE ORAL at 05:47

## 2024-04-04 RX ADMIN — IPRATROPIUM BROMIDE AND ALBUTEROL SULFATE 1 DOSE: .5; 2.5 SOLUTION RESPIRATORY (INHALATION) at 13:18

## 2024-04-04 RX ADMIN — CEFEPIME 2000 MG: 2 INJECTION, POWDER, FOR SOLUTION INTRAVENOUS at 21:46

## 2024-04-04 RX ADMIN — INSULIN LISPRO 1 UNITS: 100 INJECTION, SOLUTION INTRAVENOUS; SUBCUTANEOUS at 17:20

## 2024-04-04 RX ADMIN — APIXABAN 5 MG: 5 TABLET, FILM COATED ORAL at 21:40

## 2024-04-04 RX ADMIN — PREDNISONE 40 MG: 20 TABLET ORAL at 08:37

## 2024-04-04 RX ADMIN — VITAMIN D, TAB 1000IU (100/BT) 1000 UNITS: 25 TAB at 08:37

## 2024-04-04 RX ADMIN — IPRATROPIUM BROMIDE AND ALBUTEROL SULFATE 1 DOSE: .5; 2.5 SOLUTION RESPIRATORY (INHALATION) at 09:25

## 2024-04-04 RX ADMIN — INSULIN GLARGINE 10 UNITS: 100 INJECTION, SOLUTION SUBCUTANEOUS at 21:46

## 2024-04-04 RX ADMIN — CEFEPIME 2000 MG: 2 INJECTION, POWDER, FOR SOLUTION INTRAVENOUS at 05:51

## 2024-04-04 RX ADMIN — IPRATROPIUM BROMIDE AND ALBUTEROL SULFATE 1 DOSE: .5; 2.5 SOLUTION RESPIRATORY (INHALATION) at 05:25

## 2024-04-04 RX ADMIN — ACETAZOLAMIDE 500 MG: 250 TABLET ORAL at 21:40

## 2024-04-04 RX ADMIN — APIXABAN 5 MG: 5 TABLET, FILM COATED ORAL at 08:37

## 2024-04-04 RX ADMIN — ATORVASTATIN CALCIUM 40 MG: 40 TABLET, FILM COATED ORAL at 21:40

## 2024-04-04 RX ADMIN — ACETAZOLAMIDE 500 MG: 250 TABLET ORAL at 08:37

## 2024-04-04 RX ADMIN — Medication 10 ML: at 08:37

## 2024-04-04 ASSESSMENT — PAIN SCALES - GENERAL
PAINLEVEL_OUTOF10: 0
PAINLEVEL_OUTOF10: 0

## 2024-04-04 NOTE — CARE COORDINATION
4-4- Cm note: pt sitting up at the bedside, she is wearing 5 l nc at this time, she wears 2-4 l nc from [facility] , has a CPAP from [facility] . Pt plans on going home at [place], PT recommending [facility] vs [facility], pt denies any needs and plans on home with family at [place], pt's family will transport. Electronically signed by Lynda Hobbs RN on 4/4/2024 at 3:04 PM

## 2024-04-05 LAB
ANION GAP SERPL CALCULATED.3IONS-SCNC: 6 MMOL/L (ref 7–16)
BUN SERPL-MCNC: 22 MG/DL (ref 6–23)
CALCIUM SERPL-MCNC: 9.8 MG/DL (ref 8.6–10.2)
CHLORIDE SERPL-SCNC: 103 MMOL/L (ref 98–107)
CO2 SERPL-SCNC: 35 MMOL/L (ref 22–29)
CREAT SERPL-MCNC: 0.9 MG/DL (ref 0.5–1)
ERYTHROCYTE [DISTWIDTH] IN BLOOD BY AUTOMATED COUNT: 16.9 % (ref 11.5–15)
GFR SERPL CREATININE-BSD FRML MDRD: 67 ML/MIN/1.73M2
GLUCOSE BLD-MCNC: 110 MG/DL (ref 74–99)
GLUCOSE BLD-MCNC: 174 MG/DL (ref 74–99)
GLUCOSE BLD-MCNC: 254 MG/DL (ref 74–99)
GLUCOSE SERPL-MCNC: 102 MG/DL (ref 74–99)
HCT VFR BLD AUTO: 31 % (ref 34–48)
HGB BLD-MCNC: 9.4 G/DL (ref 11.5–15.5)
MCH RBC QN AUTO: 28 PG (ref 26–35)
MCHC RBC AUTO-ENTMCNC: 30.3 G/DL (ref 32–34.5)
MCV RBC AUTO: 92.3 FL (ref 80–99.9)
MICROORGANISM SPEC CULT: NORMAL
MICROORGANISM SPEC CULT: NORMAL
PLATELET # BLD AUTO: 189 K/UL (ref 130–450)
PMV BLD AUTO: 12.5 FL (ref 7–12)
POTASSIUM SERPL-SCNC: 3.9 MMOL/L (ref 3.5–5)
RBC # BLD AUTO: 3.36 M/UL (ref 3.5–5.5)
SERVICE CMNT-IMP: NORMAL
SERVICE CMNT-IMP: NORMAL
SODIUM SERPL-SCNC: 144 MMOL/L (ref 132–146)
SPECIMEN DESCRIPTION: NORMAL
SPECIMEN DESCRIPTION: NORMAL
WBC OTHER # BLD: 8.9 K/UL (ref 4.5–11.5)

## 2024-04-05 PROCEDURE — 85027 COMPLETE CBC AUTOMATED: CPT

## 2024-04-05 PROCEDURE — 80048 BASIC METABOLIC PNL TOTAL CA: CPT

## 2024-04-05 PROCEDURE — 6360000002 HC RX W HCPCS: Performed by: INTERNAL MEDICINE

## 2024-04-05 PROCEDURE — 6370000000 HC RX 637 (ALT 250 FOR IP): Performed by: INTERNAL MEDICINE

## 2024-04-05 PROCEDURE — 99232 SBSQ HOSP IP/OBS MODERATE 35: CPT

## 2024-04-05 PROCEDURE — 94660 CPAP INITIATION&MGMT: CPT

## 2024-04-05 PROCEDURE — 2580000003 HC RX 258: Performed by: INTERNAL MEDICINE

## 2024-04-05 PROCEDURE — 36415 COLL VENOUS BLD VENIPUNCTURE: CPT

## 2024-04-05 PROCEDURE — 94640 AIRWAY INHALATION TREATMENT: CPT

## 2024-04-05 PROCEDURE — 99232 SBSQ HOSP IP/OBS MODERATE 35: CPT | Performed by: INTERNAL MEDICINE

## 2024-04-05 PROCEDURE — 2700000000 HC OXYGEN THERAPY PER DAY

## 2024-04-05 PROCEDURE — 82962 GLUCOSE BLOOD TEST: CPT

## 2024-04-05 PROCEDURE — 2060000000 HC ICU INTERMEDIATE R&B

## 2024-04-05 PROCEDURE — 6370000000 HC RX 637 (ALT 250 FOR IP): Performed by: NURSE PRACTITIONER

## 2024-04-05 PROCEDURE — 6360000002 HC RX W HCPCS

## 2024-04-05 RX ORDER — ARFORMOTEROL TARTRATE 15 UG/2ML
15 SOLUTION RESPIRATORY (INHALATION)
Status: DISCONTINUED | OUTPATIENT
Start: 2024-04-05 | End: 2024-04-07 | Stop reason: HOSPADM

## 2024-04-05 RX ADMIN — Medication 10 ML: at 10:29

## 2024-04-05 RX ADMIN — APIXABAN 5 MG: 5 TABLET, FILM COATED ORAL at 21:42

## 2024-04-05 RX ADMIN — INSULIN GLARGINE 10 UNITS: 100 INJECTION, SOLUTION SUBCUTANEOUS at 21:42

## 2024-04-05 RX ADMIN — IPRATROPIUM BROMIDE AND ALBUTEROL SULFATE 1 DOSE: .5; 2.5 SOLUTION RESPIRATORY (INHALATION) at 19:21

## 2024-04-05 RX ADMIN — CEFEPIME 2000 MG: 2 INJECTION, POWDER, FOR SOLUTION INTRAVENOUS at 06:13

## 2024-04-05 RX ADMIN — CEFEPIME 2000 MG: 2 INJECTION, POWDER, FOR SOLUTION INTRAVENOUS at 13:57

## 2024-04-05 RX ADMIN — METOPROLOL SUCCINATE 25 MG: 25 TABLET, FILM COATED, EXTENDED RELEASE ORAL at 21:42

## 2024-04-05 RX ADMIN — METOPROLOL SUCCINATE 25 MG: 25 TABLET, FILM COATED, EXTENDED RELEASE ORAL at 10:28

## 2024-04-05 RX ADMIN — PREDNISONE 40 MG: 20 TABLET ORAL at 10:28

## 2024-04-05 RX ADMIN — BUDESONIDE INHALATION 500 MCG: 0.5 SUSPENSION RESPIRATORY (INHALATION) at 06:41

## 2024-04-05 RX ADMIN — ACETAZOLAMIDE 500 MG: 250 TABLET ORAL at 10:28

## 2024-04-05 RX ADMIN — IPRATROPIUM BROMIDE AND ALBUTEROL SULFATE 1 DOSE: .5; 2.5 SOLUTION RESPIRATORY (INHALATION) at 10:30

## 2024-04-05 RX ADMIN — VITAMIN D, TAB 1000IU (100/BT) 1000 UNITS: 25 TAB at 10:28

## 2024-04-05 RX ADMIN — ACETAZOLAMIDE 500 MG: 250 TABLET ORAL at 21:44

## 2024-04-05 RX ADMIN — PANTOPRAZOLE SODIUM 40 MG: 40 TABLET, DELAYED RELEASE ORAL at 06:14

## 2024-04-05 RX ADMIN — ARFORMOTEROL TARTRATE 15 MCG: 15 SOLUTION RESPIRATORY (INHALATION) at 19:21

## 2024-04-05 RX ADMIN — INSULIN LISPRO 2 UNITS: 100 INJECTION, SOLUTION INTRAVENOUS; SUBCUTANEOUS at 17:22

## 2024-04-05 RX ADMIN — IPRATROPIUM BROMIDE AND ALBUTEROL SULFATE 1 DOSE: .5; 2.5 SOLUTION RESPIRATORY (INHALATION) at 13:45

## 2024-04-05 RX ADMIN — APIXABAN 5 MG: 5 TABLET, FILM COATED ORAL at 10:29

## 2024-04-05 RX ADMIN — ATORVASTATIN CALCIUM 40 MG: 40 TABLET, FILM COATED ORAL at 21:42

## 2024-04-05 RX ADMIN — Medication 10 ML: at 21:44

## 2024-04-05 RX ADMIN — IPRATROPIUM BROMIDE AND ALBUTEROL SULFATE 1 DOSE: .5; 2.5 SOLUTION RESPIRATORY (INHALATION) at 06:41

## 2024-04-05 RX ADMIN — BUDESONIDE INHALATION 500 MCG: 0.5 SUSPENSION RESPIRATORY (INHALATION) at 19:21

## 2024-04-05 ASSESSMENT — PAIN SCALES - GENERAL: PAINLEVEL_OUTOF10: 0

## 2024-04-05 NOTE — CARE COORDINATION
4-5-Cm note: pt hoping for dc home today, she has 2-4 l nc from [facility] , has a CPAP from [facility] . Pt has all needed equiptment, her son will transport home. Pt denies need for home care.  Electronically signed by Lynda Hobbs RN on 4/5/2024 at 4:28 PM

## 2024-04-06 LAB
GLUCOSE BLD-MCNC: 135 MG/DL (ref 74–99)
GLUCOSE BLD-MCNC: 164 MG/DL (ref 74–99)
GLUCOSE BLD-MCNC: 245 MG/DL (ref 74–99)
GLUCOSE BLD-MCNC: 92 MG/DL (ref 74–99)

## 2024-04-06 PROCEDURE — 99232 SBSQ HOSP IP/OBS MODERATE 35: CPT | Performed by: INTERNAL MEDICINE

## 2024-04-06 PROCEDURE — 82962 GLUCOSE BLOOD TEST: CPT

## 2024-04-06 PROCEDURE — 6360000002 HC RX W HCPCS

## 2024-04-06 PROCEDURE — 2700000000 HC OXYGEN THERAPY PER DAY

## 2024-04-06 PROCEDURE — 6370000000 HC RX 637 (ALT 250 FOR IP): Performed by: INTERNAL MEDICINE

## 2024-04-06 PROCEDURE — 6370000000 HC RX 637 (ALT 250 FOR IP): Performed by: NURSE PRACTITIONER

## 2024-04-06 PROCEDURE — 94640 AIRWAY INHALATION TREATMENT: CPT

## 2024-04-06 PROCEDURE — 2580000003 HC RX 258: Performed by: INTERNAL MEDICINE

## 2024-04-06 PROCEDURE — 94660 CPAP INITIATION&MGMT: CPT

## 2024-04-06 PROCEDURE — 2060000000 HC ICU INTERMEDIATE R&B

## 2024-04-06 PROCEDURE — 6360000002 HC RX W HCPCS: Performed by: INTERNAL MEDICINE

## 2024-04-06 RX ADMIN — Medication 10 ML: at 22:35

## 2024-04-06 RX ADMIN — PREDNISONE 40 MG: 20 TABLET ORAL at 09:45

## 2024-04-06 RX ADMIN — METOPROLOL SUCCINATE 25 MG: 25 TABLET, FILM COATED, EXTENDED RELEASE ORAL at 09:46

## 2024-04-06 RX ADMIN — METOPROLOL SUCCINATE 25 MG: 25 TABLET, FILM COATED, EXTENDED RELEASE ORAL at 22:35

## 2024-04-06 RX ADMIN — Medication 10 ML: at 11:37

## 2024-04-06 RX ADMIN — IPRATROPIUM BROMIDE AND ALBUTEROL SULFATE 1 DOSE: .5; 2.5 SOLUTION RESPIRATORY (INHALATION) at 15:25

## 2024-04-06 RX ADMIN — IPRATROPIUM BROMIDE AND ALBUTEROL SULFATE 1 DOSE: .5; 2.5 SOLUTION RESPIRATORY (INHALATION) at 05:27

## 2024-04-06 RX ADMIN — VITAMIN D, TAB 1000IU (100/BT) 1000 UNITS: 25 TAB at 09:46

## 2024-04-06 RX ADMIN — ACETAZOLAMIDE 500 MG: 250 TABLET ORAL at 22:35

## 2024-04-06 RX ADMIN — APIXABAN 5 MG: 5 TABLET, FILM COATED ORAL at 09:45

## 2024-04-06 RX ADMIN — ACETAZOLAMIDE 500 MG: 250 TABLET ORAL at 09:46

## 2024-04-06 RX ADMIN — IPRATROPIUM BROMIDE AND ALBUTEROL SULFATE 1 DOSE: .5; 2.5 SOLUTION RESPIRATORY (INHALATION) at 09:41

## 2024-04-06 RX ADMIN — APIXABAN 5 MG: 5 TABLET, FILM COATED ORAL at 22:35

## 2024-04-06 RX ADMIN — BUDESONIDE INHALATION 500 MCG: 0.5 SUSPENSION RESPIRATORY (INHALATION) at 05:27

## 2024-04-06 RX ADMIN — INSULIN GLARGINE 10 UNITS: 100 INJECTION, SOLUTION SUBCUTANEOUS at 22:36

## 2024-04-06 RX ADMIN — ATORVASTATIN CALCIUM 40 MG: 40 TABLET, FILM COATED ORAL at 22:35

## 2024-04-06 RX ADMIN — INSULIN LISPRO 1 UNITS: 100 INJECTION, SOLUTION INTRAVENOUS; SUBCUTANEOUS at 16:49

## 2024-04-06 RX ADMIN — ARFORMOTEROL TARTRATE 15 MCG: 15 SOLUTION RESPIRATORY (INHALATION) at 05:27

## 2024-04-06 RX ADMIN — PANTOPRAZOLE SODIUM 40 MG: 40 TABLET, DELAYED RELEASE ORAL at 09:46

## 2024-04-06 ASSESSMENT — PAIN SCALES - GENERAL
PAINLEVEL_OUTOF10: 0
PAINLEVEL_OUTOF10: 0

## 2024-04-07 VITALS
HEART RATE: 98 BPM | OXYGEN SATURATION: 96 % | HEIGHT: 64 IN | WEIGHT: 293 LBS | TEMPERATURE: 98.3 F | SYSTOLIC BLOOD PRESSURE: 118 MMHG | DIASTOLIC BLOOD PRESSURE: 82 MMHG | RESPIRATION RATE: 22 BRPM | BODY MASS INDEX: 50.02 KG/M2

## 2024-04-07 LAB
ANION GAP SERPL CALCULATED.3IONS-SCNC: 11 MMOL/L (ref 7–16)
BASOPHILS # BLD: 0.05 K/UL (ref 0–0.2)
BASOPHILS NFR BLD: 1 % (ref 0–2)
BUN SERPL-MCNC: 21 MG/DL (ref 6–23)
CALCIUM SERPL-MCNC: 9.9 MG/DL (ref 8.6–10.2)
CHLORIDE SERPL-SCNC: 101 MMOL/L (ref 98–107)
CO2 SERPL-SCNC: 29 MMOL/L (ref 22–29)
CREAT SERPL-MCNC: 0.9 MG/DL (ref 0.5–1)
EOSINOPHIL # BLD: 0.2 K/UL (ref 0.05–0.5)
EOSINOPHILS RELATIVE PERCENT: 2 % (ref 0–6)
ERYTHROCYTE [DISTWIDTH] IN BLOOD BY AUTOMATED COUNT: 16.9 % (ref 11.5–15)
GFR SERPL CREATININE-BSD FRML MDRD: 72 ML/MIN/1.73M2
GLUCOSE BLD-MCNC: 116 MG/DL (ref 74–99)
GLUCOSE BLD-MCNC: 91 MG/DL (ref 74–99)
GLUCOSE SERPL-MCNC: 119 MG/DL (ref 74–99)
HCT VFR BLD AUTO: 31.5 % (ref 34–48)
HGB BLD-MCNC: 9.7 G/DL (ref 11.5–15.5)
IMM GRANULOCYTES # BLD AUTO: 0.13 K/UL (ref 0–0.58)
IMM GRANULOCYTES NFR BLD: 1 % (ref 0–5)
LYMPHOCYTES NFR BLD: 1.55 K/UL (ref 1.5–4)
LYMPHOCYTES RELATIVE PERCENT: 16 % (ref 20–42)
MCH RBC QN AUTO: 27.7 PG (ref 26–35)
MCHC RBC AUTO-ENTMCNC: 30.8 G/DL (ref 32–34.5)
MCV RBC AUTO: 90 FL (ref 80–99.9)
MICROORGANISM SPEC CULT: NORMAL
MONOCYTES NFR BLD: 0.91 K/UL (ref 0.1–0.95)
MONOCYTES NFR BLD: 9 % (ref 2–12)
NEUTROPHILS NFR BLD: 71 % (ref 43–80)
NEUTS SEG NFR BLD: 7.07 K/UL (ref 1.8–7.3)
PLATELET, FLUORESCENCE: 188 K/UL (ref 130–450)
PMV BLD AUTO: 12.8 FL (ref 7–12)
POTASSIUM SERPL-SCNC: 4.6 MMOL/L (ref 3.5–5)
RBC # BLD AUTO: 3.5 M/UL (ref 3.5–5.5)
SERVICE CMNT-IMP: NORMAL
SODIUM SERPL-SCNC: 141 MMOL/L (ref 132–146)
SPECIMEN DESCRIPTION: NORMAL
WBC OTHER # BLD: 9.9 K/UL (ref 4.5–11.5)

## 2024-04-07 PROCEDURE — 6370000000 HC RX 637 (ALT 250 FOR IP): Performed by: INTERNAL MEDICINE

## 2024-04-07 PROCEDURE — 6370000000 HC RX 637 (ALT 250 FOR IP): Performed by: NURSE PRACTITIONER

## 2024-04-07 PROCEDURE — 85025 COMPLETE CBC W/AUTO DIFF WBC: CPT

## 2024-04-07 PROCEDURE — 99232 SBSQ HOSP IP/OBS MODERATE 35: CPT | Performed by: INTERNAL MEDICINE

## 2024-04-07 PROCEDURE — 2580000003 HC RX 258: Performed by: INTERNAL MEDICINE

## 2024-04-07 PROCEDURE — 36415 COLL VENOUS BLD VENIPUNCTURE: CPT

## 2024-04-07 PROCEDURE — 2700000000 HC OXYGEN THERAPY PER DAY

## 2024-04-07 PROCEDURE — 6360000002 HC RX W HCPCS

## 2024-04-07 PROCEDURE — 80048 BASIC METABOLIC PNL TOTAL CA: CPT

## 2024-04-07 PROCEDURE — 6360000002 HC RX W HCPCS: Performed by: INTERNAL MEDICINE

## 2024-04-07 PROCEDURE — 94640 AIRWAY INHALATION TREATMENT: CPT

## 2024-04-07 PROCEDURE — 82962 GLUCOSE BLOOD TEST: CPT

## 2024-04-07 PROCEDURE — 94660 CPAP INITIATION&MGMT: CPT

## 2024-04-07 RX ORDER — PREDNISONE 20 MG/1
20 TABLET ORAL DAILY
Status: DISCONTINUED | OUTPATIENT
Start: 2024-04-08 | End: 2024-04-07 | Stop reason: HOSPADM

## 2024-04-07 RX ORDER — PREDNISONE 5 MG/1
TABLET ORAL
Qty: 21 TABLET | Refills: 0 | Status: SHIPPED | OUTPATIENT
Start: 2024-04-08

## 2024-04-07 RX ADMIN — ARFORMOTEROL TARTRATE 15 MCG: 15 SOLUTION RESPIRATORY (INHALATION) at 05:12

## 2024-04-07 RX ADMIN — ACETAZOLAMIDE 500 MG: 250 TABLET ORAL at 08:28

## 2024-04-07 RX ADMIN — APIXABAN 5 MG: 5 TABLET, FILM COATED ORAL at 08:28

## 2024-04-07 RX ADMIN — IPRATROPIUM BROMIDE AND ALBUTEROL SULFATE 1 DOSE: .5; 2.5 SOLUTION RESPIRATORY (INHALATION) at 09:54

## 2024-04-07 RX ADMIN — IPRATROPIUM BROMIDE AND ALBUTEROL SULFATE 1 DOSE: .5; 2.5 SOLUTION RESPIRATORY (INHALATION) at 05:12

## 2024-04-07 RX ADMIN — PANTOPRAZOLE SODIUM 40 MG: 40 TABLET, DELAYED RELEASE ORAL at 06:38

## 2024-04-07 RX ADMIN — VITAMIN D, TAB 1000IU (100/BT) 1000 UNITS: 25 TAB at 11:38

## 2024-04-07 RX ADMIN — PREDNISONE 40 MG: 20 TABLET ORAL at 08:28

## 2024-04-07 RX ADMIN — BUDESONIDE INHALATION 500 MCG: 0.5 SUSPENSION RESPIRATORY (INHALATION) at 05:12

## 2024-04-07 RX ADMIN — Medication 10 ML: at 08:30

## 2024-04-07 RX ADMIN — METOPROLOL SUCCINATE 25 MG: 25 TABLET, FILM COATED, EXTENDED RELEASE ORAL at 08:28

## 2024-04-07 NOTE — PLAN OF CARE
Problem: Discharge Planning  Goal: Discharge to home or other facility with appropriate resources  3/31/2024 1024 by Ivone Newman RN  Outcome: Progressing  Flowsheets (Taken 3/31/2024 0800)  Discharge to home or other facility with appropriate resources: Identify barriers to discharge with patient and caregiver     Problem: Safety - Adult  Goal: Free from fall injury  3/31/2024 1024 by Ivone Newman RN  Outcome: Progressing  Flowsheets (Taken 3/31/2024 1000)  Free From Fall Injury: Instruct family/caregiver on patient safety     Problem: Skin/Tissue Integrity  Goal: Absence of new skin breakdown  Description: 1.  Monitor for areas of redness and/or skin breakdown  2.  Assess vascular access sites hourly  3.  Every 4-6 hours minimum:  Change oxygen saturation probe site  4.  Every 4-6 hours:  If on nasal continuous positive airway pressure, respiratory therapy assess nares and determine need for appliance change or resting period.  3/31/2024 0634 by Virginia Ponce RN  Outcome: Progressing     Problem: Neurosensory - Adult  Goal: Achieves maximal functionality and self care  Outcome: Progressing  Flowsheets (Taken 3/31/2024 0800)  Achieves maximal functionality and self care: Monitor swallowing and airway patency with patient fatigue and changes in neurological status     Problem: Respiratory - Adult  Goal: Achieves optimal ventilation and oxygenation  3/31/2024 1024 by Ivone Newman RN  Outcome: Progressing  Flowsheets (Taken 3/31/2024 0800)  Achieves optimal ventilation and oxygenation: Assess for changes in respiratory status     Problem: Cardiovascular - Adult  Goal: Maintains optimal cardiac output and hemodynamic stability  Outcome: Progressing  Flowsheets (Taken 3/31/2024 0800)  Maintains optimal cardiac output and hemodynamic stability: Monitor blood pressure and heart rate  Goal: Absence of cardiac dysrhythmias or at baseline  Outcome: Progressing  Flowsheets (Taken 3/31/2024 0800)  Absence of cardiac 
  Problem: Discharge Planning  Goal: Discharge to home or other facility with appropriate resources  4/2/2024 0304 by Sonu Madison RN  Outcome: Progressing     Problem: Safety - Adult  Goal: Free from fall injury  4/2/2024 0304 by Sonu Madison RN  Outcome: Progressing     Problem: Skin/Tissue Integrity  Goal: Absence of new skin breakdown  Description: 1.  Monitor for areas of redness and/or skin breakdown  2.  Assess vascular access sites hourly  3.  Every 4-6 hours minimum:  Change oxygen saturation probe site  4.  Every 4-6 hours:  If on nasal continuous positive airway pressure, respiratory therapy assess nares and determine need for appliance change or resting period.  4/2/2024 0304 by Sonu Madison RN  Outcome: Progressing     Problem: Neurosensory - Adult  Goal: Achieves maximal functionality and self care  4/2/2024 0304 by Sonu Madison RN  Outcome: Progressing     Problem: Respiratory - Adult  Goal: Achieves optimal ventilation and oxygenation  4/2/2024 0304 by Sonu Madison RN  Outcome: Progressing     Problem: Cardiovascular - Adult  Goal: Maintains optimal cardiac output and hemodynamic stability  4/2/2024 0304 by Sonu Madison RN  Outcome: Progressing     Problem: Cardiovascular - Adult  Goal: Absence of cardiac dysrhythmias or at baseline  4/2/2024 0304 by Sonu Madison RN  Outcome: Progressing     Problem: Skin/Tissue Integrity - Adult  Goal: Skin integrity remains intact  4/2/2024 0304 by oSnu Madison RN  Outcome: Progressing     Problem: Musculoskeletal - Adult  Goal: Return mobility to safest level of function  4/2/2024 0304 by Sonu Madison RN  Outcome: Progressing     Problem: Gastrointestinal - Adult  Goal: Maintains or returns to baseline bowel function  4/2/2024 0304 by Sonu Madison RN  Outcome: Progressing     Problem: Gastrointestinal - Adult  Goal: Maintains adequate nutritional intake  4/2/2024 0304 by Sonu Madison RN  Outcome: Progressing     Problem: Genitourinary - 
  Problem: Discharge Planning  Goal: Discharge to home or other facility with appropriate resources  4/2/2024 1115 by Nina Mcadams RN  Outcome: Progressing  Flowsheets  Taken 4/2/2024 1115  Discharge to home or other facility with appropriate resources: Identify barriers to discharge with patient and caregiver  Taken 4/2/2024 0800  Discharge to home or other facility with appropriate resources: Identify barriers to discharge with patient and caregiver     Problem: Safety - Adult  Goal: Free from fall injury  4/2/2024 1115 by Nina Mcadams RN  Outcome: Progressing  Flowsheets (Taken 4/1/2024 1412)  Free From Fall Injury: Instruct family/caregiver on patient safety     Problem: Skin/Tissue Integrity  Goal: Absence of new skin breakdown  Description: 1.  Monitor for areas of redness and/or skin breakdown  2.  Assess vascular access sites hourly  3.  Every 4-6 hours minimum:  Change oxygen saturation probe site  4.  Every 4-6 hours:  If on nasal continuous positive airway pressure, respiratory therapy assess nares and determine need for appliance change or resting period.  4/2/2024 1115 by Nina Mcadams RN  Outcome: Progressing     Problem: Neurosensory - Adult  Goal: Achieves maximal functionality and self care  4/2/2024 1115 by Nina Mcadams RN  Outcome: Progressing  Flowsheets (Taken 4/2/2024 0800)  Achieves maximal functionality and self care: Monitor swallowing and airway patency with patient fatigue and changes in neurological status     Problem: Respiratory - Adult  Goal: Achieves optimal ventilation and oxygenation  4/2/2024 1115 by Nina Mcadams RN  Outcome: Progressing  Flowsheets (Taken 4/2/2024 0800)  Achieves optimal ventilation and oxygenation:   Assess for changes in respiratory status   Assess for changes in mentation and behavior   Position to facilitate oxygenation and minimize respiratory effort   Care plan reviewed with patient.  Patient verbalizes understanding of the care plan and 
  Problem: Discharge Planning  Goal: Discharge to home or other facility with appropriate resources  4/3/2024 1354 by Nina Mcadams RN  Outcome: Progressing  Flowsheets (Taken 4/3/2024 0800)  Discharge to home or other facility with appropriate resources: Identify barriers to discharge with patient and caregiver     Problem: Safety - Adult  Goal: Free from fall injury  4/3/2024 1354 by Nina Mcadams RN  Outcome: Progressing  Flowsheets (Taken 4/1/2024 1412)  Free From Fall Injury: Instruct family/caregiver on patient safety     Problem: Skin/Tissue Integrity  Goal: Absence of new skin breakdown  Description: 1.  Monitor for areas of redness and/or skin breakdown  2.  Assess vascular access sites hourly  3.  Every 4-6 hours minimum:  Change oxygen saturation probe site  4.  Every 4-6 hours:  If on nasal continuous positive airway pressure, respiratory therapy assess nares and determine need for appliance change or resting period.  4/3/2024 1354 by Nina Mcadams RN  Outcome: Progressing     Problem: Neurosensory - Adult  Goal: Achieves maximal functionality and self care  4/3/2024 1354 by Nina Mcadams, RN  Outcome: Progressing  Flowsheets (Taken 4/2/2024 0800)  Achieves maximal functionality and self care: Monitor swallowing and airway patency with patient fatigue and changes in neurological status   Care plan reviewed with patient.  Patient verbalizes understanding of the care plan and contributed to goal setting.   
  Problem: Discharge Planning  Goal: Discharge to home or other facility with appropriate resources  4/3/2024 2149 by Dorothy Nicole RN  Outcome: Progressing  4/3/2024 1354 by Nina Mcadams RN  Outcome: Progressing  Flowsheets (Taken 4/3/2024 0800)  Discharge to home or other facility with appropriate resources: Identify barriers to discharge with patient and caregiver     Problem: Safety - Adult  Goal: Free from fall injury  4/3/2024 2149 by Dorothy Nicole RN  Outcome: Progressing  4/3/2024 1354 by Nina Mcadams RN  Outcome: Progressing  Flowsheets (Taken 4/1/2024 1412)  Free From Fall Injury: Instruct family/caregiver on patient safety     Problem: Skin/Tissue Integrity  Goal: Absence of new skin breakdown  4/3/2024 2149 by Dorothy Nicole RN  Outcome: Progressing  4/3/2024 1354 by Nina Mcadams RN  Outcome: Progressing     Problem: Neurosensory - Adult  Goal: Achieves maximal functionality and self care  4/3/2024 2149 by Dorothy Nicole RN  Outcome: Progressing  4/3/2024 1354 by Nina Mcadams RN  Outcome: Progressing  Flowsheets (Taken 4/2/2024 0800)  Achieves maximal functionality and self care: Monitor swallowing and airway patency with patient fatigue and changes in neurological status     Problem: Respiratory - Adult  Goal: Achieves optimal ventilation and oxygenation  Outcome: Progressing  Flowsheets (Taken 4/3/2024 0800 by Nina Mcadams RN)  Achieves optimal ventilation and oxygenation:   Assess for changes in respiratory status   Assess for changes in mentation and behavior   Position to facilitate oxygenation and minimize respiratory effort     Problem: Cardiovascular - Adult  Goal: Maintains optimal cardiac output and hemodynamic stability  Outcome: Progressing  Flowsheets (Taken 4/3/2024 0800 by Nina Mcadams RN)  Maintains optimal cardiac output and hemodynamic stability:   Monitor blood pressure and heart rate   Monitor urine output and notify Licensed Independent Practitioner for 
  Problem: Discharge Planning  Goal: Discharge to home or other facility with appropriate resources  Outcome: Progressing     Problem: Safety - Adult  Goal: Free from fall injury  Outcome: Progressing     Problem: Skin/Tissue Integrity  Goal: Absence of new skin breakdown  Description: 1.  Monitor for areas of redness and/or skin breakdown  2.  Assess vascular access sites hourly  3.  Every 4-6 hours minimum:  Change oxygen saturation probe site  4.  Every 4-6 hours:  If on nasal continuous positive airway pressure, respiratory therapy assess nares and determine need for appliance change or resting period.  Outcome: Progressing     Problem: Neurosensory - Adult  Goal: Achieves maximal functionality and self care  Outcome: Progressing     Problem: Respiratory - Adult  Goal: Achieves optimal ventilation and oxygenation  Outcome: Progressing     Problem: Cardiovascular - Adult  Goal: Maintains optimal cardiac output and hemodynamic stability  Outcome: Progressing     Problem: Cardiovascular - Adult  Goal: Absence of cardiac dysrhythmias or at baseline  Outcome: Progressing     Problem: Skin/Tissue Integrity - Adult  Goal: Skin integrity remains intact  Outcome: Progressing     Problem: Musculoskeletal - Adult  Goal: Return mobility to safest level of function  Outcome: Progressing     Problem: Gastrointestinal - Adult  Goal: Maintains or returns to baseline bowel function  Outcome: Progressing     Problem: Gastrointestinal - Adult  Goal: Maintains adequate nutritional intake  Outcome: Progressing     Problem: Genitourinary - Adult  Goal: Absence of urinary retention  Outcome: Progressing     Problem: Infection - Adult  Goal: Absence of infection at discharge  Outcome: Progressing     Problem: Metabolic/Fluid and Electrolytes - Adult  Goal: Electrolytes maintained within normal limits  Outcome: Progressing     Problem: Metabolic/Fluid and Electrolytes - Adult  Goal: Hemodynamic stability and optimal renal function 
  Problem: Discharge Planning  Goal: Discharge to home or other facility with appropriate resources  Outcome: Progressing     Problem: Safety - Adult  Goal: Free from fall injury  Outcome: Progressing     Problem: Skin/Tissue Integrity  Goal: Absence of new skin breakdown  Description: 1.  Monitor for areas of redness and/or skin breakdown  2.  Assess vascular access sites hourly  3.  Every 4-6 hours minimum:  Change oxygen saturation probe site  4.  Every 4-6 hours:  If on nasal continuous positive airway pressure, respiratory therapy assess nares and determine need for appliance change or resting period.  Outcome: Progressing     Problem: Neurosensory - Adult  Goal: Achieves maximal functionality and self care  Outcome: Progressing  Flowsheets (Taken 4/5/2024 1600 by Srikanth Barrera RN)  Achieves maximal functionality and self care: Monitor swallowing and airway patency with patient fatigue and changes in neurological status     Problem: Respiratory - Adult  Goal: Achieves optimal ventilation and oxygenation  Outcome: Progressing     Problem: Cardiovascular - Adult  Goal: Maintains optimal cardiac output and hemodynamic stability  Outcome: Progressing  Goal: Absence of cardiac dysrhythmias or at baseline  Outcome: Progressing     Problem: Skin/Tissue Integrity - Adult  Goal: Skin integrity remains intact  Outcome: Progressing     Problem: Musculoskeletal - Adult  Goal: Return mobility to safest level of function  Outcome: Progressing     Problem: Gastrointestinal - Adult  Goal: Maintains or returns to baseline bowel function  Outcome: Progressing  Goal: Maintains adequate nutritional intake  Outcome: Progressing     Problem: Genitourinary - Adult  Goal: Absence of urinary retention  Outcome: Progressing     Problem: Infection - Adult  Goal: Absence of infection at discharge  Outcome: Progressing     Problem: Metabolic/Fluid and Electrolytes - Adult  Goal: Electrolytes maintained within normal limits  Outcome: 
  Problem: Discharge Planning  Goal: Discharge to home or other facility with appropriate resources  Outcome: Progressing     Problem: Safety - Adult  Goal: Free from fall injury  Outcome: Progressing     Problem: Skin/Tissue Integrity  Goal: Absence of new skin breakdown  Description: 1.  Monitor for areas of redness and/or skin breakdown  2.  Assess vascular access sites hourly  3.  Every 4-6 hours minimum:  Change oxygen saturation probe site  4.  Every 4-6 hours:  If on nasal continuous positive airway pressure, respiratory therapy assess nares and determine need for appliance change or resting period.  Outcome: Progressing     Problem: Neurosensory - Adult  Goal: Achieves maximal functionality and self care  Outcome: Progressing  Flowsheets (Taken 4/6/2024 2000)  Achieves maximal functionality and self care: Monitor swallowing and airway patency with patient fatigue and changes in neurological status     Problem: Respiratory - Adult  Goal: Achieves optimal ventilation and oxygenation  Outcome: Progressing     Problem: Cardiovascular - Adult  Goal: Maintains optimal cardiac output and hemodynamic stability  Outcome: Progressing  Goal: Absence of cardiac dysrhythmias or at baseline  Outcome: Progressing     Problem: Skin/Tissue Integrity - Adult  Goal: Skin integrity remains intact  Outcome: Progressing     Problem: Musculoskeletal - Adult  Goal: Return mobility to safest level of function  Outcome: Progressing     Problem: Gastrointestinal - Adult  Goal: Maintains or returns to baseline bowel function  Outcome: Progressing  Goal: Maintains adequate nutritional intake  Outcome: Progressing     Problem: Genitourinary - Adult  Goal: Absence of urinary retention  Outcome: Progressing     Problem: Infection - Adult  Goal: Absence of infection at discharge  Outcome: Progressing     Problem: Metabolic/Fluid and Electrolytes - Adult  Goal: Electrolytes maintained within normal limits  Outcome: Progressing  Goal: 
  Problem: Discharge Planning  Goal: Discharge to home or other facility with appropriate resources  Outcome: Progressing     Problem: Safety - Adult  Goal: Free from fall injury  Outcome: Progressing     Problem: Skin/Tissue Integrity  Goal: Absence of new skin breakdown  Outcome: Progressing     Problem: Neurosensory - Adult  Goal: Achieves maximal functionality and self care  Outcome: Progressing     Problem: Respiratory - Adult  Goal: Achieves optimal ventilation and oxygenation  Outcome: Progressing     Problem: Cardiovascular - Adult  Goal: Maintains optimal cardiac output and hemodynamic stability  Outcome: Progressing  Goal: Absence of cardiac dysrhythmias or at baseline  Outcome: Progressing     Problem: Skin/Tissue Integrity - Adult  Goal: Skin integrity remains intact  Outcome: Progressing  Flowsheets (Taken 4/2/2024 2020)  Skin Integrity Remains Intact: Monitor for areas of redness and/or skin breakdown     Problem: Musculoskeletal - Adult  Goal: Return mobility to safest level of function  Outcome: Progressing     Problem: Gastrointestinal - Adult  Goal: Maintains or returns to baseline bowel function  Outcome: Progressing  Goal: Maintains adequate nutritional intake  Outcome: Progressing     Problem: Genitourinary - Adult  Goal: Absence of urinary retention  Outcome: Progressing     Problem: Infection - Adult  Goal: Absence of infection at discharge  Outcome: Progressing     Problem: Metabolic/Fluid and Electrolytes - Adult  Goal: Electrolytes maintained within normal limits  Outcome: Progressing  Goal: Hemodynamic stability and optimal renal function maintained  Outcome: Progressing  Goal: Glucose maintained within prescribed range  Outcome: Progressing     Problem: Hematologic - Adult  Goal: Maintains hematologic stability  Outcome: Progressing     Problem: Anxiety  Goal: Will report anxiety at manageable levels  Outcome: Progressing     Problem: Coping  Goal: Pt/Family able to verbalize concerns and 
  Problem: Discharge Planning  Goal: Discharge to home or other facility with appropriate resources  Outcome: Progressing  Flowsheets (Taken 3/30/2024 1445)  Discharge to home or other facility with appropriate resources: Identify barriers to discharge with patient and caregiver     Problem: Safety - Adult  Goal: Free from fall injury  Outcome: Progressing     Problem: Neurosensory - Adult  Goal: Achieves maximal functionality and self care  Outcome: Progressing  Flowsheets (Taken 3/30/2024 1445)  Achieves maximal functionality and self care: Monitor swallowing and airway patency with patient fatigue and changes in neurological status     Problem: Respiratory - Adult  Goal: Achieves optimal ventilation and oxygenation  Outcome: Progressing  Flowsheets (Taken 3/30/2024 1445)  Achieves optimal ventilation and oxygenation: Assess for changes in respiratory status     Problem: Cardiovascular - Adult  Goal: Maintains optimal cardiac output and hemodynamic stability  Outcome: Progressing  Flowsheets (Taken 3/30/2024 1445)  Maintains optimal cardiac output and hemodynamic stability: Monitor blood pressure and heart rate  Goal: Absence of cardiac dysrhythmias or at baseline  Outcome: Progressing  Flowsheets (Taken 3/30/2024 1445)  Absence of cardiac dysrhythmias or at baseline: Monitor cardiac rate and rhythm     Problem: Skin/Tissue Integrity - Adult  Goal: Skin integrity remains intact  Outcome: Progressing  Flowsheets (Taken 3/30/2024 1445)  Skin Integrity Remains Intact: Monitor for areas of redness and/or skin breakdown     Problem: Musculoskeletal - Adult  Goal: Return mobility to safest level of function  Outcome: Progressing  Flowsheets (Taken 3/30/2024 1445)  Return Mobility to Safest Level of Function: Assess patient stability and activity tolerance for standing, transferring and ambulating with or without assistive devices     Problem: Gastrointestinal - Adult  Goal: Maintains or returns to baseline bowel 
cardiac dysrhythmias or at baseline  3/30/2024 1725 by Ivone Newman RN  Outcome: Progressing  Flowsheets (Taken 3/30/2024 1445)  Absence of cardiac dysrhythmias or at baseline: Monitor cardiac rate and rhythm     Problem: Skin/Tissue Integrity - Adult  Goal: Skin integrity remains intact  3/30/2024 1725 by Ivone Newman RN  Outcome: Progressing  Flowsheets (Taken 3/30/2024 1445)  Skin Integrity Remains Intact: Monitor for areas of redness and/or skin breakdown     Problem: Musculoskeletal - Adult  Goal: Return mobility to safest level of function  3/30/2024 1725 by Ivone Newman RN  Outcome: Progressing  Flowsheets (Taken 3/30/2024 1445)  Return Mobility to Safest Level of Function: Assess patient stability and activity tolerance for standing, transferring and ambulating with or without assistive devices     Problem: Gastrointestinal - Adult  Goal: Maintains or returns to baseline bowel function  3/30/2024 1725 by Ivone Newman RN  Outcome: Progressing  Flowsheets (Taken 3/30/2024 1445)  Maintains or returns to baseline bowel function: Assess bowel function  Goal: Maintains adequate nutritional intake  3/30/2024 1725 by Ivone Newman RN  Outcome: Progressing  Flowsheets (Taken 3/30/2024 1445)  Maintains adequate nutritional intake: Monitor percentage of each meal consumed     Problem: Genitourinary - Adult  Goal: Absence of urinary retention  3/30/2024 1725 by Ivone Newman RN  Outcome: Progressing  Flowsheets (Taken 3/30/2024 1445)  Absence of urinary retention: Assess patient’s ability to void and empty bladder     Problem: Infection - Adult  Goal: Absence of infection at discharge  3/30/2024 1725 by Ivone Newman RN  Outcome: Progressing  Flowsheets (Taken 3/30/2024 1445)  Absence of infection at discharge: Assess and monitor for signs and symptoms of infection     Problem: Metabolic/Fluid and Electrolytes - Adult  Goal: Electrolytes maintained within normal limits  3/30/2024 1725 by Ivone Newman 
  Problem: Pain  Goal: Verbalizes/displays adequate comfort level or baseline comfort level  Outcome: Progressing     
Ivone Newman, RN)  Absence of urinary retention: Assess patient’s ability to void and empty bladder   Care plan reviewed with patient.  Patient verbalizes understanding of the care plan and contributed to goal setting.   
Norberto, Francheska, RN  Outcome: Progressing  4/4/2024 1212 by Harshil Gonsalves RN  Outcome: Progressing  Flowsheets (Taken 4/4/2024 0845)  Hemodynamic stability and optimal renal function maintained: Monitor labs and assess for signs and symptoms of volume excess or deficit  Goal: Glucose maintained within prescribed range  4/5/2024 0158 by Francheska Thornton RN  Outcome: Progressing  4/4/2024 1212 by Harshil Gonsalves RN  Outcome: Progressing  Flowsheets (Taken 4/4/2024 0845)  Glucose maintained within prescribed range: Monitor blood glucose as ordered     Problem: Hematologic - Adult  Goal: Maintains hematologic stability  4/5/2024 0158 by Francheska Thornton RN  Outcome: Progressing  4/4/2024 1212 by Harshil Gonsalves RN  Outcome: Progressing  Flowsheets (Taken 4/4/2024 0845)  Maintains hematologic stability: Assess for signs and symptoms of bleeding or hemorrhage     Problem: Anxiety  Goal: Will report anxiety at manageable levels  Description: INTERVENTIONS:  1. Administer medication as ordered  2. Teach and rehearse alternative coping skills  3. Provide emotional support with 1:1 interaction with staff  4/5/2024 0158 by Francheska Thornton RN  Outcome: Progressing  Flowsheets (Taken 4/4/2024 2000)  Will report anxiety at manageable levels: Administer medication as ordered  4/4/2024 1212 by Harshil Gonsalves RN  Outcome: Progressing     Problem: Coping  Goal: Pt/Family able to verbalize concerns and demonstrate effective coping strategies  Description: INTERVENTIONS:  1. Assist patient/family to identify coping skills, available support systems and cultural and spiritual values  2. Provide emotional support, including active listening and acknowledgement of concerns of patient and caregivers  3. Reduce environmental stimuli, as able  4. Instruct patient/family in relaxation techniques, as appropriate  5. Assess for spiritual pain/suffering and initiate Spiritual Care, Psychosocial Clinical Specialist 
as ordered  2. Teach and rehearse alternative coping skills  3. Provide emotional support with 1:1 interaction with staff  4/7/2024 1503 by Neil Bravo RN  Outcome: Completed  4/7/2024 1349 by Neil Bravo RN  Outcome: Progressing  Flowsheets  Taken 4/7/2024 1156 by Neil Bravo RN  Will report anxiety at manageable levels: Teach and rehearse alternative coping skills  Taken 4/7/2024 0745 by CONCHITA RENAE  Will report anxiety at manageable levels:   Administer medication as ordered   Teach and rehearse alternative coping skills  4/7/2024 0214 by Francheska Thornton RN  Outcome: Progressing  Flowsheets (Taken 4/6/2024 2000)  Will report anxiety at manageable levels:   Administer medication as ordered   Teach and rehearse alternative coping skills     Problem: Coping  Goal: Pt/Family able to verbalize concerns and demonstrate effective coping strategies  Description: INTERVENTIONS:  1. Assist patient/family to identify coping skills, available support systems and cultural and spiritual values  2. Provide emotional support, including active listening and acknowledgement of concerns of patient and caregivers  3. Reduce environmental stimuli, as able  4. Instruct patient/family in relaxation techniques, as appropriate  5. Assess for spiritual pain/suffering and initiate Spiritual Care, Psychosocial Clinical Specialist consults as needed  4/7/2024 1503 by Neil Bravo RN  Outcome: Completed  4/7/2024 1349 by Neil Bravo RN  Outcome: Progressing  Flowsheets  Taken 4/7/2024 1156 by Neil Bravo RN  Patient/family able to verbalize anxieties, fears, and concerns, and demonstrate effective coping: Assist patient/family to identify coping skills, available support systems and cultural and spiritual values  Taken 4/7/2024 0745 by CONCHITA RENAE  Patient/family able to verbalize anxieties, fears, and concerns, and demonstrate effective coping:   Assist patient/family to identify coping skills, available support systems 
cultural and spiritual values  2. Provide emotional support, including active listening and acknowledgement of concerns of patient and caregivers  3. Reduce environmental stimuli, as able  4. Instruct patient/family in relaxation techniques, as appropriate  5. Assess for spiritual pain/suffering and initiate Spiritual Care, Psychosocial Clinical Specialist consults as needed  4/7/2024 1349 by Neil Bravo RN  Outcome: Progressing  Flowsheets  Taken 4/7/2024 1156 by Neil Bravo RN  Patient/family able to verbalize anxieties, fears, and concerns, and demonstrate effective coping: Assist patient/family to identify coping skills, available support systems and cultural and spiritual values  Taken 4/7/2024 0745 by CONCHITA RENAE  Patient/family able to verbalize anxieties, fears, and concerns, and demonstrate effective coping:   Assist patient/family to identify coping skills, available support systems and cultural and spiritual values   Provide emotional support, including active listening and acknowledgement of concerns of patient and caregivers   Instruct patient/family in relaxation techniques, as appropriate  4/7/2024 0214 by Francheska Thornton RN  Outcome: Progressing  Flowsheets (Taken 4/6/2024 2000)  Patient/family able to verbalize anxieties, fears, and concerns, and demonstrate effective coping: Assist patient/family to identify coping skills, available support systems and cultural and spiritual values     Problem: Pain  Goal: Verbalizes/displays adequate comfort level or baseline comfort level  4/7/2024 1349 by Neil Bravo RN  Outcome: Progressing  Flowsheets (Taken 4/7/2024 1156)  Verbalizes/displays adequate comfort level or baseline comfort level: Encourage patient to monitor pain and request assistance  4/7/2024 0214 by Francheska Thornton RN  Outcome: Progressing     Problem: ABCDS Injury Assessment  Goal: Absence of physical injury  Outcome: Progressing  Flowsheets (Taken 4/7/2024 1156)  Absence of

## 2024-04-07 NOTE — DISCHARGE SUMMARY
[facility] Hospitalist       Hospitalist Physician Discharge Summary       Sabas Vargas MD  [address] [facility] [id number] [phone]    Schedule an appointment as soon as possible for a visit in 1 week(s)  Hospital follow up      Activity level: ***    Diet: ADULT DIET; Regular; 4 carb choices (60 gm/meal)    Labs:***    Condition at discharge: ***    Dispo:***    Continue supplemental oxygen via nasal canula @ 2 LPM round-the-clock.    Continue CPAP / BiPAP during sleep as prior to admission.    Patient ID:  Emily Weiss  [id number]  68 y.o.  1955    Admit date: 3/29/2024    Discharge date and time:  4/7/2024  2:25 PM    Admission Diagnoses: Principal Problem:    Acute on chronic respiratory failure with hypoxia and hypercapnia (HCC)  Active Problems:    COPD (chronic obstructive pulmonary disease) (HCC)    Acute on chronic diastolic heart failure (HCC)    Atrial fibrillation (HCC)    Type 2 diabetes mellitus with hyperglycemia, without long-term current use of insulin (HCC)    Acute on chronic right heart failure (HCC)    SIRS (systemic inflammatory response syndrome) (HCC)    Acute respiratory failure with hypoxia and hypercapnia (HCC)    Acute on chronic systolic CHF (congestive heart failure) (HCC)    Streptococcal bacteremia    Sepsis (HCC)    Acute on chronic respiratory failure with hypercapnia (HCC)  Resolved Problems:    * No resolved hospital problems. *      Discharge Diagnoses: Principal Problem:    Acute on chronic respiratory failure with hypoxia and hypercapnia (HCC)  Active Problems:    COPD (chronic obstructive pulmonary disease) (HCC)    Acute on chronic diastolic heart failure (HCC)    Atrial fibrillation (HCC)    Type 2 diabetes mellitus with hyperglycemia, without long-term current use of insulin (HCC)    Acute on chronic right heart failure (HCC)    SIRS (systemic inflammatory response syndrome) (HCC)    Acute respiratory failure with hypoxia and hypercapnia

## 2024-04-07 NOTE — PROGRESS NOTES
[facility] Hospitalist   Progress Note    Admitting Date and Time: 3/29/2024  3:32 PM  Admit Dx: Respiratory acidosis [E87.29]  Acute on chronic systolic CHF (congestive heart failure) (HCC) [I50.23]  Acute respiratory failure with hypoxia and hypercapnia (HCC) [J96.01, J96.02]  Acute on chronic respiratory failure with hypoxia and hypercapnia (HCC) [J96.21, J96.22]    Subjective/interval history:    3/30: Patient admitted yesterday with acute on chronic hypoxic and hypercapnic respiratory failure.  Today she is awake, alert, on high flow nasal cannula.  She is intermittently confused during conversation however.  She is able to tell me that she has a home PAP, but she forgot how to turn it on.     3/31: Patient sitting up in bed. She is feeling better. Oxygen is down to 1.5L.    4/1: Patient happy that she got a new larger bed.  She was very happy with her aid earlier today and she was given a lot of encouragement and that made her feel good.    4/2: Patient sitting up in bed. She feels better but does notice her heart has been intermittently irregular and fast. \"I can tell I am fibbing\"    4/3: Patient sitting up in bed on BiPAP.  She says she really likes wearing the mask but they did not put it on her last night.  However, she states it is not 112 but her own because she did not push them to do it either.    4/4: Patient sitting up in chair she is working with physical therapy.  She did desaturate on 5 L to 85% and got tachycardic but quickly recovered.    Per RN: Per night nurse patient only wore BiPAP for 1 hour but patient states she wore it all night and was upset that we were thinking otherwise.  She did wear during the day for several hours however     pantoprazole  40 mg Oral QAM AC    predniSONE  40 mg Oral Daily    acetaZOLAMIDE  500 mg Oral BID    [Held by provider] amLODIPine  10 mg Oral Daily    apixaban  5 mg Oral BID    atorvastatin  40 mg Oral Nightly    budesonide  0.5 mg 
       [facility] Hospitalist   Progress Note    Admitting Date and Time: 3/29/2024  3:32 PM  Admit Dx: Respiratory acidosis [E87.29]  Acute on chronic systolic CHF (congestive heart failure) (HCC) [I50.23]  Acute respiratory failure with hypoxia and hypercapnia (HCC) [J96.01, J96.02]  Acute on chronic respiratory failure with hypoxia and hypercapnia (HCC) [J96.21, J96.22]    Subjective/interval history:    3/30: Patient admitted yesterday with acute on chronic hypoxic and hypercapnic respiratory failure.  Today she is awake, alert, on high flow nasal cannula.  She is intermittently confused during conversation however.  She is able to tell me that she has a home PAP, but she forgot how to turn it on.     3/31: Patient sitting up in bed. She is feeling better. Oxygen is down to 1.5L.    4/1: Patient happy that she got a new larger bed.  She was very happy with her aid earlier today and she was given a lot of encouragement and that made her feel good.    4/2: Patient sitting up in bed. She feels better but does notice her heart has been intermittently irregular and fast. \"I can tell I am fibbing\"    4/3: Patient sitting up in bed on BiPAP.  She says she really likes wearing the mask but they did not put it on her last night.  However, she states it is not 112 but her own because she did not push them to do it either.    4/4: Patient sitting up in chair she is working with physical therapy.  She did desaturate on 5 L to 85% and got tachycardic but quickly recovered.  Per RN, Per night nurse patient only wore BiPAP for 1 hour but patient states she wore it all night and was upset that we were thinking otherwise.  She did wear during the day for several hours however    4/5: Patient states she tried to do exercises in bed to keep her self stronger.  Per RN, O2 weaned down to 4 L. She has been wearing Bipap at bedtime.    4/6:  Patient sitting up in chair. States she has been doing legs exercises in 
       [facility] Hospitalist   Progress Note    Admitting Date and Time: 3/29/2024  3:32 PM  Admit Dx: Respiratory acidosis [E87.29]  Acute on chronic systolic CHF (congestive heart failure) (HCC) [I50.23]  Acute respiratory failure with hypoxia and hypercapnia (HCC) [J96.01, J96.02]  Acute on chronic respiratory failure with hypoxia and hypercapnia (HCC) [J96.21, J96.22]    Subjective/interval history:    3/30: Patient admitted yesterday with acute on chronic hypoxic and hypercapnic respiratory failure.  Today she is awake, alert, on high flow nasal cannula.  She is intermittently confused during conversation however.  She is able to tell me that she has a home PAP, but she forgot how to turn it on.     3/31: Patient sitting up in bed. She is feeling better. Oxygen is down to 1.5L.       [Held by provider] acetaZOLAMIDE  500 mg Oral BID    [Held by provider] amLODIPine  10 mg Oral Daily    apixaban  5 mg Oral BID    [Held by provider] atorvastatin  40 mg Oral Nightly    budesonide  0.5 mg Nebulization BID RT    [Held by provider] lisinopril  20 mg Oral Daily    [Held by provider] metoprolol succinate  25 mg Oral BID    pantoprazole (PROTONIX) 40 mg in sodium chloride (PF) 0.9 % 10 mL injection  40 mg IntraVENous Daily    [Held by provider] Vitamin D  1,000 Units Oral Daily    sodium chloride flush  5-40 mL IntraVENous 2 times per day    ipratropium 0.5 mg-albuterol 2.5 mg  1 Dose Inhalation Q4H WA RT    methylPREDNISolone  40 mg IntraVENous Q8H    cefepime  2,000 mg IntraVENous q8h    insulin glargine  10 Units SubCUTAneous Nightly    insulin lispro  0-4 Units SubCUTAneous TID WC    insulin lispro  0-4 Units SubCUTAneous Nightly    vancomycin  1,000 mg IntraVENous Q12H     [Held by provider] medicated lip balm, , PRN  sodium chloride flush, 5-40 mL, PRN  sodium chloride, , PRN  ondansetron, 4 mg, Q8H PRN   Or  ondansetron, 4 mg, Q6H PRN  polyethylene glycol, 17 g, Daily PRN  acetaminophen, 650 mg, Q6H 
     Nutrition Note    Pt assessed per LOS protocol. Chart reviewed. Pt currently eating % of meals with no other significant nutritional issues at this time.  Will follow up per policy. Please consult RD if needed.     Electronically signed by Juanita Velazquez RD, LD on 4/5/24 at 8:36 AM EDT    Contact: [phone]    
    Pharmacist Review and Automatic Dose Adjustment of Prophylactic Enoxaparin      The reviewing pharmacist has made an adjustment to the ordered enoxaparin dose or converted to UFH per the approved [facility] protocol and table as identified below.        Emily Weiss is a 68 y.o. female.     Recent Labs     03/29/24  1547   CREATININE 0.8       Estimated Creatinine Clearance: 90 mL/min (based on SCr of 0.8 mg/dL).    Recent Labs     03/29/24  1547   HGB 10.0*   HCT 33.8*     No results for input(s): \"INR\" in the last 72 hours.    Height:   Ht Readings from Last 1 Encounters:   02/05/24 1.6 m (5' 2.99\")     Weight:  Wt Readings from Last 1 Encounters:   03/29/24 136.1 kg (300 lb)               Plan: Based upon the patient's weight and renal function    Ordered: Enoxaparin 40mg SUBQ Daily    Changed/converted to    New Order: Enoxaparin 30mg SUBQ BID      Thank you,    Felicitas Mcnair, PharmD.  3/29/2024 6:04 PM    SJW: [phone]    
  [facility]  Department of Internal Medicine  Division of Pulmonary, Critical Care and Sleep Medicine  Consult Note        Raad White, APRN-CNP    Assessment and Plan:      Patient is a 68 y.o. female with the following medical Problems:     Acute on chronic hypoxic and hypercapnic respiratory failure requiring BiPAP, currently on 5L supplemental oxygen   Metabolic Encephalopathy (resolved)  Cocaine abuse about 2 weeks ago  Atrial fibrillation, metoprolol and apixaban  Sepsis without septic shock   Suspected UTI  Bacteremia 3/4 bottles with strep bacteremia  Morbid obesity with obesity hypoventilation syndrome  Acute on chronic heart failure with preserved ejection fraction  Severe secondary pulmonary hypertension  COPD with acute exacerbation  Aspiration pneumonitis  Atrial fibrillation with [id number] score of 5 on Eliquis  Type 2 diabetes (hemoglobin A1c 6.1 03/29/24)  Hx of Trichomonas vaginalis.  MARY    Plan of care:    Restart home diamox  Stop Vancomycin   BiPAP while napping, sleeping and as needed.  BiPAP 15/5 with 12 backup rate and 30% FiO2 while sleeping and napping.  Supplemental oxygen to keep sat 88 to 94%  Strictly avoid benzodiazepines and opioids.  Systemic steroids, transition to prednisone 40 mg po daily  Repeat blood cultures x 2  Continue with broad-spectrum antibiotic with cefepime, discontinue vancomycin  DVT prophylaxis, Apixaban  GI prophylaxis, PPI  Urine toxicology was positive for cocaine  Schedule Solu-Medrol, DuoNeb, Pulmicort for acute exacerbation of COPD  PT, OT, and out of bed as tolerated.      History of Present Illness:   Patient is a 68-year-old woman with above-mentioned medical problems who was admitted on March 29, 2024.  Patient was brought from a nursing home due to respiratory distress and altered mental 
  [facility]  Department of Internal Medicine  Division of Pulmonary, Critical Care and Sleep Medicine  Progress Note       Assessment and Plan:    Ms. Emily Weiss is a 68 y.o. female with the following medical problems:     Acute on chronic hypoxic and hypercapnic respiratory failure requiring BiPAP, currently weaned to on 4L, better compliance with her Bipap yesterday and overnight  Metabolic Encephalopathy (resolved)  Cocaine abuse about 2 weeks ago  Atrial fibrillation, metoprolol and apixaban  Sepsis without septic shock, resolved   Suspected UTI  Bacteremia 3/4 bottles with strep bacteremia, repeated 3/31/24  Morbid obesity with obesity hypoventilation syndrome  Acute on chronic heart failure with preserved ejection fraction  Severe secondary pulmonary hypertension  COPD with acute exacerbation  Aspiration pneumonitis  Atrial fibrillation with [id number] score of 5 on Eliquis  Type 2 diabetes (hemoglobin A1c 6.1 03/29/24)  Hx of Trichomonas vaginalis.  MARY, improving    Plan of care:    Home diamox restarted on 4/2/24, follow up with daily labs  BiPAP while napping, sleeping and as needed.  BiPAP 15/5 with 12 backup rate and 30% FiO2 while sleeping and napping. Please encourage use of BiPAP  Stop Diamox  Supplemental oxygen to keep sat 88 to 94%, currently weaned to 3L NC, chronic use of home O2, 2-4L baseline  Strictly avoid benzodiazepines and opioids.  Lower po steroids, continue prednisone 20 mg po daily  Repeat blood cultures x 2, currently with no growth to date  Off Abx 4/5/24  DVT prophylaxis, Apixaban  GI prophylaxis, PPI  Urine toxicology was positive for cocaine   Schedule DuoNeb, Pulmicort, Brovana for acute exacerbation of COPD  PT, OT, and out of bed as tolerated, encourage the patient to get out of bed.  Discharge planning [facility] vs. [facility]      Daily progress:    March 30, 2024: Patient remained on BiPAP overnight however she is more awake and responsive today. 
  [facility]  Department of Internal Medicine  Division of Pulmonary, Critical Care and Sleep Medicine  Progress Note        Raad White, APRN-CNP    Assessment and Plan:      Ms. Emily Weiss is a 68 y.o. female with the following medical problems:     Acute on chronic hypoxic and hypercapnic respiratory failure requiring BiPAP, currently weaned to on 3L supplemental oxygen, has not been wearing BiPAP   Metabolic Encephalopathy (resolved)  Cocaine abuse about 2 weeks ago  Atrial fibrillation, metoprolol and apixaban  Sepsis without septic shock   Suspected UTI  Bacteremia 3/4 bottles with strep bacteremia  Morbid obesity with obesity hypoventilation syndrome  Acute on chronic heart failure with preserved ejection fraction  Severe secondary pulmonary hypertension  COPD with acute exacerbation  Aspiration pneumonitis  Atrial fibrillation with [id number] score of 5 on Eliquis  Type 2 diabetes (hemoglobin A1c 6.1 03/29/24)  Hx of Trichomonas vaginalis.  MARY    Plan of care:    Home diamox restarted on 4/2/24, follow up with daily labs  BiPAP while napping, sleeping and as needed.  BiPAP 15/5 with 12 backup rate and 30% FiO2 while sleeping and napping. Please encourage use of BiPAP  Supplemental oxygen to keep sat 88 to 94%, currently weaned to 3L NC  Strictly avoid benzodiazepines and opioids.  Obtain CXR tomorrow am  Systemic steroids, continue prednisone 40 mg po daily  Repeat blood cultures x 2, currently with no growth to date on 4/2/24  Continue with broad-spectrum antibiotic with cefepime, discontinue vancomycin  DVT prophylaxis, Apixaban  GI prophylaxis, PPI  Urine toxicology was positive for cocaine  Schedule Solu-Medrol, DuoNeb, Pulmicort for acute exacerbation of COPD  PT, OT, and out of bed as tolerated.      History of Present Illness: 
4 Eyes Skin Assessment     NAME:  Emily Weiss  YOB: 1955  MEDICAL RECORD NUMBER:  [id number]    The patient is being assessed for  Transfer to New Unit    I agree that at least one RN has performed a thorough Head to Toe Skin Assessment on the patient. ALL assessment sites listed below have been assessed.      Areas assessed by both nurses:    Head, Face, Ears, Shoulders, Back, Chest, Arms, Elbows, Hands, Sacrum. Buttock, Coccyx, Ischium, and Legs. Feet and Heels        Does the Patient have a Wound? No noted wound(s)       Jacinto Prevention initiated by RN: Yes  Wound Care Orders initiated by RN: No    Pressure Injury (Stage 3,4, Unstageable, DTI, NWPT, and Complex wounds) if present, place Wound referral order by RN under : No    New Ostomies, if present place, Ostomy referral order under : No     Nurse 1 eSignature: Electronically signed by Sonu Madison RN on 3/31/24 at 9:58 PM EDT    **SHARE this note so that the co-signing nurse can place an eSignature**    Nurse 2 eSignature: Electronically signed by Kaylynn Forde RN on 3/31/24 at 10:57 PM EDT   
4 Eyes Skin Assessment     NAME:  Emily Weiss  YOB: 1955  MEDICAL RECORD NUMBER:  [id number]    The patient is being assessed for  Admission    I agree that at least one RN has performed a thorough Head to Toe Skin Assessment on the patient. ALL assessment sites listed below have been assessed.      Areas assessed by both nurses:            Does the Patient have a Wound? No noted wound(s)    -discoloration to coccyx  -dry flaky heels       Jacinto Prevention initiated by RN: Yes  Wound Care Orders initiated by RN: Yes    Pressure Injury (Stage 3,4, Unstageable, DTI, NWPT, and Complex wounds) if present, place Wound referral order by RN under : No    New Ostomies, if present place, Ostomy referral order under : No     Nurse 1 eSignature: Electronically signed by Tita Casillas RN on 3/30/24 at 2:49 PM EDT    **SHARE this note so that the co-signing nurse can place an eSignature**    Nurse 2 eSignature: Electronically signed by Sharyn Agrawal RN on 3/30/24 at 6:29 PM EDT    
Antibiotic Extended Infusion Policy     This patient is on medication that requires renal, weight, and/or indication dose adjustment.      Date Body Weight IBW  Adjusted BW SCr  CrCl Dialysis status BMI   3/29/2024 136.1 kg (300 lb) Ideal body weight: 52.4 kg (115 lb 7.7 oz)  Adjusted ideal body weight: 85.9 kg (189 lb 4.6 oz) Serum creatinine: 0.8 mg/dL 03/29/24 1547  Estimated creatinine clearance: 90 mL/min N/a Body mass index is 53.16 kg/m².       Pharmacy has dose-adjusted the following medication(s):    Ordered Medication: Cefepime 2000mg q12h     Order Changed/converted to: Cefepime 2000mg q8h    These changes were made per protocol according to the [facility]   Automatic Extended Infusion Dose Adjustment Policy.     *Please note this dose may need readjusted if patient's condition changes.    Please contact pharmacy with any questions regarding these changes.    Felicitas Mcnair, PharmD.  3/29/2024 6:02 PM    MIGUEL: [phone]    
Called report to Sonu on C all questions answered at this time.     Electronically signed by Ivone Newman RN on 3/31/2024 at 7:35 PM     
Dr. Tena updated with patient's transfer status to IMCU. [facility] for PICU or IMCU.    Beth Diaz RN  3/31/2024    
OCCUPATIONAL THERAPY INITIAL EVALUATION    [facility]  [address]  [place]. [place]        Date:2024                                                  Patient Name: Emily Weiss    MRN: [id number]    : 1955    Room: [address] [place] [address]      Evaluating OT: Suleman Cadet OTR/L; #[id number]     Referring Provider and Specific Provider Orders/Date:      24  OT eval and treat  Start:  24,   End:  24,   ONE TIME,   Standing Count:  1 Occurrences,   R         Gm Tena MD        Placement Recommendation: Subacute Rehab  vs home with occupational therapy and family assistance       Diagnosis:   1. Acute respiratory failure with hypoxia and hypercapnia ([facility])    2. Respiratory acidosis    3. Acute on chronic systolic CHF (congestive heart failure) ([facility])         Surgery: None      Pertinent Medical History:       Past Medical History:   Diagnosis Date    Acute congestive heart failure with left ventricular diastolic dysfunction ([facility]) 2020    Atrial fibrillation ([facility]) 2024    Cardiomegaly 2017    COPD (chronic obstructive pulmonary disease) ([facility]) 2024    Hyperlipidemia     Hypertension     Hypoxemia 2017    Nodule of right lung 2017    Obese     Oxygen dependent     2l n/c    Renal failure 2018    Substance abuse ([facility])     Type 2 diabetes mellitus with hyperglycemia, without long-term current use of insulin ([facility]) 2024       No past surgical history on file.     Precautions:  Fall Risk, Afib, NC O2,      Assessment of current deficits:     [x] Functional mobility  [x]ADLs  [x] Strength               []Cognition    [x] Functional transfers   [x] IADLs         [] Safety Awareness   [x]Endurance    [] Fine Coordination              [x] Balance      [] Vision/perception   []Sensation     []Gross Motor Coordination  [] ROM  [] Delirium                   [] Motor Control     OT PLAN OF CARE 
Patient placed on bipap at this time.  
Patient placed on bipap at this time.  
Patient taken off of bipap at this time per patient's request.  Placed on 4L via NC.  SPO 94%.  
Pharmacy Consultation Note  (Antibiotic Dosing and Monitoring)    Initial consult date: 3/29/24  Consulting physician/provider: Dr Pineda  Drug: Vancomycin  Indication: Pneumonia    Age/  Gender Height Weight IBW  Allergy Information   68 y.o./female 5'3\" 136.1 kg (300 lb)     Ideal body weight: 52.4 kg (115 lb 7.7 oz)  Adjusted ideal body weight: 85.9 kg (189 lb 4.6 oz)   Patient has no known allergies.      Renal Function:  Recent Labs     03/29/24  1547   BUN 13   CREATININE 0.8     No intake or output data in the 24 hours ending 03/29/24 1748    Vancomycin Monitoring:  Trough:  No results for input(s): \"VANCOTROUGH\" in the last 72 hours.  Random:  No results for input(s): \"VANCORANDOM\" in the last 72 hours.      Vancomycin Administration Times:  Recent vancomycin administrations        No vancomycin IV orders with administrations found.               Assessment:  Patient is a 68 y.o. female who has been initiated on vancomycin  Estimated Creatinine Clearance: 90 mL/min (based on SCr of 0.8 mg/dL).  To dose vancomycin, pharmacy will target an AUC of 400-600    Plan:  Vancomycin 1000 mg IV every 12 hours  Check levels when appropriate  Will continue to monitor renal function   Pharmacy to follow      Qamar Lock PharmD 3/29/2024 6:02 PM   [phone]    W: [phone]  
Pharmacy Consultation Note  (Antibiotic Dosing and Monitoring)    Initial consult date: 3/29/24  Consulting physician/provider: Dr Pineda  Drug: Vancomycin  Indication: Pneumonia    Age/  Gender Height Weight IBW  Allergy Information   68 y.o./female 5'3\" 136.1 kg (300 lb)     Ideal body weight: 52.4 kg (115 lb 7.7 oz)  Adjusted ideal body weight: 85.9 kg (189 lb 4.6 oz)   Patient has no known allergies.      Renal Function:  Recent Labs     03/29/24  1547 03/30/24  0538   BUN 13 16   CREATININE 0.8 0.9       No intake or output data in the 24 hours ending 03/30/24 1340    Vancomycin Monitoring:  Trough:  No results for input(s): \"VANCOTROUGH\" in the last 72 hours.  Random:  No results for input(s): \"VANCORANDOM\" in the last 72 hours.      Vancomycin Administration Times:  Recent vancomycin administrations                     vancomycin (VANCOCIN) 1,000 mg in sodium chloride 0.9 % 250 mL IVPB ([id number]) (mg) 1,000 mg New Bag 03/30/24 0636     1,000 mg New Bag 03/29/24 1843               Assessment:  Patient is a 68 y.o. female who has been initiated on vancomycin  Estimated Creatinine Clearance: 80 mL/min (based on SCr of 0.9 mg/dL).  To dose vancomycin, pharmacy will target an AUC of 400-600    Plan:  Vancomycin 1000 mg IV every 12 hours  Trough tomorrow @ 0600; Hold dose if > 20 mcg/mL  Will continue to monitor renal function   Pharmacy to follow      Qamar Lock, PharmD 3/30/2024 1:40 PM   [phone]    [facility]: [phone]  
Pharmacy Consultation Note  (Antibiotic Dosing and Monitoring)    Initial consult date: 3/29/24  Consulting physician/provider: Dr Pineda  Drug: Vancomycin  Indication: Pneumonia    Age/  Gender Height Weight IBW  Allergy Information   68 y.o./female 5'3\" 136.1 kg (300 lb)     Ideal body weight: 54.7 kg (120 lb 9.5 oz)  Adjusted ideal body weight: 87.2 kg (192 lb 2.5 oz)   Patient has no known allergies.      Renal Function:  Recent Labs     03/29/24  1547 03/30/24  0538 03/31/24  0510   BUN 13 16 29*   CREATININE 0.8 0.9 1.1*         Intake/Output Summary (Last 24 hours) at 3/31/2024 1137  Last data filed at 3/31/2024 0635  Gross per 24 hour   Intake 1859.17 ml   Output 1200 ml   Net 659.17 ml       Vancomycin Monitoring:  Trough:    Recent Labs     03/31/24  0510   VANCOTROUGH 12.6     Random:  No results for input(s): \"VANCORANDOM\" in the last 72 hours.      Vancomycin Administration Times:  Recent vancomycin administrations                     vancomycin (VANCOCIN) 1,000 mg in sodium chloride 0.9 % 250 mL IVPB ([id number]) (mg) 1,000 mg New Bag 03/31/24 0620     1,000 mg New Bag 03/30/24 1841     1,000 mg New Bag  0636     1,000 mg New Bag 03/29/24 1843               Assessment:  Patient is a 68 y.o. female who has been initiated on vancomycin  Estimated Creatinine Clearance: 66 mL/min (A) (based on SCr of 1.1 mg/dL (H)).  To dose vancomycin, pharmacy will target an AUC of 400-600  3/31: Trough @ 0510 = 12.6 mcg/mL;     Plan:  Vancomycin 1000 mg IV every 12 hours  Repeat levels as appropriate  Will continue to monitor renal function   Pharmacy to follow      Juancarlos TomlinD 3/31/2024 11:37 AM   [phone]    [facility]: [phone]  
Pharmacy Consultation Note  (Antibiotic Dosing and Monitoring)    Initial consult date: 3/29/24  Consulting physician/provider: Dr Pineda  Drug: Vancomycin  Indication: Pneumonia    Age/  Gender Height Weight IBW  Allergy Information   68 y.o./female 5'3\" 136.1 kg (300 lb)     Ideal body weight: 54.7 kg (120 lb 9.5 oz)  Adjusted ideal body weight: 87.2 kg (192 lb 2.5 oz)   Patient has no known allergies.      Renal Function:  Recent Labs     03/30/24  0538 03/31/24  0510 04/01/24  0559   BUN 16 29* 28*   CREATININE 0.9 1.1* 1.0         Intake/Output Summary (Last 24 hours) at 4/1/2024 1445  Last data filed at 3/31/2024 2028  Gross per 24 hour   Intake 1449.95 ml   Output 430 ml   Net 1019.95 ml         Vancomycin Monitoring:  Trough:    Recent Labs     03/31/24  0510   VANCOTROUGH 12.6       Random:  No results for input(s): \"VANCORANDOM\" in the last 72 hours.      Vancomycin Administration Times:  Recent vancomycin administrations                     vancomycin (VANCOCIN) 1,000 mg in sodium chloride 0.9 % 250 mL IVPB ([id number]) (mg) 1,000 mg New Bag 04/01/24 0617     1,000 mg New Bag 03/31/24 1824     1,000 mg New Bag  0620     1,000 mg New Bag 03/30/24 1841     1,000 mg New Bag  0636     1,000 mg New Bag 03/29/24 1843                      Assessment:  Patient is a 68 y.o. female who has been initiated on vancomycin  Estimated Creatinine Clearance: 73 mL/min (based on SCr of 1 mg/dL).  To dose vancomycin, pharmacy will target an AUC of 400-600  3/31: Trough @ 0510 = 12.6 mcg/mL;     Plan:  Vancomycin 1000 mg IV every 12 hours  Repeat trough tonight @ 1800. Hold dose if trough is >20 mcg/mL.  Will continue to monitor renal function   Pharmacy to follow      Sophy Love, PharmD, BCPS 4/1/2024 2:45 PM   [phone]      [facility]: [phone]  
Phlebotomist unable to obtain labs at this time.  
Physical Therapy  Physical Therapy Treatment Note/Plan of Care    Room #:  0639/0639-01  Patient Name: Emily Weiss  YOB: 1955  MRN: [id number]    Date of Service: 4/3/2024     Tentative placement recommendation: Subacute unless patient meets goals then Home Health Physical Therapy  Equipment recommendation: To be determined      Evaluating Physical Therapist: Long Peng, PT, DPT #[id number]      Specific Provider Orders/Date/Referring Provider :     04/01/24 0930    PT eval and treat  Start:  04/01/24 0930,   End:  04/01/24 0930,   ONE TIME,   Standing Count:  1 Occurrences,   R       Darinel, Gm JULES MD Acknowledge New    Admitting Diagnosis:   Respiratory acidosis [E87.29]  Acute on chronic systolic CHF (congestive heart failure) ([facility]) [I50.23]  Acute respiratory failure with hypoxia and hypercapnia (HCC) [J96.01, J96.02]  Acute on chronic respiratory failure with hypoxia and hypercapnia ([facility]) [J96.21, J96.22]      Surgery: none  Visit Diagnoses         Codes    Respiratory acidosis     E87.29            Patient Active Problem List   Diagnosis    Hypoxemia    Cardiomegaly    Nodule of right lung    Acute renal failure with tubular necrosis ([facility])    Rectal bleeding    Sepsis affecting skin    Cocaine abuse ([facility])    Metabolic acidosis    Volume overload    Physical deconditioning    Morbid obesity with BMI of 60.0-69.9, adult ([facility])    Skin breakdown    Vitamin D deficiency    COPD (chronic obstructive pulmonary disease) ([facility])    Acute on chronic respiratory failure with hypoxia and hypercapnia (HCC)    Acute on chronic diastolic heart failure ([facility])    Atrial fibrillation ([facility])    Primary hypertension    Type 2 diabetes mellitus with hyperglycemia, without long-term current use of insulin (HCC)    Acute on chronic right heart failure (HCC)    Community acquired pneumonia of right lung    SIRS (systemic inflammatory response syndrome) ([facility])    Acute respiratory failure with hypoxia and hypercapnia 
This  attempted to visit the patient for a length of stay visit and found the patient sleeping. This  left literature of the attempted visit. Spiritual Health will follow up as requested.    ian Dietz [place], [place] [address]  
This  met with the patient for a Spiritual Care Consult. The patient was reclining in the bed. The patient stated that she was doing better physically. This  had prayer with the patient and informed her that Spiritual Health was available as needed. Spiritual Health will follow up as requested.    ian Dietz [place], [place] [address]  
Urine Toxicology ordered STAT by Dr. Cabral.    Phone call to ED -  to update ED RN.    Beth Diaz RN  3/30/2024    
  [phone]      [facility]: [phone]  
Anna Lambert for acute exacerbation of COPD  PT, OT, and out of bed as tolerated, encourage the patient to get out of bed.  Discharge planning [facility] vs. [facility]      History of Present Illness:   Patient is a 68-year-old woman with above-mentioned medical problems who was admitted on March 29, 2024.  Patient was brought from a nursing home due to respiratory distress and altered mental status.  Blood gases showed pH of 7.036, pCO2 of 109.3, and a pO2 of 66.2.  Patient was placed on BiPAP after settings were adjusted and BiPAP mode was changed to AVAPS.  Gases improved.  Patient was lethargic but responsive.  She denies fever, chills, rigors.    Daily progress:    March 30, 2024: Patient remained on BiPAP overnight however she is more awake and responsive today.  Urine toxicology is pending.  She has no fever, chills, rigors.  White blood cell count is trending down.  BiPAP will be transition to nasal cannula to keep sat 88 to 94%.    March 31, 2024: Patient's blood culture came back positive with Staphylococcus species and Streptococcus species for 3 out of 4 bottles.  Urine culture is positive as well.  Patient is awake and interactive today and currently on 2 L nasal cannula she remains weak and debilitated.  She has no fever, chills, rigors.  Creatinine is mildly elevated and patient was started on 24 hours fluid challenge.    April 1, 2024: Emily is seen and examined at the bedside. She is awake, alert and currently on 2L supplemental oxygen. No new complaints or concerns. Creatinine improving.     April 2, 2024: Emily is seen and examined at the bedside. She is currently on 5L supplemental oxygen, increased from yesterday. She has been restarted on beta blocker and apixaban for atrial fibrillation. She has been encouraged to use BIPAP nightly. Denies chest pain, hemoptysis or syncope.     April 3, 2024: Ms. Emily Weiss was seen and examined at the bedside. She is resting comfortably, currently weaned down to 
  MPV 13.5* 13.1* 12.6*           Culture, Blood 1 [[id number]] Collected: 03/31/24 1159     Specimen: Blood Updated: 04/01/24 1646      Specimen Description .BLOOD LEFT      Special Requests           Culture NO GROWTH 1 DAY     Culture, Blood 2 [[id number]] Collected: 03/31/24 1206     Specimen: Blood Updated: 04/01/24 1643      Specimen Description .BLOOD RIGHT      Special Requests           Culture NO GROWTH 1 DAY       Culture, Blood 2 [[id number]] (Abnormal) Collected: 03/29/24 1828     Specimen: Blood Updated: 04/01/24 1309      Specimen Description .BLOOD      Special Requests           [employer] test comment AEROBIC BLD BOTTLE      Direct Exam DIRECT GRAM STAIN FROM BOTTLE: GRAM POSITIVE COCCI IN CHAINS GRAM POSITIVE COCCI IN CLUSTERS      Culture STREP VIRIDANS GROUP  A single positive blood culture of coagulase negative Staphylocci, diphtheroids,micrococci, Cutibacterium, viridans Streptocci, Bacillus, or Lactobacillus species should be interpreted with caution and viewed as a likely skin contaminant.         Culture, Blood 1 [[id number]] (Abnormal) Collected: 03/29/24 1828     Specimen: Blood Updated: 04/01/24 1055      Specimen Description .BLOOD      Special Requests           [employer] test comment ANAEROBIC BLD BOTTLE      Direct Exam DIRECT GRAM STAIN FROM BOTTLE: GRAM POSITIVE COCCI IN CLUSTERS       DIRECT GRAM STAIN FROM BOTTLE: GRAM VARIABLE RODS      Culture STAPHYLOCOCCUS SPECIES, COAGULASE NEGATIVE Identification and sensitivity to follow.       Radiology:   XR CHEST PORTABLE   Final Result   Worsening of signs of volume overload since February 10.             Assessment and Plan:  Principal Problem:    Acute on chronic respiratory failure with hypoxia and hypercapnia (HCC)  Active Problems:    COPD (chronic obstructive pulmonary disease) (HCC)    Acute on chronic diastolic heart failure (HCC)    Atrial fibrillation (HCC)    Type 2 diabetes mellitus with hyperglycemia, without long-term current 
diabetes mellitus with hyperglycemia, without long-term current use of insulin (HCC)    Acute on chronic right heart failure (HCC)    SIRS (systemic inflammatory response syndrome) (HCC)    Acute respiratory failure with hypoxia and hypercapnia (HCC)  Resolved Problems:    * No resolved hospital problems. *    Acute on chronic respiratory failure with hypoxia and hypercapnia  -ABG this morning improved, but still slightly acidemic  -Trial on high flow nasal cannula for now, will probably need to go back on NIV after lunch  -Treatment of underlying causes as noted below     2.  Acute on chronic heart failure with preserved ejection fraction acute on chronic right heart failure, primary hypertension  -Slightly hyponatremic today, appears close to euvolemic.  Will hold IV Lasix for now  -Restart home medications when blood pressure allows  -Strict intake and output, daily weights      3.  COPD with acute exacerbation  -Solu-Medrol 40 mg IV every 8 hours  -Scheduled DuoNeb and inhaled corticosteroid     4.  SIRS  -Tachycardia, tachypnea, leukocytosis.  Concern for sepsis  -Blood cultures in process  -Continue broad-spectrum antimicrobials with vancomycin and cefepime with plan to de-escalate based on culture results     5.  Paroxysmal atrial fibrillation  -Restart apixaban, discontinue enoxaparin  -Rate controlled, hold beta-blocker for now.  Restart beta-blocker when blood pressure allows     6.  Type 2 diabetes mellitus  -Basal insulin and corrective scale  -Hypoglycemia protocol        Code Status: Full  DVT prophylaxis: Apixaban     Disposition: Anticipate several more days inpatient treatment and monitoring until she is medically stable for discharge.  Will request PT/OT when clinically appropriate    NOTE: This report was transcribed using voice recognition software. Every effort was made to ensure accuracy; however, inadvertent computerized transcription errors may be present.     Electronically signed by 
COCCI IN CLUSTERS       DIRECT GRAM STAIN FROM BOTTLE: GRAM VARIABLE RODS      Culture STAPHYLOCOCCUS SPECIES, COAGULASE NEGATIVE Identification and sensitivity to follow.       Radiology:   XR CHEST PORTABLE   Final Result   Worsening of signs of volume overload since February 10.         XR CHEST PORTABLE    (Results Pending)       Assessment and Plan:  Principal Problem:    Acute on chronic respiratory failure with hypoxia and hypercapnia ([facility])  Active Problems:    COPD (chronic obstructive pulmonary disease) ([facility])    Acute on chronic diastolic heart failure (HCC)    Atrial fibrillation ([facility])    Type 2 diabetes mellitus with hyperglycemia, without long-term current use of insulin (HCC)    Acute on chronic right heart failure ([facility])    SIRS (systemic inflammatory response syndrome) ([facility])    Acute respiratory failure with hypoxia and hypercapnia (HCC)    Acute on chronic systolic CHF (congestive heart failure) ([facility])    Streptococcal bacteremia    Sepsis ([facility])    Acute on chronic respiratory failure with hypercapnia ([facility])  Resolved Problems:    * No resolved hospital problems. *    # Acute on chronic hypoxic and hypercapnic respiratory failure  -ABG 3/30 morning improved, but still slightly acidemic  -weaned to NC last few days. Encouraged her to wear Bipap and explained it also takes strain off right side of heart. Echo from Jan showed right sided heart failure.  -Treatment of underlying causes as noted below  -Patient is doing better wearing her BiPAP.     # Acute on chronic HFpEF,  chronic right heart failure, primary hypertension   -Slightly hypernatremic yesterday, appears close to euvolemic so IV Lasix was held Sat and discontinued altogether Sun. May need to resume diuretics but will discuss with pulmonary.  Diamox resumed today 4/3.  -Restart home medications when blood pressure allows; amlodipine and lisinopril remain on hold  -Strict intake and output, daily weights      # COPD with acute 
Lopressor 5 mg now and restart on Toprol XL 25 mg bid Mon evening.     # Type II DM with mild steroid-induced hyperglycemia  -Basal insulin and corrective scale  -Hypoglycemia protocol    # UDS positive for cocaine  -Patient did admit to the pulmonary CNP that she did not use 1 time in the last few weeks but has not done that for years.  She was very remorseful to her about that.    # Disposition  -anticipate few more days inpatient treatment and monitoring until she is medically stable for discharge.    -PT/OT evaluating patient and thus far recommending [facility] versus home with  PT/OT  -[place] [facility] [place]     Code Status: Full  DVT prophylaxis: Apixaban    Case discussed with pulmonary CNP on the floor.    NOTE: This report was transcribed using voice recognition software. Every effort was made to ensure accuracy; however, inadvertent computerized transcription errors may be present.     Electronically signed by Gm Tena MD on 4/2/2024 at 10:14 AM                  
SubCUTAneous, PRN, Huber Pineda DO    dextrose 10 % infusion, , IntraVENous, Continuous PRN, Huber Pineda DO    insulin lispro (HUMALOG) injection vial 0-4 Units, 0-4 Units, SubCUTAneous, TID WC, Huber Pineda DO    insulin lispro (HUMALOG) injection vial 0-4 Units, 0-4 Units, SubCUTAneous, Nightly, Huber Pineda DO    vancomycin (VANCOCIN) 1,000 mg in sodium chloride 0.9 % 250 mL IVPB ([id number]), 1,000 mg, IntraVENous, Q12H, Huber Pineda DO, Stopped at 03/31/24 0752    Review of Systems:   General: denies weight gain, denies loss of appetite, fever, chills, night sweats.  HEENT: denies headaches, dizziness, head trauma, visual changes, eye pain, tinnitus, nosebleeds, hoarseness or throat pain    Respiratory: denies chest pain,+ve dyspnea, cough but no hemoptysis  Cardiovascular: denies orthopnea, paroxysmal nocturnal dyspnea, leg swelling, and previous heart attack.    Gastrointestinal: denies pain, nausea vomiting, diarrhea, constipation, melena or bleeding.  Genitourinary: denies hematuria, frequency, urgency or dysuria  Neurology: denies syncope, seizures, paralysis, paraesthesia   Endocrine: denies polyuria, polydipsia, skin or hair changes, and heat or cold intolerance  Musculoskeletal: denies joint pain, swelling, arthritis or myalgia  Hematologic: denies bleeding, adenopathy and easy bruising  Skin: denies rashes and skin discoloration  Psychiatry: denies depression    Physical Exam:   Vital Signs:  /72   Pulse (!) 106   Temp 98.8 °F (37.1 °C) (Oral)   Resp 26   Ht 1.626 m (5' 4\")   Wt 135.9 kg (299 lb 8 oz)   LMP 07/18/2017   SpO2 98%   BMI 51.41 kg/m²     Input/Output:  In: 1859.2 [P.O.:600]  Out: 1200     Oxygen requirements: BiPAP    Ventilator Information:       General appearance: Ill looking, not in pain but in mild respiratory distress    HEENT: Atraumatic/normocephalic, EOMI, DMITRI, pharynx clear, moist mucosa, redness of the uvula appreciated,   Neck: 
denies numbness/tingling   Edema:  no   Endurance: fair  -    Vitals:  4 liters nasal cannula   Blood Pressure at rest  Blood Pressure during session    Heart Rate at rest  Heart Rate during session 102-145   SPO2 at rest %  SPO2 during session 83-94%     Patient education  Patient educated on role of Physical Therapy, risks of immobility, safety and plan of care, energy conservation,  importance of mobility while in hospital , purse lip breathing, ankle pumps, quad set and glut set for edema control, blood clot prevention, importance and purpose of adaptive device and adjusted to proper height for the patient., safety , O2 line management and safety , positioning for skin integrity and comfort, and proper use/technique of incentive spirometer     Patient response to education:   Pt verbalized understanding Pt demonstrated skill Pt requires further education in this area   Yes Partial Yes      Treatment:  Patient practiced and was instructed/facilitated in the following treatment: Patient Sat edge of bed 15 minutes with Supervision  to increase dynamic sitting balance and activity tolerance. Pt performed bed mobility, transfers, short ambulation in room, seated exercises.      Therapeutic Exercises:  ankle pumps, heel raises, hip abduction/adduction, long arc quad, and seated marching  x 15 reps.       At end of session, patient in chair with     call light and phone within reach,  all lines and tubes intact, nursing notified.      Patient would benefit from continued skilled Physical Therapy to improve functional independence and quality of life.         Patient's/ family goals   get stronger    Time in    1230  Time out  1313    Total Treatment Time  23 minutes    Evaluation time includes thorough review of current medical information, gathering information on past medical history/social history and prior level of function, completion of standardized testing/informal observation of tasks, assessment of data, and 
groups by 1/3 grade   Balance Sitting EOB:  fair +  Dynamic Standing:  fair with WW Sitting EOB: fair +  Dynamic Standing: fair with WW        Sitting EOB:  good -  Dynamic Standing: fair + with WW     Patient is Alert & Oriented x person, place, time, and situation and follows directions with moderate confusion  Sensation:  Patient  denies numbness/tingling   Edema:  no   Endurance: fair  -    Vitals:  5 liters nasal cannula   Blood Pressure at rest  Blood Pressure during session    Heart Rate at rest  Heart Rate during session  101-141   SPO2 at rest %  SPO2 during session 85-96%     Patient education  Patient educated on role of Physical Therapy, risks of immobility, safety and plan of care, energy conservation,  importance of mobility while in hospital , purse lip breathing, ankle pumps, quad set and glut set for edema control, blood clot prevention, importance and purpose of adaptive device and adjusted to proper height for the patient., safety , O2 line management and safety , positioning for skin integrity and comfort, and proper use/technique of incentive spirometer     Patient response to education:   Pt verbalized understanding Pt demonstrated skill Pt requires further education in this area   Yes Partial Yes      Treatment:  Patient practiced and was instructed/facilitated in the following treatment: Patient Sat edge of bed 15 minutes with Supervision  to increase dynamic sitting balance and activity tolerance. Pt performed bed mobility, transfers, short ambulation in room.     Therapeutic Exercises:  not performed      At end of session, patient in chair with     call light and phone within reach,  all lines and tubes intact, nursing notified.      Patient would benefit from continued skilled Physical Therapy to improve functional independence and quality of life.         Patient's/ family goals   get stronger    Time in     1352  Time out   1418    Total Treatment Time  26 minutes      CPT 
tablet 1,000 Units, 1,000 Units, Oral, Daily, Huber Pineda, DO, 1,000 Units at 04/03/24 0928    sodium chloride flush 0.9 % injection 5-40 mL, 5-40 mL, IntraVENous, 2 times per day, Huber Pineda, DO, 10 mL at 04/03/24 2044    sodium chloride flush 0.9 % injection 5-40 mL, 5-40 mL, IntraVENous, PRN, Kimberley Pinedas, DO    0.9 % sodium chloride infusion, , IntraVENous, PRN, Kimberley Pinedas, DO    ondansetron (ZOFRAN-ODT) disintegrating tablet 4 mg, 4 mg, Oral, Q8H PRN **OR** ondansetron (ZOFRAN) injection 4 mg, 4 mg, IntraVENous, Q6H PRN, Huber Pineda, DO    polyethylene glycol (GLYCOLAX) packet 17 g, 17 g, Oral, Daily PRN, Kimberley Pinedas, DO    acetaminophen (TYLENOL) tablet 650 mg, 650 mg, Oral, Q6H PRN, 650 mg at 04/01/24 1134 **OR** acetaminophen (TYLENOL) suppository 650 mg, 650 mg, Rectal, Q6H PRN, Kimberley Pinedas, DO    ipratropium 0.5 mg-albuterol 2.5 mg (DUONEB) nebulizer solution 1 Dose, 1 Dose, Inhalation, Q4H WA RT, Huber Pineda, DO, 1 Dose at 04/04/24 0525    ceFEPIme (MAXIPIME) 2,000 mg in sodium chloride 0.9 % 100 mL IVPB ([id number]), 2,000 mg, IntraVENous, q8h, Huber Pineda, DO, Last Rate: 25 mL/hr at 04/04/24 0551, 2,000 mg at 04/04/24 0551    insulin glargine (LANTUS) injection vial 10 Units, 10 Units, SubCUTAneous, Nightly, Huber Pineda, DO, 10 Units at 04/03/24 2051    glucose chewable tablet 16 g, 4 tablet, Oral, PRN, Kimberely Pinedas, DO    dextrose bolus 10% 125 mL, 125 mL, IntraVENous, PRN **OR** dextrose bolus 10% 250 mL, 250 mL, IntraVENous, PRN, Huber Pineda DO    glucagon injection 1 mg, 1 mg, SubCUTAneous, PRN, Huber Pineda DO    dextrose 10 % infusion, , IntraVENous, Continuous PRN, Huber Pineda DO    insulin lispro (HUMALOG) injection vial 0-4 Units, 0-4 Units, SubCUTAneous, TID , Huber Pineda DO, 1 Units at 04/03/24 1706    insulin lispro (HUMALOG) injection vial 0-4 Units, 0-4 Units, SubCUTAneous, Nightly, 
  -PT/OT evaluating patient and thus far recommending [facility] versus home with HH PT/OT  -[place] [facility]     Code Status: Full  DVT prophylaxis: Apixaban    NOTE: This report was transcribed using voice recognition software. Every effort was made to ensure accuracy; however, inadvertent computerized transcription errors may be present.     Electronically signed by Gm Tena MD on 4/5/2024 at 6:09 PM                  
respiratory distress    HEENT: Atraumatic/normocephalic, EOMI, DMITRI, pharynx clear, moist mucosa, redness of the uvula appreciated,   Neck: Supple, no jugular venous distension, lymphadenopathy, thyromegaly or carotid bruits  Chest: Decreased breath sounds, +ve faint wheezing, +ve crackles and no tenderness over ribs   Cardiovascular: Normal S1 , S2, regular rate and rhythm, no murmur, rub or gallop  Abdomen: Normal sounds present, soft, lax with no tenderness, no hepatosplenomegaly, and no masses  Extremities: +ve edema. Pulses are equally present.   Skin: intact, no rashes   Neurologic: Alert and oriented x 3, No focal deficit     Investigations:  Labs, radiological imaging and cardiac work up were personally reviewed        ICU STAFF PHYSICIAN NOTE OF PERSONAL INVOLVEMENT IN CARE  As the attending physician, I certify that I personally reviewed the patient's history and personally examined the patient to confirm the physical findings described above, and that I reviewed the relevant imaging studies and available reports.  I also discussed the differential diagnosis and all of the proposed management plans with the patient and individuals accompanying the patient to this visit.  They had the opportunity to ask questions about the proposed management plans and to have those questions answered.    This patient has a high probability of sudden, clinically significant deterioration, which requires the highest level of physician preparedness to intervene urgently.  I managed/supervised life or organ supporting interventions that required frequent physician assessment.  I devoted my full attention to the direct care of this patient for the amount of time indicated below.  Time I spent with family or surrogate(s) is included only if the patient was incapable of providing the necessary information or participating in medical decision-making.  Time devoted to teaching and to any procedures I billed separately is not

## 2024-04-08 LAB
MICROORGANISM SPEC CULT: NORMAL
SERVICE CMNT-IMP: NORMAL
SPECIMEN DESCRIPTION: NORMAL